# Patient Record
Sex: FEMALE | Race: WHITE | NOT HISPANIC OR LATINO | Employment: OTHER | URBAN - METROPOLITAN AREA
[De-identification: names, ages, dates, MRNs, and addresses within clinical notes are randomized per-mention and may not be internally consistent; named-entity substitution may affect disease eponyms.]

---

## 2017-02-11 ENCOUNTER — HOSPITAL ENCOUNTER (EMERGENCY)
Facility: HOSPITAL | Age: 57
Discharge: HOME | End: 2017-02-12
Attending: EMERGENCY MEDICINE
Payer: COMMERCIAL

## 2017-02-11 VITALS
WEIGHT: 160 LBS | DIASTOLIC BLOOD PRESSURE: 64 MMHG | TEMPERATURE: 98.1 F | OXYGEN SATURATION: 99 % | SYSTOLIC BLOOD PRESSURE: 110 MMHG | RESPIRATION RATE: 18 BRPM | HEART RATE: 61 BPM

## 2017-02-11 DIAGNOSIS — T50.901A OVERDOSE: Primary | ICD-10-CM

## 2017-02-11 DIAGNOSIS — F10.929 ALCOHOL INTOXICATION (HCC): ICD-10-CM

## 2017-02-11 DIAGNOSIS — F32.A DEPRESSION: ICD-10-CM

## 2017-02-11 LAB
ALBUMIN SERPL BCP-MCNC: 3.6 G/DL (ref 3.5–5)
ALP SERPL-CCNC: 84 U/L (ref 46–116)
ALT SERPL W P-5'-P-CCNC: 31 U/L (ref 12–78)
AMPHETAMINES SERPL QL SCN: NEGATIVE
ANION GAP SERPL CALCULATED.3IONS-SCNC: 9 MMOL/L (ref 4–13)
APAP SERPL-MCNC: <2 UG/ML (ref 10–30)
APTT PPP: 27 SECONDS (ref 24–33)
AST SERPL W P-5'-P-CCNC: 10 U/L (ref 5–45)
BARBITURATES UR QL: NEGATIVE
BASOPHILS # BLD AUTO: 0 THOUSANDS/ΜL (ref 0–0.1)
BASOPHILS NFR BLD AUTO: 1 % (ref 0–1)
BENZODIAZ UR QL: POSITIVE
BILIRUB DIRECT SERPL-MCNC: 0.1 MG/DL (ref 0–0.2)
BILIRUB SERPL-MCNC: 0.3 MG/DL (ref 0.2–1)
BILIRUB UR QL STRIP: NEGATIVE
BUN SERPL-MCNC: 10 MG/DL (ref 5–25)
CALCIUM SERPL-MCNC: 8.9 MG/DL (ref 8.3–10.1)
CHLORIDE SERPL-SCNC: 107 MMOL/L (ref 100–108)
CLARITY UR: CLEAR
CO2 SERPL-SCNC: 28 MMOL/L (ref 21–32)
COCAINE UR QL: NEGATIVE
COLOR UR: NORMAL
CREAT SERPL-MCNC: 0.87 MG/DL (ref 0.6–1.3)
EOSINOPHIL # BLD AUTO: 0.3 THOUSAND/ΜL (ref 0–0.61)
EOSINOPHIL NFR BLD AUTO: 4 % (ref 0–6)
ERYTHROCYTE [DISTWIDTH] IN BLOOD BY AUTOMATED COUNT: 13.1 % (ref 11.6–15.1)
ETHANOL EXG-MCNC: 0.06 MG/DL
ETHANOL SERPL-MCNC: 174 MG/DL (ref 0–3)
GFR SERPL CREATININE-BSD FRML MDRD: >60 ML/MIN/1.73SQ M
GLUCOSE SERPL-MCNC: 90 MG/DL (ref 65–140)
GLUCOSE UR STRIP-MCNC: NEGATIVE MG/DL
HCT VFR BLD AUTO: 46.9 % (ref 37–47)
HGB BLD-MCNC: 15.1 G/DL (ref 12–16)
HGB UR QL STRIP.AUTO: NEGATIVE
INR PPP: 1.06 (ref 0.86–1.16)
KETONES UR STRIP-MCNC: NEGATIVE MG/DL
LEUKOCYTE ESTERASE UR QL STRIP: NEGATIVE
LYMPHOCYTES # BLD AUTO: 3.4 THOUSANDS/ΜL (ref 0.6–4.47)
LYMPHOCYTES NFR BLD AUTO: 41 % (ref 14–44)
MCH RBC QN AUTO: 29.2 PG (ref 27–31)
MCHC RBC AUTO-ENTMCNC: 32.3 G/DL (ref 31.4–37.4)
MCV RBC AUTO: 90 FL (ref 82–98)
METHADONE UR QL: NEGATIVE
MONOCYTES # BLD AUTO: 0.4 THOUSAND/ΜL (ref 0.17–1.22)
MONOCYTES NFR BLD AUTO: 5 % (ref 4–12)
NEUTROPHILS # BLD AUTO: 4.2 THOUSANDS/ΜL (ref 1.85–7.62)
NEUTS SEG NFR BLD AUTO: 51 % (ref 43–75)
NITRITE UR QL STRIP: NEGATIVE
NRBC BLD AUTO-RTO: 0 /100 WBCS
OPIATES UR QL SCN: NEGATIVE
PCP UR QL: NEGATIVE
PH UR STRIP.AUTO: 6 [PH] (ref 5–9)
PLATELET # BLD AUTO: 399 THOUSANDS/UL (ref 130–400)
PMV BLD AUTO: 7 FL (ref 8.9–12.7)
POTASSIUM SERPL-SCNC: 3.8 MMOL/L (ref 3.5–5.3)
PROT SERPL-MCNC: 7.5 G/DL (ref 6.4–8.2)
PROT UR STRIP-MCNC: NEGATIVE MG/DL
PROTHROMBIN TIME: 11.1 SECONDS (ref 9.4–11.7)
RBC # BLD AUTO: 5.19 MILLION/UL (ref 4.2–5.4)
SALICYLATES SERPL-MCNC: 5.7 MG/DL (ref 3–20)
SODIUM SERPL-SCNC: 144 MMOL/L (ref 136–145)
SP GR UR STRIP.AUTO: <=1.005 (ref 1–1.03)
THC UR QL: NEGATIVE
UROBILINOGEN UR QL STRIP.AUTO: 0.2 E.U./DL
WBC # BLD AUTO: 8.4 THOUSAND/UL (ref 4.8–10.8)

## 2017-02-11 PROCEDURE — 85730 THROMBOPLASTIN TIME PARTIAL: CPT | Performed by: EMERGENCY MEDICINE

## 2017-02-11 PROCEDURE — 85610 PROTHROMBIN TIME: CPT | Performed by: EMERGENCY MEDICINE

## 2017-02-11 PROCEDURE — 80307 DRUG TEST PRSMV CHEM ANLYZR: CPT | Performed by: EMERGENCY MEDICINE

## 2017-02-11 PROCEDURE — 96372 THER/PROPH/DIAG INJ SC/IM: CPT

## 2017-02-11 PROCEDURE — 80076 HEPATIC FUNCTION PANEL: CPT | Performed by: EMERGENCY MEDICINE

## 2017-02-11 PROCEDURE — 82075 ASSAY OF BREATH ETHANOL: CPT | Performed by: EMERGENCY MEDICINE

## 2017-02-11 PROCEDURE — 80048 BASIC METABOLIC PNL TOTAL CA: CPT | Performed by: EMERGENCY MEDICINE

## 2017-02-11 PROCEDURE — 96361 HYDRATE IV INFUSION ADD-ON: CPT

## 2017-02-11 PROCEDURE — 36415 COLL VENOUS BLD VENIPUNCTURE: CPT | Performed by: EMERGENCY MEDICINE

## 2017-02-11 PROCEDURE — 81003 URINALYSIS AUTO W/O SCOPE: CPT | Performed by: EMERGENCY MEDICINE

## 2017-02-11 PROCEDURE — 85025 COMPLETE CBC W/AUTO DIFF WBC: CPT | Performed by: EMERGENCY MEDICINE

## 2017-02-11 PROCEDURE — 80320 DRUG SCREEN QUANTALCOHOLS: CPT | Performed by: EMERGENCY MEDICINE

## 2017-02-11 PROCEDURE — 80329 ANALGESICS NON-OPIOID 1 OR 2: CPT | Performed by: EMERGENCY MEDICINE

## 2017-02-11 PROCEDURE — 93005 ELECTROCARDIOGRAM TRACING: CPT | Performed by: EMERGENCY MEDICINE

## 2017-02-11 PROCEDURE — 96360 HYDRATION IV INFUSION INIT: CPT

## 2017-02-11 RX ORDER — PANTOPRAZOLE SODIUM 40 MG/1
40 TABLET, DELAYED RELEASE ORAL DAILY
COMMUNITY
End: 2017-12-12

## 2017-02-11 RX ORDER — METOPROLOL TARTRATE 100 MG/1
200 TABLET ORAL DAILY
COMMUNITY
End: 2018-06-06 | Stop reason: HOSPADM

## 2017-02-11 RX ORDER — LOSARTAN POTASSIUM 25 MG/1
25 TABLET ORAL DAILY
COMMUNITY
End: 2019-10-01 | Stop reason: HOSPADM

## 2017-02-11 RX ORDER — DOXEPIN HYDROCHLORIDE 25 MG/1
25 CAPSULE ORAL
COMMUNITY
End: 2017-07-28 | Stop reason: ALTCHOICE

## 2017-02-11 RX ORDER — GABAPENTIN 100 MG/1
100 CAPSULE ORAL 3 TIMES DAILY
COMMUNITY
End: 2017-07-28 | Stop reason: ALTCHOICE

## 2017-02-11 RX ORDER — OLANZAPINE 10 MG/1
10 INJECTION, POWDER, LYOPHILIZED, FOR SOLUTION INTRAMUSCULAR ONCE
Status: COMPLETED | OUTPATIENT
Start: 2017-02-11 | End: 2017-02-11

## 2017-02-11 RX ORDER — LORAZEPAM 2 MG/ML
2 INJECTION INTRAMUSCULAR ONCE
Status: COMPLETED | OUTPATIENT
Start: 2017-02-11 | End: 2017-02-11

## 2017-02-11 RX ORDER — OXYCODONE HCL 10 MG/1
10 TABLET, FILM COATED, EXTENDED RELEASE ORAL EVERY 12 HOURS PRN
COMMUNITY
End: 2017-07-28 | Stop reason: ALTCHOICE

## 2017-02-11 RX ORDER — DULOXETIN HYDROCHLORIDE 60 MG/1
60 CAPSULE, DELAYED RELEASE ORAL DAILY
COMMUNITY
End: 2017-12-12

## 2017-02-11 RX ORDER — ATORVASTATIN CALCIUM 80 MG/1
80 TABLET, FILM COATED ORAL DAILY
COMMUNITY
End: 2019-10-17

## 2017-02-11 RX ORDER — ALPRAZOLAM 1 MG/1
1 TABLET ORAL 4 TIMES DAILY
COMMUNITY
End: 2017-12-12

## 2017-02-11 RX ORDER — GEMFIBROZIL 600 MG/1
600 TABLET, FILM COATED ORAL
COMMUNITY
End: 2018-08-05 | Stop reason: ALTCHOICE

## 2017-02-11 RX ORDER — ZOLPIDEM TARTRATE 10 MG/1
10 TABLET ORAL
COMMUNITY
End: 2017-12-12

## 2017-02-11 RX ADMIN — OLANZAPINE 10 MG: 10 INJECTION, POWDER, FOR SOLUTION INTRAMUSCULAR at 21:53

## 2017-02-11 RX ADMIN — LORAZEPAM 2 MG: 2 INJECTION INTRAMUSCULAR; INTRAVENOUS at 21:53

## 2017-02-11 RX ADMIN — SODIUM CHLORIDE 1000 ML: 0.9 INJECTION, SOLUTION INTRAVENOUS at 16:29

## 2017-02-12 PROCEDURE — 99285 EMERGENCY DEPT VISIT HI MDM: CPT

## 2017-02-14 LAB
ATRIAL RATE: 62 BPM
P AXIS: 26 DEGREES
PR INTERVAL: 176 MS
QRS AXIS: -8 DEGREES
QRSD INTERVAL: 92 MS
QT INTERVAL: 456 MS
QTC INTERVAL: 462 MS
T WAVE AXIS: 60 DEGREES
VENTRICULAR RATE: 62 BPM

## 2017-07-28 ENCOUNTER — APPOINTMENT (EMERGENCY)
Dept: RADIOLOGY | Facility: HOSPITAL | Age: 57
End: 2017-07-28
Payer: COMMERCIAL

## 2017-07-28 ENCOUNTER — HOSPITAL ENCOUNTER (EMERGENCY)
Facility: HOSPITAL | Age: 57
Discharge: HOME | End: 2017-07-28
Attending: EMERGENCY MEDICINE
Payer: COMMERCIAL

## 2017-07-28 VITALS
OXYGEN SATURATION: 98 % | SYSTOLIC BLOOD PRESSURE: 123 MMHG | DIASTOLIC BLOOD PRESSURE: 66 MMHG | HEIGHT: 64 IN | WEIGHT: 135 LBS | RESPIRATION RATE: 18 BRPM | BODY MASS INDEX: 23.05 KG/M2 | TEMPERATURE: 97.7 F | HEART RATE: 96 BPM

## 2017-07-28 DIAGNOSIS — S52.123A RADIAL HEAD FRACTURE: Primary | ICD-10-CM

## 2017-07-28 PROCEDURE — 99283 EMERGENCY DEPT VISIT LOW MDM: CPT

## 2017-07-28 PROCEDURE — 73080 X-RAY EXAM OF ELBOW: CPT

## 2017-07-28 PROCEDURE — 73030 X-RAY EXAM OF SHOULDER: CPT

## 2017-07-28 RX ORDER — OXYCODONE HYDROCHLORIDE AND ACETAMINOPHEN 5; 325 MG/1; MG/1
1 TABLET ORAL EVERY 6 HOURS PRN
Qty: 15 TABLET | Refills: 0 | Status: SHIPPED | OUTPATIENT
Start: 2017-07-28 | End: 2017-12-12

## 2017-07-28 RX ORDER — IBUPROFEN 400 MG/1
400 TABLET ORAL ONCE
Status: COMPLETED | OUTPATIENT
Start: 2017-07-28 | End: 2017-07-28

## 2017-07-28 RX ORDER — OXYCODONE HYDROCHLORIDE AND ACETAMINOPHEN 5; 325 MG/1; MG/1
1 TABLET ORAL ONCE
Status: COMPLETED | OUTPATIENT
Start: 2017-07-28 | End: 2017-07-28

## 2017-07-28 RX ADMIN — IBUPROFEN 400 MG: 400 TABLET, FILM COATED ORAL at 18:31

## 2017-07-28 RX ADMIN — OXYCODONE HYDROCHLORIDE AND ACETAMINOPHEN 1 TABLET: 5; 325 TABLET ORAL at 18:45

## 2017-08-02 ENCOUNTER — APPOINTMENT (OUTPATIENT)
Dept: RADIOLOGY | Facility: CLINIC | Age: 57
End: 2017-08-02
Payer: COMMERCIAL

## 2017-08-02 ENCOUNTER — GENERIC CONVERSION - ENCOUNTER (OUTPATIENT)
Dept: OTHER | Facility: OTHER | Age: 57
End: 2017-08-02

## 2017-08-02 DIAGNOSIS — S52.132A: ICD-10-CM

## 2017-08-02 DIAGNOSIS — S42.402A CLOSED FRACTURE OF LOWER END OF LEFT HUMERUS: ICD-10-CM

## 2017-08-02 PROCEDURE — 73070 X-RAY EXAM OF ELBOW: CPT

## 2017-08-09 ENCOUNTER — ALLSCRIPTS OFFICE VISIT (OUTPATIENT)
Dept: OTHER | Facility: OTHER | Age: 57
End: 2017-08-09

## 2017-08-09 ENCOUNTER — APPOINTMENT (OUTPATIENT)
Dept: RADIOLOGY | Facility: CLINIC | Age: 57
End: 2017-08-09
Payer: COMMERCIAL

## 2017-08-09 DIAGNOSIS — S52.132A: ICD-10-CM

## 2017-08-09 PROCEDURE — 73070 X-RAY EXAM OF ELBOW: CPT

## 2017-08-10 ENCOUNTER — GENERIC CONVERSION - ENCOUNTER (OUTPATIENT)
Dept: OTHER | Facility: OTHER | Age: 57
End: 2017-08-10

## 2017-08-29 ENCOUNTER — APPOINTMENT (OUTPATIENT)
Dept: RADIOLOGY | Facility: CLINIC | Age: 57
End: 2017-08-29
Payer: COMMERCIAL

## 2017-08-29 ENCOUNTER — ALLSCRIPTS OFFICE VISIT (OUTPATIENT)
Dept: OTHER | Facility: OTHER | Age: 57
End: 2017-08-29

## 2017-08-29 DIAGNOSIS — S52.132A: ICD-10-CM

## 2017-08-29 PROCEDURE — 73080 X-RAY EXAM OF ELBOW: CPT

## 2017-09-27 ENCOUNTER — APPOINTMENT (OUTPATIENT)
Dept: RADIOLOGY | Facility: CLINIC | Age: 57
End: 2017-09-27
Payer: COMMERCIAL

## 2017-09-27 ENCOUNTER — GENERIC CONVERSION - ENCOUNTER (OUTPATIENT)
Dept: OTHER | Facility: OTHER | Age: 57
End: 2017-09-27

## 2017-09-27 DIAGNOSIS — S42.402A CLOSED FRACTURE OF LOWER END OF LEFT HUMERUS: ICD-10-CM

## 2017-09-27 PROCEDURE — 73080 X-RAY EXAM OF ELBOW: CPT

## 2017-11-15 DIAGNOSIS — S52.132A: ICD-10-CM

## 2017-12-12 ENCOUNTER — APPOINTMENT (INPATIENT)
Dept: RADIOLOGY | Facility: HOSPITAL | Age: 57
DRG: 014 | End: 2017-12-12
Payer: COMMERCIAL

## 2017-12-12 ENCOUNTER — APPOINTMENT (EMERGENCY)
Dept: RADIOLOGY | Facility: HOSPITAL | Age: 57
DRG: 014 | End: 2017-12-12
Payer: COMMERCIAL

## 2017-12-12 ENCOUNTER — HOSPITAL ENCOUNTER (INPATIENT)
Facility: HOSPITAL | Age: 57
LOS: 1 days | Discharge: HOME | DRG: 014 | End: 2017-12-13
Attending: EMERGENCY MEDICINE | Admitting: INTERNAL MEDICINE
Payer: COMMERCIAL

## 2017-12-12 DIAGNOSIS — R29.898 LOWER EXTREMITY WEAKNESS: ICD-10-CM

## 2017-12-12 DIAGNOSIS — R20.0 LEFT UPPER EXTREMITY NUMBNESS: ICD-10-CM

## 2017-12-12 DIAGNOSIS — R51.9 HEADACHE: ICD-10-CM

## 2017-12-12 DIAGNOSIS — R46.89 BEHAVIORAL CHANGE: Primary | ICD-10-CM

## 2017-12-12 DIAGNOSIS — R47.81 SLURRED SPEECH: ICD-10-CM

## 2017-12-12 PROBLEM — R53.1 WEAKNESS: Status: ACTIVE | Noted: 2017-12-12

## 2017-12-12 PROBLEM — I10 HYPERTENSION: Status: ACTIVE | Noted: 2017-12-12

## 2017-12-12 PROBLEM — M79.7 FIBROMYALGIA: Status: ACTIVE | Noted: 2017-12-12

## 2017-12-12 PROBLEM — E78.5 HYPERLIPIDEMIA: Status: ACTIVE | Noted: 2017-12-12

## 2017-12-12 LAB
ALBUMIN SERPL BCP-MCNC: 3.6 G/DL (ref 3.5–5)
ALP SERPL-CCNC: 74 U/L (ref 46–116)
ALT SERPL W P-5'-P-CCNC: 36 U/L (ref 12–78)
AMPHETAMINES SERPL QL SCN: NEGATIVE
ANION GAP SERPL CALCULATED.3IONS-SCNC: 8 MMOL/L (ref 4–13)
APTT PPP: 25 SECONDS (ref 23–35)
AST SERPL W P-5'-P-CCNC: 14 U/L (ref 5–45)
BACTERIA UR QL AUTO: ABNORMAL /HPF
BARBITURATES UR QL: NEGATIVE
BASOPHILS # BLD AUTO: 0 THOUSANDS/ΜL (ref 0–0.1)
BASOPHILS NFR BLD AUTO: 0 % (ref 0–1)
BENZODIAZ UR QL: POSITIVE
BILIRUB SERPL-MCNC: 0.2 MG/DL (ref 0.2–1)
BILIRUB UR QL STRIP: NEGATIVE
BUN SERPL-MCNC: 12 MG/DL (ref 5–25)
CALCIUM SERPL-MCNC: 8.5 MG/DL (ref 8.3–10.1)
CHLORIDE SERPL-SCNC: 107 MMOL/L (ref 100–108)
CK SERPL-CCNC: 76 U/L (ref 26–192)
CLARITY UR: ABNORMAL
CO2 SERPL-SCNC: 28 MMOL/L (ref 21–32)
COCAINE UR QL: NEGATIVE
COLOR UR: YELLOW
CREAT SERPL-MCNC: 0.96 MG/DL (ref 0.6–1.3)
EOSINOPHIL # BLD AUTO: 0.4 THOUSAND/ΜL (ref 0–0.61)
EOSINOPHIL NFR BLD AUTO: 4 % (ref 0–6)
ERYTHROCYTE [DISTWIDTH] IN BLOOD BY AUTOMATED COUNT: 13.9 % (ref 11.6–15.1)
ETHANOL SERPL-MCNC: <3 MG/DL (ref 0–3)
GFR SERPL CREATININE-BSD FRML MDRD: 66 ML/MIN/1.73SQ M
GLUCOSE SERPL-MCNC: 87 MG/DL (ref 65–140)
GLUCOSE SERPL-MCNC: 95 MG/DL (ref 65–140)
GLUCOSE UR STRIP-MCNC: NEGATIVE MG/DL
HCT VFR BLD AUTO: 42.3 % (ref 37–47)
HGB BLD-MCNC: 14.1 G/DL (ref 12–16)
HGB UR QL STRIP.AUTO: NEGATIVE
INR PPP: 1.07 (ref 0.86–1.16)
KETONES UR STRIP-MCNC: NEGATIVE MG/DL
LEUKOCYTE ESTERASE UR QL STRIP: ABNORMAL
LYMPHOCYTES # BLD AUTO: 3.6 THOUSANDS/ΜL (ref 0.6–4.47)
LYMPHOCYTES NFR BLD AUTO: 39 % (ref 14–44)
MCH RBC QN AUTO: 30.5 PG (ref 27–31)
MCHC RBC AUTO-ENTMCNC: 33.4 G/DL (ref 31.4–37.4)
MCV RBC AUTO: 91 FL (ref 82–98)
METHADONE UR QL: NEGATIVE
MONOCYTES # BLD AUTO: 0.5 THOUSAND/ΜL (ref 0.17–1.22)
MONOCYTES NFR BLD AUTO: 5 % (ref 4–12)
NEUTROPHILS # BLD AUTO: 4.8 THOUSANDS/ΜL (ref 1.85–7.62)
NEUTS SEG NFR BLD AUTO: 52 % (ref 43–75)
NITRITE UR QL STRIP: NEGATIVE
NON-SQ EPI CELLS URNS QL MICRO: ABNORMAL /HPF
NRBC BLD AUTO-RTO: 0 /100 WBCS
OPIATES UR QL SCN: NEGATIVE
PCP UR QL: NEGATIVE
PH UR STRIP.AUTO: 6 [PH] (ref 5–9)
PLATELET # BLD AUTO: 417 THOUSANDS/UL (ref 130–400)
PMV BLD AUTO: 7.3 FL (ref 8.9–12.7)
POTASSIUM SERPL-SCNC: 3.8 MMOL/L (ref 3.5–5.3)
PROT SERPL-MCNC: 6.8 G/DL (ref 6.4–8.2)
PROT UR STRIP-MCNC: NEGATIVE MG/DL
PROTHROMBIN TIME: 11.2 SECONDS (ref 9.4–11.7)
RBC # BLD AUTO: 4.63 MILLION/UL (ref 4.2–5.4)
RBC #/AREA URNS AUTO: ABNORMAL /HPF
SODIUM SERPL-SCNC: 143 MMOL/L (ref 136–145)
SP GR UR STRIP.AUTO: 1.01 (ref 1–1.03)
THC UR QL: NEGATIVE
TROPONIN I SERPL-MCNC: <0.02 NG/ML
UROBILINOGEN UR QL STRIP.AUTO: 0.2 E.U./DL
VIT B12 SERPL-MCNC: 465 PG/ML (ref 100–900)
WBC # BLD AUTO: 9.2 THOUSAND/UL (ref 4.8–10.8)
WBC #/AREA URNS AUTO: ABNORMAL /HPF

## 2017-12-12 PROCEDURE — 36415 COLL VENOUS BLD VENIPUNCTURE: CPT | Performed by: EMERGENCY MEDICINE

## 2017-12-12 PROCEDURE — 85025 COMPLETE CBC W/AUTO DIFF WBC: CPT | Performed by: EMERGENCY MEDICINE

## 2017-12-12 PROCEDURE — 72125 CT NECK SPINE W/O DYE: CPT

## 2017-12-12 PROCEDURE — 85610 PROTHROMBIN TIME: CPT | Performed by: EMERGENCY MEDICINE

## 2017-12-12 PROCEDURE — 70496 CT ANGIOGRAPHY HEAD: CPT

## 2017-12-12 PROCEDURE — 93005 ELECTROCARDIOGRAM TRACING: CPT | Performed by: EMERGENCY MEDICINE

## 2017-12-12 PROCEDURE — 70498 CT ANGIOGRAPHY NECK: CPT

## 2017-12-12 PROCEDURE — 85730 THROMBOPLASTIN TIME PARTIAL: CPT | Performed by: EMERGENCY MEDICINE

## 2017-12-12 PROCEDURE — 80320 DRUG SCREEN QUANTALCOHOLS: CPT | Performed by: EMERGENCY MEDICINE

## 2017-12-12 PROCEDURE — 82607 VITAMIN B-12: CPT | Performed by: EMERGENCY MEDICINE

## 2017-12-12 PROCEDURE — 71010 HB CHEST X-RAY 1 VIEW FRONTAL: CPT

## 2017-12-12 PROCEDURE — 80307 DRUG TEST PRSMV CHEM ANLYZR: CPT | Performed by: EMERGENCY MEDICINE

## 2017-12-12 PROCEDURE — 81001 URINALYSIS AUTO W/SCOPE: CPT | Performed by: EMERGENCY MEDICINE

## 2017-12-12 PROCEDURE — 82550 ASSAY OF CK (CPK): CPT | Performed by: EMERGENCY MEDICINE

## 2017-12-12 PROCEDURE — 99285 EMERGENCY DEPT VISIT HI MDM: CPT

## 2017-12-12 PROCEDURE — 70551 MRI BRAIN STEM W/O DYE: CPT

## 2017-12-12 PROCEDURE — 80053 COMPREHEN METABOLIC PANEL: CPT | Performed by: EMERGENCY MEDICINE

## 2017-12-12 PROCEDURE — 93005 ELECTROCARDIOGRAM TRACING: CPT

## 2017-12-12 PROCEDURE — 82948 REAGENT STRIP/BLOOD GLUCOSE: CPT

## 2017-12-12 PROCEDURE — 84484 ASSAY OF TROPONIN QUANT: CPT | Performed by: EMERGENCY MEDICINE

## 2017-12-12 PROCEDURE — 70450 CT HEAD/BRAIN W/O DYE: CPT

## 2017-12-12 PROCEDURE — 87081 CULTURE SCREEN ONLY: CPT | Performed by: INTERNAL MEDICINE

## 2017-12-12 PROCEDURE — 86618 LYME DISEASE ANTIBODY: CPT | Performed by: EMERGENCY MEDICINE

## 2017-12-12 RX ORDER — CLONAZEPAM 0.5 MG/1
0.5 TABLET ORAL 2 TIMES DAILY
COMMUNITY
End: 2017-12-26

## 2017-12-12 RX ORDER — GEMFIBROZIL 600 MG/1
600 TABLET, FILM COATED ORAL
Status: DISCONTINUED | OUTPATIENT
Start: 2017-12-12 | End: 2017-12-13 | Stop reason: HOSPADM

## 2017-12-12 RX ORDER — ONDANSETRON 2 MG/ML
4 INJECTION INTRAMUSCULAR; INTRAVENOUS EVERY 6 HOURS PRN
Status: DISCONTINUED | OUTPATIENT
Start: 2017-12-12 | End: 2017-12-13 | Stop reason: HOSPADM

## 2017-12-12 RX ORDER — METOPROLOL TARTRATE 100 MG/1
200 TABLET ORAL DAILY
Status: DISCONTINUED | OUTPATIENT
Start: 2017-12-13 | End: 2017-12-13 | Stop reason: HOSPADM

## 2017-12-12 RX ORDER — CLONAZEPAM 0.5 MG/1
0.5 TABLET ORAL 2 TIMES DAILY
Status: DISCONTINUED | OUTPATIENT
Start: 2017-12-12 | End: 2017-12-13 | Stop reason: HOSPADM

## 2017-12-12 RX ORDER — ACETAMINOPHEN 325 MG/1
650 TABLET ORAL EVERY 6 HOURS PRN
Status: DISCONTINUED | OUTPATIENT
Start: 2017-12-12 | End: 2017-12-13 | Stop reason: HOSPADM

## 2017-12-12 RX ORDER — GABAPENTIN 300 MG/1
300 CAPSULE ORAL 3 TIMES DAILY
Status: DISCONTINUED | OUTPATIENT
Start: 2017-12-12 | End: 2017-12-13 | Stop reason: HOSPADM

## 2017-12-12 RX ORDER — NICOTINE 21 MG/24HR
1 PATCH, TRANSDERMAL 24 HOURS TRANSDERMAL DAILY
Status: DISCONTINUED | OUTPATIENT
Start: 2017-12-13 | End: 2017-12-12

## 2017-12-12 RX ORDER — ATORVASTATIN CALCIUM 80 MG/1
80 TABLET, FILM COATED ORAL
Status: DISCONTINUED | OUTPATIENT
Start: 2017-12-12 | End: 2017-12-13 | Stop reason: HOSPADM

## 2017-12-12 RX ORDER — NICOTINE 21 MG/24HR
1 PATCH, TRANSDERMAL 24 HOURS TRANSDERMAL DAILY
Status: DISCONTINUED | OUTPATIENT
Start: 2017-12-12 | End: 2017-12-13 | Stop reason: HOSPADM

## 2017-12-12 RX ORDER — GABAPENTIN 600 MG/1
900 TABLET ORAL 3 TIMES DAILY
COMMUNITY

## 2017-12-12 RX ORDER — LOSARTAN POTASSIUM 50 MG/1
50 TABLET ORAL DAILY
Status: DISCONTINUED | OUTPATIENT
Start: 2017-12-13 | End: 2017-12-13 | Stop reason: HOSPADM

## 2017-12-12 RX ADMIN — GABAPENTIN 300 MG: 300 CAPSULE ORAL at 21:04

## 2017-12-12 RX ADMIN — ATORVASTATIN CALCIUM 80 MG: 80 TABLET, FILM COATED ORAL at 17:54

## 2017-12-12 RX ADMIN — NICOTINE 1 PATCH: 21 PATCH, EXTENDED RELEASE TRANSDERMAL at 21:05

## 2017-12-12 RX ADMIN — GEMFIBROZIL 600 MG: 600 TABLET, FILM COATED ORAL at 18:44

## 2017-12-12 RX ADMIN — CLONAZEPAM 0.5 MG: 0.5 TABLET ORAL at 17:54

## 2017-12-12 RX ADMIN — ACETAMINOPHEN 650 MG: 325 TABLET, FILM COATED ORAL at 17:54

## 2017-12-12 RX ADMIN — IOHEXOL 85 ML: 350 INJECTION, SOLUTION INTRAVENOUS at 20:29

## 2017-12-12 NOTE — ED PROVIDER NOTES
History  Chief Complaint   Patient presents with    Numbness      states patient has been" acting strange like she is drunk and speech slurred " since 12/9, episode of same last week  Patient c/o numbness left arm starting this morning  States has fallen x 4 in the past few days  History provided by:  Patient and spouse   used: No    Altered Mental Status   Presenting symptoms: behavior changes    Presenting symptoms: no confusion    Severity:  Moderate  Most recent episode:  Yesterday  Episode history:  Multiple  Timing:  Intermittent  Progression:  Waxing and waning  Chronicity:  New (recurrent in last 2-3 weeks)  Context: drug use and recent change in medication    Context: not alcohol use, not dementia, not head injury, not homeless, taking medications as prescribed, not nursing home resident, not recent illness and not recent infection    Context comment:  Multiple Rx meds, denies illicit drug use, recent change in benzos 2 wks ago  Associated symptoms: headaches, slurred speech and weakness    Associated symptoms: no abdominal pain, normal movement, no agitation, no bladder incontinence, no decreased appetite, no depression, no difficulty breathing, no eye deviation, no fever, no hallucinations, no light-headedness, no nausea, no palpitations, no rash, no seizures, no suicidal behavior, no visual change and no vomiting    Associated symptoms comment:  B/l LE weakness (feels like my legs are giving out), has fallen multiple times in the last couple of weeks    LUE numbness intermittent, last episode today    Stopped yesterday by police for "eradict driving" considered to be acutely intoxicated, but neg EtOH and never tested for drugs, pt denies behavior      Prior to Admission Medications   Prescriptions Last Dose Informant Patient Reported? Taking?    DULoxetine HCl (CYMBALTA PO) 12/11/2017 at Unknown time Self Yes Yes   Sig: Take 90 mg by mouth daily   atorvastatin (LIPITOR) 80 mg tablet 2017 at Unknown time  Yes Yes   Sig: Take 80 mg by mouth daily   clonazePAM (KlonoPIN) 0 5 mg tablet 2017 at Unknown time Self Yes Yes   Sig: Take 0 5 mg by mouth 2 (two) times a day   gabapentin (NEURONTIN) 600 MG tablet 2017 at Unknown time Self Yes Yes   Sig: Take 300 mg by mouth 3 (three) times a day   gemfibrozil (LOPID) 600 mg tablet 2017 at Unknown time  Yes Yes   Sig: Take 600 mg by mouth 2 (two) times a day before meals   losartan (COZAAR) 25 mg tablet 2017 at Unknown time Self Yes Yes   Sig: Take 50 mg by mouth daily     metoprolol tartrate (LOPRESSOR) 100 mg tablet 2017 at Unknown time Self Yes Yes   Sig: Take 200 mg by mouth daily        Facility-Administered Medications: None       Past Medical History:   Diagnosis Date    Back injury     Bell's palsy     Chronic pain     Closed left arm fracture     Fibromyalgia     GERD (gastroesophageal reflux disease)     Hyperlipidemia     Hypertension     Lyme disease     Myocardial infarction     Cardiac catheterization 2 years ago showed 30% blockage in 1 of the blood vessels       Past Surgical History:   Procedure Laterality Date     SECTION      CHOLECYSTECTOMY         Family History   Problem Relation Age of Onset    Heart attack Mother     Heart attack Father      I have reviewed and agree with the history as documented  Social History   Substance Use Topics    Smoking status: Current Every Day Smoker     Packs/day: 1 50     Years: 40 00    Smokeless tobacco: Never Used    Alcohol use Yes      Comment: occasional        Review of Systems   Constitutional: Positive for diaphoresis and fatigue  Negative for chills, decreased appetite and fever  HENT: Negative for congestion, dental problem, ear pain, facial swelling, sinus pain, sinus pressure, sore throat, trouble swallowing and voice change  Eyes: Negative for photophobia, pain, redness and visual disturbance  Respiratory: Negative for cough, chest tightness and shortness of breath  Cardiovascular: Negative for chest pain, palpitations and leg swelling  Gastrointestinal: Negative for abdominal distention, abdominal pain, nausea and vomiting  Endocrine: Negative for polydipsia, polyphagia and polyuria  Genitourinary: Negative for bladder incontinence, difficulty urinating, dysuria and flank pain  Musculoskeletal: Positive for back pain  Negative for neck pain and neck stiffness  Chronic lower back pain   Skin: Negative for pallor, rash and wound  Allergic/Immunologic: Negative for immunocompromised state  Neurological: Positive for facial asymmetry, speech difficulty, weakness and headaches  Negative for dizziness, tremors, seizures, syncope, light-headedness and numbness  Slurred speech at times (sounds like she's drunk" per  now normal  LE weakness b/l  LUE numbness  Rt facial lid droop, chronic s/t Bell's Palsy, Lyme  Intermittent headaches     Psychiatric/Behavioral: Positive for behavioral problems and decreased concentration  Negative for agitation, confusion and hallucinations  The patient is nervous/anxious  Physical Exam  ED Triage Vitals [12/12/17 1345]   Temperature Pulse Respirations Blood Pressure SpO2   98 1 °F (36 7 °C) 66 18 (!) 174/95 99 %      Temp Source Heart Rate Source Patient Position - Orthostatic VS BP Location FiO2 (%)   Tympanic Monitor Lying Right arm --      Pain Score       --           Orthostatic Vital Signs  Vitals:    12/12/17 1500 12/12/17 1515 12/12/17 1545 12/12/17 1608   BP:    168/76   Pulse: 64 56 58 62   Patient Position - Orthostatic VS:    Sitting       Physical Exam   Constitutional: She is oriented to person, place, and time  She appears well-developed and well-nourished  No distress  HENT:   Head: Normocephalic and atraumatic     Mouth/Throat: Oropharynx is clear and moist    Eyes: EOM are normal  Pupils are equal, round, and reactive to light  3mm dilated pupils b/l   Neck: Normal range of motion  Neck supple  No JVD present  No tracheal deviation present  No thyromegaly present  Cervical spine ttp, midline w/o stepoffs, no e/e/e/c/s/l/a/deformity, rash    Cervical collar applied given recent falls   Cardiovascular: Normal rate, regular rhythm and intact distal pulses  Pulmonary/Chest: Effort normal and breath sounds normal  No stridor  Abdominal: Soft  There is no tenderness  Musculoskeletal: Normal range of motion  She exhibits no edema, tenderness or deformity  4+ diffuse muscle strength   Lymphadenopathy:     She has no cervical adenopathy  Neurological: She is alert and oriented to person, place, and time  No cranial nerve deficit  Coordination normal    Skin: Skin is warm and dry  She is not diaphoretic  Psychiatric:   Mod anxious   Nursing note and vitals reviewed  ED Medications  Medications - No data to display    Diagnostic Studies  Results Reviewed     Procedure Component Value Units Date/Time    Vitamin B12 [[id number]]     Lab Status:  No result Specimen:  Blood     RPR [[id number]]     Lab Status:  No result Specimen:  Blood     Urine Microscopic [[id number]]  (Abnormal) Collected:  12/12/17 1453    Lab Status:  Final result Specimen:  Urine from Urine, Clean Catch Updated:  12/12/17 1527     RBC, UA None Seen /hpf      WBC, UA 4-10 (A) /hpf      Epithelial Cells Innumerable (A) /hpf      Bacteria, UA Occasional /hpf     Rapid drug screen, urine [[id number]]  (Abnormal) Collected:  12/12/17 1455    Lab Status:  Final result Specimen:  Urine from Urine, Clean Catch Updated:  12/12/17 1522     Amph/Meth UR Negative     Barbiturate Ur Negative     Benzodiazepine Urine Positive (A)     Cocaine Urine Negative     Methadone Urine Negative     Opiate Urine Negative     PCP Ur Negative     THC Urine Negative    Narrative:         Presumptive report   If requested, specimen will be sent to reference lab for confirmation  FOR MEDICAL PURPOSES ONLY  IF CONFIRMATION NEEDED PLEASE CONTACT THE LAB WITHIN 5 DAYS  Drug Screen Cutoff Levels:  AMPHETAMINE/METHAMPHETAMINES  1000 ng/mL  BARBITURATES     200 ng/mL  BENZODIAZEPINES     200 ng/mL  COCAINE      300 ng/mL  METHADONE      300 ng/mL  OPIATES      300 ng/mL  PHENCYCLIDINE     25 ng/mL  THC       50 ng/mL    UA w Reflex to Microscopic w Reflex to Culture [[id number]]  (Abnormal) Collected:  12/12/17 1453    Lab Status:  Final result Specimen:  Urine from Urine, Clean Catch Updated:  12/12/17 1504     Color, UA Yellow     Clarity, UA Slightly Cloudy     Specific [place], UA 1 010     pH, UA 6 0     Leukocytes, UA Trace (A)     Nitrite, UA Negative     Protein, UA Negative mg/dl      Glucose, UA Negative mg/dl      Ketones, UA Negative mg/dl      Urobilinogen, UA 0 2 E U /dl      Bilirubin, UA Negative     Blood, UA Negative    Comprehensive metabolic panel [[id number]] Collected:  12/12/17 1358    Lab Status:  Final result Specimen:  Blood from Arm, Right Updated:  12/12/17 1434     Sodium 143 mmol/L      Potassium 3 8 mmol/L      Chloride 107 mmol/L      CO2 28 mmol/L      Anion Gap 8 mmol/L      BUN 12 mg/dL      Creatinine 0 96 mg/dL      Glucose 87 mg/dL      Calcium 8 5 mg/dL      AST 14 U/L      ALT 36 U/L      Alkaline Phosphatase 74 U/L      Total Protein 6 8 g/dL      Albumin 3 6 g/dL      Total Bilirubin 0 20 mg/dL      eGFR 66 ml/min/1 73sq m     Narrative:         National Kidney Disease Education Program recommendations are as follows:  GFR calculation is accurate only with a steady state creatinine  Chronic Kidney disease less than 60 ml/min/1 73 sq  meters  Kidney failure less than 15 ml/min/1 73 sq  meters      Troponin I [[id number]]  (Normal) Collected:  12/12/17 1358    Lab Status:  Final result Specimen:  Blood from Arm, Right Updated:  12/12/17 1434     Troponin I <0 02 ng/mL     Narrative:         [employer] Chemistry analyzer 99% cutoff is > 0 04 ng/mL in network labs    o cTnI 99% cutoff is useful only when applied to patients in the clinical setting of myocardial ischemia  o cTnI 99% cutoff should be interpreted in the context of clinical history, ECG findings and possibly cardiac imaging to establish correct diagnosis  o cTnI 99% cutoff may be suggestive but clearly not indicative of a coronary event without the clinical setting of myocardial ischemia  CK Total with Reflex CKMB [[id number]]  (Normal) Collected:  12/12/17 1358    Lab Status:  Final result Specimen:  Blood from Arm, Right Updated:  12/12/17 1434     Total CK 76 U/L     Ethanol [[id number]]  (Normal) Collected:  12/12/17 1358    Lab Status:  Final result Specimen:  Blood from Arm, Right Updated:  12/12/17 1433     Ethanol Lvl <3 mg/dL     Protime-INR [[id number]]  (Normal) Collected:  12/12/17 1358    Lab Status:  Final result Specimen:  Blood from Arm, Right Updated:  12/12/17 1432     Protime 11 2 seconds      INR 1 07    APTT [[id number]]  (Normal) Collected:  12/12/17 1358    Lab Status:  Final result Specimen:  Blood from Arm, Right Updated:  12/12/17 1432     PTT 25 seconds     Narrative:          Therapeutic Heparin Range = 60-90 seconds    CBC and differential [[id number]]  (Abnormal) Collected:  12/12/17 1358    Lab Status:  Final result Specimen:  Blood from Arm, Right Updated:  12/12/17 1412     WBC 9 20 Thousand/uL      RBC 4 63 Million/uL      Hemoglobin 14 1 g/dL      Hematocrit 42 3 %      MCV 91 fL      MCH 30 5 pg      MCHC 33 4 g/dL      RDW 13 9 %      MPV 7 3 (L) fL      Platelets 672 (H) Thousands/uL      nRBC 0 /100 WBCs      Neutrophils Relative 52 %      Lymphocytes Relative 39 %      Monocytes Relative 5 %      Eosinophils Relative 4 %      Basophils Relative 0 %      Neutrophils Absolute 4 80 Thousands/µL      Lymphocytes Absolute 3 60 Thousands/µL      Monocytes Absolute 0 50 Thousand/µL      Eosinophils Absolute 0 40 Thousand/µL      Basophils Absolute 0 00 Thousands/µL Lyme Antibody Profile with reflex to [facility] [[id number]] Collected:  12/12/17 1358    Lab Status: In process Specimen:  Blood from Arm, Right Updated:  12/12/17 1409    Fingerstick Glucose (POCT) [[id number]]  (Normal) Collected:  12/12/17 1344    Lab Status:  Final result Updated:  12/12/17 1354     POC Glucose 95 mg/dl                  XR chest 1 view   Final Result by JAEL Buck MD (12/12 1450)      No active pulmonary disease  Workstation performed: [id number]         CT spine cervical without contrast   Final Result by Sandip Tillman MD (12/12 1442)      No cervical spine fracture or traumatic malalignment  Workstation performed: [id number]         CT head without contrast   Final Result by Sandip Tillman MD (12/12 1435)      No acute intracranial abnormality           Workstation performed: [id number]         MRI brain wo contrast    (Results Pending)              Procedures  Procedures       Phone Contacts  ED Phone Contact    ED Course  ED Course              NIH Stroke Scale    Flowsheet Row Most Recent Value   Level of Consciousness (1a )  0 Filed at: 12/12/2017 1412   LOC Questions (1b )  0 Filed at: 12/12/2017 1412   LOC Commands (1c )  0 Filed at: 12/12/2017 1412   Best Gaze (2 )  0 Filed at: 12/12/2017 1412   Visual (3 )  0 Filed at: 12/12/2017 1412   Facial Palsy (4 )  0 Filed at: 12/12/2017 1412   Motor Arm, Left (5a )  0 Filed at: 12/12/2017 1412   Motor Arm, Right (5b )  0 Filed at: 12/12/2017 1412   Motor Leg, Left (6a )  0 Filed at: 12/12/2017 1412   Motor Leg, Right (6b )  0 Filed at: 12/12/2017 1412   Limb Ataxia (7 )  0 Filed at: 12/12/2017 1412   Sensory (8 )  0 Filed at: 12/12/2017 1412   Best Language (9 )  0 Filed at: 12/12/2017 1412   Dysarthria (10 )  0 Filed at: 12/12/2017 1412   Extinction and Inattention (11 ) (Formerly Neglect)  0 Filed at: 12/12/2017 1412   Total  0 Filed at: 12/12/2017 1412                        MDM  Number of Diagnoses or Management Options  Behavioral change: new and requires workup  Headache: new and requires workup  Left upper extremity numbness: new and requires workup  Lower extremity weakness: new and requires workup  Diagnosis management comments: R/o CVA, TIA, Lyme, metabolic/electrolyte imbalance, vit b12 deficiency, syphillis, med effec, intox  - POC glucose  - Orthostatics  - EKG  - Labs, EtOH level  - UA, UTox  - CXR  - CTH  - CT cervical spine  - IVF  - Symptomatic management  - Re-eval, dispo        Amount and/or Complexity of Data Reviewed  Clinical lab tests: ordered and reviewed  Tests in the radiology section of CPT®: ordered and reviewed  Tests in the medicine section of CPT®: reviewed and ordered  Decide to obtain previous medical records or to obtain history from someone other than the patient: yes  Obtain history from someone other than the patient: yes (Spouse)  Review and summarize past medical records: yes  Discuss the patient with other providers: yes (Dr Valeriy Faulkner - case discussed, ok with adm, recommend adding B12 and RPR levels    Dr Marylene Francisco - agrees with adm r/o stroke, will consult as inpt; lyme infection discussed, recommend waiting on titer results, no abx and no LP at this time)  Independent visualization of images, tracings, or specimens: yes    Risk of Complications, Morbidity, and/or Mortality  Presenting problems: moderate  Diagnostic procedures: moderate  Management options: moderate    Patient Progress  Patient progress: stable    CritCare Time    Disposition  Final diagnoses:   Behavioral change   Lower extremity weakness   Left upper extremity numbness   Headache     Time reflects when diagnosis was documented in both MDM as applicable and the Disposition within this note     Time User Action Codes Description Comment    12/12/2017  3:34 PM Sasha Sarah [R46 89] Behavioral change     12/12/2017  3:34 PM Sasha Sarah [R29 898] Lower extremity weakness     12/12/2017  3:34 PM Alonso Cunha [R20 0] Left upper extremity numbness     12/12/2017  3:35 PM Nlesy Cunning Add [R51] Headache     12/12/2017  4:09 PM Josh Yao Add [R47 81] Slurred speech     12/12/2017  4:09 PM Josh Yao Modify [R47 81] Slurred speech       ED Disposition     ED Disposition Condition Comment    Admit  Case was discussed with Dr Alexus Estevez and Dr Roseline Medeiros and the patient's admission status was agreed to be Med to the service of Dr Arleen Horvath    None       Patient's Medications   Discharge Prescriptions    No medications on file     No discharge procedures on file      ED Provider  Electronically Signed by           Andrzej Mcginnis MD  12/12/17 2059
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
## 2017-12-13 ENCOUNTER — APPOINTMENT (INPATIENT)
Dept: RADIOLOGY | Facility: HOSPITAL | Age: 57
DRG: 014 | End: 2017-12-13
Payer: COMMERCIAL

## 2017-12-13 ENCOUNTER — APPOINTMENT (INPATIENT)
Dept: NON INVASIVE DIAGNOSTICS | Facility: HOSPITAL | Age: 57
DRG: 014 | End: 2017-12-13
Payer: COMMERCIAL

## 2017-12-13 VITALS
WEIGHT: 145 LBS | HEART RATE: 66 BPM | HEIGHT: 65 IN | SYSTOLIC BLOOD PRESSURE: 135 MMHG | RESPIRATION RATE: 18 BRPM | OXYGEN SATURATION: 95 % | DIASTOLIC BLOOD PRESSURE: 62 MMHG | TEMPERATURE: 98.5 F | BODY MASS INDEX: 24.16 KG/M2

## 2017-12-13 LAB
ANION GAP SERPL CALCULATED.3IONS-SCNC: 8 MMOL/L (ref 4–13)
B BURGDOR IGG SER IA-ACNC: 0.18
B BURGDOR IGM SER IA-ACNC: 0.44
BUN SERPL-MCNC: 15 MG/DL (ref 5–25)
CALCIUM SERPL-MCNC: 8.3 MG/DL (ref 8.3–10.1)
CHLORIDE SERPL-SCNC: 107 MMOL/L (ref 100–108)
CHOLEST SERPL-MCNC: 176 MG/DL (ref 50–200)
CO2 SERPL-SCNC: 27 MMOL/L (ref 21–32)
CREAT SERPL-MCNC: 0.89 MG/DL (ref 0.6–1.3)
GFR SERPL CREATININE-BSD FRML MDRD: 73 ML/MIN/1.73SQ M
GLUCOSE SERPL-MCNC: 126 MG/DL (ref 65–140)
HDLC SERPL-MCNC: 31 MG/DL (ref 40–60)
LDLC SERPL CALC-MCNC: 105 MG/DL (ref 0–100)
POTASSIUM SERPL-SCNC: 3.6 MMOL/L (ref 3.5–5.3)
SODIUM SERPL-SCNC: 142 MMOL/L (ref 136–145)
T4 FREE SERPL-MCNC: 0.8 NG/DL (ref 0.76–1.46)
TRIGL SERPL-MCNC: 201 MG/DL
TSH SERPL DL<=0.05 MIU/L-ACNC: 0.68 UIU/ML (ref 0.36–3.74)

## 2017-12-13 PROCEDURE — A9585 GADOBUTROL INJECTION: HCPCS | Performed by: INTERNAL MEDICINE

## 2017-12-13 PROCEDURE — 97535 SELF CARE MNGMENT TRAINING: CPT

## 2017-12-13 PROCEDURE — G8979 MOBILITY GOAL STATUS: HCPCS

## 2017-12-13 PROCEDURE — G8988 SELF CARE GOAL STATUS: HCPCS

## 2017-12-13 PROCEDURE — 70552 MRI BRAIN STEM W/DYE: CPT

## 2017-12-13 PROCEDURE — 93306 TTE W/DOPPLER COMPLETE: CPT

## 2017-12-13 PROCEDURE — 97110 THERAPEUTIC EXERCISES: CPT

## 2017-12-13 PROCEDURE — 84439 ASSAY OF FREE THYROXINE: CPT | Performed by: NURSE PRACTITIONER

## 2017-12-13 PROCEDURE — 97162 PT EVAL MOD COMPLEX 30 MIN: CPT

## 2017-12-13 PROCEDURE — 97167 OT EVAL HIGH COMPLEX 60 MIN: CPT

## 2017-12-13 PROCEDURE — 84443 ASSAY THYROID STIM HORMONE: CPT | Performed by: NURSE PRACTITIONER

## 2017-12-13 PROCEDURE — G8987 SELF CARE CURRENT STATUS: HCPCS

## 2017-12-13 PROCEDURE — 80061 LIPID PANEL: CPT | Performed by: INTERNAL MEDICINE

## 2017-12-13 PROCEDURE — 80048 BASIC METABOLIC PNL TOTAL CA: CPT | Performed by: INTERNAL MEDICINE

## 2017-12-13 PROCEDURE — G8978 MOBILITY CURRENT STATUS: HCPCS

## 2017-12-13 RX ORDER — HYDROCODONE BITARTRATE AND ACETAMINOPHEN 5; 325 MG/1; MG/1
1 TABLET ORAL EVERY 6 HOURS PRN
Status: DISCONTINUED | OUTPATIENT
Start: 2017-12-13 | End: 2017-12-13 | Stop reason: HOSPADM

## 2017-12-13 RX ORDER — CLOPIDOGREL BISULFATE 75 MG/1
75 TABLET ORAL DAILY
Qty: 30 TABLET | Refills: 0 | Status: SHIPPED | OUTPATIENT
Start: 2017-12-14 | End: 2018-09-11

## 2017-12-13 RX ORDER — NICOTINE 21 MG/24HR
1 PATCH, TRANSDERMAL 24 HOURS TRANSDERMAL DAILY
Qty: 28 PATCH | Refills: 0 | Status: SHIPPED | OUTPATIENT
Start: 2017-12-14 | End: 2017-12-23 | Stop reason: ALTCHOICE

## 2017-12-13 RX ORDER — CLOPIDOGREL BISULFATE 75 MG/1
75 TABLET ORAL DAILY
Status: DISCONTINUED | OUTPATIENT
Start: 2017-12-13 | End: 2017-12-13 | Stop reason: HOSPADM

## 2017-12-13 RX ADMIN — NICOTINE 1 PATCH: 21 PATCH, EXTENDED RELEASE TRANSDERMAL at 09:18

## 2017-12-13 RX ADMIN — GEMFIBROZIL 600 MG: 600 TABLET, FILM COATED ORAL at 06:09

## 2017-12-13 RX ADMIN — GABAPENTIN 300 MG: 300 CAPSULE ORAL at 09:21

## 2017-12-13 RX ADMIN — ENOXAPARIN SODIUM 40 MG: 40 INJECTION SUBCUTANEOUS at 09:17

## 2017-12-13 RX ADMIN — ACETAMINOPHEN 650 MG: 325 TABLET, FILM COATED ORAL at 13:16

## 2017-12-13 RX ADMIN — HYDROCODONE BITARTRATE AND ACETAMINOPHEN 1 TABLET: 5; 325 TABLET ORAL at 15:05

## 2017-12-13 RX ADMIN — CLONAZEPAM 0.5 MG: 0.5 TABLET ORAL at 09:17

## 2017-12-13 RX ADMIN — CLONAZEPAM 0.5 MG: 0.5 TABLET ORAL at 17:09

## 2017-12-13 RX ADMIN — METOPROLOL TARTRATE 200 MG: 100 TABLET ORAL at 09:17

## 2017-12-13 RX ADMIN — GABAPENTIN 300 MG: 300 CAPSULE ORAL at 15:05

## 2017-12-13 RX ADMIN — CLOPIDOGREL BISULFATE 75 MG: 75 TABLET ORAL at 17:09

## 2017-12-13 RX ADMIN — LOSARTAN POTASSIUM 50 MG: 50 TABLET, FILM COATED ORAL at 09:18

## 2017-12-13 RX ADMIN — GEMFIBROZIL 600 MG: 600 TABLET, FILM COATED ORAL at 15:05

## 2017-12-13 RX ADMIN — DULOXETINE HYDROCHLORIDE 90 MG: 60 CAPSULE, DELAYED RELEASE ORAL at 09:17

## 2017-12-13 RX ADMIN — ATORVASTATIN CALCIUM 80 MG: 80 TABLET, FILM COATED ORAL at 17:09

## 2017-12-13 RX ADMIN — GADOBUTROL 7 ML: 604.72 INJECTION INTRAVENOUS at 16:14

## 2017-12-13 NOTE — PLAN OF CARE
Activity Intolerance/Impaired Mobility     Mobility/activity is maintained at optimum level for patient Progressing        Communication Impairment     Ability to express needs and understand communication Jv Engel Andreialmita Discharge to home or other facility with appropriate resources Progressing        INFECTION - ADULT     Absence or prevention of progression during hospitalization Progressing        Knowledge Deficit     Patient/family/caregiver demonstrates understanding of disease process, treatment plan, medications, and discharge instructions Progressing        Neurological Deficit     Neurological status is stable or improving Progressing        PAIN - ADULT     Verbalizes/displays adequate comfort level or baseline comfort level Progressing        Potential for Falls     Patient will remain free of falls Progressing        SAFETY ADULT     Maintain or return to baseline ADL function Progressing     Maintain or return mobility status to optimal level Progressing

## 2017-12-13 NOTE — SOCIAL WORK
Met with pt  Resides with her boyfriend Massiel Villanueva  Home is multi-level with 4 steps to enter and flight to the second floor  Has bathroom on bilat floors  Pt uses spc for ambulation  Has been independent and drives  Explained role of cm, no anticipated d/c needs  CM reviewed d/c planning process including the following: identifying help at home, patient preference for d/c planning needs, and availability of the treatment team to discuss questions or concerns patient and/or family may have regarding understanding of medications and recognizing signs and symptoms once discharged  CM also encouraged patient to follow up with all recommended appointments after discharge  Patient advised of importance for patient and family to participate in managing patient's medical well being

## 2017-12-13 NOTE — DISCHARGE SUMMARY
Discharge Summary - [facility]    Patient Information: Mala Powell 65 y o  female MRN: [id number]  Unit/Bed#: [address] [address] Encounter: [id number]    Discharging Physician / Practitioner: Ramiro Luong MD  PCP: Isabell Joel DO  Admission Date: 12/12/2017  Discharge Date: 12/13/17    Reason for Admission: Numbness ( states patient has been" acting strange like she is drunk and speech slurred " since 12/9, episode of same last week  Patient c/o numbness left arm starting this morning  States has fallen x 4 in the past few days  )      Discharge Diagnoses:     Principal Problem:    Weakness  Active Problems:    Slurred speech    Fibromyalgia    Hypertension    Hyperlipidemia  Resolved Problems:    * No resolved hospital problems  *  1  Possible right basal ganglia acute stroke  2  Hypertension  3  Hyperlipidemia  4  Fibromyalgia  5  Bell's palsy  6  History of Lyme disease    Consultations During Hospital Stay:  Adrian Elmore  Procedures Performed:     Echo showed EF of 60-65 percent, no regional wall motion abnormalities, grade 2 diastolic dysfunction    Imaging while in hospital:    Cta Head And Neck W Wo Contrast    Result Date: 12/12/2017    Impression: No signs of intracranial vascular occlusive disease or aneurysm formation  No hemodynamically significant stenosis within the cervical carotids by NASCET criteria  Patent bilateral cervical vertebral arteries  Workstation performed: [id number]     Ct Head Without Contrast    Result Date: 12/12/2017    Impression: No acute intracranial abnormality  Workstation performed: [id number]     Ct Spine Cervical Without Contrast    Result Date: 12/12/2017  Impression: No cervical spine fracture or traumatic malalignment       Workstation performed: [id number]     Mri Brain Wo Contrast    Result Date: 12/12/2017    Impression: There is questionable diffusion signal along the right posterior limb internal capsule with questionable decreased signal on the ADC map  However, this is not seen on the T2 or FLAIR sequences and therefore is likely artifactual  Correlate clinically and  follow-up  If symptoms persist or worsen, follow-up MRI or CTA brain can be obtained  Workstation performed: [id number]     Mri Brain W Contrast    Result Date: 12/13/2017  Impression: No enhancing lesion identified  Workstation performed: [id number]     Xr Chest 1 View    Result Date: 12/12/2017    Impression: No active pulmonary disease  Workstation performed: [id number]       Test Results Pending at Discharge (will require follow up):   · As per After Visit Summary     Outpatient Tests Requested:  · EEG as outpatient and follow up with Dr Nori Baez in 4 weeks    Complications:  None    Hospital Course:     Humble Christie is a 65 y o  female patient with past medical history of hypertension, hyperlipidemia, MI, fibromyalgia, Bell's palsy who originally presented to the hospital on 12/12/2017 due to intermittent episodes of dizziness with bilateral lower extremity weakness and left arm tingling and numbness  Patient also had episodes of slurred speech  Patient was evaluated by Neurology  Patient initially had MRI of the brain without contrast which showed diffusion signal along the right posterior limb internal capsule with questionable decreased signal on the ADC map  Patient also had CTA of the brain and neck which was negative  Patient also had echo which was normal   Later patient underwent MRI with contrast which did not show any acute abnormality  Patient was seen by Neurology  It was thought patient could have had an acute right basal ganglia stroke  Patient will be changed from aspirin to Plavix 75 milligram daily  Patient advised to follow up outpatient for EEG  Patient also advised not to drive till she gets EEG and cleared by Neurology      Condition at Discharge: stable     Discharge Day Visit / Exam:     Subjective:  Patient is feeling better today  Denies any dizziness or weakness  Vitals: Blood Pressure: 135/62 (12/13/17 1100)  Pulse: 66 (12/13/17 1100)  Temperature: 98 5 °F (36 9 °C) (12/13/17 1100)  Temp Source: Oral (12/13/17 1100)  Respirations: 18 (12/13/17 1100)  Height: 5' 5" (165 1 cm) (12/12/17 1345)  Weight - Scale: 65 8 kg (145 lb) (12/12/17 1345)  SpO2: 95 % (12/13/17 1100)  Exam:   Physical Exam   Constitutional: No distress  HENT:   Head: Normocephalic and atraumatic  Nose: Nose normal    Eyes: Conjunctivae and EOM are normal  Pupils are equal, round, and reactive to light  Neck: Normal range of motion  Neck supple  No JVD present  Cardiovascular: Normal rate, regular rhythm and normal heart sounds  Exam reveals no gallop and no friction rub  No murmur heard  Pulmonary/Chest: Effort normal and breath sounds normal  No respiratory distress  She has no wheezes  She has no rales  She exhibits no tenderness  Abdominal: Soft  Bowel sounds are normal  She exhibits no distension  There is no tenderness  There is no rebound and no guarding  Musculoskeletal: She exhibits no edema  Neurological: She is alert  No cranial nerve deficit  Right-sided facial palsy   Skin: Skin is warm and dry  No rash noted  Psychiatric: She has a normal mood and affect  Discharge instructions/Information to patient and family:(Discharge Medications and Follow up):   See after visit summary for information provided to patient and family  Provisions for Follow-Up Care:  See after visit summary for information related to follow-up care and any pertinent home health orders  Disposition: Home    Planned Readmission:  No     Discharge Statement:  I spent 35 minutes discharging the patient  This time was spent on the day of discharge  I had direct contact with the patient on the day of discharge   Greater than 50% of the total time was spent examining patient, answering all patient questions, arranging and discussing plan of care with patient as well as directly providing post-discharge instructions  Additional time then spent on discharge activities  Discharge Medications:  See after visit summary for reconciled discharge medications provided to patient and family  ** Please Note: Dragon 360 Dictation voice to text software may have been used in the creation of this document   **
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
## 2017-12-13 NOTE — PLAN OF CARE
Activity Intolerance/Impaired Mobility     Mobility/activity is maintained at optimum level for patient Progressing        Communication Impairment     Ability to express needs and understand communication Jv Karjosafat Alicia Discharge to home or other facility with appropriate resources Progressing        INFECTION - ADULT     Absence or prevention of progression during hospitalization Progressing     Absence of fever/infection during neutropenic period Progressing        Knowledge Deficit     Patient/family/caregiver demonstrates understanding of disease process, treatment plan, medications, and discharge instructions Progressing        Neurological Deficit     Neurological status is stable or improving Progressing        PAIN - ADULT     Verbalizes/displays adequate comfort level or baseline comfort level Progressing        Potential for Falls     Patient will remain free of falls Progressing        SAFETY ADULT     Maintain or return to baseline ADL function Progressing     Maintain or return mobility status to optimal level Progressing

## 2017-12-13 NOTE — PLAN OF CARE
INFECTION - ADULT     Absence of fever/infection during neutropenic period Completed          Activity Intolerance/Impaired Mobility     Mobility/activity is maintained at optimum level for patient Progressing        Communication Impairment     Ability to express needs and understand communication [place]     Discharge to home or other facility with appropriate resources Progressing        INFECTION - ADULT     Absence or prevention of progression during hospitalization Progressing        Knowledge Deficit     Patient/family/caregiver demonstrates understanding of disease process, treatment plan, medications, and discharge instructions Progressing        Neurological Deficit     Neurological status is stable or improving Progressing        PAIN - ADULT     Verbalizes/displays adequate comfort level or baseline comfort level Progressing        Potential for Falls     Patient will remain free of falls Progressing        SAFETY ADULT     Maintain or return to baseline ADL function Progressing     Maintain or return mobility status to optimal level Progressing

## 2017-12-13 NOTE — OCCUPATIONAL THERAPY NOTE
Occupational Therapy Evaluation/Treatment     12/13/17 0842   Note Type   Note type Eval only;Eval/Treat   Restrictions/Precautions   Other Precautions Chair Alarm; Bed Alarm; Fall Risk   Pain Assessment   Pain Assessment 0-10   Pain Score 7   Pain Type Chronic pain   Pain Location Back   Pain Orientation Mid   Pain Frequency Constant/continuous   Hospital Pain Intervention(s) Medication (See MAR)   Response to Interventions minimal relief   Home Living   Type of [address] [place] [address] Av Two level;Bed/bath upstairs;1/2 bath on main level;Stairs to enter with rails  (4 JANN from front door, 5 JANN from garage)   Bathroom Shower/Tub Walk-in shower   Bathroom Toilet Standard   Bathroom Equipment (none)   [address] Cane;Reacher   Prior Function   Level of [place] Independent with ADLs and functional mobility   Lives With Significant other  (fiance)   Receives Help From Friend(s)   ADL Assistance Independent   IADLs Needs assistance  (light cleaning only)   Falls in the last 6 months 1 to 4   Vocational On disability  (no lifting >5-10#)   Comments Pt uses SPC at all times   Psychosocial   Psychosocial (WDL) WDL   Subjective   Subjective "I wish I could go back to work "   ADL   Where Assessed Supine, bed   Eating Assistance 7  Independent   Grooming Assistance 5  Supervision/Setup   UB Bathing Assistance 5  Supervision/Setup   LB Bathing Assistance 5  Supervision/Setup   LB Bathing Deficit Steadying; Increased time to complete  (due to pain)   UB Dressing Assistance 7  Independent    [place]  4  Minimal Assistance  (armhold; Supervision with SPC)   Bed Mobility   Rolling R 7  Independent   Rolling L 7  Independent   Supine to Sit 7  Independent   Sit to Supine 7  Independent   Transfers   Sit to Stand 7  Independent   Stand to Sit 7  Independent   Stand pivot 5  Supervision   Additional items [facility])   Additional Comments SPC Functional Mobility   Functional Mobility 5  Supervision   Additional Comments 150 feet   Additional items SPC   Balance   Static Sitting Good   Dynamic Sitting Good   Static Standing Good   Dynamic Standing Fair +   Ambulatory Fair +  (, limps on one side)   Activity Tolerance   Activity Tolerance Patient limited by pain   Nurse Made Aware yes   RUE Assessment   RUE Assessment WFL   LUE Assessment   LUE Assessment WFL   Hand Function   Gross Motor Coordination Functional   Fine Motor Coordination Functional   Sensation   Light Touch No apparent deficits   Sharp/Dull No apparent deficits   Stereognosis No apparent deficits   Proprioception   Proprioception No apparent deficits   Vision-Basic Assessment   Current Vision Wears glasses only for reading   Perception   Inattention/Neglect Appears intact   Spatial Orientation intact   Cognition   Arousal/Participation Alert; Cooperative   Attention Within functional limits   Orientation Level Oriented X4   Memory Within functional limits   Following Commands Follows all commands and directions without difficulty   Assessment   Limitation Decreased ADL status; Decreased endurance;Decreased high-level ADLs; Decreased self-care trans   Prognosis Good   Assessment Patient evaluated by Occupational Therapy  Patient admitted with Weakness  The patients occupational profile, medical and therapy history includes a extensive additional review of physical, cognitive, or psychosocial history related to current functional performance  Comorbidities affecting functional mobility and ADLS include: hypertension, hyperlipidemia, myocardial infarction, fibromyalgia, Bell's palsy, chronic back pain, and multiple falls  Prior to admission, patient was independent with functional mobility with SPC, independent with ADLS, requiring assist for IADLS, living with fiance x 10 years in a 2 level home with 5 steps to enter and ambulating community distances    The evaluation identifies the following performance deficits: weakness, impaired balance, decreased endurance, new onset of impairment of functional mobility, decreased ADLS, decreased IADLS, pain and decreased activity tolerance, that result in activity limitations and/or participation restrictions  This evaluation requires clinical decision making of high complexity, because the patient presents with comorbidites that affect occupational performance and required significant modification of tasks or assistance with consideration of multiple treatment options  The Barthel Index was used as a functional outcome tool presenting with a score of 75, indicating moderate limitations of functional mobility and ADLS  Patient will benefit from skilled Occupational Therapy services to address above deficits and facilitate a safe return to prior level of function  Goals   Patient Goals go home   STG Time Frame 3-5   Short Term Goal #1 Educate pt regarding energy conservation techniques and correct body mechanics during ADL/IADL to minimize chronic back pain and improve independence  Short Term Goal #2 Patient will increase functional mobility to and from bathroom with single point cane with supervision to increase performance with ADLS; Patient will tolerate 10 minutes of UE ROM/strengthening to increase general activity tolerance and performance in ADLS/IADLS; Patient will improve functional activity tolerance to 10 minutes of sustained functional tasks to increase participation in basic self-care and decrease assistance level  LTG Time Frame 10-14   Long Term Goal #1 Pt to demonstrate and verbalize understanding of energy conservation and proper body mechanics principles to safely adapt in various ADL/IADL scenarios with no cueing     Long Term Goal #2 Patient will increase functional mobility to and from bathroom with single point cane independently to increase performance with ADLS; Patient will tolerate 15 minutes of UE ROM/strengthening to increase general activity tolerance and performance in ADLS/IADLS; Patient will improve functional activity tolerance to 15 minutes of sustained functional tasks to increase participation in basic self-care and decrease assistance level  Functional Transfer Goals   Pt Will Perform All Functional Transfers With good judgment/safety   ADL Goals   Pt Will Perform Toileting With stand by assist  (LTG - Indepndent with AD)   Plan   Treatment Interventions ADL retraining;Functional transfer training;Patient/family training; Compensatory technique education; Energy conservation; Activityengagement; Neuromuscular reeducation   OT Frequency 3-5x/wk   Additional Treatment Session   Start Time 3073   End Time 0920   Treatment Assessment Pt seen for education regarding energy conservation techniques and correct body mechanics to compensate for chronic low back pain and some unsteadiness due to chronic limp  Pt showed acceptance, explanation/teachback Used, Demonstrated Understanding/Verbalizes Understanding, Written handout provided for future use  Pt expressed learning about several ideas "that I never even thought of " Cont OT per POC     Recommendation   OT Discharge Recommendation Home with family support   Barthel Index   Feeding 10   Bathing 0   Grooming Score 5   Dressing Score 10   Bladder Score 10   Bowels Score 10   Toilet Use Score 5   Transfers (Bed/Chair) Score 10   Mobility (Level Surface) Score 10   Stairs Score 5   Barthel Index Score 75

## 2017-12-14 LAB
ATRIAL RATE: 61 BPM
MRSA NOSE QL CULT: NORMAL
P AXIS: 44 DEGREES
PR INTERVAL: 168 MS
QRS AXIS: 0 DEGREES
QRSD INTERVAL: 84 MS
QT INTERVAL: 486 MS
QTC INTERVAL: 489 MS
T WAVE AXIS: 68 DEGREES
VENTRICULAR RATE: 61 BPM
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
## 2017-12-23 ENCOUNTER — HOSPITAL ENCOUNTER (EMERGENCY)
Facility: HOSPITAL | Age: 57
Discharge: HOME | End: 2017-12-23
Attending: EMERGENCY MEDICINE | Admitting: EMERGENCY MEDICINE
Payer: COMMERCIAL

## 2017-12-23 VITALS
HEART RATE: 77 BPM | BODY MASS INDEX: 24.96 KG/M2 | SYSTOLIC BLOOD PRESSURE: 104 MMHG | RESPIRATION RATE: 20 BRPM | DIASTOLIC BLOOD PRESSURE: 62 MMHG | OXYGEN SATURATION: 95 % | WEIGHT: 150 LBS | TEMPERATURE: 98.7 F

## 2017-12-23 DIAGNOSIS — F41.9 ANXIETY: Primary | ICD-10-CM

## 2017-12-23 DIAGNOSIS — M54.9 EXACERBATION OF CHRONIC BACK PAIN: ICD-10-CM

## 2017-12-23 DIAGNOSIS — G89.29 EXACERBATION OF CHRONIC BACK PAIN: ICD-10-CM

## 2017-12-23 PROCEDURE — 99283 EMERGENCY DEPT VISIT LOW MDM: CPT

## 2017-12-23 RX ORDER — ALPRAZOLAM 0.5 MG/1
0.5 TABLET ORAL 3 TIMES DAILY PRN
Qty: 15 TABLET | Refills: 0 | Status: ON HOLD | OUTPATIENT
Start: 2017-12-23 | End: 2017-12-28

## 2017-12-23 RX ORDER — NAPROXEN 500 MG/1
500 TABLET ORAL ONCE
Status: COMPLETED | OUTPATIENT
Start: 2017-12-23 | End: 2017-12-23

## 2017-12-23 RX ORDER — ALPRAZOLAM 0.5 MG/1
0.5 TABLET ORAL ONCE
Status: COMPLETED | OUTPATIENT
Start: 2017-12-23 | End: 2017-12-23

## 2017-12-23 RX ORDER — NAPROXEN 500 MG/1
500 TABLET ORAL 2 TIMES DAILY WITH MEALS
Qty: 10 TABLET | Refills: 0 | Status: SHIPPED | OUTPATIENT
Start: 2017-12-23 | End: 2018-06-06 | Stop reason: HOSPADM

## 2017-12-23 RX ADMIN — ALPRAZOLAM 0.5 MG: 0.5 TABLET ORAL at 18:49

## 2017-12-23 RX ADMIN — NAPROXEN 500 MG: 500 TABLET ORAL at 18:49

## 2017-12-23 NOTE — DISCHARGE INSTRUCTIONS
Anxiety - rest as needed, tylenol and the naprosyn for pain, you can add the Xanax as needed for severe anxiety but only take if really needed as your doctor is trying to wean you off  WHAT YOU SHOULD KNOW:   Anxiety is a condition that causes you to feel excessive worry, uneasiness, or fear  Family or work stress, smoking, caffeine, and alcohol can increase your risk for anxiety  Certain medicines or health conditions can also increase your risk  Anxiety may begin gradually, and can become a long-term condition if it is not managed or treated  AFTER YOU LEAVE:   Medicines:   · Medicines  can help you feel more calm and relaxed, and decrease your symptoms  · Take your medicine as directed  Contact your healthcare provider if you think your medicine is not helping or if you have side effects  Tell him if you are allergic to any medicine  Keep a list of the medicines, vitamins, and herbs you take  Include the amounts, and when and why you take them  Bring the list or the pill bottles to follow-up visits  Carry your medicine list with you in case of an emergency  Follow up with your healthcare provider within 2 weeks or as directed:  Write down your questions so you remember to ask them during your visits  Manage anxiety:   · Go to counseling as directed  Cognitive behavioral therapy can help you understand and change how you react to events that trigger your symptoms  · Find ways to manage your symptoms  Activities such as exercise, meditation, or listening to music can help you relax  · Practice deep breathing  Breathing can change how your body reacts to stress  Focus on taking slow, deep breaths several times a day, or during an anxiety attack  Breathe in through your nose, and out through your mouth  · Avoid caffeine  Caffeine can make your symptoms worse  Avoid foods or drinks that are meant to increase your energy level  · Limit or avoid alcohol    Ask your healthcare provider if alcohol is safe for you  You may not be able to drink alcohol if you take certain anxiety or depression medicines  Limit alcohol to 1 drink per day if you are a woman  Limit alcohol to 2 drinks per day if you are a man  A drink of alcohol is 12 ounces of beer, 5 ounces of wine, or 1½ ounces of liquor  Contact your healthcare provider if:   · Your symptoms get worse or do not get better with treatment  · You think your medicine may be causing side effects  · Your anxiety keeps you from doing your regular daily activities  · You have new symptoms since your last visit  · You have questions or concerns about your condition or care  Seek care immediately or call 911 if:   · You have chest pain, tightness, or heaviness that may spread to your shoulders, arms, jaw, neck, or back  · You feel like hurting yourself or someone else  · You feel dizzy, lightheaded, or faint  © 2014 [address] is for End User's use only and may not be sold, redistributed or otherwise used for commercial purposes  All illustrations and images included in CareNotes® are the copyrighted property of A D A M , Inc  or [address]  The above information is an  only  It is not intended as medical advice for individual conditions or treatments  Talk to your doctor, nurse or pharmacist before following any medical regimen to see if it is safe and effective for you  Chronic Low Back Pain   AMBULATORY CARE:    Low back pain  is sudden discomfort in your lower back area and that discomfort makes it difficult to tolerate activity          Common symptoms include the following:   · Back stiffness or spasms    · Pain down the back or side of one leg    · Holding yourself in an unusual position or posture to decrease your back pain    · Not being able to find a sitting position that is comfortable    · Slow increase in your pain for 24 to 48 hours after you stress your back    · Tenderness on your lower back or severe pain when you move your back  Seek immediate care for the following symptoms:     · Sudden stiffness and heaviness in both buttocks down to both legs    · Numbness or weakness in one leg, or pain in both legs    · Numbness in your genital area or across your lower back    · Unable to control your urine or bowel movements  Contact your healthcare provider if:   · You have a fever  · You have pain at night or when you rest     · Your pain does not get better with treatment  · You have pain that worsens when you cough or sneeze  · You suddenly feel something pop or snap in your back  · You have questions or concerns about your condition or care  The goal of treatment for acute low back pain  is to relieve your pain and help you tolerate activity  Most people with acute lower back pain get better within 4 to 6 weeks  You may need any of the following:  · Acetaminophen  decreases pain  It is available without a doctor's order  Ask how much to take and how often to take it  Follow directions  Acetaminophen can cause liver damage if not taken correctly  · NSAIDs  help decrease swelling and pain  This medicine is available with or without a doctor's order  NSAIDs can cause stomach bleeding or kidney problems in certain people  If you take blood thinner medicine, always ask your healthcare provider if NSAIDs are safe for you  Always read the medicine label and follow directions  · Prescription pain medicine  may be given  Ask your healthcare provider how to take this medicine safely  · Muscle relaxers  decrease pain by relaxing the muscles in your lower spine  Manage your symptoms:   · Stay active  as much as you can without causing more pain  Bed rest could make your back pain worse  Start with some light exercises such as walking  Avoid heavy lifting until your pain is gone   Ask for more information about the activities or exercises that are right for you      · Ice  helps decrease swelling, pain, and muscle spams  Put crushed ice in a plastic bag  Cover it with a towel  Place it on your lower back for 20 to 30 minutes every 2 hours  Do this for about 2 to 3 days after your pain starts, or as directed  · Heat  helps decrease pain and muscle spasms  Start to use heat after treatment with ice has stopped  Use a small towel dampened with warm water or a heating pad, or sit in a warm bath  Apply heat on the area for 20 to 30 minutes every 2 hours for as many days as directed  Alternate heat and ice  Prevent acute low back pain:   · Use proper body mechanics  ¨ Bend at the hips and knees when you  objects  Do not bend from the waist  Use your leg muscles as you lift the load  Do not use your back  Keep the object close to your chest as you lift it  Try not to twist or lift anything above your waist     ¨ Change your position often when you stand for long periods of time  Rest one foot on a small box or footrest, and then switch to the other foot often  ¨ Try not to sit for long periods of time  When you do, sit in a straight-backed chair with your feet flat on the floor  Never reach, pull, or push while you are sitting  · Do exercises that strengthen your back muscles  Warm up before you exercise  Ask your healthcare provider the best exercises for you  · Maintain a healthy weight  Ask your healthcare provider how much you should weigh  Ask him to help you create a weight loss plan if you are overweight  Follow up with your healthcare provider as directed:  Return for a follow-up visit if you still have pain after 1 to 3 weeks of treatment  You may need to visit an orthopedist if your back pain lasts more than 12 weeks  Write down your questions so you remember to ask them during your visits    © 2017 [address] Information is for End User's use only and may not be sold, redistributed or otherwise used for commercial purposes  All illustrations and images included in CareNotes® are the copyrighted property of A D A M , Inc  or [employer]  The above information is an  only  It is not intended as medical advice for individual conditions or treatments  Talk to your doctor, nurse or pharmacist before following any medical regimen to see if it is safe and effective for you

## 2017-12-23 NOTE — ED PROVIDER NOTES
History  Chief Complaint   Patient presents with    Panic Attack     Pt reprts panic attack on/off all day today and chronic back pain from fx      57 yowf c/o feeling anxious all day today  Her chronic back pain is acting up and she thinks that is causing her more anxiety  She has a long h/o anxiety and had been on chronic BZD 4mg per day as well as chronic vicodin  A month ago her doctor stopped the higher dose BZD and put her on Klonopin 1 mg per day and gabapentin and she doesn't feel that is enough  No fever, vomiting  Panic Attack   Associated symptoms: anxiety    Associated symptoms: no abdominal pain, no chest pain and no headaches        Prior to Admission Medications   Prescriptions Last Dose Informant Patient Reported? Taking?    DULoxetine HCl (CYMBALTA PO) 12/22/2017 at Unknown time Self Yes Yes   Sig: Take 90 mg by mouth daily   atorvastatin (LIPITOR) 80 mg tablet 12/23/2017 at Unknown time  Yes Yes   Sig: Take 80 mg by mouth daily   clonazePAM (KlonoPIN) 0 5 mg tablet 12/23/2017 at Unknown time Self Yes Yes   Sig: Take 0 5 mg by mouth 2 (two) times a day   clopidogrel (PLAVIX) 75 mg tablet 12/23/2017 at Unknown time  No Yes   Sig: Take 1 tablet by mouth daily   gabapentin (NEURONTIN) 600 MG tablet Past Week at Unknown time Self Yes Yes   Sig: Take 300 mg by mouth 3 (three) times a day   gemfibrozil (LOPID) 600 mg tablet 12/23/2017 at Unknown time  Yes Yes   Sig: Take 600 mg by mouth 2 (two) times a day before meals   losartan (COZAAR) 25 mg tablet 12/23/2017 at Unknown time Self Yes Yes   Sig: Take 50 mg by mouth daily     metoprolol tartrate (LOPRESSOR) 100 mg tablet 12/23/2017 at Unknown time Self Yes Yes   Sig: Take 200 mg by mouth daily        Facility-Administered Medications: None       Past Medical History:   Diagnosis Date    Back injury     Bell's palsy     Chronic pain     back    Closed left arm fracture     Fibromyalgia     GERD (gastroesophageal reflux disease)     Hyperlipidemia     Hypertension     Lyme disease     Myocardial infarction     Cardiac catheterization 2 years ago showed 30% blockage in 1 of the blood vessels       Past Surgical History:   Procedure Laterality Date     SECTION      CHOLECYSTECTOMY         Family History   Problem Relation Age of Onset    Heart attack Mother     Heart attack Father      I have reviewed and agree with the history as documented  Social History   Substance Use Topics    Smoking status: Current Every Day Smoker     Packs/day: 1 00     Years: 40 00     Types: Cigarettes    Smokeless tobacco: Never Used      Comment: Counseled, wants to quit    Alcohol use Yes      Comment: occasional        Review of Systems   Constitutional: Negative  Negative for fever  HENT: Negative  Eyes: Negative  Respiratory: Negative  Negative for cough and shortness of breath  Cardiovascular: Negative  Negative for chest pain  Gastrointestinal: Negative  Negative for abdominal pain, diarrhea, nausea and vomiting  Genitourinary: Negative  Negative for dysuria and flank pain  Musculoskeletal: Positive for back pain  Negative for myalgias  Skin: Negative  Negative for rash  Neurological: Negative  Negative for dizziness and headaches  Hematological: Does not bruise/bleed easily  Psychiatric/Behavioral: The patient is nervous/anxious  All other systems reviewed and are negative  Physical Exam  ED Triage Vitals [17]   Temperature Pulse Respirations Blood Pressure SpO2   98 7 °F (37 1 °C) 77 20 104/62 95 %      Temp Source Heart Rate Source Patient Position - Orthostatic VS BP Location FiO2 (%)   Tympanic Monitor Sitting Right arm --      Pain Score       Worst Possible Pain           Orthostatic Vital Signs  Vitals:    17   BP: 104/62   Pulse: 77   Patient Position - Orthostatic VS: Sitting       Physical Exam   Constitutional: She is oriented to person, place, and time   She appears well-developed and well-nourished  No distress  Pt  tearful   HENT:   Head: Normocephalic and atraumatic  Eyes: Conjunctivae are normal  Pupils are equal, round, and reactive to light  Neck: Normal range of motion  Neck supple  Cardiovascular: Normal rate, regular rhythm and normal heart sounds  No murmur heard  Pulmonary/Chest: Effort normal and breath sounds normal  No respiratory distress  Abdominal: Soft  Bowel sounds are normal  She exhibits no distension  There is no tenderness  Musculoskeletal: Normal range of motion  She exhibits no edema or deformity  Neurological: She is alert and oriented to person, place, and time  No cranial nerve deficit  She exhibits normal muscle tone  Skin: Skin is warm and dry  No rash noted  She is not diaphoretic  No pallor  Psychiatric: She has a normal mood and affect  Her behavior is normal    Nursing note and vitals reviewed  ED Medications  Medications   ALPRAZolam (XANAX) tablet 0 5 mg (0 5 mg Oral Given 12/23/17 1849)   naproxen (NAPROSYN) tablet 500 mg (500 mg Oral Given 12/23/17 1849)       Diagnostic Studies  Results Reviewed     None                 No orders to display              Procedures  Procedures       Phone Contacts  ED Phone Contact    ED Course  ED Course                                MDM  Number of Diagnoses or Management Options  Anxiety:   Exacerbation of chronic back pain:   Diagnosis management comments: Advised will supplement with prn xanax for the next few days and she can use tylenol and naprosyn and rest, heat/ice for back pain  Pt  Advised to call her doctor next week      CritCare Time    Disposition  Final diagnoses:   Anxiety   Exacerbation of chronic back pain     Time reflects when diagnosis was documented in both MDM as applicable and the Disposition within this note     Time User Action Codes Description Comment    88/11/8298  9:26 PM Luciana Factor Add [M62 7] Anxiety     83/34/0505  5:50 PM Luciana Factor Add [M54 9,  G89 29] Exacerbation of chronic back pain       ED Disposition     ED Disposition Condition Comment    Discharge  [facility] discharge to home/self care  Condition at discharge: Stable        Follow-up Information     Follow up With Specialties Details Why Contact Info    Rosalia Palacios, DO  In 3 days  [address]          Patient's Medications   Discharge Prescriptions    ALPRAZOLAM (XANAX) 0 5 MG TABLET    Take 1 tablet by mouth 3 (three) times a day as needed for anxiety       Start Date: 12/23/2017End Date: --       Order Dose: 0 5 mg       Quantity: 15 tablet    Refills: 0    NAPROXEN (NAPROSYN) 500 MG TABLET    Take 1 tablet by mouth 2 (two) times a day with meals       Start Date: 12/23/2017End Date: --       Order Dose: 500 mg       Quantity: 10 tablet    Refills: 0     No discharge procedures on file      ED Provider  Electronically Signed by           Oscar Singh MD  18/57/04 7772

## 2017-12-26 ENCOUNTER — APPOINTMENT (EMERGENCY)
Dept: RADIOLOGY | Facility: HOSPITAL | Age: 57
End: 2017-12-26
Payer: COMMERCIAL

## 2017-12-26 ENCOUNTER — HOSPITAL ENCOUNTER (OUTPATIENT)
Facility: HOSPITAL | Age: 57
Setting detail: OBSERVATION
Discharge: HOME | End: 2017-12-28
Attending: EMERGENCY MEDICINE | Admitting: INTERNAL MEDICINE
Payer: COMMERCIAL

## 2017-12-26 DIAGNOSIS — M79.7 FIBROMYALGIA: ICD-10-CM

## 2017-12-26 DIAGNOSIS — T50.901A DRUG OVERDOSE: Primary | ICD-10-CM

## 2017-12-26 PROBLEM — T44.7X2A INTENTIONAL OVERDOSE OF BETA-ADRENERGIC BLOCKING DRUG (HCC): Status: ACTIVE | Noted: 2017-12-26

## 2017-12-26 PROBLEM — K21.9 GERD (GASTROESOPHAGEAL REFLUX DISEASE): Chronic | Status: ACTIVE | Noted: 2017-12-26

## 2017-12-26 PROBLEM — F41.9 ANXIETY: Status: ACTIVE | Noted: 2017-12-26

## 2017-12-26 PROBLEM — I21.9 MYOCARDIAL INFARCTION (HCC): Chronic | Status: ACTIVE | Noted: 2017-12-26

## 2017-12-26 LAB
ALBUMIN SERPL BCP-MCNC: 3.6 G/DL (ref 3.5–5)
ALP SERPL-CCNC: 70 U/L (ref 46–116)
ALT SERPL W P-5'-P-CCNC: 20 U/L (ref 12–78)
ANION GAP SERPL CALCULATED.3IONS-SCNC: 11 MMOL/L (ref 4–13)
APAP SERPL-MCNC: <2 UG/ML (ref 10–30)
APTT PPP: 24 SECONDS (ref 23–35)
AST SERPL W P-5'-P-CCNC: 9 U/L (ref 5–45)
BASOPHILS # BLD AUTO: 0.1 THOUSANDS/ΜL (ref 0–0.1)
BASOPHILS NFR BLD AUTO: 1 % (ref 0–1)
BILIRUB SERPL-MCNC: 0.2 MG/DL (ref 0.2–1)
BUN SERPL-MCNC: 16 MG/DL (ref 5–25)
CALCIUM SERPL-MCNC: 8.1 MG/DL (ref 8.3–10.1)
CHLORIDE SERPL-SCNC: 107 MMOL/L (ref 100–108)
CO2 SERPL-SCNC: 26 MMOL/L (ref 21–32)
CREAT SERPL-MCNC: 0.98 MG/DL (ref 0.6–1.3)
EOSINOPHIL # BLD AUTO: 0.5 THOUSAND/ΜL (ref 0–0.61)
EOSINOPHIL NFR BLD AUTO: 5 % (ref 0–6)
ERYTHROCYTE [DISTWIDTH] IN BLOOD BY AUTOMATED COUNT: 13.8 % (ref 11.6–15.1)
ETHANOL SERPL-MCNC: 137 MG/DL (ref 0–3)
GFR SERPL CREATININE-BSD FRML MDRD: 65 ML/MIN/1.73SQ M
GLUCOSE SERPL-MCNC: 107 MG/DL (ref 65–140)
GLUCOSE SERPL-MCNC: 95 MG/DL (ref 65–140)
HCT VFR BLD AUTO: 43.5 % (ref 37–47)
HGB BLD-MCNC: 14.8 G/DL (ref 12–16)
INR PPP: 1.03 (ref 0.86–1.16)
LYMPHOCYTES # BLD AUTO: 4.3 THOUSANDS/ΜL (ref 0.6–4.47)
LYMPHOCYTES NFR BLD AUTO: 47 % (ref 14–44)
MCH RBC QN AUTO: 30.9 PG (ref 27–31)
MCHC RBC AUTO-ENTMCNC: 34.1 G/DL (ref 31.4–37.4)
MCV RBC AUTO: 91 FL (ref 82–98)
MONOCYTES # BLD AUTO: 0.6 THOUSAND/ΜL (ref 0.17–1.22)
MONOCYTES NFR BLD AUTO: 7 % (ref 4–12)
NEUTROPHILS # BLD AUTO: 3.7 THOUSANDS/ΜL (ref 1.85–7.62)
NEUTS SEG NFR BLD AUTO: 40 % (ref 43–75)
NRBC BLD AUTO-RTO: 0 /100 WBCS
PLATELET # BLD AUTO: 379 THOUSANDS/UL (ref 130–400)
PMV BLD AUTO: 7.5 FL (ref 8.9–12.7)
POTASSIUM SERPL-SCNC: 3.5 MMOL/L (ref 3.5–5.3)
PROT SERPL-MCNC: 6.9 G/DL (ref 6.4–8.2)
PROTHROMBIN TIME: 10.8 SECONDS (ref 9.4–11.7)
RBC # BLD AUTO: 4.81 MILLION/UL (ref 4.2–5.4)
SALICYLATES SERPL-MCNC: 4.6 MG/DL (ref 3–20)
SODIUM SERPL-SCNC: 144 MMOL/L (ref 136–145)
TROPONIN I SERPL-MCNC: <0.02 NG/ML
WBC # BLD AUTO: 9.1 THOUSAND/UL (ref 4.8–10.8)

## 2017-12-26 PROCEDURE — 84443 ASSAY THYROID STIM HORMONE: CPT | Performed by: INTERNAL MEDICINE

## 2017-12-26 PROCEDURE — 85730 THROMBOPLASTIN TIME PARTIAL: CPT | Performed by: EMERGENCY MEDICINE

## 2017-12-26 PROCEDURE — 99285 EMERGENCY DEPT VISIT HI MDM: CPT

## 2017-12-26 PROCEDURE — 80320 DRUG SCREEN QUANTALCOHOLS: CPT | Performed by: EMERGENCY MEDICINE

## 2017-12-26 PROCEDURE — 84484 ASSAY OF TROPONIN QUANT: CPT | Performed by: EMERGENCY MEDICINE

## 2017-12-26 PROCEDURE — 93005 ELECTROCARDIOGRAM TRACING: CPT

## 2017-12-26 PROCEDURE — 71010 HB CHEST X-RAY 1 VIEW FRONTAL (PORTABLE): CPT

## 2017-12-26 PROCEDURE — 36415 COLL VENOUS BLD VENIPUNCTURE: CPT

## 2017-12-26 PROCEDURE — 80053 COMPREHEN METABOLIC PANEL: CPT | Performed by: EMERGENCY MEDICINE

## 2017-12-26 PROCEDURE — 85025 COMPLETE CBC W/AUTO DIFF WBC: CPT | Performed by: EMERGENCY MEDICINE

## 2017-12-26 PROCEDURE — 93005 ELECTROCARDIOGRAM TRACING: CPT | Performed by: EMERGENCY MEDICINE

## 2017-12-26 PROCEDURE — 80329 ANALGESICS NON-OPIOID 1 OR 2: CPT | Performed by: EMERGENCY MEDICINE

## 2017-12-26 PROCEDURE — 82948 REAGENT STRIP/BLOOD GLUCOSE: CPT

## 2017-12-26 PROCEDURE — 85610 PROTHROMBIN TIME: CPT | Performed by: EMERGENCY MEDICINE

## 2017-12-26 PROCEDURE — 96360 HYDRATION IV INFUSION INIT: CPT

## 2017-12-26 RX ADMIN — SODIUM CHLORIDE 1000 ML: 0.9 INJECTION, SOLUTION INTRAVENOUS at 22:37

## 2017-12-27 PROBLEM — F17.210 DEPENDENCE ON NICOTINE FROM CIGARETTES: Chronic | Status: ACTIVE | Noted: 2017-12-27

## 2017-12-27 PROBLEM — R94.31 QT PROLONGATION: Status: ACTIVE | Noted: 2017-12-27

## 2017-12-27 LAB
ANION GAP SERPL CALCULATED.3IONS-SCNC: 10 MMOL/L (ref 4–13)
ATRIAL RATE: 58 BPM
BUN SERPL-MCNC: 15 MG/DL (ref 5–25)
CALCIUM SERPL-MCNC: 7.8 MG/DL (ref 8.3–10.1)
CHLORIDE SERPL-SCNC: 109 MMOL/L (ref 100–108)
CO2 SERPL-SCNC: 24 MMOL/L (ref 21–32)
CREAT SERPL-MCNC: 0.79 MG/DL (ref 0.6–1.3)
ERYTHROCYTE [DISTWIDTH] IN BLOOD BY AUTOMATED COUNT: 13.6 % (ref 11.6–15.1)
GFR SERPL CREATININE-BSD FRML MDRD: 84 ML/MIN/1.73SQ M
GLUCOSE SERPL-MCNC: 157 MG/DL (ref 65–140)
HCT VFR BLD AUTO: 42.2 % (ref 37–47)
HGB BLD-MCNC: 14 G/DL (ref 12–16)
MAGNESIUM SERPL-MCNC: 1.6 MG/DL (ref 1.6–2.6)
MCH RBC QN AUTO: 30.5 PG (ref 27–31)
MCHC RBC AUTO-ENTMCNC: 33.3 G/DL (ref 31.4–37.4)
MCV RBC AUTO: 92 FL (ref 82–98)
P AXIS: 14 DEGREES
PLATELET # BLD AUTO: 350 THOUSANDS/UL (ref 130–400)
PMV BLD AUTO: 7.6 FL (ref 8.9–12.7)
POTASSIUM SERPL-SCNC: 3.9 MMOL/L (ref 3.5–5.3)
PR INTERVAL: 164 MS
QRS AXIS: -3 DEGREES
QRSD INTERVAL: 88 MS
QT INTERVAL: 506 MS
QTC INTERVAL: 496 MS
RBC # BLD AUTO: 4.6 MILLION/UL (ref 4.2–5.4)
SODIUM SERPL-SCNC: 143 MMOL/L (ref 136–145)
T WAVE AXIS: 59 DEGREES
TSH SERPL DL<=0.05 MIU/L-ACNC: 0.36 UIU/ML (ref 0.36–3.74)
VENTRICULAR RATE: 58 BPM
WBC # BLD AUTO: 7.6 THOUSAND/UL (ref 4.8–10.8)

## 2017-12-27 PROCEDURE — 83735 ASSAY OF MAGNESIUM: CPT | Performed by: INTERNAL MEDICINE

## 2017-12-27 PROCEDURE — 87081 CULTURE SCREEN ONLY: CPT | Performed by: INTERNAL MEDICINE

## 2017-12-27 PROCEDURE — 85027 COMPLETE CBC AUTOMATED: CPT | Performed by: INTERNAL MEDICINE

## 2017-12-27 PROCEDURE — 80048 BASIC METABOLIC PNL TOTAL CA: CPT | Performed by: INTERNAL MEDICINE

## 2017-12-27 RX ORDER — PANTOPRAZOLE SODIUM 40 MG/1
40 TABLET, DELAYED RELEASE ORAL DAILY
COMMUNITY

## 2017-12-27 RX ORDER — GABAPENTIN 300 MG/1
300 CAPSULE ORAL 3 TIMES DAILY
Status: DISCONTINUED | OUTPATIENT
Start: 2017-12-27 | End: 2017-12-28 | Stop reason: HOSPADM

## 2017-12-27 RX ORDER — CLOPIDOGREL BISULFATE 75 MG/1
75 TABLET ORAL DAILY
Status: DISCONTINUED | OUTPATIENT
Start: 2017-12-27 | End: 2017-12-28 | Stop reason: HOSPADM

## 2017-12-27 RX ORDER — NICOTINE 21 MG/24HR
1 PATCH, TRANSDERMAL 24 HOURS TRANSDERMAL DAILY
Status: DISCONTINUED | OUTPATIENT
Start: 2017-12-27 | End: 2017-12-28 | Stop reason: HOSPADM

## 2017-12-27 RX ORDER — ALPRAZOLAM 0.5 MG/1
0.5 TABLET ORAL 3 TIMES DAILY PRN
Status: DISCONTINUED | OUTPATIENT
Start: 2017-12-27 | End: 2017-12-28 | Stop reason: HOSPADM

## 2017-12-27 RX ORDER — GEMFIBROZIL 600 MG/1
600 TABLET, FILM COATED ORAL
Status: DISCONTINUED | OUTPATIENT
Start: 2017-12-27 | End: 2017-12-28 | Stop reason: HOSPADM

## 2017-12-27 RX ORDER — ATORVASTATIN CALCIUM 80 MG/1
80 TABLET, FILM COATED ORAL
Status: DISCONTINUED | OUTPATIENT
Start: 2017-12-27 | End: 2017-12-28 | Stop reason: HOSPADM

## 2017-12-27 RX ORDER — NAPROXEN 250 MG/1
500 TABLET ORAL 2 TIMES DAILY WITH MEALS
Status: DISCONTINUED | OUTPATIENT
Start: 2017-12-27 | End: 2017-12-28 | Stop reason: HOSPADM

## 2017-12-27 RX ORDER — DULOXETIN HYDROCHLORIDE 60 MG/1
60 CAPSULE, DELAYED RELEASE ORAL
Status: DISCONTINUED | OUTPATIENT
Start: 2017-12-27 | End: 2017-12-28 | Stop reason: HOSPADM

## 2017-12-27 RX ORDER — PANTOPRAZOLE SODIUM 40 MG/1
40 TABLET, DELAYED RELEASE ORAL
Status: DISCONTINUED | OUTPATIENT
Start: 2017-12-27 | End: 2017-12-28 | Stop reason: HOSPADM

## 2017-12-27 RX ORDER — HEPARIN SODIUM 5000 [USP'U]/ML
5000 INJECTION, SOLUTION INTRAVENOUS; SUBCUTANEOUS EVERY 8 HOURS SCHEDULED
Status: DISCONTINUED | OUTPATIENT
Start: 2017-12-27 | End: 2017-12-28 | Stop reason: HOSPADM

## 2017-12-27 RX ORDER — ACETAMINOPHEN 325 MG/1
650 TABLET ORAL EVERY 6 HOURS PRN
Status: DISCONTINUED | OUTPATIENT
Start: 2017-12-27 | End: 2017-12-28 | Stop reason: HOSPADM

## 2017-12-27 RX ADMIN — GABAPENTIN 300 MG: 300 CAPSULE ORAL at 21:17

## 2017-12-27 RX ADMIN — GEMFIBROZIL 600 MG: 600 TABLET, FILM COATED ORAL at 16:33

## 2017-12-27 RX ADMIN — GABAPENTIN 300 MG: 300 CAPSULE ORAL at 10:15

## 2017-12-27 RX ADMIN — ALPRAZOLAM 0.5 MG: 0.5 TABLET ORAL at 12:59

## 2017-12-27 RX ADMIN — HEPARIN SODIUM 5000 UNITS: 5000 INJECTION, SOLUTION INTRAVENOUS; SUBCUTANEOUS at 06:54

## 2017-12-27 RX ADMIN — ATORVASTATIN CALCIUM 80 MG: 80 TABLET, FILM COATED ORAL at 16:32

## 2017-12-27 RX ADMIN — NAPROXEN 500 MG: 250 TABLET ORAL at 16:32

## 2017-12-27 RX ADMIN — PANTOPRAZOLE SODIUM 40 MG: 40 TABLET, DELAYED RELEASE ORAL at 06:53

## 2017-12-27 RX ADMIN — ALPRAZOLAM 0.5 MG: 0.5 TABLET ORAL at 21:18

## 2017-12-27 RX ADMIN — GEMFIBROZIL 600 MG: 600 TABLET, FILM COATED ORAL at 06:54

## 2017-12-27 RX ADMIN — SODIUM CHLORIDE 1000 ML: 0.9 INJECTION, SOLUTION INTRAVENOUS at 01:39

## 2017-12-27 RX ADMIN — DULOXETINE HYDROCHLORIDE 60 MG: 60 CAPSULE, DELAYED RELEASE ORAL at 21:17

## 2017-12-27 RX ADMIN — NICOTINE 1 PATCH: 14 PATCH TRANSDERMAL at 10:14

## 2017-12-27 RX ADMIN — CLOPIDOGREL BISULFATE 75 MG: 75 TABLET ORAL at 10:15

## 2017-12-27 RX ADMIN — NAPROXEN 500 MG: 250 TABLET ORAL at 10:12

## 2017-12-27 RX ADMIN — HEPARIN SODIUM 5000 UNITS: 5000 INJECTION, SOLUTION INTRAVENOUS; SUBCUTANEOUS at 14:39

## 2017-12-27 RX ADMIN — HEPARIN SODIUM 5000 UNITS: 5000 INJECTION, SOLUTION INTRAVENOUS; SUBCUTANEOUS at 21:17

## 2017-12-27 RX ADMIN — ALPRAZOLAM 0.5 MG: 0.5 TABLET ORAL at 06:53

## 2017-12-27 RX ADMIN — GABAPENTIN 300 MG: 300 CAPSULE ORAL at 16:33

## 2017-12-27 NOTE — CASE MANAGEMENT
Initial Clinical Review    Admission: Date/Time/Statement:  12/26/17 2322    Orders Placed This Encounter   Procedures    Place in Observation (expected length of stay for this patient is less than two midnights)     Standing Status:   Standing     Number of Occurrences:   1     Order Specific Question:   Admitting Physician     Answer:   MAGALI Thibodeaux [id number]     Order Specific Question:   Level of Care     Answer:   Med Surg [16]         ED: Date/Time/Mode of Arrival:   ED Arrival Information     Expected Arrival Acuity Means of Arrival Escorted By Service Admission Type    - 12/26/2017 21:21 [facility] [address] Emergency    Arrival Complaint    Possible overdose          Chief Complaint:   Chief Complaint   Patient presents with    Overdose - Accidental     Pt states that she took 3 200 mg Metoprolol tablets because she felt her heart racing  History of Illness: 65 y o  female w/PMH of HTN, HLP, fibromyaglgia who presents to the ED after taking Metoprolol  mg total for anxiety  Patient states that she occasionally feels as though she cannot catch her breathe  She is on Xanax 0 5 mg PO Q8 hrs PRN anxiety  She is also on Cymbalta  She is not on Klonopin  She did not call her PCP or her psychiatrist today  Denies any F/C/C  No N/V/D  No chest pain, SOB or palpitations  No abdominal pain  No urinary complaints  No leg swelling  States that she has been smoking half a pack to one pack a day of cigarettes since she was 13years old  In the ED, labs were drawn  EKG was obtained       ED Vital Signs:   ED Triage Vitals   Temperature Pulse Respirations Blood Pressure SpO2   12/26/17 2126 12/26/17 2122 12/26/17 2122 12/26/17 2122 12/26/17 2122   97 7 °F (36 5 °C) 63 18 119/60 96 %      Temp Source Heart Rate Source Patient Position - Orthostatic VS BP Location FiO2 (%)   12/26/17 2126 12/26/17 2122 12/26/17 2122 12/26/17 2122 --   Tympanic Monitor Sitting Right arm       Pain Score 12/26/17 2122       No Pain        Wt Readings from Last 1 Encounters:   12/27/17 79 kg (174 lb 2 6 oz)       Vital Signs (abnormal): Abnormal Labs/Diagnostic Test Results: MEDICAL ALCOHOL 137 ACETAMINOPHEN LEVEL <2 0   · EKG: NSR at 55 bpm,  ms, QRS 88 ms, QT//496 ms --> prolonged QT, TWI in V1     ED Treatment:   Medication Administration from 12/26/2017 2121 to 12/26/2017 2356       Date/Time Order Dose Route Action Action by Comments     12/26/2017 2244 sodium chloride 0 9 % bolus 1,000 mL 0 mL Intravenous Hold Butch Bowen RN      12/26/2017 2237 sodium chloride 0 9 % bolus 1,000 mL 1,000 mL Intravenous New Bag Butch Bowen RN           Past Medical/Surgical History: Active Ambulatory Problems     Diagnosis Date Noted    Slurred speech 12/12/2017    Weakness 12/12/2017    Fibromyalgia 12/12/2017    Hypertension 12/12/2017    Hyperlipidemia 12/12/2017     Resolved Ambulatory Problems     Diagnosis Date Noted    No Resolved Ambulatory Problems     Past Medical History:   Diagnosis Date    Back injury     Bell's palsy     Chronic pain     Closed left arm fracture     Fibromyalgia     GERD (gastroesophageal reflux disease)     Hyperlipidemia     Hypertension     Lyme disease     Myocardial infarction        Admitting Diagnosis: Drug overdose [T50 901A]  Fibromyalgia [M79 7]  Accidental overdose [T50 901A]    Age/Sex: 65 y o  female    Active problems:   Intentional overdose of beta-adrenergic blocking drug    Anxiety   Hx of HTN  HLP  Fibromyalgia   GERD  Plan for the Primary Problem(s):  · Intentional overdose of a beta-adrenergic blocking drug  ? Monitor VS closely   ? EKG reviewed   ? BS within normal limits   ? Hold Metoprolol XL   Plan for Additional Problems:   · Anxiety: Resume Cymbalta and Xanax  ? Psych consult   · Hx of HTN: Hold Toprol XL and Losartan 2/2 above    · HLP: Resume Lipitor and Gemfiborzil  · CAD: Resume Plavix, Lipitor and Gembrozil   ?  BB and ARB on hold 2/2 above   · Fibromyalgia: Resume Cymbalta and Gabapentin   · GERD: Resume Protonix   · QT prolongation: unknown etio   ? Repeat EKG in AM   · Nicotine dependence: Smoking cessation advised                          Start Nicotine patch   VTE Prophylaxis: Heparin  / sequential compression device   Code Status: Full code   POLST: There is no POLST form on file for this patient (pre-hospital)  Anticipated Length of Stay:  Patient will be admitted on an Observation basis with an anticipated length of stay of  < 2 midnights     Justification for Hospital Stay: Ingestion of substances - monitoring of VS     Admission Orders:  OBSERVATION  TELE MON  CONSULT PSYCHIATRY  BMP CBC PLT MG   CXR  Scheduled Meds:   atorvastatin 80 mg Oral Daily With Dinner   clopidogrel 75 mg Oral Daily   DULoxetine 60 mg Oral HS   gabapentin 300 mg Oral TID   gemfibrozil 600 mg Oral BID AC   heparin (porcine) 5,000 Units Subcutaneous Q8H [address]   naproxen 500 mg Oral BID With Meals   nicotine 1 patch Transdermal Daily   pantoprazole 40 mg Oral Early Morning   sodium chloride 1,000 mL Intravenous Once     Continuous Infusions:    PRN Meds:   acetaminophen    ALPRAZolam

## 2017-12-27 NOTE — CONSULTS
Consultation - Bessie Zuniga 65 y o  female MRN: [id number]    Unit/Bed#: [address] [place] [address] Encounter: [id number]      Identifying Data:  65years old white female is admitted at [facility] on December 26, 2017  After she ran out of her Xanax and she started having anxiety and palpitation so she took extra medication of her heart metoprolol 600 mg total for anxiety then her fiance brought her to the hospital because her palpitation was not stopped and her anxiety was still ongoing  Psychiatric consultation is asked for the depression  Patient examined spoke with the nurse reportedly patient is not suicidal and patient also clearly denied taking an overdose to harm herself she said she was trying to help her with the anxiety she did not call her PCP or psychiatrist and she thought that that medicine will help her with anxiety but it did not  Patient is not on one-to-one observation for the suicidal precaution  Patient is a poor historian but she is very open and she was able to tell me clearly about her background information  Patient reports that she has been living with reagan and she is  in 2010 she has 1 son and 2 daughters she smokes cigarette since she was 13 she used to drink alcohol and she had 1 DUI 7 years ago but she never went to rehab and currently she denies drinking alcohol she tried pot years ago but no abuse and she has no history of other drug abuse no IV drug abuse  Patient has 12th grade education and she is not working since 2009 because of the depression and panic disorder she is on disability  Patient is suffering from depression panic disorder hypertension fibromyalgia high cholesterol coronary artery disease GERD nicotine dependence  She has been seeing a psychiatrist and taking her medications Cymbalta and Xanax  Currently patient is not in distress but she still reports that she feels palpitation and medical evaluation and treatment is in progress    Nurse reported no behavior problem  Patient agreed to follow up with her psychiatrist after the discharge and continue her psychotropic medications as ordered by her psychiatrist   Patient has tried Celexa for 10 years and she used to take Klonopin in the past she is suffering from depression and panic disorder    Chief Complaints:  Anxiety and palpitations  Family History:  Daughter has anxiety and depression her father was an alcoholic  Family History   Problem Relation Age of Onset    Heart attack Mother     Heart attack Father        Legal History:  Patient denies  Mental Status Exam:  65years old white female is alert awake oriented x3 cooperative not lethargic she looks anxious and restless during the session  Memory intact  Patient denies auditory or visual hallucinations  Patient denies suicidal or homicidal ideations  Patient gets panic attacks and she has been on anxiety medications since many years  No paranoia and no delusion elucidated  Not agitated  Poor concentration poor sleep insight and judgments are adequate  History of Present Illness     HPI: Cristina Tavera is a 65y o  year old female who presents with anxiety and palpitations  Consults      Historical Information   Past Psychiatric History:  Patient gives an extensive history of depression and panic disorder she goes to see a psychiatrist at [facility] since many years and she gets her psychotropic medications from the psychiatrist she gives a history of 2 psychiatric admissions in the past and 2 suicide attempts in the past but this was not a suicide attempt  She also has a remote history of alcohol abuse with 1 dui but no rehabs in the past she denies history of drug abuse except remote history of smoking pot many years ago but no abuse  Currently patient is taking Cymbalta 60 mg daily and Xanax 0 5 mg p o  t i d  p r n  and she is under psychiatric care    Patient denies legal history in the past  Past Medical History:   Diagnosis Date    Back injury     Bell's palsy     Chronic pain     back    Closed left arm fracture     Fibromyalgia     GERD (gastroesophageal reflux disease)     Hyperlipidemia     Hypertension     Lyme disease     Myocardial infarction     Cardiac catheterization 2 years ago showed 30% blockage in 1 of the blood vessels     Past Surgical History:   Procedure Laterality Date     SECTION      CHOLECYSTECTOMY       Social History   History   Alcohol Use    Yes     Comment: occasional     History   Drug Use No     History   Smoking Status    Current Every Day Smoker    Packs/day: 1 00    Years: 40 00    Types: Cigarettes   Smokeless Tobacco    Never Used     Comment: Counseled, wants to quit       Meds/Allergies   current meds:   Current Facility-Administered Medications   Medication Dose Route Frequency    acetaminophen (TYLENOL) tablet 650 mg  650 mg Oral Q6H PRN    ALPRAZolam (XANAX) tablet 0 5 mg  0 5 mg Oral TID PRN    atorvastatin (LIPITOR) tablet 80 mg  80 mg Oral Daily With Dinner    clopidogrel (PLAVIX) tablet 75 mg  75 mg Oral Daily    DULoxetine (CYMBALTA) delayed release capsule 60 mg  60 mg Oral HS    gabapentin (NEURONTIN) capsule 300 mg  300 mg Oral TID    gemfibrozil (LOPID) tablet 600 mg  600 mg Oral BID AC    heparin (porcine) subcutaneous injection 5,000 Units  5,000 Units Subcutaneous Q8H [address]    naproxen (NAPROSYN) tablet 500 mg  500 mg Oral BID With Meals    nicotine (NICODERM CQ) 14 mg/24hr TD 24 hr patch 1 patch  1 patch Transdermal Daily    pantoprazole (PROTONIX) EC tablet 40 mg  40 mg Oral Early Morning    sodium chloride 0 9 % bolus 1,000 mL  1,000 mL Intravenous Once    and PTA meds:    Prescriptions Prior to Admission   Medication    ALPRAZolam (XANAX) 0 5 mg tablet    clopidogrel (PLAVIX) 75 mg tablet    DULoxetine HCl (CYMBALTA PO)    gabapentin (NEURONTIN) 600 MG tablet    gemfibrozil (LOPID) 600 mg tablet    losartan (COZAAR) 25 mg tablet    metoprolol tartrate (LOPRESSOR) 100 mg tablet    naproxen (NAPROSYN) 500 mg tablet    pantoprazole (PROTONIX) 40 mg tablet    atorvastatin (LIPITOR) 80 mg tablet     No Known Allergies    Objective   Vitals: Blood pressure 122/63, pulse 65, temperature 97 6 °F (36 4 °C), temperature source Oral, resp  rate 18, height 5' 5" (1 651 m), weight 79 kg (174 lb 2 6 oz), SpO2 97 %, not currently breastfeeding        Routine Lab Results:   Admission on 12/26/2017   Component Date Value Ref Range Status    Sodium 12/26/2017 144  136 - 145 mmol/L Final    Potassium 12/26/2017 3 5  3 5 - 5 3 mmol/L Final    Chloride 12/26/2017 107  100 - 108 mmol/L Final    CO2 12/26/2017 26  21 - 32 mmol/L Final    Anion Gap 12/26/2017 11  4 - 13 mmol/L Final    BUN 12/26/2017 16  5 - 25 mg/dL Final    Creatinine 12/26/2017 0 98  0 60 - 1 30 mg/dL Final    Glucose 12/26/2017 107  65 - 140 mg/dL Final    Calcium 12/26/2017 8 1* 8 3 - 10 1 mg/dL Final    AST 12/26/2017 9  5 - 45 U/L Final    ALT 12/26/2017 20  12 - 78 U/L Final    Alkaline Phosphatase 12/26/2017 70  46 - 116 U/L Final    Total Protein 12/26/2017 6 9  6 4 - 8 2 g/dL Final    Albumin 12/26/2017 3 6  3 5 - 5 0 g/dL Final    Total Bilirubin 12/26/2017 0 20  0 20 - 1 00 mg/dL Final    eGFR 12/26/2017 65  ml/min/1 73sq m Final    WBC 12/26/2017 9 10  4 80 - 10 80 Thousand/uL Final    RBC 12/26/2017 4 81  4 20 - 5 40 Million/uL Final    Hemoglobin 12/26/2017 14 8  12 0 - 16 0 g/dL Final    Hematocrit 12/26/2017 43 5  37 0 - 47 0 % Final    MCV 12/26/2017 91  82 - 98 fL Final    MCH 12/26/2017 30 9  27 0 - 31 0 pg Final    MCHC 12/26/2017 34 1  31 4 - 37 4 g/dL Final    RDW 12/26/2017 13 8  11 6 - 15 1 % Final    MPV 12/26/2017 7 5* 8 9 - 12 7 fL Final    Platelets 84/67/0337 379  130 - 400 Thousands/uL Final    nRBC 12/26/2017 0  /100 WBCs Final    Neutrophils Relative 12/26/2017 40* 43 - 75 % Final    Lymphocytes Relative 12/26/2017 47* 14 - 44 % Final    Monocytes Relative 12/26/2017 7  4 - 12 % Final    Eosinophils Relative 12/26/2017 5  0 - 6 % Final    Basophils Relative 12/26/2017 1  0 - 1 % Final    Neutrophils Absolute 12/26/2017 3 70  1 85 - 7 62 Thousands/µL Final    Lymphocytes Absolute 12/26/2017 4 30  0 60 - 4 47 Thousands/µL Final    Monocytes Absolute 12/26/2017 0 60  0 17 - 1 22 Thousand/µL Final    Eosinophils Absolute 12/26/2017 0 50  0 00 - 0 61 Thousand/µL Final    Basophils Absolute 12/26/2017 0 10  0 00 - 0 10 Thousands/µL Final    Protime 12/26/2017 10 8  9 4 - 11 7 seconds Final    INR 12/26/2017 1 03  0 86 - 1 16 Final    PTT 12/26/2017 24  23 - 35 seconds Final    Troponin I 12/26/2017 <0 02  <=0 04 ng/mL Final    Ethanol Lvl 12/26/2017 137* 0 - 3 mg/dL Final    Ventricular Rate 12/26/2017 58  BPM Final    Atrial Rate 12/26/2017 58  BPM Final    MT Interval 12/26/2017 164  ms Final    QRSD Interval 12/26/2017 88  ms Final    QT Interval 12/26/2017 506  ms Final    QTC Interval 12/26/2017 496  ms Final    P Axis 12/26/2017 14  degrees Final    QRS Axis 12/26/2017 -3  degrees Final    T Wave Axis 12/26/2017 59  degrees Final    Acetaminophen Level 12/26/2017 <2 0* 10 - 30 ug/mL Final    Salicylate Lvl 19/48/2543 4 6  3 - 20 mg/dL Final    POC Glucose 12/26/2017 95  65 - 140 mg/dl Final    TSH 3RD GENERATON 12/26/2017 0 360  0 358 - 3 740 uIU/mL Final    Sodium 12/27/2017 143  136 - 145 mmol/L Final    Potassium 12/27/2017 3 9  3 5 - 5 3 mmol/L Final    Chloride 12/27/2017 109* 100 - 108 mmol/L Final    CO2 12/27/2017 24  21 - 32 mmol/L Final    Anion Gap 12/27/2017 10  4 - 13 mmol/L Final    BUN 12/27/2017 15  5 - 25 mg/dL Final    Creatinine 12/27/2017 0 79  0 60 - 1 30 mg/dL Final    Glucose 12/27/2017 157* 65 - 140 mg/dL Final    Calcium 12/27/2017 7 8* 8 3 - 10 1 mg/dL Final    eGFR 12/27/2017 84  ml/min/1 73sq m Final    Magnesium 12/27/2017 1 6  1 6 - 2 6 mg/dL Final    WBC 12/27/2017 7 60  4 80 - 10 80 Thousand/uL Final    RBC 12/27/2017 4 60  4 20 - 5 40 Million/uL Final    Hemoglobin 12/27/2017 14 0  12 0 - 16 0 g/dL Final    Hematocrit 12/27/2017 42 2  37 0 - 47 0 % Final    MCV 12/27/2017 92  82 - 98 fL Final    MCH 12/27/2017 30 5  27 0 - 31 0 pg Final    MCHC 12/27/2017 33 3  31 4 - 37 4 g/dL Final    RDW 12/27/2017 13 6  11 6 - 15 1 % Final    Platelets 59/16/0310 350  130 - 400 Thousands/uL Final    MPV 12/27/2017 7 6* 8 9 - 12 7 fL Final         Diagnosis:  Major depression recurrent  Panic disorder  Insomnia  History of alcohol abuse  Plan:  Continue Cymbalta 60 mg HS  Continue Xanax 0 5 mg p o  t i d  p r n  for anxiety panic attacks  Psychotherapy  Psychiatric follow-up with her psychiatrist at scheduled appointment after the discharge  No need for one-to-one suicidal precaution  No need for inpatient psychiatric care at this time  Discussed with the nurse  I will follow up      Roverto Lopes MD

## 2017-12-27 NOTE — H&P
History and Physical - [facility] Internal Medicine    Patient Information: Harding Meckel 65 y o  female MRN: [id number]  Unit/Bed#: ED 06 Encounter: [id number]  Admitting Physician: Yany De Luna MD  PCP: Jodee Díaz DO  Date of Admission:  12/26/17    Assessment/Plan:    Hospital Problem List:     Active problems:   Intentional overdose of beta-adrenergic blocking drug    Anxiety   Hx of HTN  HLP  Fibromyalgia   GERD    Plan for the Primary Problem(s):  · Intentional overdose of a beta-adrenergic blocking drug  · Monitor VS closely   · EKG reviewed   · BS within normal limits   · Hold Metoprolol XL     Plan for Additional Problems:   · Anxiety: Resume Cymbalta and Xanax  · Psych consult   · Hx of HTN: Hold Toprol XL and Losartan 2/2 above    · HLP: Resume Lipitor and Gemfiborzil  · CAD: Resume Plavix, Lipitor and Gembrozil   · BB and ARB on hold 2/2 above   · Fibromyalgia: Resume Cymbalta and Gabapentin   · GERD: Resume Protonix   · QT prolongation: unknown etio   · Repeat EKG in AM   · Nicotine dependence: Smoking cessation advised    Start Nicotine patch     VTE Prophylaxis: Heparin  / sequential compression device   Code Status: Full code   POLST: There is no POLST form on file for this patient (pre-hospital)    Anticipated Length of Stay:  Patient will be admitted on an Observation basis with an anticipated length of stay of  < 2 midnights  Justification for Hospital Stay: Ingestion of substances - monitoring of VS     Total Time for Visit, including Counseling / Coordination of Care: 45 minutes  Greater than 50% of this total time spent on direct patient counseling and coordination of care  Chief Complaint:   Anxiety - ingestion of multiple Metoprolol XL tablets    History of Present Illness:    Harding Meckel is a 65 y o  female w/PMH of HTN, HLP, fibromyaglgia who presents to the ED after taking Metoprolol  mg total for anxiety   Patient states that she occasionally feels as though she cannot catch her breathe  She is on Xanax 0 5 mg PO Q8 hrs PRN anxiety  She is also on Cymbalta  She is not on Klonopin  She did not call her PCP or her psychiatrist today  Denies any F/C/C  No N/V/D  No chest pain, SOB or palpitations  No abdominal pain  No urinary complaints  No leg swelling  States that she has been smoking half a pack to one pack a day of cigarettes since she was 13years old  In the ED, labs were drawn  EKG was obtained  Review of Systems:    Review of Systems   Constitutional: Negative for activity change, appetite change, chills, diaphoresis, fatigue, fever and unexpected weight change  HENT: Negative for congestion, ear discharge, ear pain, facial swelling, hearing loss, mouth sores, nosebleeds, postnasal drip, rhinorrhea, sinus pressure, sneezing, sore throat, tinnitus, trouble swallowing and voice change  Eyes: Negative for photophobia, discharge, redness and visual disturbance  Respiratory: Negative for cough, chest tightness, shortness of breath, wheezing and stridor  Cardiovascular: Negative for chest pain, palpitations and leg swelling  Gastrointestinal: Negative for abdominal distention, abdominal pain, anal bleeding, blood in stool, constipation, diarrhea, nausea and vomiting  Endocrine: Negative for polydipsia, polyphagia and polyuria  Genitourinary: Negative for decreased urine volume, difficulty urinating, dysuria, flank pain, hematuria, menstrual problem, pelvic pain, urgency, vaginal bleeding and vaginal discharge  Musculoskeletal: Negative for arthralgias, back pain and neck stiffness  Skin: Negative for pallor and rash  Neurological: Negative for dizziness, seizures, facial asymmetry, speech difficulty, light-headedness, numbness and headaches  Hematological: Negative for adenopathy  Psychiatric/Behavioral: Negative for agitation and confusion  The patient is nervous/anxious  All other systems reviewed and are negative        Past Medical and Surgical History:     Past Medical History:   Diagnosis Date    Back injury     Bell's palsy     Chronic pain     back    Closed left arm fracture     Fibromyalgia     GERD (gastroesophageal reflux disease)     Hyperlipidemia     Hypertension     Lyme disease     Myocardial infarction     Cardiac catheterization 2 years ago showed 30% blockage in 1 of the blood vessels       Past Surgical History:   Procedure Laterality Date     SECTION      CHOLECYSTECTOMY         Meds/Allergies:    Prior to Admission medications    Medication Sig Start Date End Date Taking? Authorizing Provider   ALPRAZolam Juan Carlos Pittman) 0 5 mg tablet Take 1 tablet by mouth 3 (three) times a day as needed for anxiety    Yesenia Mcintyre MD   atorvastatin (LIPITOR) 80 mg tablet Take 80 mg by mouth daily    Historical Provider, MD           clopidogrel (PLAVIX) 75 mg tablet Take 1 tablet by mouth daily 17   Diana Luna MD   DULoxetine HCl (CYMBALTA PO) Take 90 mg by mouth daily    Historical Provider, MD   gabapentin (NEURONTIN) 600 MG tablet Take 300 mg by mouth 3 (three) times a day    Historical Provider, MD   gemfibrozil (LOPID) 600 mg tablet Take 600 mg by mouth 2 (two) times a day before meals    Historical Provider, MD   losartan (COZAAR) 25 mg tablet Take 50 mg by mouth daily      Historical Provider, MD   metoprolol tartrate (LOPRESSOR) 100 mg tablet Take 200 mg by mouth daily      Historical Provider, MD   naproxen (NAPROSYN) 500 mg tablet Take 1 tablet by mouth 2 (two) times a day with meals    Yesenia Mcintyre MD     I have reviewed home medications with patient personally      Allergies: No Known Allergies    Social History:     Marital Status: /Civil Union   Occupation: Disabled  Patient Pre-hospital Living Situation: Lives w/her fiance  Patient Pre-hospital Level of Mobility: Able to perform her ADLs  Patient Pre-hospital Diet Restrictions: None  Substance Use History:   History   Alcohol Use    Yes     Comment: occasional     History   Smoking Status    Current Every Day Smoker    Packs/day: 1 00    Years: 40 00    Types: Cigarettes   Smokeless Tobacco    Never Used     Comment: Counseled, wants to quit     History   Drug Use No       Family History:    Family History   Problem Relation Age of Onset    Heart attack Mother     Heart attack Father        Physical Exam:     Vitals:   Blood Pressure: 118/63 (12/26/17 2300)  Pulse: 56 (12/26/17 2300)  Temperature: 97 7 °F (36 5 °C) (12/26/17 2126)  Temp Source: Tympanic (12/26/17 2126)  Respirations: 16 (12/26/17 2300)  Weight - Scale: 68 kg (150 lb) (12/26/17 2122)  SpO2: 96 % (12/26/17 2300)    Physical Exam   Constitutional: She appears well-developed and well-nourished  No distress  HENT:   Head: Normocephalic and atraumatic  Nose: Nose normal    Eyes: Conjunctivae and EOM are normal  Pupils are equal, round, and reactive to light  Neck: Normal range of motion  Neck supple  No JVD present  Cardiovascular: Normal rate, regular rhythm and normal heart sounds  Exam reveals no gallop and no friction rub  No murmur heard  Pulmonary/Chest: Effort normal and breath sounds normal  No respiratory distress  She has no wheezes  She has no rales  She exhibits no tenderness  Abdominal: Soft  Bowel sounds are normal  She exhibits no distension  There is no tenderness  There is no rebound and no guarding  Musculoskeletal: She exhibits no edema  Neurological: She is alert  No cranial nerve deficit  Skin: Skin is warm and dry  No rash noted  Psychiatric:   Flat affect   Vitals reviewed  Additional Data:     Lab Results: I have personally reviewed pertinent reports          Results from last 7 days  Lab Units 12/26/17  2132   WBC Thousand/uL 9 10   HEMOGLOBIN g/dL 14 8   HEMATOCRIT % 43 5   PLATELETS Thousands/uL 379   NEUTROS PCT % 40*   LYMPHS PCT % 47*   MONOS PCT % 7   EOS PCT % 5       Results from last 7 days  Lab Units 12/26/17  2132   SODIUM mmol/L 144   POTASSIUM mmol/L 3 5   CHLORIDE mmol/L 107   CO2 mmol/L 26   BUN mg/dL 16   CREATININE mg/dL 0 98   CALCIUM mg/dL 8 1*   TOTAL PROTEIN g/dL 6 9   BILIRUBIN TOTAL mg/dL 0 20   ALK PHOS U/L 70   ALT U/L 20   AST U/L 9   GLUCOSE RANDOM mg/dL 107       Results from last 7 days  Lab Units 12/26/17  2132   INR  1 03       Imaging: No images to review    EKG, Pathology, and Other Studies Reviewed on Admission:   · EKG: NSR at 55 bpm,  ms, QRS 88 ms, QT//496 ms --> prolonged QT, TWI in V1    Allscripts / Epic Records Reviewed: Yes     ** Please Note: This note has been constructed using a voice recognition system   **

## 2017-12-27 NOTE — SOCIAL WORK
Met with pt  Resides with her boyfriend Justen Sifuentes  Home is multi-level with 4 steps to enter and flight to the second floor  Has bathroom on bilat floors  Pt uses spc for ambulation  Has been independent and drives  States she ran out of Xanax at home, her psychiatrist decreased her dosing drastically, pt asked to a little decrease  Took her metoprolol thinking her would decrease her heart rate thus decreasing her anxiety  Encouraged pt to call her psychiatrist and family physician for urgent appt to get her xanax refilled  Explained role of cm, no anticipated d/c needs  CM reviewed d/c planning process including the following: identifying help at home, patient preference for d/c planning needs, and availability of the treatment team to discuss questions or concerns patient and/or family may have regarding understanding of medications and recognizing signs and symptoms once discharged  CM also encouraged patient to follow up with all recommended appointments after discharge  Patient advised of importance for patient and family to participate in managing patient's medical well being

## 2017-12-28 VITALS
WEIGHT: 174.16 LBS | OXYGEN SATURATION: 98 % | HEIGHT: 65 IN | HEART RATE: 73 BPM | DIASTOLIC BLOOD PRESSURE: 84 MMHG | BODY MASS INDEX: 29.02 KG/M2 | RESPIRATION RATE: 20 BRPM | SYSTOLIC BLOOD PRESSURE: 183 MMHG | TEMPERATURE: 97.7 F

## 2017-12-28 LAB
ATRIAL RATE: 73 BPM
MRSA NOSE QL CULT: NORMAL
P AXIS: 45 DEGREES
PR INTERVAL: 172 MS
QRS AXIS: -2 DEGREES
QRSD INTERVAL: 92 MS
QT INTERVAL: 438 MS
QTC INTERVAL: 482 MS
T WAVE AXIS: 76 DEGREES
VENTRICULAR RATE: 73 BPM

## 2017-12-28 PROCEDURE — 93005 ELECTROCARDIOGRAM TRACING: CPT

## 2017-12-28 RX ORDER — NICOTINE 21 MG/24HR
1 PATCH, TRANSDERMAL 24 HOURS TRANSDERMAL DAILY
Qty: 28 PATCH | Refills: 0 | Status: SHIPPED | OUTPATIENT
Start: 2017-12-29 | End: 2018-06-04

## 2017-12-28 RX ORDER — ALPRAZOLAM 0.5 MG/1
0.5 TABLET ORAL 3 TIMES DAILY PRN
Qty: 10 TABLET | Refills: 0 | Status: SHIPPED | OUTPATIENT
Start: 2017-12-28 | End: 2019-08-21

## 2017-12-28 RX ADMIN — NICOTINE 1 PATCH: 14 PATCH TRANSDERMAL at 09:15

## 2017-12-28 RX ADMIN — GABAPENTIN 300 MG: 300 CAPSULE ORAL at 09:11

## 2017-12-28 RX ADMIN — ATORVASTATIN CALCIUM 80 MG: 80 TABLET, FILM COATED ORAL at 16:57

## 2017-12-28 RX ADMIN — GABAPENTIN 300 MG: 300 CAPSULE ORAL at 16:57

## 2017-12-28 RX ADMIN — HEPARIN SODIUM 5000 UNITS: 5000 INJECTION, SOLUTION INTRAVENOUS; SUBCUTANEOUS at 05:23

## 2017-12-28 RX ADMIN — NAPROXEN 500 MG: 250 TABLET ORAL at 16:57

## 2017-12-28 RX ADMIN — GEMFIBROZIL 600 MG: 600 TABLET, FILM COATED ORAL at 16:57

## 2017-12-28 RX ADMIN — ALPRAZOLAM 0.5 MG: 0.5 TABLET ORAL at 07:12

## 2017-12-28 RX ADMIN — NAPROXEN 500 MG: 250 TABLET ORAL at 09:10

## 2017-12-28 RX ADMIN — CLOPIDOGREL BISULFATE 75 MG: 75 TABLET ORAL at 09:10

## 2017-12-28 RX ADMIN — PANTOPRAZOLE SODIUM 40 MG: 40 TABLET, DELAYED RELEASE ORAL at 05:23

## 2017-12-28 RX ADMIN — ALPRAZOLAM 0.5 MG: 0.5 TABLET ORAL at 15:07

## 2017-12-28 RX ADMIN — GEMFIBROZIL 600 MG: 600 TABLET, FILM COATED ORAL at 07:17

## 2017-12-28 NOTE — PROGRESS NOTES
Patient examined spoke with the nurse patient is alert awake cooperative she feels much better and she reports that she made a mistake by taking extra pill to help her with anxiety she will never do that again  I spoke to Dr Remi Sanabria  yesterday  Patient is not suicidal and it was not a suicide attempt she will be discharged home once she is medically stable  No behavior problem reported or noted  Patient is smiling affect is brighter cooperative and she is able to communicate her feelings well she offers no new complaints  She is stable with depression and panic attacks she will follow up with her psychiatrist after the discharge and continue her psychotropic medications as prescribed but her psychiatrist   Currently patient denies feeling suicidal no psychosis no hallucinations no agitations and she offers no new complaints  Patient will be discharged home after medical stabilization  Therapy done with good response  Continue her medications the same as ordered at this time

## 2017-12-28 NOTE — DISCHARGE SUMMARY
Discharge Summary - [place] [address] Internal Medicine    Patient Information: Jovanna Sawyer 65 y o  female MRN: [id number]  Unit/Bed#: [address] [place] [address] Encounter: [id number]    Discharging Physician / Practitioner: José Miguel Piedra MD  PCP: Cathy Suárez DO  Admission Date: 12/26/2017  Discharge Date: 12/28/17    Reason for Admission: Overdose - Accidental (Pt states that she took 3 200 mg Metoprolol tablets because she felt her heart racing  )      Discharge Diagnoses:     Principal Problem:    Intentional overdose of beta-adrenergic blocking drug ([address] )  Active Problems:    Fibromyalgia    Hypertension    Hyperlipidemia    Accidental overdose    Anxiety    Myocardial infarction    GERD (gastroesophageal reflux disease)    Dependence on nicotine from cigarettes    QT prolongation  Resolved Problems:    * No resolved hospital problems  *  1  Beta-blocker overdose  2  Prolonged QTC  3  Hypertension  4  CAD  5  Fibromyalgia  6  Nicotine dependence  7  Hyperlipidemia    Consultations During Hospital Stay:  Cody Urbina    Test Results Pending at Discharge (will require follow up):   · As per After Visit Summary     Outpatient Tests Requested:  · Follow-up with outpatient EEG as scheduled before    Complications:  None    Hospital Course:     Jovanna Sawyer is a 65 y o  female patient with past medical history of hypertension, hyperlipidemia, fibromyalgia who originally presented to the hospital on 12/26/2017 due to overdosing on metoprolol  milligrams for anxiety and palpitations  Patient was admitted hospital for beta-blocker overdose  Patient was initially bradycardic  Patient was monitored over telemetry which did not show any evidence of pauses or significant bradycardia  Patient continued to remain stable with holding beta-blocker  Patient was also evaluated by Psychiatry who recommended continuing Xanax    Patient initially noted to have prolonged QTC but the EKG before discharge showed a QTC of 482 millisecond which has improved since admission  Patient will be discharged home to follow up with PCP and psychiatry  Condition at Discharge: stable     Discharge Day Visit / Exam:     Subjective:  Patient denies any headache, dizziness, chest pain, palpitations    Vitals: Blood Pressure: (!) 183/84 (12/28/17 1607)  Pulse: 73 (12/28/17 1607)  Temperature: 97 7 °F (36 5 °C) (12/28/17 1607)  Temp Source: Oral (12/28/17 1607)  Respirations: 20 (12/28/17 1607)  Height: 5' 5" (165 1 cm) (12/27/17 0030)  Weight - Scale: 79 kg (174 lb 2 6 oz) (12/27/17 0030)  SpO2: 98 % (12/28/17 1607)  Exam:   Physical Exam   Constitutional: No distress  HENT:   Head: Normocephalic and atraumatic  Nose: Nose normal    Eyes: Conjunctivae and EOM are normal  Pupils are equal, round, and reactive to light  Neck: Normal range of motion  Neck supple  No JVD present  Cardiovascular: Normal rate, regular rhythm and normal heart sounds  Exam reveals no gallop and no friction rub  No murmur heard  Pulmonary/Chest: Effort normal and breath sounds normal  No respiratory distress  She has no wheezes  She has no rales  She exhibits no tenderness  Abdominal: Soft  Bowel sounds are normal  She exhibits no distension  There is no tenderness  There is no rebound and no guarding  Musculoskeletal: She exhibits no edema  Neurological: She is alert  No cranial nerve deficit  Right-sided facial palsy   Skin: Skin is warm and dry  No rash noted  Psychiatric: She has a normal mood and affect  Discharge instructions/Information to patient and family:(Discharge Medications and Follow up):   See after visit summary for information provided to patient and family  Provisions for Follow-Up Care:  See after visit summary for information related to follow-up care and any pertinent home health orders  Disposition: Home    Planned Readmission:  No     Discharge Statement:  I spent 25 minutes discharging the patient  This time was spent on the day of discharge  I had direct contact with the patient on the day of discharge  Greater than 50% of the total time was spent examining patient, answering all patient questions, arranging and discussing plan of care with patient as well as directly providing post-discharge instructions  Additional time then spent on discharge activities  Discharge Medications:  See after visit summary for reconciled discharge medications provided to patient and family  ** Please Note: Dragon 360 Dictation voice to text software may have been used in the creation of this document   **

## 2017-12-28 NOTE — PROGRESS NOTES
[address] Internal Medicine Progress Note  Patient: Lina Martinez 65 y o  female   MRN: [id number]  PCP: Jam Rodriguez DO  Unit/Bed#: [address] [place] [address] Encounter: [id number]  Date Of Visit: 12/27/17    Problem List:    Principal Problem:    Intentional overdose of beta-adrenergic blocking drug ([address] )  Active Problems:    Fibromyalgia    Hypertension    Hyperlipidemia    Accidental overdose    Anxiety    Myocardial infarction    GERD (gastroesophageal reflux disease)    Dependence on nicotine from cigarettes    QT prolongation      Assessment & Plan:    1  Toprol XL over dose-patient was slightly bradycardic without any evidence of pauses or significant events on telemetry  Patient will be monitored overnight and if remains stable possible DC in a m  Mimi Nissen Continue to hold metoprolol XL  2  Prolonged QTC-Electrolyte levels are normal   Follow up repeat EKG in a m  3  Hypertension-continue losartan 25 milligram p o  daily  Continue to hold metoprolol 20 milligram p o  daily  4  History of CAD-continue Plavix, Lipitor and gemfibrozil  Buttock blocker and ARB are hold  5  History of fibromyalgia-on Cymbalta and gabapentin  6  Nicotine dependence-continue nicotine patch  7  Hyperlipidemia-continue gemfibrozil and Lipitor      VTE Pharmacologic Prophylaxis:   Pharmacologic: Heparin  Mechanical VTE Prophylaxis in Place: Yes    Patient Centered Rounds: I have performed bedside rounds with nursing staff today  Discussions with Specialists or Other Care Team Provider: No    Education and Discussions with Family / Patient:Yes    Time Spent for Care: 30 minutes  More than 50% of total time spent on counseling and coordination of care as described above      Current Length of Stay: 0 day(s)    Current Patient Status: Observation     Discharge Plan: Home    Code Status: Level 1 - Full Code      Subjective:   Patient denies any headache, dizziness, palpitations    Objective:         Negative for Chest Pain, Palpitations, Shortness of Breath, Abdominal Pain, Nausea, Vomiting, Constipation, Diarrhea, Dizziness  All other 10 review of systems negative and without drastic interval changes from yesterday  Vitals:   Temp (24hrs), Av 9 °F (36 6 °C), Min:97 6 °F (36 4 °C), Max:98 3 °F (36 8 °C)    HR:  [54-68] 63  Resp:  [16-23] 18  BP: (104-147)/(55-75) 143/75  SpO2:  [92 %-98 %] 96 %  Body mass index is 28 98 kg/m²  Input and Output Summary (last 24 hours): Intake/Output Summary (Last 24 hours) at 17  Last data filed at 17 0139   Gross per 24 hour   Intake             1000 ml   Output                0 ml   Net             1000 ml       Physical Exam:     Physical Exam   Constitutional: No distress  HENT:   Head: Normocephalic and atraumatic  Nose: Nose normal    Eyes: Conjunctivae and EOM are normal  Pupils are equal, round, and reactive to light  Neck: Normal range of motion  Neck supple  No JVD present  Cardiovascular: Normal rate, regular rhythm and normal heart sounds  Exam reveals no gallop and no friction rub  No murmur heard  Pulmonary/Chest: Effort normal and breath sounds normal  No respiratory distress  She has no wheezes  She has no rales  She exhibits no tenderness  Abdominal: Soft  Bowel sounds are normal  She exhibits no distension  There is no tenderness  There is no rebound and no guarding  Musculoskeletal: She exhibits no edema  Neurological: She is alert  No cranial nerve deficit  Skin: Skin is warm and dry  No rash noted  Psychiatric: She has a normal mood and affect         Additional Data:     Labs:      Results from last 7 days  Lab Units 17  1103 17  2132   WBC Thousand/uL 7 60 9 10   HEMOGLOBIN g/dL 14 0 14 8   HEMATOCRIT % 42 2 43 5   PLATELETS Thousands/uL 350 379   NEUTROS PCT %  --  40*   LYMPHS PCT %  --  47*   MONOS PCT %  --  7   EOS PCT %  --  5       Results from last 7 days  Lab Units 17  1103 17  2132   SODIUM mmol/L 143 144   POTASSIUM mmol/L 3 9 3 5   CHLORIDE mmol/L 109* 107   CO2 mmol/L 24 26   BUN mg/dL 15 16   CREATININE mg/dL 0 79 0 98   CALCIUM mg/dL 7 8* 8 1*   TOTAL PROTEIN g/dL  --  6 9   BILIRUBIN TOTAL mg/dL  --  0 20   ALK PHOS U/L  --  70   ALT U/L  --  20   AST U/L  --  9   GLUCOSE RANDOM mg/dL 157* 107       Results from last 7 days  Lab Units 12/26/17  2132   INR  1 03       * I Have Reviewed All Lab Data Listed Above  * Additional Pertinent Lab Tests Reviewed: [address] Admission Reviewed    Imaging:  Cta Head And Neck W Wo Contrast    Result Date: 12/12/2017  Narrative: CTA NECK AND BRAIN WITH AND WITHOUT CONTRAST INDICATION: Neurologic deficit  COMPARISON:   Concurrent MRI of the brain TECHNIQUE:  Routine CT imaging of the Brain without contrast   Post contrast imaging was performed after administration of iodinated contrast through the neck and brain  Post contrast axial 0 625 mm images timed to opacify the arterial system  3D rendering was performed on an independent workstation  MIP reconstructions performed  Coronal reconstructions were performed of the noncontrast portion of the brain  Radiation dose length product (DLP) for this visit:  356 17 mGy-cm   This examination, like all CT scans performed in the [facility], was performed utilizing techniques to minimize radiation dose exposure, including the use of iterative  reconstruction and automated exposure control  IV Contrast:  85 mL of iohexol (OMNIPAQUE)     IMAGE QUALITY:   Diagnostic FINDINGS: CERVICAL VASCULATURE AORTIC ARCH AND GREAT VESSELS:  Normal aortic arch and great vessel origins  Patent visualized subclavian vessels  Independent origin to the left vertebral artery RIGHT VERTEBRAL ARTERY CERVICAL SEGMENT:  Normal origin  The vessel is normal in caliber throughout the neck  Right-sided dominance  LEFT VERTEBRAL ARTERY CERVICAL SEGMENT:  Normal origin   The vessel is normal in caliber throughout the neck  RIGHT EXTRACRANIAL CAROTID SEGMENT:  Mild atherosclerotic disease of the distal common carotid artery and proximal cervical internal carotid artery without significant stenosis, less than 50%  0% LEFT EXTRACRANIAL CAROTID SEGMENT:  Mild atherosclerotic disease of the distal common carotid artery and proximal cervical internal carotid artery without significant stenosis, less than 50%  10% NASCET criteria was used to determine the degree of internal carotid artery diameter stenosis  INTRACRANIAL VASCULATURE INTERNAL CAROTID ARTERIES:  Normal enhancement of the intracranial portions of the internal carotid arteries  Normal ophthalmic artery origins  Normal ICA terminus  ANTERIOR CIRCULATION:  Symmetric A1 segments and anterior cerebral arteries with normal enhancement  Normal anterior communicating artery  MIDDLE CEREBRAL ARTERY CIRCULATION:  M1 segment and middle cerebral artery branches demonstrate normal enhancement bilaterally  DISTAL VERTEBRAL ARTERIES:  Right vertebral dominant relative left  Otherwise there are unremarkable  BASILAR ARTERY:  Vertebrobasilar junction, basilar trunk, basilar summit are within normal limits of caliber  POSTERIOR CEREBRAL ARTERIES: Both posterior cerebral arteries arises from the basilar tip  Both arteries demonstrate normal flow-related enhancement  DURAL VENOUS SINUSES:  Normal  NON VASCULAR ANATOMY BONY STRUCTURES:  No acute osseous abnormality  SOFT TISSUES OF THE NECK:  8 mm peripherally calcified nodule in the right thyroid lobe  Incidental discovery of one or more thyroid nodule(s) measuring less than 1 5 cm and without suspicious features is noted in this patient who is above 28years old; according to guidelines published in the February 2015 white paper on incidental thyroid nodules in the Journal of the [employer] of Radiology [facility]), no further evaluation is recommended  THORACIC INLET:  Mild apical predominant centrilobular emphysema  Impression: No signs of intracranial vascular occlusive disease or aneurysm formation  No hemodynamically significant stenosis within the cervical carotids by NASCET criteria  Patent bilateral cervical vertebral arteries  Workstation performed: [id number]     X-ray Chest 1 View Portable    Result Date: 12/27/2017  Narrative: CHEST INDICATION:  Intentional overdose of metoprolol COMPARISON:  12/12/2017 VIEWS:   AP frontal IMAGES:  1 FINDINGS:     Cardiomediastinal silhouette appears upper limits of normal in size  The lungs are clear  No pneumothorax or pleural effusion  No evidence of heart failure  Visualized osseous structures appear within normal limits for the patient's age  Impression: No active pulmonary disease  Workstation performed: [id number]     Ct Head Without Contrast    Result Date: 12/12/2017  Narrative: CT BRAIN - WITHOUT CONTRAST INDICATION:  Numbness  COMPARISON:  None  TECHNIQUE:  CT examination of the brain was performed  In addition to axial images, coronal reformatted images were created and submitted for interpretation  Radiation dose length product (DLP) for this visit:  1289 51 mGy-cm   This examination, like all CT scans performed in the [facility], was performed utilizing techniques to minimize radiation dose exposure, including the use of iterative reconstruction and automated exposure control  IMAGE QUALITY:  Diagnostic  FINDINGS:  PARENCHYMA:  No intracranial mass, mass effect or midline shift  No CT signs of acute infarction  There is no parenchymal hemorrhage  VENTRICLES AND EXTRA-AXIAL SPACES:  Normal for patient's age  VISUALIZED ORBITS AND PARANASAL SINUSES:  Unremarkable  CALVARIUM AND EXTRACRANIAL SOFT TISSUES:   Normal      Impression: No acute intracranial abnormality  Workstation performed: [id number]     Ct Spine Cervical Without Contrast    Result Date: 12/12/2017  Narrative: CT CERVICAL SPINE - WITHOUT CONTRAST INDICATION: Numbness  COMPARISON: None  TECHNIQUE:  CT examination of the cervical spine was performed without intravenous contrast   Contiguous axial images were obtained  Sagittal and coronal reconstructions were performed  Radiation dose length product (DLP) for this visit:  447 55 mGy-cm   This examination, like all CT scans performed in the [facility], was performed utilizing techniques to minimize radiation dose exposure, including the use of iterative  reconstruction and automated exposure control  IMAGE QUALITY:  Diagnostic  FINDINGS: ALIGNMENT:  Normal alignment of the cervical spine  No subluxation  VERTEBRAL BODIES:  No fracture  DEGENERATIVE CHANGES:  Mild multilevel cervical degenerative changes are noted without critical central canal stenosis  PREVERTEBRAL AND PARASPINAL SOFT TISSUES: Normal         THORACIC INLET:  Emphysema is noted at the lung apices  Impression: No cervical spine fracture or traumatic malalignment  Workstation performed: [id number]     Mri Brain Wo Contrast    Result Date: 12/12/2017  Narrative: MRI BRAIN WITHOUT CONTRAST INDICATION:  Behavioral changes COMPARISON:   None  TECHNIQUE:  Sagittal T1, axial T2, axial FLAIR, axial T1, axial [place] and axial diffusion imaging  IMAGE QUALITY:  Diagnostic  FINDINGS: BRAIN PARENCHYMA:  There is no discrete mass, mass effect or midline shift  No abnormal white matter signal identified  Brainstem and cerebellum demonstrate normal signal  There is no intracranial hemorrhage  There is questionable diffusion signal along the right posterior limb internal capsule with questionable decreased signal on the ADC map  However, this is not seen on the T2 or FLAIR sequences and therefore is likely artifactual  (Series 300 image 16 and series 3 image 16)  Correlate clinically and  follow-up  VENTRICLES:  The ventricles are normal in size and contour   SELLA AND PITUITARY GLAND:  Normal  ORBITS:  Normal  PARANASAL SINUSES:  Normal  VASCULATURE: Evaluation of the major intracranial vasculature demonstrates appropriate flow voids  CALVARIUM AND SKULL BASE:  Normal  EXTRACRANIAL SOFT TISSUES:  Normal      Impression: There is questionable diffusion signal along the right posterior limb internal capsule with questionable decreased signal on the ADC map  However, this is not seen on the T2 or FLAIR sequences and therefore is likely artifactual  Correlate clinically and  follow-up  If symptoms persist or worsen, follow-up MRI or CTA brain can be obtained  Workstation performed: [id number]     Mri Brain W Contrast    Result Date: 12/13/2017  Narrative: MRI BRAIN with CONTRAST INDICATION:  Dizziness COMPARISON:   None  TECHNIQUE: Pre and postcontrast axial, sagittal and coronal multiplanar imaging was performed  7 mL of gadolinium this was given  IMAGE QUALITY:  Diagnostic  FINDINGS: BRAIN PARENCHYMA:  No enhancing lesion identified  VENTRICLES:  The ventricles are normal in size and contour  SELLA AND PITUITARY GLAND:  Normal  ORBITS:  Normal  PARANASAL SINUSES:  Normal  VASCULATURE:  Evaluation of the major intracranial vasculature demonstrates appropriate flow voids  CALVARIUM AND SKULL BASE:  Normal  EXTRACRANIAL SOFT TISSUES:  Normal      Impression: No enhancing lesion identified  Workstation performed: [id number]     Xr Chest 1 View    Result Date: 12/12/2017  Narrative: CHEST-AP INDICATION: Weakness COMPARISON:  None VIEWS: AP semierect IMAGES:  1 FINDINGS:     Cardiomediastinal silhouette appears unremarkable  The lungs are clear  No pneumothorax or pleural effusion  Visualized osseous structures appear within normal limits for the patient's age  Impression: No active pulmonary disease   Workstation performed: [id number]     Imaging Reports Reviewed by myself    Cultures:   Blood Culture: No results found for: BLOODCX  Urine Culture: No results found for: URINECX  Sputum Culture: No components found for: SPUTUMCX  Wound Culture: No results found for: WOUNDCULT    Last 24 Hours Medication List:     atorvastatin 80 mg Oral Daily With Dinner   clopidogrel 75 mg Oral Daily   DULoxetine 60 mg Oral HS   gabapentin 300 mg Oral TID   gemfibrozil 600 mg Oral BID AC   heparin (porcine) 5,000 Units Subcutaneous Q8H [facility]   naproxen 500 mg Oral BID With Meals   nicotine 1 patch Transdermal Daily   pantoprazole 40 mg Oral Early Morning   sodium chloride 1,000 mL Intravenous Once        Today, Patient Was Seen By: Hoang Houston MD    ** Please Note: Dragon 360 Dictation voice to text software may have been used in the creation of this document   **

## 2017-12-28 NOTE — PLAN OF CARE
DISCHARGE PLANNING     Discharge to home or other facility with appropriate resources Progressing        DISCHARGE PLANNING - CARE MANAGEMENT     Discharge to post-acute care or home with appropriate resources Progressing        INFECTION - ADULT     Absence or prevention of progression during hospitalization Progressing        Knowledge Deficit     Patient/family/caregiver demonstrates understanding of disease process, treatment plan, medications, and discharge instructions Progressing        PAIN - ADULT     Verbalizes/displays adequate comfort level or baseline comfort level Progressing        Potential for Falls     Patient will remain free of falls Progressing        SAFETY ADULT     Patient will remain free of falls Progressing     Maintain or return to baseline ADL function Progressing     Maintain or return mobility status to optimal level Progressing

## 2018-01-13 VITALS
HEIGHT: 65 IN | SYSTOLIC BLOOD PRESSURE: 117 MMHG | DIASTOLIC BLOOD PRESSURE: 84 MMHG | HEART RATE: 147 BPM | WEIGHT: 155 LBS | BODY MASS INDEX: 25.83 KG/M2

## 2018-01-13 VITALS — HEIGHT: 65 IN | BODY MASS INDEX: 26.68 KG/M2 | WEIGHT: 160.13 LBS

## 2018-01-17 NOTE — RESULT NOTES
Verified Results  XR ELBOW 2 VIEW LEFT [id number] 11:15AM Mariel Colon Order Number: [id number]     Test Name Result Flag Reference   XR ELBOW 2 VW LEFT (Report)     LEFT ELBOW     INDICATION: Fracture follow-up     COMPARISON: 8/2/2017     VIEWS: 2     IMAGES: 2     FINDINGS:     Radial neck fracture, slightly displaced   There is a joint effusion  No degenerative changes  No lytic or blastic lesions are seen  Soft tissues are unremarkable  IMPRESSION:     Radial neck fracture         Workstation performed: [id number]     Signed by:   Kamari Sanders MD   8/10/17

## 2018-01-22 VITALS
SYSTOLIC BLOOD PRESSURE: 117 MMHG | HEIGHT: 65 IN | BODY MASS INDEX: 26.66 KG/M2 | DIASTOLIC BLOOD PRESSURE: 76 MMHG | WEIGHT: 160 LBS | HEART RATE: 77 BPM

## 2018-01-22 VITALS
WEIGHT: 157.38 LBS | HEIGHT: 65 IN | HEART RATE: 74 BPM | BODY MASS INDEX: 26.22 KG/M2 | DIASTOLIC BLOOD PRESSURE: 72 MMHG | SYSTOLIC BLOOD PRESSURE: 115 MMHG

## 2018-01-23 ENCOUNTER — ALLSCRIPTS OFFICE VISIT (OUTPATIENT)
Dept: OTHER | Facility: OTHER | Age: 58
End: 2018-01-23

## 2018-01-24 NOTE — PROGRESS NOTES
Assessment   1  Closed displaced comminuted fracture of shaft of left tibia, initial encounter (209 32)     (F07 747A)    Plan      Tia Nielson has a closed displaced comminuted fracture of the left tibia that will require open reduction internal fixation  We discussed that this will be fixated with a large plate and screws  I described the risks of the surgery which are inclusive of but not limited to bleeding, infection, blood clot, nerve injury, persistent stiffness and weakness, wound healing problems, failure to achieve the anticipated results and worsening of symptoms  This will be a large incision on the left tibia  We discussed due to the size of the incision there will be some numbness in the lower leg  Tia Nielson has a history of stroke and is currently taking Plavix  She was informed that she must be off this medication for a minimum of 5 days before having the surgery  She will contact her provider who is managing this medication  I did recommend she begin Lovenox of which she can take up to 24 hours prior to the surgery  Discussion/Summary   The patient, patient's family was counseled regarding diagnostic results,-- instructions for management,-- risk factor reductions,-- prognosis,-- patient and family education,-- impressions,-- risks and benefits of treatment options,-- importance of compliance with treatment  Chief Complaint   1  Leg Pain    History of Present Illness   Tia Nielson is a 61-year-old female here today for 2nd opinion for the treatment of a left tibia fracture sustained on January 9, 2018  She states she fell out of bed the evening of the night causing her to fracture leg  She was transported to [facility] where she was treated with closed reduction under anesthesia  Unfortunately, she states, the fracture remained a malaligned and the managing physician wanted to send her to a another provider in [place] for his opinion     Tia Nielson as chief complaint is of the persistent mild to moderate ache located in the medial aspect of the left lower extremity that will radiate superiorly and distally  This pain increases with movement of the left lower extremity and is better at rest in her cast          Review of Systems        Constitutional: No fever, no chills, feels well, no tiredness, no recent weight gain or loss  Eyes: No complaints of eyesight problems, no red eyes  ENT: no loss of hearing, no nosebleeds, no sore throat  Cardiovascular: No complaints of chest pain, no palpitations, no leg claudication or lower extremity edema  Respiratory: no compliants of shortness of breath, no wheezing, no cough  Gastrointestinal: no complaints of abdominal pain, no constipation, no nausea or diarrhea, no vomiting, no bloody stools  Genitourinary: no complaints of dysuria, no incontinence  Musculoskeletal: as noted in HPI  Integumentary: no complaints of skin rash or lesion, no itching or dry skin, no skin wounds  Neurological: no complaints of headache, no confusion, no numbness or tingling, no dizziness  Endocrine: No complaints of muscle weakness, no feelings of weakness, no frequent urination, no excessive thirst       Psychiatric: No suicidal thoughts, no anxiety, no feelings of depression  ROS reviewed  Active Problems   1  Benign essential hypertension (401 1) (I10)   2  Closed displaced fracture of neck of left radius, initial encounter (813 06) (S52 132A)   3  Depression (311) (F32 9)   4  Hypercholesteremia (272 0) (E78 00)   5  Left elbow fracture (812 40) (S42 402A)   6  Radial neck fracture (813 06) (B65 977R)    Past Medical History      The active problems and past medical history were reviewed and updated today  Surgical History    · History of  Section   · History of Cholecystectomy     The surgical history was reviewed and updated today         Family History   Mother    · No pertinent family history  Father    · No pertinent family history     The family history was reviewed and updated today  Social History    · Current every day smoker (305 1) (F17 200)  The social history was reviewed and updated today  The social history was reviewed and is unchanged  Current Meds    1  Gemfibrozil TABS; Therapy: (Recorded:[id number]) to Recorded   2  Losartan Potassium TABS; Therapy: (Recorded:[id number]) to Recorded   3  Metoprolol Tartrate TABS; Therapy: (Recorded:[id number]) to Recorded     The medication list was reviewed and updated today  Allergies   1  No Known Drug Allergies    Physical Exam      Left Lower Extremity: Thigh Appearance: Normal  Lower Leg Appearance: Normal except swelling-- and-- Mild swelling noted but intact skin, but-- no pitting edema  Thigh Palpation: Normal  Lower Leg Palpation: Normal except tenderness (medial, anterior and proximal ), but-- no palpable cord-- and-- no warmth  Thigh ROM: Deferred  Lower Leg ROM: Deferred  Thigh Motor: Deferred  Lower Motor: Deferred  Special Tests: Negative except Sensation to light touch is intact throughout the left lower extremity from L2 through S1 with 2+ dorsalis pedis pulse , but-- negative Homans' sign  Constitutional - General appearance: Normal       Musculoskeletal - Gait and station: Abnormal  Gait evaluation demonstrated non-weight bearing on the left  -- Digits and nails: Normal -- Muscle strength/tone: Normal       Cardiovascular - Pulses: Normal -- Examination of extremities for edema and/or varicosities: Normal -- Heart - RRR, no murmurs, no rubs, no gallops  Respiratory - Lungs - Clear to auscultation bilaterally, no rales, no rhonci, no wheezes  Lymphatic - Palpation of lymph nodes in other areas: Normal  no left femoral node enlargement        Skin - Skin and subcutaneous tissue: Normal       Neurologic - Sensation: Normal -- Lower extremity peripheral neuro exam: Normal       Psychiatric - Orientation to person, place, and time: Normal -- Mood and affect: Normal       Eyes      Conjunctiva and lids: Normal        Pupils and irises: Normal        Results/Data   I personally reviewed the films/images/results in the office today  My interpretation follows  X-ray Review X-rays from an outside source were reviewed today  Jakob Gill has a Displaced and comminuted tibial shaft fracture that extends into the Metaphysis but does not appear to be intra-articular  CT Scan Review CT scan of the tibia fracture also demonstrates that this is extra-articular and fairly comminuted in the distal half of the tibia  Attending Note   Scribe Attestation:      Steven Dow ATC am acting as a scribe in the presence of the attending physician while the attending physician examines the patient  Physician Attestation:      Yovani Francois MD personally performed the services described in this documentation as scribed in my presence, and it is both accurate and complete        Signatures    Electronically signed by : CLAYTON Ring; Jan 23 2018  1:06PM EST                       (Author)     Electronically signed by : MARKIE Choudhury ; Jan 23 2018  3:11PM EST                       (Author)

## 2018-01-25 ENCOUNTER — TELEPHONE (OUTPATIENT)
Dept: OBGYN CLINIC | Facility: HOSPITAL | Age: 58
End: 2018-01-25

## 2018-01-25 NOTE — TELEPHONE ENCOUNTER
Patient sees Dr Dago Esteves,  calling to let the doctor know that patient is interested in going to inpatient rehab after surgery  She has very limited help at home and she has little support  Is this possible to set up?

## 2018-01-25 NOTE — TELEPHONE ENCOUNTER
Yes   She can certainly go to inpatient rehab after surgery  She will likely stay at least 1 or 2 nights in the hospital however

## 2018-01-27 ENCOUNTER — HOSPITAL ENCOUNTER (EMERGENCY)
Facility: HOSPITAL | Age: 58
Discharge: HOME | End: 2018-01-27
Attending: EMERGENCY MEDICINE
Payer: COMMERCIAL

## 2018-01-27 ENCOUNTER — ANESTHESIA EVENT (OUTPATIENT)
Dept: PERIOP | Facility: HOSPITAL | Age: 58
End: 2018-01-27
Payer: COMMERCIAL

## 2018-01-27 VITALS
RESPIRATION RATE: 16 BRPM | HEART RATE: 73 BPM | TEMPERATURE: 98.3 F | OXYGEN SATURATION: 95 % | DIASTOLIC BLOOD PRESSURE: 72 MMHG | SYSTOLIC BLOOD PRESSURE: 157 MMHG | WEIGHT: 174 LBS | BODY MASS INDEX: 28.96 KG/M2

## 2018-01-27 DIAGNOSIS — R58 ECCHYMOSIS: Primary | ICD-10-CM

## 2018-01-27 PROCEDURE — 99283 EMERGENCY DEPT VISIT LOW MDM: CPT

## 2018-01-27 NOTE — ED PROVIDER NOTES
History  Chief Complaint   Patient presents with    Leg Pain     pt presents with a bruise to her right calf that she noticed around 2330 last night and is now afraid this is a blood clot     Patient presents for evaluation of bruising on her right leg  States she stopped her Plavix for surgery on her left leg fracture and is concerned the bruising is a blood clot  Patient also having some throbbing in the broken leg  Recently had half the cast cut off by Dr Tam Talley  Scheduled for surgery on Monday  History provided by:  Patient   used: No    Leg Pain       Prior to Admission Medications   Prescriptions Last Dose Informant Patient Reported? Taking?    ALPRAZolam (XANAX) 0 5 mg tablet   No No   Sig: Take 1 tablet by mouth 3 (three) times a day as needed for anxiety for up to 3 days   DULoxetine HCl (CYMBALTA PO)  Self Yes No   Sig: Take 90 mg by mouth daily at bedtime     atorvastatin (LIPITOR) 80 mg tablet   Yes No   Sig: Take 80 mg by mouth daily   clopidogrel (PLAVIX) 75 mg tablet   No No   Sig: Take 1 tablet by mouth daily   Patient taking differently: Take 75 mg by mouth daily Last dose  1/23/18    gabapentin (NEURONTIN) 600 MG tablet  Self Yes No   Sig: Take 300 mg by mouth 3 (three) times a day   gemfibrozil (LOPID) 600 mg tablet   Yes No   Sig: Take 600 mg by mouth 2 (two) times a day before meals   losartan (COZAAR) 25 mg tablet  Self Yes No   Sig: Take 50 mg by mouth daily     metoprolol tartrate (LOPRESSOR) 100 mg tablet  Self Yes No   Sig: Take 200 mg by mouth daily     naproxen (NAPROSYN) 500 mg tablet   No No   Sig: Take 1 tablet by mouth 2 (two) times a day with meals   nicotine (NICODERM CQ) 14 mg/24hr TD 24 hr patch   No No   Sig: Place 1 patch on the skin daily   pantoprazole (PROTONIX) 40 mg tablet   Yes No   Sig: Take 40 mg by mouth daily      Facility-Administered Medications: None       Past Medical History:   Diagnosis Date    Back injury     Bell's palsy     Chronic pain     back    Closed left arm fracture     Fibromyalgia     Fibromyalgia, primary     GERD (gastroesophageal reflux disease)     Hyperlipidemia     Hypertension     Lyme disease     Myocardial infarct     2015    Myocardial infarction     Cardiac catheterization 2 years ago showed 30% blockage in 1 of the blood vessels    Stroke ([address] )     TIA (transient ischemic attack)     2017       Past Surgical History:   Procedure Laterality Date     SECTION      CHOLECYSTECTOMY      CORONARY ANGIOPLASTY             Family History   Problem Relation Age of Onset    Heart attack Mother     Heart attack Father      I have reviewed and agree with the history as documented  Social History   Substance Use Topics    Smoking status: Current Every Day Smoker     Packs/day: 1 00     Years: 40 00     Types: Cigarettes    Smokeless tobacco: Never Used      Comment: Counseled, wants to quit    Alcohol use Yes      Comment: occasional        Review of Systems   All other systems reviewed and are negative  Physical Exam  ED Triage Vitals [18 014]   Temperature Pulse Respirations Blood Pressure SpO2   98 3 °F (36 8 °C) 73 16 157/72 95 %      Temp Source Heart Rate Source Patient Position - Orthostatic VS BP Location FiO2 (%)   Tympanic Monitor Lying Right arm --      Pain Score       6           Orthostatic Vital Signs  Vitals:    18 0145   BP: 157/72   Pulse: 73   Patient Position - Orthostatic VS: Lying       Physical Exam   Constitutional: She is oriented to person, place, and time  No distress  Cardiovascular: Normal rate, regular rhythm and intact distal pulses  Pulmonary/Chest: Effort normal and breath sounds normal  No respiratory distress  Abdominal: Soft  There is no tenderness  Neurological: She is alert and oriented to person, place, and time  Skin: Capillary refill takes less than 2 seconds  She is not diaphoretic          Nursing note and vitals reviewed  ED Medications  Medications - No data to display    Diagnostic Studies  Results Reviewed     None                 No orders to display              Procedures  Procedures       Phone Contacts  ED Phone Contact    ED Course  ED Course                                MDM  Number of Diagnoses or Management Options  Ecchymosis:   Diagnosis management comments: Pulse ox 95% on RA indicating adequate oxygenation    Ecchymosis is not sign of DVT  Left leg ace wrap was undone and rewrapped which improved her throbbing symptoms  Advised to follow up with Dr Betito Keenan as scheduled  Amount and/or Complexity of Data Reviewed  Decide to obtain previous medical records or to obtain history from someone other than the patient: yes  Review and summarize past medical records: yes    Patient Progress  Patient progress: stable    CritCare Time    Disposition  Final diagnoses:   Ecchymosis     Time reflects when diagnosis was documented in both MDM as applicable and the Disposition within this note     Time User Action Codes Description Comment    1/27/2018  2:00 AM Joann, Luanne [facility] Ecchymosis       ED Disposition     ED Disposition Condition Comment    Discharge  [facility] discharge to home/self care      Condition at discharge: stable        Follow-up Information     Follow up With Specialties Details Why Contact Rommel Robert MD Orthopedic Surgery  as scheduled [facility] [address] [facility]  [phone]          Discharge Medication List as of 1/27/2018  2:01 AM      CONTINUE these medications which have NOT CHANGED    Details   ALPRAZolam (XANAX) 0 5 mg tablet Take 1 tablet by mouth 3 (three) times a day as needed for anxiety for up to 3 days, Starting Thu 12/28/2017, Until Thu 1/25/2018, Print      atorvastatin (LIPITOR) 80 mg tablet Take 80 mg by mouth daily, Until Discontinued, Historical Med      clopidogrel (PLAVIX) 75 mg tablet Take 1 tablet by mouth daily, Starting Thu 12/14/2017, Print      DULoxetine HCl (CYMBALTA PO) Take 90 mg by mouth daily at bedtime  , Historical Med      gabapentin (NEURONTIN) 600 MG tablet Take 300 mg by mouth 3 (three) times a day, Historical Med      gemfibrozil (LOPID) 600 mg tablet Take 600 mg by mouth 2 (two) times a day before meals, Until Discontinued, Historical Med      losartan (COZAAR) 25 mg tablet Take 50 mg by mouth daily  , Historical Med      metoprolol tartrate (LOPRESSOR) 100 mg tablet Take 200 mg by mouth daily  , Historical Med      naproxen (NAPROSYN) 500 mg tablet Take 1 tablet by mouth 2 (two) times a day with meals, Starting Sat 12/23/2017, Print      nicotine (NICODERM CQ) 14 mg/24hr TD 24 hr patch Place 1 patch on the skin daily, Starting Fri 12/29/2017, Normal      pantoprazole (PROTONIX) 40 mg tablet Take 40 mg by mouth daily, Historical Med           No discharge procedures on file      ED Provider  Electronically Signed by           Annamaria Primrose, DO  01/27/18 1342

## 2018-01-27 NOTE — DISCHARGE INSTRUCTIONS
Ecchymosis   WHAT YOU NEED TO KNOW:   Ecchymosis is a collection of blood under the skin  Blood leaks from blood vessels and collects in nearby tissues  This can happen anywhere just below the skin, or in a mucus membrane, such as your mouth  Ecchymosis may appear as a large red, blue, or purple area of skin  You may also have pain or swelling in the area  Signs and symptoms may move to nearby body areas  It is important for you to follow up with your healthcare provider  Some causes of ecchymosis are serious and need medical treatment  DISCHARGE INSTRUCTIONS:   Contact your healthcare provider if:   · You have new symptoms  · Your bruise suddenly gets bigger and feels hard  · The affected area does not improve within 2 weeks  · You have ecchymosis around your eye and you are having trouble seeing  · You have questions or concerns about your condition or care  Self-care:   · Rest  the area to help the tissues heal      · Apply ice  to the area to relieve pain and swelling  Ice can also help prevent tissue damage  Use an ice pack, or put crushed ice in a bag  Cover the ice pack or bag with a small towel before you apply it to your skin  Apply ice for 20 minutes every hour, or as directed  · Elevate  the affected area to reduce swelling, and to improve circulation  Prop the area on pillows to keep it elevated above the level of your heart  Do this as often as possible  · NSAID medicines  such as ibuprofen can help reduce pain and swelling  NSAIDs are available without a prescription  Ask your healthcare provider which medicine is right for you  Ask how much to take and how often to take it  Follow directions  NSAIDs can cause stomach bleeding and kidney damage if not taken correctly  If you take blood thinner medicine, always ask if NSAIDs are safe for you  Follow up with your healthcare provider as directed:  Write down your questions so you remember to ask them during your visits    © 2017 [facility] Information is for End User's use only and may not be sold, redistributed or otherwise used for commercial purposes  All illustrations and images included in CareNotes® are the copyrighted property of A D A M , Inc  or Boris Robles  The above information is an  only  It is not intended as medical advice for individual conditions or treatments  Talk to your doctor, nurse or pharmacist before following any medical regimen to see if it is safe and effective for you

## 2018-01-29 ENCOUNTER — HOSPITAL ENCOUNTER (OUTPATIENT)
Dept: RADIOLOGY | Facility: HOSPITAL | Age: 58
Setting detail: OUTPATIENT SURGERY
Discharge: HOME | End: 2018-01-29
Payer: COMMERCIAL

## 2018-01-29 ENCOUNTER — ANESTHESIA (OUTPATIENT)
Dept: PERIOP | Facility: HOSPITAL | Age: 58
End: 2018-01-29
Payer: COMMERCIAL

## 2018-01-29 ENCOUNTER — HOSPITAL ENCOUNTER (OUTPATIENT)
Facility: HOSPITAL | Age: 58
Setting detail: OUTPATIENT SURGERY
Discharge: NURSING, REHAB OR LONG-TERM CARE | End: 2018-01-30
Attending: ORTHOPAEDIC SURGERY | Admitting: ORTHOPAEDIC SURGERY
Payer: COMMERCIAL

## 2018-01-29 DIAGNOSIS — E78.5 HYPERLIPIDEMIA, UNSPECIFIED HYPERLIPIDEMIA TYPE: ICD-10-CM

## 2018-01-29 DIAGNOSIS — S82.252A: ICD-10-CM

## 2018-01-29 DIAGNOSIS — I21.3 ST ELEVATION MYOCARDIAL INFARCTION (STEMI), UNSPECIFIED ARTERY (HCC): Chronic | ICD-10-CM

## 2018-01-29 DIAGNOSIS — S82.252G CLOSED DISPLACED COMMINUTED FRACTURE OF SHAFT OF LEFT TIBIA WITH DELAYED HEALING, SUBSEQUENT ENCOUNTER: ICD-10-CM

## 2018-01-29 DIAGNOSIS — S82.401J: ICD-10-CM

## 2018-01-29 DIAGNOSIS — S82.201J: ICD-10-CM

## 2018-01-29 DIAGNOSIS — I10 HYPERTENSION, UNSPECIFIED TYPE: ICD-10-CM

## 2018-01-29 DIAGNOSIS — M79.7 FIBROMYALGIA: Primary | ICD-10-CM

## 2018-01-29 PROCEDURE — 73590 X-RAY EXAM OF LOWER LEG: CPT

## 2018-01-29 PROCEDURE — C1713 ANCHOR/SCREW BN/BN,TIS/BN: HCPCS | Performed by: ORTHOPAEDIC SURGERY

## 2018-01-29 PROCEDURE — 27758 TREATMENT OF TIBIA FRACTURE: CPT | Performed by: PHYSICIAN ASSISTANT

## 2018-01-29 PROCEDURE — 27758 TREATMENT OF TIBIA FRACTURE: CPT | Performed by: ORTHOPAEDIC SURGERY

## 2018-01-29 DEVICE — 3.5MM CRTX SCREW/LOW PROF HD SELF-TAPPING/STAR DRIVE/38MM: Type: IMPLANTABLE DEVICE | Status: FUNCTIONAL

## 2018-01-29 DEVICE — 2.7/3.5MM VA-LCP MEDIAL DISTAL TIBIA PLATE/10 HOLES/LEFT
Type: IMPLANTABLE DEVICE | Status: FUNCTIONAL
Brand: VA-LCP

## 2018-01-29 DEVICE — 2.7MM VA LCKNG SCREW SLF-TPNG WITH T8 STARDRIVE RECESS 26MM: Type: IMPLANTABLE DEVICE | Status: FUNCTIONAL

## 2018-01-29 DEVICE — 2.7MM VA LCKNG SCREW SLF-TPNG WITH T8 STARDRIVE RECESS 18MM: Type: IMPLANTABLE DEVICE | Status: FUNCTIONAL

## 2018-01-29 DEVICE — 2.7MM VA LCKNG SCREW SLF-TPNG WITH T8 STARDRIVE RECESS 16MM: Type: IMPLANTABLE DEVICE | Status: FUNCTIONAL

## 2018-01-29 DEVICE — 3.5MM CRTX SCREW/LOW PROF HD SELF-TAPPING/STAR DRIVE/24MM: Type: IMPLANTABLE DEVICE | Status: FUNCTIONAL

## 2018-01-29 DEVICE — 2.7MM VA LCKNG SCREW SLF-TPNG WITH T8 STARDRIVE RECESS 28MM: Type: IMPLANTABLE DEVICE | Status: FUNCTIONAL

## 2018-01-29 DEVICE — 3.5MM CRTX SCREW/LOW PROF HD SELF-TAPPING/STAR DRIVE/26MM: Type: IMPLANTABLE DEVICE | Status: FUNCTIONAL

## 2018-01-29 DEVICE — 3.5MM VARIABLE ANGLE LOCKING SCREW/SLF-TPNG/STRDRV/28MM: Type: IMPLANTABLE DEVICE | Status: FUNCTIONAL

## 2018-01-29 DEVICE — 3.5MM VARIABLE ANGLE LOCKING SCREW/SLF-TPNG/STRDRV/16MM: Type: IMPLANTABLE DEVICE | Status: FUNCTIONAL

## 2018-01-29 DEVICE — 3.5MM CRTX SCREW/LOW PROF HD SELF-TAPPING/STAR DRIVE/22MM: Type: IMPLANTABLE DEVICE | Status: FUNCTIONAL

## 2018-01-29 DEVICE — 2.7MM VA LCKNG SCREW SLF-TPNG WITH T8 STARDRIVE RECESS 22MM: Type: IMPLANTABLE DEVICE | Status: FUNCTIONAL

## 2018-01-29 DEVICE — 2.7MM VA LCKNG SCREW SLF-TPNG WITH T8 STARDRIVE RECESS 20MM: Type: IMPLANTABLE DEVICE | Status: FUNCTIONAL

## 2018-01-29 DEVICE — 3.5MM CRTX SCREW/LOW PROF HD SELF-TAPPING/STAR DRIVE/20MM: Type: IMPLANTABLE DEVICE | Status: FUNCTIONAL

## 2018-01-29 RX ORDER — NAPROXEN 500 MG/1
500 TABLET ORAL 2 TIMES DAILY WITH MEALS
Status: DISCONTINUED | OUTPATIENT
Start: 2018-01-30 | End: 2018-01-31 | Stop reason: HOSPADM

## 2018-01-29 RX ORDER — PROPOFOL 10 MG/ML
INJECTION, EMULSION INTRAVENOUS CONTINUOUS PRN
Status: DISCONTINUED | OUTPATIENT
Start: 2018-01-29 | End: 2018-01-29 | Stop reason: SURG

## 2018-01-29 RX ORDER — ALPRAZOLAM 0.5 MG/1
0.5 TABLET ORAL 3 TIMES DAILY PRN
Status: DISCONTINUED | OUTPATIENT
Start: 2018-01-29 | End: 2018-01-31 | Stop reason: HOSPADM

## 2018-01-29 RX ORDER — SODIUM CHLORIDE, SODIUM LACTATE, POTASSIUM CHLORIDE, CALCIUM CHLORIDE 600; 310; 30; 20 MG/100ML; MG/100ML; MG/100ML; MG/100ML
75 INJECTION, SOLUTION INTRAVENOUS CONTINUOUS
Status: DISCONTINUED | OUTPATIENT
Start: 2018-01-29 | End: 2018-01-31 | Stop reason: HOSPADM

## 2018-01-29 RX ORDER — PANTOPRAZOLE SODIUM 40 MG/1
40 TABLET, DELAYED RELEASE ORAL DAILY
Status: DISCONTINUED | OUTPATIENT
Start: 2018-01-30 | End: 2018-01-31 | Stop reason: HOSPADM

## 2018-01-29 RX ORDER — HYDROMORPHONE HCL 110MG/55ML
0.2 PATIENT CONTROLLED ANALGESIA SYRINGE INTRAVENOUS
Status: DISCONTINUED | OUTPATIENT
Start: 2018-01-29 | End: 2018-01-31 | Stop reason: HOSPADM

## 2018-01-29 RX ORDER — DULOXETIN HYDROCHLORIDE 60 MG/1
60 CAPSULE, DELAYED RELEASE ORAL
Status: DISCONTINUED | OUTPATIENT
Start: 2018-01-29 | End: 2018-01-31 | Stop reason: HOSPADM

## 2018-01-29 RX ORDER — KETAMINE HYDROCHLORIDE 50 MG/ML
INJECTION, SOLUTION, CONCENTRATE INTRAMUSCULAR; INTRAVENOUS AS NEEDED
Status: DISCONTINUED | OUTPATIENT
Start: 2018-01-29 | End: 2018-01-29 | Stop reason: SURG

## 2018-01-29 RX ORDER — MIDAZOLAM HYDROCHLORIDE 1 MG/ML
INJECTION INTRAMUSCULAR; INTRAVENOUS AS NEEDED
Status: DISCONTINUED | OUTPATIENT
Start: 2018-01-29 | End: 2018-01-29 | Stop reason: SURG

## 2018-01-29 RX ORDER — ONDANSETRON 2 MG/ML
4 INJECTION INTRAMUSCULAR; INTRAVENOUS EVERY 6 HOURS PRN
Status: DISCONTINUED | OUTPATIENT
Start: 2018-01-29 | End: 2018-01-31 | Stop reason: HOSPADM

## 2018-01-29 RX ORDER — CLOPIDOGREL BISULFATE 75 MG/1
75 TABLET ORAL DAILY
Status: DISCONTINUED | OUTPATIENT
Start: 2018-01-30 | End: 2018-01-31 | Stop reason: HOSPADM

## 2018-01-29 RX ORDER — OXYCODONE HYDROCHLORIDE AND ACETAMINOPHEN 5; 325 MG/1; MG/1
1 TABLET ORAL EVERY 4 HOURS PRN
Status: DISCONTINUED | OUTPATIENT
Start: 2018-01-29 | End: 2018-01-31 | Stop reason: HOSPADM

## 2018-01-29 RX ORDER — NICOTINE 21 MG/24HR
1 PATCH, TRANSDERMAL 24 HOURS TRANSDERMAL DAILY
Status: DISCONTINUED | OUTPATIENT
Start: 2018-01-30 | End: 2018-01-31 | Stop reason: HOSPADM

## 2018-01-29 RX ORDER — FENTANYL CITRATE 50 UG/ML
INJECTION, SOLUTION INTRAMUSCULAR; INTRAVENOUS AS NEEDED
Status: DISCONTINUED | OUTPATIENT
Start: 2018-01-29 | End: 2018-01-29 | Stop reason: SURG

## 2018-01-29 RX ORDER — DOCUSATE SODIUM 100 MG/1
100 CAPSULE, LIQUID FILLED ORAL 2 TIMES DAILY
Status: DISCONTINUED | OUTPATIENT
Start: 2018-01-29 | End: 2018-01-31 | Stop reason: HOSPADM

## 2018-01-29 RX ORDER — HYDROMORPHONE HCL 110MG/55ML
0.2 PATIENT CONTROLLED ANALGESIA SYRINGE INTRAVENOUS
Status: DISCONTINUED | OUTPATIENT
Start: 2018-01-29 | End: 2018-01-29

## 2018-01-29 RX ORDER — PROPOFOL 10 MG/ML
INJECTION, EMULSION INTRAVENOUS AS NEEDED
Status: DISCONTINUED | OUTPATIENT
Start: 2018-01-29 | End: 2018-01-29 | Stop reason: SURG

## 2018-01-29 RX ORDER — HYDROMORPHONE HCL 110MG/55ML
0.5 PATIENT CONTROLLED ANALGESIA SYRINGE INTRAVENOUS
Status: DISCONTINUED | OUTPATIENT
Start: 2018-01-29 | End: 2018-01-29 | Stop reason: HOSPADM

## 2018-01-29 RX ORDER — HYDROMORPHONE HYDROCHLORIDE 2 MG/ML
INJECTION, SOLUTION INTRAMUSCULAR; INTRAVENOUS; SUBCUTANEOUS AS NEEDED
Status: DISCONTINUED | OUTPATIENT
Start: 2018-01-29 | End: 2018-01-29 | Stop reason: SURG

## 2018-01-29 RX ORDER — GABAPENTIN 300 MG/1
300 CAPSULE ORAL 3 TIMES DAILY
Status: DISCONTINUED | OUTPATIENT
Start: 2018-01-29 | End: 2018-01-31 | Stop reason: HOSPADM

## 2018-01-29 RX ORDER — MAGNESIUM HYDROXIDE 1200 MG/15ML
LIQUID ORAL AS NEEDED
Status: DISCONTINUED | OUTPATIENT
Start: 2018-01-29 | End: 2018-01-29 | Stop reason: HOSPADM

## 2018-01-29 RX ORDER — FENTANYL CITRATE/PF 50 MCG/ML
25 SYRINGE (ML) INJECTION AS NEEDED
Status: COMPLETED | OUTPATIENT
Start: 2018-01-29 | End: 2018-01-29

## 2018-01-29 RX ORDER — LIDOCAINE HYDROCHLORIDE 10 MG/ML
INJECTION, SOLUTION INFILTRATION; PERINEURAL AS NEEDED
Status: DISCONTINUED | OUTPATIENT
Start: 2018-01-29 | End: 2018-01-29 | Stop reason: SURG

## 2018-01-29 RX ORDER — ONDANSETRON 2 MG/ML
INJECTION INTRAMUSCULAR; INTRAVENOUS AS NEEDED
Status: DISCONTINUED | OUTPATIENT
Start: 2018-01-29 | End: 2018-01-29 | Stop reason: SURG

## 2018-01-29 RX ORDER — DIPHENHYDRAMINE HYDROCHLORIDE 50 MG/ML
12.5 INJECTION INTRAMUSCULAR; INTRAVENOUS ONCE AS NEEDED
Status: DISCONTINUED | OUTPATIENT
Start: 2018-01-29 | End: 2018-01-29 | Stop reason: HOSPADM

## 2018-01-29 RX ORDER — ROCURONIUM BROMIDE 10 MG/ML
INJECTION, SOLUTION INTRAVENOUS AS NEEDED
Status: DISCONTINUED | OUTPATIENT
Start: 2018-01-29 | End: 2018-01-29 | Stop reason: SURG

## 2018-01-29 RX ORDER — ONDANSETRON 2 MG/ML
4 INJECTION INTRAMUSCULAR; INTRAVENOUS ONCE AS NEEDED
Status: DISCONTINUED | OUTPATIENT
Start: 2018-01-29 | End: 2018-01-29 | Stop reason: HOSPADM

## 2018-01-29 RX ORDER — METOPROLOL TARTRATE 100 MG/1
200 TABLET ORAL DAILY
Status: DISCONTINUED | OUTPATIENT
Start: 2018-01-30 | End: 2018-01-31 | Stop reason: HOSPADM

## 2018-01-29 RX ORDER — BUPIVACAINE HYDROCHLORIDE AND EPINEPHRINE 5; 5 MG/ML; UG/ML
INJECTION, SOLUTION EPIDURAL; INTRACAUDAL; PERINEURAL AS NEEDED
Status: DISCONTINUED | OUTPATIENT
Start: 2018-01-29 | End: 2018-01-29 | Stop reason: HOSPADM

## 2018-01-29 RX ORDER — GEMFIBROZIL 600 MG/1
600 TABLET, FILM COATED ORAL
Status: DISCONTINUED | OUTPATIENT
Start: 2018-01-30 | End: 2018-01-31 | Stop reason: HOSPADM

## 2018-01-29 RX ORDER — LOSARTAN POTASSIUM 50 MG/1
50 TABLET ORAL DAILY
Status: DISCONTINUED | OUTPATIENT
Start: 2018-01-30 | End: 2018-01-31 | Stop reason: HOSPADM

## 2018-01-29 RX ADMIN — FENTANYL CITRATE 25 MCG: 50 INJECTION INTRAMUSCULAR; INTRAVENOUS at 20:57

## 2018-01-29 RX ADMIN — GABAPENTIN 300 MG: 300 CAPSULE ORAL at 22:37

## 2018-01-29 RX ADMIN — PROPOFOL 100 MCG/KG/MIN: 10 INJECTION, EMULSION INTRAVENOUS at 17:41

## 2018-01-29 RX ADMIN — HYDROMORPHONE HYDROCHLORIDE 0.2 MG: 2 INJECTION, SOLUTION INTRAMUSCULAR; INTRAVENOUS; SUBCUTANEOUS at 19:33

## 2018-01-29 RX ADMIN — FENTANYL CITRATE 25 MCG: 50 INJECTION INTRAMUSCULAR; INTRAVENOUS at 20:48

## 2018-01-29 RX ADMIN — HYDROMORPHONE HYDROCHLORIDE 0.5 MG: 2 INJECTION, SOLUTION INTRAMUSCULAR; INTRAVENOUS; SUBCUTANEOUS at 21:37

## 2018-01-29 RX ADMIN — DULOXETINE HYDROCHLORIDE 60 MG: 60 CAPSULE, DELAYED RELEASE ORAL at 22:38

## 2018-01-29 RX ADMIN — DOCUSATE SODIUM 100 MG: 100 CAPSULE, LIQUID FILLED ORAL at 22:38

## 2018-01-29 RX ADMIN — FENTANYL CITRATE 25 MCG: 50 INJECTION INTRAMUSCULAR; INTRAVENOUS at 21:17

## 2018-01-29 RX ADMIN — ALPRAZOLAM 0.5 MG: 0.5 TABLET ORAL at 22:50

## 2018-01-29 RX ADMIN — CEFAZOLIN SODIUM 2000 MG: 2 SOLUTION INTRAVENOUS at 17:35

## 2018-01-29 RX ADMIN — HYDROMORPHONE HYDROCHLORIDE 0.5 MG: 2 INJECTION, SOLUTION INTRAMUSCULAR; INTRAVENOUS; SUBCUTANEOUS at 21:27

## 2018-01-29 RX ADMIN — FENTANYL CITRATE 25 MCG: 50 INJECTION INTRAMUSCULAR; INTRAVENOUS at 21:08

## 2018-01-29 RX ADMIN — DEXAMETHASONE SODIUM PHOSPHATE 4 MG: 10 INJECTION INTRAMUSCULAR; INTRAVENOUS at 18:00

## 2018-01-29 RX ADMIN — FENTANYL CITRATE 100 MCG: 50 INJECTION, SOLUTION INTRAMUSCULAR; INTRAVENOUS at 20:23

## 2018-01-29 RX ADMIN — KETAMINE HYDROCHLORIDE 5 MG: 50 INJECTION INTRAMUSCULAR; INTRAVENOUS at 19:35

## 2018-01-29 RX ADMIN — KETAMINE HYDROCHLORIDE 5 MG: 50 INJECTION INTRAMUSCULAR; INTRAVENOUS at 18:27

## 2018-01-29 RX ADMIN — PROPOFOL 200 MG: 10 INJECTION, EMULSION INTRAVENOUS at 17:36

## 2018-01-29 RX ADMIN — HYDROMORPHONE HYDROCHLORIDE 0.5 MG: 2 INJECTION, SOLUTION INTRAMUSCULAR; INTRAVENOUS; SUBCUTANEOUS at 21:58

## 2018-01-29 RX ADMIN — KETAMINE HYDROCHLORIDE 5 MG: 50 INJECTION INTRAMUSCULAR; INTRAVENOUS at 17:39

## 2018-01-29 RX ADMIN — HYDROMORPHONE HYDROCHLORIDE 0.5 MG: 2 INJECTION, SOLUTION INTRAMUSCULAR; INTRAVENOUS; SUBCUTANEOUS at 21:48

## 2018-01-29 RX ADMIN — OXYCODONE HYDROCHLORIDE AND ACETAMINOPHEN 1 TABLET: 5; 325 TABLET ORAL at 22:50

## 2018-01-29 RX ADMIN — ROCURONIUM BROMIDE 50 MG: 10 INJECTION INTRAVENOUS at 17:36

## 2018-01-29 RX ADMIN — LIDOCAINE HYDROCHLORIDE 50 MG: 10 INJECTION, SOLUTION INFILTRATION; PERINEURAL at 17:36

## 2018-01-29 RX ADMIN — FENTANYL CITRATE 100 MCG: 50 INJECTION, SOLUTION INTRAMUSCULAR; INTRAVENOUS at 17:36

## 2018-01-29 RX ADMIN — HYDROMORPHONE HYDROCHLORIDE 0.4 MG: 2 INJECTION, SOLUTION INTRAMUSCULAR; INTRAVENOUS; SUBCUTANEOUS at 19:03

## 2018-01-29 RX ADMIN — SODIUM CHLORIDE, SODIUM LACTATE, POTASSIUM CHLORIDE, AND CALCIUM CHLORIDE: .6; .31; .03; .02 INJECTION, SOLUTION INTRAVENOUS at 17:20

## 2018-01-29 RX ADMIN — KETAMINE HYDROCHLORIDE 5 MG: 50 INJECTION INTRAMUSCULAR; INTRAVENOUS at 19:49

## 2018-01-29 RX ADMIN — MIDAZOLAM HYDROCHLORIDE 2 MG: 1 INJECTION, SOLUTION INTRAMUSCULAR; INTRAVENOUS at 17:35

## 2018-01-29 RX ADMIN — SODIUM CHLORIDE, SODIUM LACTATE, POTASSIUM CHLORIDE, AND CALCIUM CHLORIDE 75 ML/HR: .6; .31; .03; .02 INJECTION, SOLUTION INTRAVENOUS at 15:40

## 2018-01-29 RX ADMIN — ONDANSETRON 4 MG: 2 INJECTION INTRAMUSCULAR; INTRAVENOUS at 18:00

## 2018-01-29 NOTE — ANESTHESIA PREPROCEDURE EVALUATION
Review of Systems/Medical History  Patient summary reviewed  Chart reviewed  No history of anesthetic complications     Cardiovascular  No pacemaker/AICD, Hyperlipidemia, Hypertension , Past MI (2016) , CAD, , History of percutaneous transluminal coronary angioplasty,    Pulmonary  Smoker cigarette smoker  ,        GI/Hepatic    GERD well controlled,             Endo/Other     GYN       Hematology    Coagulation disorder (plavix off 1 week) currently taking oral anticoagulants,    Musculoskeletal  Back pain ,   Comment: Uses cane for back pain, prior fracture at T10-T11      Neurology    Neuromuscular disease (fibromyalgia) , CVA , no residual symptoms,    Psychology   Anxiety, Depression ,              Physical Exam    Airway    Mallampati score: III  TM Distance: >3 FB  Neck ROM: full     Dental       Cardiovascular  Rhythm: regular, Rate: normal,     Pulmonary  Breath sounds clear to auscultation,     Other Findings        Anesthesia Plan  ASA Score- 2     Anesthesia Type- general with ASA Monitors  Additional Monitors:   Airway Plan: LMA  Plan Factors-    Induction- intravenous  Postoperative Plan- Plan for postoperative opioid use  Planned trial extubation    Informed Consent- Anesthetic plan and risks discussed with patient  I personally reviewed this patient with the CRNA  Discussed and agreed on the Anesthesia Plan with the ASHLY Sage

## 2018-01-30 VITALS
WEIGHT: 160 LBS | HEART RATE: 73 BPM | SYSTOLIC BLOOD PRESSURE: 145 MMHG | RESPIRATION RATE: 18 BRPM | BODY MASS INDEX: 26.66 KG/M2 | TEMPERATURE: 98.3 F | HEIGHT: 65 IN | OXYGEN SATURATION: 96 % | DIASTOLIC BLOOD PRESSURE: 68 MMHG

## 2018-01-30 PROBLEM — S82.252A CLOSED DISPLACED COMMINUTED FRACTURE OF SHAFT OF LEFT TIBIA: Status: ACTIVE | Noted: 2018-01-30

## 2018-01-30 LAB
ANION GAP SERPL CALCULATED.3IONS-SCNC: 9 MMOL/L (ref 4–13)
BASOPHILS # BLD AUTO: 0 THOUSANDS/ΜL (ref 0–0.1)
BASOPHILS NFR BLD AUTO: 0 % (ref 0–1)
BUN SERPL-MCNC: 14 MG/DL (ref 5–25)
CALCIUM SERPL-MCNC: 8.5 MG/DL (ref 8.3–10.1)
CHLORIDE SERPL-SCNC: 105 MMOL/L (ref 100–108)
CO2 SERPL-SCNC: 29 MMOL/L (ref 21–32)
CREAT SERPL-MCNC: 0.89 MG/DL (ref 0.6–1.3)
EOSINOPHIL # BLD AUTO: 0 THOUSAND/ΜL (ref 0–0.61)
EOSINOPHIL NFR BLD AUTO: 0 % (ref 0–6)
ERYTHROCYTE [DISTWIDTH] IN BLOOD BY AUTOMATED COUNT: 14.4 % (ref 11.6–15.1)
GFR SERPL CREATININE-BSD FRML MDRD: 73 ML/MIN/1.73SQ M
GLUCOSE P FAST SERPL-MCNC: 133 MG/DL (ref 65–99)
GLUCOSE SERPL-MCNC: 133 MG/DL (ref 65–140)
HCT VFR BLD AUTO: 41.2 % (ref 37–47)
HGB BLD-MCNC: 13.7 G/DL (ref 12–16)
LYMPHOCYTES # BLD AUTO: 1.9 THOUSANDS/ΜL (ref 0.6–4.47)
LYMPHOCYTES NFR BLD AUTO: 10 % (ref 14–44)
MCH RBC QN AUTO: 31.1 PG (ref 27–31)
MCHC RBC AUTO-ENTMCNC: 33.3 G/DL (ref 31.4–37.4)
MCV RBC AUTO: 93 FL (ref 82–98)
MONOCYTES # BLD AUTO: 1.2 THOUSAND/ΜL (ref 0.17–1.22)
MONOCYTES NFR BLD AUTO: 6 % (ref 4–12)
NEUTROPHILS # BLD AUTO: 16.1 THOUSANDS/ΜL (ref 1.85–7.62)
NEUTS SEG NFR BLD AUTO: 84 % (ref 43–75)
NRBC BLD AUTO-RTO: 0 /100 WBCS
PLATELET # BLD AUTO: 524 THOUSANDS/UL (ref 130–400)
PLATELET BLD QL SMEAR: ABNORMAL
PMV BLD AUTO: 7.5 FL (ref 8.9–12.7)
POTASSIUM SERPL-SCNC: 4.1 MMOL/L (ref 3.5–5.3)
RBC # BLD AUTO: 4.41 MILLION/UL (ref 4.2–5.4)
SODIUM SERPL-SCNC: 143 MMOL/L (ref 136–145)
WBC # BLD AUTO: 19.2 THOUSAND/UL (ref 4.8–10.8)

## 2018-01-30 PROCEDURE — 85025 COMPLETE CBC W/AUTO DIFF WBC: CPT | Performed by: PHYSICIAN ASSISTANT

## 2018-01-30 PROCEDURE — 99217 PR OBSERVATION CARE DISCHARGE MANAGEMENT: CPT | Performed by: PHYSICIAN ASSISTANT

## 2018-01-30 PROCEDURE — 97162 PT EVAL MOD COMPLEX 30 MIN: CPT

## 2018-01-30 PROCEDURE — 99024 POSTOP FOLLOW-UP VISIT: CPT | Performed by: PHYSICIAN ASSISTANT

## 2018-01-30 PROCEDURE — 99024 POSTOP FOLLOW-UP VISIT: CPT | Performed by: ORTHOPAEDIC SURGERY

## 2018-01-30 PROCEDURE — 97110 THERAPEUTIC EXERCISES: CPT

## 2018-01-30 PROCEDURE — 97167 OT EVAL HIGH COMPLEX 60 MIN: CPT

## 2018-01-30 PROCEDURE — 80048 BASIC METABOLIC PNL TOTAL CA: CPT | Performed by: PHYSICIAN ASSISTANT

## 2018-01-30 PROCEDURE — 99253 IP/OBS CNSLTJ NEW/EST LOW 45: CPT | Performed by: INTERNAL MEDICINE

## 2018-01-30 PROCEDURE — G8988 SELF CARE GOAL STATUS: HCPCS

## 2018-01-30 PROCEDURE — G8987 SELF CARE CURRENT STATUS: HCPCS

## 2018-01-30 RX ORDER — OXYCODONE AND ACETAMINOPHEN 7.5; 325 MG/1; MG/1
1 TABLET ORAL EVERY 4 HOURS PRN
Qty: 24 TABLET | Refills: 0 | Status: SHIPPED | OUTPATIENT
Start: 2018-01-30 | End: 2018-02-09

## 2018-01-30 RX ORDER — ALBUTEROL SULFATE 90 UG/1
2 AEROSOL, METERED RESPIRATORY (INHALATION) EVERY 4 HOURS PRN
Status: DISCONTINUED | OUTPATIENT
Start: 2018-01-30 | End: 2018-01-31 | Stop reason: HOSPADM

## 2018-01-30 RX ADMIN — HYDROMORPHONE HYDROCHLORIDE 0.2 MG: 2 INJECTION, SOLUTION INTRAMUSCULAR; INTRAVENOUS; SUBCUTANEOUS at 04:58

## 2018-01-30 RX ADMIN — HYDROMORPHONE HYDROCHLORIDE 0.2 MG: 2 INJECTION, SOLUTION INTRAMUSCULAR; INTRAVENOUS; SUBCUTANEOUS at 21:12

## 2018-01-30 RX ADMIN — LOSARTAN POTASSIUM 50 MG: 50 TABLET, FILM COATED ORAL at 08:22

## 2018-01-30 RX ADMIN — OXYCODONE HYDROCHLORIDE AND ACETAMINOPHEN 1 TABLET: 5; 325 TABLET ORAL at 20:51

## 2018-01-30 RX ADMIN — GABAPENTIN 300 MG: 300 CAPSULE ORAL at 16:46

## 2018-01-30 RX ADMIN — OXYCODONE HYDROCHLORIDE AND ACETAMINOPHEN 1 TABLET: 5; 325 TABLET ORAL at 03:14

## 2018-01-30 RX ADMIN — OXYCODONE HYDROCHLORIDE AND ACETAMINOPHEN 1 TABLET: 5; 325 TABLET ORAL at 12:27

## 2018-01-30 RX ADMIN — ENOXAPARIN SODIUM 40 MG: 40 INJECTION SUBCUTANEOUS at 16:53

## 2018-01-30 RX ADMIN — ALPRAZOLAM 0.5 MG: 0.5 TABLET ORAL at 23:02

## 2018-01-30 RX ADMIN — GEMFIBROZIL 600 MG: 600 TABLET ORAL at 16:53

## 2018-01-30 RX ADMIN — GEMFIBROZIL 600 MG: 600 TABLET ORAL at 06:15

## 2018-01-30 RX ADMIN — GABAPENTIN 300 MG: 300 CAPSULE ORAL at 21:15

## 2018-01-30 RX ADMIN — ALPRAZOLAM 0.5 MG: 0.5 TABLET ORAL at 06:17

## 2018-01-30 RX ADMIN — HYDROMORPHONE HYDROCHLORIDE 0.2 MG: 2 INJECTION, SOLUTION INTRAMUSCULAR; INTRAVENOUS; SUBCUTANEOUS at 11:44

## 2018-01-30 RX ADMIN — OXYCODONE HYDROCHLORIDE AND ACETAMINOPHEN 1 TABLET: 5; 325 TABLET ORAL at 07:33

## 2018-01-30 RX ADMIN — CLOPIDOGREL BISULFATE 75 MG: 75 TABLET ORAL at 16:53

## 2018-01-30 RX ADMIN — NICOTINE 1 PATCH: 14 PATCH TRANSDERMAL at 08:24

## 2018-01-30 RX ADMIN — GABAPENTIN 300 MG: 300 CAPSULE ORAL at 08:22

## 2018-01-30 RX ADMIN — OXYCODONE HYDROCHLORIDE AND ACETAMINOPHEN 1 TABLET: 5; 325 TABLET ORAL at 16:45

## 2018-01-30 RX ADMIN — METOPROLOL TARTRATE 200 MG: 100 TABLET ORAL at 08:22

## 2018-01-30 RX ADMIN — HYDROMORPHONE HYDROCHLORIDE 0.2 MG: 2 INJECTION, SOLUTION INTRAMUSCULAR; INTRAVENOUS; SUBCUTANEOUS at 08:30

## 2018-01-30 RX ADMIN — PANTOPRAZOLE SODIUM 40 MG: 40 TABLET, DELAYED RELEASE ORAL at 08:29

## 2018-01-30 RX ADMIN — HYDROMORPHONE HYDROCHLORIDE 0.2 MG: 2 INJECTION, SOLUTION INTRAMUSCULAR; INTRAVENOUS; SUBCUTANEOUS at 01:00

## 2018-01-30 RX ADMIN — DULOXETINE HYDROCHLORIDE 60 MG: 60 CAPSULE, DELAYED RELEASE ORAL at 21:15

## 2018-01-30 RX ADMIN — HYDROMORPHONE HYDROCHLORIDE 0.2 MG: 2 INJECTION, SOLUTION INTRAMUSCULAR; INTRAVENOUS; SUBCUTANEOUS at 15:07

## 2018-01-30 RX ADMIN — DOCUSATE SODIUM 100 MG: 100 CAPSULE, LIQUID FILLED ORAL at 08:23

## 2018-01-30 RX ADMIN — ALPRAZOLAM 0.5 MG: 0.5 TABLET ORAL at 15:07

## 2018-01-30 RX ADMIN — HYDROMORPHONE HYDROCHLORIDE 0.2 MG: 2 INJECTION, SOLUTION INTRAMUSCULAR; INTRAVENOUS; SUBCUTANEOUS at 18:07

## 2018-01-30 RX ADMIN — DOCUSATE SODIUM 100 MG: 100 CAPSULE, LIQUID FILLED ORAL at 17:38

## 2018-01-30 NOTE — PLAN OF CARE
Problem: DISCHARGE PLANNING - CARE MANAGEMENT  Goal: Discharge to post-acute care or home with appropriate resources  INTERVENTIONS:  - Conduct assessment to determine patient/family and health care team treatment goals, and need for post-acute services based on payer coverage, community resources, and patient preferences, and barriers to discharge  - Address psychosocial, clinical, and financial barriers to discharge as identified in assessment in conjunction with the patient/family and health care team  - Arrange appropriate level of post-acute services according to patient's   needs and preference and payer coverage in collaboration with the physician and health care team  - Communicate with and update the patient/family, physician, and health care team regarding progress on the discharge plan  - Arrange appropriate transportation to post-acute venues    ARRANGE SHORT TERM REHAB PLACEMENT  Outcome: Progressing  STR referrals have been made to [facility] and [facility]  Rehab authorization will be requested once accepting facility is determined

## 2018-01-30 NOTE — PLAN OF CARE
CARDIOVASCULAR - ADULT     Maintains optimal cardiac output and hemodynamic stability Not Progressing     Absence of cardiac dysrhythmias or at baseline rhythm Not Progressing        GASTROINTESTINAL - ADULT     Minimal or absence of nausea and/or vomiting Not Progressing     Maintains or returns to baseline bowel function Not Progressing     Maintains adequate nutritional intake Not Progressing        GENITOURINARY - ADULT     Maintains or returns to baseline urinary function Not Progressing     Absence of urinary retention Not Progressing        HEMATOLOGIC - ADULT     Maintains hematologic stability Not Progressing        METABOLIC, FLUID AND ELECTROLYTES - ADULT     Electrolytes maintained within normal limits Not Progressing     Fluid balance maintained Not Progressing        MUSCULOSKELETAL - ADULT     Maintain or return mobility to safest level of function Not Progressing     Maintain proper alignment of affected body part Not Progressing        NEUROSENSORY - ADULT     Achieves stable or improved neurological status Not Progressing     Achieves maximal functionality and self care Not Progressing        Potential for Falls     Patient will remain free of falls Not Progressing        Prexisting or High Potential for Compromised Skin Integrity     Skin integrity is maintained or improved Not Progressing        RESPIRATORY - ADULT     Achieves optimal ventilation and oxygenation Not Progressing        SKIN/TISSUE INTEGRITY - ADULT     Skin integrity remains intact Not Progressing     Incision(s), wounds(s) or drain site(s) healing without S/S of infection Not Progressing     Oral mucous membranes remain intact Not Progressing

## 2018-01-30 NOTE — SOCIAL WORK
Discharge ordered  Pt will be transferred to [facility] at a skilled level of care and rehab  Authorization for 7 days skilled rehab trial ([id number]) received from [facility]   Wheelchair van transport scheduled through [facility] (#[id number])  Unit, [facility] and pt aware of plan

## 2018-01-30 NOTE — DISCHARGE SUMMARY
Discharge Summary - Bety Marcial 65 y o  female MRN: [id number]    Unit/Bed#: [address] Encounter: [id number]    Admission Date: 1/29/2018     Admitting Diagnosis: Closed displaced comminuted fracture of shaft of left tibia [K21 [id number]]     Per Dr Galdino Littlejohn  HPI: Larry Oliveros has closed displaced comminuted fracture of the left tibia that will require open reduction internal fixation  We discussed that this will be fixated with a large plate and screws  I described the risks of surgery which are inclusive but not limited to bleeding, infection, blood clot, nerve injury, persistent stiffness, and weakness  Wound healing problems, failure to achieve the anticipated results and worsening of the symptoms  This will be a large incision of the left tibia  We discussed due to the size of incision there will be some numbness in the lower left leg  He now has a history of stroke and is currently taking Plavix  She is informed that she must be off this medication for minimal 5 days before having the surgery  She will contact her provider who is managing this medication  I did recommend that she begin Lovenox of which she can take up to 24 hours prior to the surgery  Procedures Performed: Left tibia ORIF and prophylactic fasciotomy    Hospital Course:  Patient was admitted for elective but urgent surgery for traumatic left tibia fracture  Patient had planned left tibia open reduction internal fixation with plate and screws  Patient also had left anterior tibial compartment fasciotomy in order to prevent high risk of compartment syndrome  Patient had procedure performed yesterday 1/29/18 and tolerated procedure well  Postoperatively into postoperative day 1 patient had pain relatively well controlled with p r n  analgesic medications  Patient was neurovascularly intact  Patient was monitored with physical exam for any signs of compartment syndrome    Patient did not have any numbness, tingling, lack of motor movement, lack of sensation in the distal lower left extremity  Patient did not have any pain with passive range of motion/extension of the toes  Patient had good cap refill  Patient was approved by with case management for skilled nursing facility for extensive PT/OT  Patient was discharged as nonweightbearing on lower left extremity with p r n  analgesic medications and Lovenox for 4 weeks  Patient was transported to skilled nursing [facility]  Significant Findings, Care, Treatment and Services Provided: Left tibia comminuted fracture    Complications: none    Discharge Diagnosis: left comminuted fracture of tibia    Condition at Discharge: good     Discharge instructions/Information to patient and family:   See after visit summary for information provided to patient and family  Provisions for Follow-Up Care:  See after visit summary for information related to follow-up care and any pertinent home health orders  Disposition: Skilled nursing facility at [address] [place] [address] Readmission: No    Discharge Statement   I spent 30 minutes discharging the patient  This time was spent on the day of discharge  I had direct contact with the patient on the day of discharge  Additional documentation is required if more than 30 minutes were spent on discharge  Discharge Medications:  See after visit summary for reconciled discharge medications provided to patient and family

## 2018-01-30 NOTE — ANESTHESIA POSTPROCEDURE EVALUATION
Post-Op Assessment Note      CV Status:  Stable    Mental Status:  Alert and awake    Hydration Status:  Euvolemic    PONV Controlled:  Controlled    Airway Patency:  Patent    Post Op Vitals Reviewed: Yes          Staff: Anesthesiologist           /87 (01/29/18 2130)    Temp      Pulse 68 (01/29/18 2130)   Resp 20 (01/29/18 2130)    SpO2

## 2018-01-30 NOTE — SOCIAL WORK
SW met with pt to assess needs and discuss plans  DASH discussion completed  Discussed goals of making sure pt's needs are met upon discharge, pt's preferences are taken into account, pt understands her health condition, medications and symptoms to watch for after returning home and pt is aware of any follow up appointments recommended by hospital physician  Pt lives at home with her significant other  Prior to admission pt was ambulating with a cane  Pt's plan at this time is to go to [facility]  SW discussed facility options with pt  Pt's preference is [facility] due to the proximity to her home  Her second preference is [facility]  Referrals have been made  Rehab authorization will be requested once accepting facility is determined  SW will follow to assist with planning as needed

## 2018-01-30 NOTE — OCCUPATIONAL THERAPY NOTE
OT EVALUATION     01/30/18 0915   Note Type   Note type Eval only   Restrictions/Precautions   Weight Bearing Precautions Per Order Yes   LLE Weight Bearing Per Order NWB   Braces or Orthoses Splint  (POD #1 s/p tibia ORIF)   Other Precautions Pain; Fall Risk   Pain Assessment   Pain Assessment 0-10   Pain Type Acute pain   Pain Location Leg   Pain Orientation Left; Lower   Hospital Pain Intervention(s) Medication (See MAR); Cold applied;Repositioned; Ambulation/increased activity   Response to Interventions some relief   Home Living   Type of [address] [place] [address] Two level; Able to live on main level with bedroom/bathroom; Bed/bath upstairs  (3  JANN with rail)   Bathroom Shower/Tub Walk-in shower   Bathroom Toilet Standard   Bathroom Equipment Hand-held shower;Commode   Bathroom Accessibility Accessible via walker   [address] [place] [address],[address]; Wheelchair-manual;Crutches;Cane   Prior Function   Level of Love Independent with ADLs and functional mobility   Lives With Friend(s)  ("roommate")   Receives Help From Friend(s)   ADL Assistance Independent   IADLs Needs assistance   Falls in the last 6 months 1 to 4   Comments Pt states she was transferring to w/c independently, propelling w/c in home, and bumping up/down stairs as needed on her buttocks without assist since Left tibia fracture first occurred on 1/9/18  Prior to fracture, pt was independent with all ADL/ light IADL without use of AD      Psychosocial   Psychosocial (WDL) WDL   Subjective   Subjective "I was doing really well, but now I'm going to rehab "   ADL   Where Assessed Supine, bed   Eating Assistance 7  Independent   Grooming Assistance 5  Supervision/Setup   UB Bathing Assistance 5  Supervision/Setup    [facility] 5  Supervision/Setup    [address]  4  Minimal Assistance   Bed Mobility   Supine to Sit 5  Supervision   Sit to Supine 5 Supervision   Transfers   Sit to Stand 4  Minimal assistance   Additional items Assist x 1   Stand to Sit 4  Minimal assistance   Additional items Assist x 1   Stand pivot 4  Minimal assistance  (with standard walker, NWB LLE)   Additional items Assist x 1   Balance   Static Sitting Fair +   Dynamic Sitting Fair   Static Standing Fair -   Dynamic Standing Poor -   Ambulatory Fair -  (with standard walker)   Activity Tolerance   Activity Tolerance Patient limited by pain; Patient limited by fatigue   Nurse Made Aware yes   RUE Assessment   RUE Assessment WFL  (gross strength 4/5)   LUE Assessment   LUE Assessment WFL  (gross strength 4/5)   Hand Function   Gross Motor Coordination Functional   Fine Motor Coordination Functional   Vision-Basic Assessment   Current Vision Wears glasses all the time   Perception   Spatial Orientation Appears intact   Motor Planning Appears intact   Cognition   Overall Cognitive Status WFL   Arousal/Participation Alert; Cooperative   Attention Within functional limits   Orientation Level Oriented X4   Memory Within functional limits   Following Commands Follows all commands and directions without difficulty   Assessment   Limitation Decreased ADL status; Decreased UE strength;Decreased endurance;Decreased self-care trans;Decreased high-level ADLs  (decreased balance)   Prognosis Good   Assessment Patient evaluated by Occupational Therapy  Patient admitted with closed displaced comminuted fracture of shaft of left tibia,  s/p tibia ORIF 1/29/18  The patients occupational profile, medical and therapy history includes a extensive additional review of physical, cognitive, or psychosocial history related to current functional performance  Comorbidities affecting functional mobility and ADLS include: anxiety, chronic back pain, CVA, falls, GERD, hypertension and hyperlipidemia, fibromyalgia, Bell's palsy, left arm fracture, and MI    Prior to admission, patient was independent with ADLS, independent with IADLS and living with "roommate" in a 2 level home with 3 steps to enter  The evaluation identifies the following performance deficits: weakness, impaired balance, decreased endurance, increased fall risk, decreased ADLS, decreased IADLS, pain, decreased activity tolerance, ortheopedic restrictions and decreased strength, that result in activity limitations and/or participation restrictions  This evaluation requires clinical decision making of high complexity, because the patient presents with comorbidites that affect occupational performance and required significant modification of tasks or assistance with consideration of multiple treatment options  The Barthel Index was used as a functional outcome tool presenting with a score of 50, indicating marked limitations of functional mobility and ADLS  Patient will benefit from skilled Occupational Therapy services to address above deficits and facilitate a safe return to prior level of function  Goals   Patient Goals go to rehab   STG Time Frame 3-5   Short Term Goal #1 Patient will increase standing tolerance to 3 minutes during functional activity; Patient will increase bed mobility to independent; Patient will increase functional mobility to and from bathroom with standard walker with min assist to increase performance with ADLS; Patient will tolerate 15 minutes of UE ROM/strengthening to increase general activity tolerance and performance in ADLS/IADLS; Patient will improve functional activity tolerance to 15 minutes of sustained functional tasks to increase participation in basic self-care and decrease assistance level  LTG Time Frame 10-14   Long Term Goal #1 Patient will increase standing tolerance to 5 minutes during functional activity;  Patient will increase bed mobility to independent; Patient will increase functional mobility to and from bathroom with standard walker independently to increase performance with ADLS; Patient will tolerate 20 minutes of UE ROM/strengthening to increase general activity tolerance and performance in ADLS/IADLS; Patient will improve functional activity tolerance to 20 minutes of sustained functional tasks to increase participation in basic self-care and decrease assistance level  Functional Transfer Goals   Pt Will Perform All Functional Transfers With mod indep; With assistive devices; With good judgment/safety  (LTG- Independent; With assistive devices; With good judgment/s)   ADL Goals   Pt Will Perform Bathing At edge of bed   Pt Will Perform UE Dressing With stand by assist;With setup  (LTG - Independent in shower)   Pt Will Perform LE Dressing At edge of bed; With stand by assist  (LTG - Independent)   Pt Will Perform Toileting With stand by assist;With bedside commode  (LTG - Independent on toilet in bathroom,)   Plan   Treatment Interventions ADL retraining;Functional transfer training;UE strengthening/ROM; Endurance training;Patient/family training;Equipment evaluation/education; Compensatory technique education; Energy conservation   OT Frequency 3-5x/wk   Recommendation   OT Discharge Recommendation Short Term Rehab   Barthel Index   Feeding 10   Bathing 0   Grooming Score 0   Dressing Score 5   Bladder Score 10   Bowels Score 10   Toilet Use Score 5   Transfers (Bed/Chair) Score 10   Mobility (Level Surface) Score 0   Stairs Score 0   Barthel Index Score 50

## 2018-01-30 NOTE — PHYSICAL THERAPY NOTE
PT EVALUATION       01/30/18 0901   Pain Assessment   Pain Assessment 0-10   Pain Score 5  (pt given pain meds prior to treatment)   Pain Location Leg   Pain Orientation Left; Lower; Anterior   Home Living   Type of [facility] Two level; Able to live on main level with bedroom/bathroom  (3 JANN)   [address]; Wheelchair-manual;Crutches   Prior Function   Lives With (roommate)   Receives Help From Friend(s); Home health   Falls in the last 6 months (multiple)   Comments Pt fx L tibia 2 weeks ago  L LE was casted but not healing so ORIF 1/29/2018  Pt has had 2 falls at home in the past 2 weeks so pt has just been transferring to commode and , not ambulating at all  Pt was "bumping" up and down steps, does not want to try crutches on steps  Restrictions/Precautions   Weight Bearing Precautions Per Order Yes   LLE Weight Bearing Per Order NWB   Braces or Orthoses Splint   Other Precautions Pain; Fall Risk   Cognition   Overall Cognitive Status WFL   Following Commands Follows all commands and directions without difficulty   RLE Assessment   RLE Assessment WNL   LLE Assessment   LLE Assessment (short leg splint;  ROM knee/hip WFL, MMT hip/knee at least 3)   Bed Mobility   Supine to Sit 5  Supervision   Sit to Supine 5  Supervision   Transfers   Sit to Stand 4  Minimal assistance   Stand to Sit 4  Minimal assistance   Stand pivot 4  Minimal assistance   Additional items (with walker, NWB L LE)   Ambulation/Elevation   Gait pattern (NWB L LE)   Gait Assistance 4  Minimal assist   Assistive Device Standard walker   Distance few steps   Balance   Static Sitting Good   Dynamic Sitting Fair   Static Standing Fair   Dynamic Standing Poor   Activity Tolerance   Activity Tolerance Patient tolerated treatment well   Assessment   Prognosis Good   Problem List Decreased strength;Decreased endurance; Impaired balance;Decreased mobility;Pain;Orthopedic restrictions   Assessment Patient seen for Physical Therapy evaluation  Patient admitted with Fx L tibia now s/o ORIF  Comorbidities affecting patient's physical performance include: MI, htn, fibromyalgia, chronic pain, lymes disease  Personal factors affecting patient at time of initial evaluation include: lives in 2 story house, ambulating with assistive device, stairs to enter home, inability to ambulate household distances, positive fall history, anxiety and tobacco use   Prior to admission, patient was transferring independently to chair, not ambulating due to falls  Please find objective findings from Physical Therapy assessment regarding body systems outlined above with impairments and limitations including weakness, impaired balance, gait deviations, pain, decreased activity tolerance, decreased functional mobility tolerance, fall risk and orthopedic restrictions  The Barthel Index was used as a functional outcome tool presenting with a score of 50 today indicating marked limitations of functional mobility and ADLS  Patient's clinical presentation is currently evolving as seen in patient's presentation of changing level of pain, increased fall risk, new onset of impairment of functional mobility and new onset of weakness  Pt would benefit from continued Physical Therapy treatment to address deficits as defined above and maximize level of functional mobility  As demonstrated by objective findings, the assigned level of complexity for this evaluation is moderate     Goals   Patient Goals fracture healing   STG Expiration Date (1-7 days)   Short Term Goal #1 supervision transfers, supervision ambulation NWB L LE 15 feet with standard walker, OOB to chair x 2 hours with L LE elevated   LTG Expiration Date (1-2 weeks)   Long Term Goal #1 independent transfers to WC/commode, independent ambulation with standard walker NWB L LE 25 feet, no falls, independent HEP   Plan   Treatment/Interventions ADL retraining;Functional transfer training;LE strengthening/ROM; Therapeutic exercise;Patient/family training;Equipment eval/education;Gait training; Endurance training;Elevations   PT Frequency 5x/wk   Recommendation   Recommendation (STR)   Barthel Index   Feeding 10   Bathing 0   Grooming Score 5   Dressing Score 5   Bladder Score 10   Bowels Score 10   Toilet Use Score 5   Transfers (Bed/Chair) Score 5   Mobility (Level Surface) Score 0   Stairs Score 0   Barthel Index Score 50       Time In:0850  Time Out:0900  Total Time: 10      S:  "My leg hurts"  O:  Min assist sit to stand x 3 times to get closer to head of bed with UE support on walker NWB L LE  Cues for safety, technique  Pt instructed to wiggle toes, perform quad sets (pt performed 5x)  Pt educated that it is safe to perform AROM of L LE but not to weight bear on the leg  Ice applied after treatment  Encouraged pt to use commode with assist, not bed pan  A:  Pt is impulsive at times but did well post op day 1 s/p ORIF tibia     P: Continue PT     Yane Cheng, PT

## 2018-01-30 NOTE — DISCHARGE INSTRUCTIONS
Non weight bearing on lower left extremity  Lovenox 40mg  SQ once daily for 4 weeks  D/c to [facility]    Closed Reduction Internal Fixation of Leg Fracture in Adults   WHAT YOU NEED TO KNOW:   You may have a splint or half cast placed during surgery to protect the area  The area will remain numb for up to 6 hours  It is normal to have pain  Take your pain medicines before the numbness wears off completely  You may also notice swelling and bruising  DISCHARGE INSTRUCTIONS:   Call 911 for any of the following:   · You suddenly feel lightheaded, short of breath, or have chest pain  · You cough up blood  Seek care immediately if:   · Your leg feels warm, tender, and painful  It may look swollen and red  · You have severe pain, even after you take medicine  · Your cast or splint breaks  · You cannot move your leg or toes  · You have tingling or numbness in your leg or toes  Contact your healthcare provider if:   · You have a fever or chills  · Your cast or splint gets wet or begins to smell  · Your bandage or cast feels too tight or too loose  · You have a lot of itching under your cast or splint  · You have questions or concerns about your condition or care  Medicines:   · Prescription pain medicine  may be given  Ask your how to take this medicine safely  · Take your medicine as directed  Contact your healthcare provider if you think your medicine is not helping or if you have side effects  Tell him or her if you are allergic to any medicine  Keep a list of the medicines, vitamins, and herbs you take  Include the amounts, and when and why you take them  Bring the list or the pill bottles to follow-up visits  Carry your medicine list with you in case of an emergency  Self-care after surgery:   · Apply ice to the area  Ice helps decrease swelling and pain  Ice may also help prevent tissue damage  Use an ice pack or put crushed ice in a plastic bag   Cover it with a towel and place it on your fractured area for 15 to 20 minutes every hour or as directed  · Elevate your leg as directed  Raise your leg above the level of your heart as often as you can  This will help decrease swelling and pain  Prop your leg on pillows or blankets to keep it elevated comfortably  · Keep your splint or cast dry  Place a plastic bag over the splint or cast before you shower  Tape the bag to your skin to keep water out  Do not soak in a tub or pool  · Limit activity  You may not be able to put your full weight on your leg  Use your crutches, walker, or cane as directed  Ask your healthcare provider when you can return to your daily activities and go back to work  · Eat foods that contain calcium and vitamin D  Calcium and vitamin D help fractures heal  Examples of high-calcium foods include kale, spinach, salmon, and fortified orange juice or breakfast cereal  Tuna, fortified milk or cheese, egg yolks, and soy milk are examples of vitamin D foods  · Do not smoke  If you smoke, it is never too late to quit  Smoking can increase your risk for infection and can slow down healing  Ask your healthcare provider for information if you need help quitting  E-cigarettes or smokeless tobacco still contain nicotine  Talk to your healthcare provider before you use these products  · Go to physical therapy as directed  A physical therapist teaches you exercises to help improve movement and strength, and to decrease pain  Follow up with your healthcare provider as directed: You may need to have more x-rays to show if the bone is healing  You may need to have your splint or cast replaced after the swelling goes down  Write down your questions so remember to ask them during your visits  © 2017 [address] [employer] Information is for End User's use only and may not be sold, redistributed or otherwise used for commercial purposes   All illustrations and images included in CareNotes® are the copyrighted property of [employer]  or Boris Robles  The above information is an  only  It is not intended as medical advice for individual conditions or treatments  Talk to your doctor, nurse or pharmacist before following any medical regimen to see if it is safe and effective for you

## 2018-01-30 NOTE — SOCIAL WORK
SW following to assist with DCP  STR placement is planned  Pt has been accepted by [facility] and there is currently a bed available  SW will submit rehab authorization to [facility]  SW will follow to assist with transfer when ready

## 2018-01-30 NOTE — CONSULTS
Inpatient Medical Consultation - [facility] Internal Medicine    Patient Information: Christiano Lamar 65 y o  female MRN: [id number]  Unit/Bed#: [facility] Encounter: [id number]  PCP: Prabha Pérez DO  Date of Admission:  1/29/2018  Date of Consultation: 01/30/18  Requesting Physician: Katherine Ramirez MD    Reason For Consultation:   Medical management of HTN, HLD    Assessment/Plan:    Hospital Problem List:     Principal Problem:    Closed displaced comminuted fracture of shaft of left tibia  Active Problems:    Fibromyalgia    Hypertension    Hyperlipidemia    Anxiety    Myocardial infarction    GERD (gastroesophageal reflux disease)    Dependence on nicotine from cigarettes      Plan:  Closed displaced comminuted fracture of the left tibia shaft,  Management for pain control and DVT prophylaxis by primary team   POD#1 s/p tibia ORIF  Non weight-bearing on left lower extremity    Leukocytosis,  Likely reactive secondary to surgery  Denies urinary symptoms, URI symptoms, or cough    HTN,   Stable  Continue Cozaar and Lopressor    HLD,  Continue gemfibrozil  12/13/2018 lipid profile cholesterol 176, triglycerides 201, HDL 31 and     Anxiety/depression,  Continue Cymbalta    Compression fracture T10,  Continue naproxen and and gabapentin    GERD,  Continue Protonix    Nicotine dependence,  Encourage smoking cessation  Continue nicotine patch    History of myocardial infarction,  She states she did have a cardiac catheterization approximately 2 years ago which showed a 30% blockage in 1 of the vessels and it was cleaned out at that time with no stent placed    Continue Plavix and gemfibrozil    History of Bell's palsy  12/12/2017 Lyme antibody profile, normal      VTE Prophylaxis: Enoxaparin (Lovenox)  / sequential compression device     Recommendations for Discharge:  · Continue current medications  · Recheck CBC in 5 days  · Add albuterol HFA 2 puffs q4 hours prn whezing    Counseling / Coordination of Care Time: 30 minutes  Greater than 50% of total time spent on patient counseling and coordination of care  Collaboration of Care: Were Recommendations Directly Discussed with Primary Treatment Team? - Yes     History of Present Illness:    Rick Hamlin is a 65 y o  female with a PMH of  hypertension, hyperlipidemia, fibromyalgia, anxiety/depression, history of Bell's palsy, myocardial infarction, GERD and nicotine dependence who was admitted on 1/29/2018 for repair of closed displaced comminuted fracture of the left tibia shaft  She states on 1/9/2018 she fell out of  bed and was complaining of LLE  She called EMS and was transported to [facility] where she was treated for a close reduction under anesthesia  She continued to complain of a moderate ache located in the medial aspect of LLE when she sought out a 2nd opinion with Dr Chante Calderon  On 01/29/2018 she went to the OR for a open reduction with internal fixation of the tibial fracture  Denies chest pain, abdominal pain, or shortness of breath  Denies urinary symptoms  Denies cough or URI symptoms  We are consulted for medical management of hypertension, hyperlipidemia and anxiety/depression    Review of Systems:    Review of Systems   Constitutional: Positive for activity change  Negative for appetite change, chills, diaphoresis, fatigue and fever  HENT: Negative for congestion, dental problem, facial swelling, mouth sores, nosebleeds, postnasal drip, rhinorrhea, trouble swallowing and voice change  Eyes: Negative for pain and redness  Respiratory: Negative for cough, choking, chest tightness, shortness of breath and wheezing  Cardiovascular: Positive for leg swelling  Negative for chest pain and palpitations  Gastrointestinal: Negative for abdominal distention, abdominal pain, blood in stool, constipation, diarrhea and vomiting     Genitourinary: Negative for difficulty urinating, dysuria, flank pain, frequency, hematuria, pelvic pain, urgency and vaginal pain  Musculoskeletal: Positive for back pain and gait problem  Negative for neck pain and neck stiffness  Skin: Negative for color change, pallor and rash  Neurological: Negative for dizziness, tremors, syncope, facial asymmetry, weakness and headaches  Psychiatric/Behavioral: Negative for agitation, behavioral problems, confusion, sleep disturbance and suicidal ideas  The patient is nervous/anxious  Past Medical and Surgical History:     Past Medical History:   Diagnosis Date    Alopecia     Back injury     Bell's palsy     Chronic pain     back    Closed left arm fracture     Fibromyalgia     Fibromyalgia, primary     GERD (gastroesophageal reflux disease)     Hyperlipidemia     Hypertension     Lyme disease     Myocardial infarct     2015    Myocardial infarction     Cardiac catheterization 2 years ago showed 30% blockage in 1 of the blood vessels    Stroke ([facility] [address] )     TIA (transient ischemic attack)     2017       Past Surgical History:   Procedure Laterality Date     SECTION      CHOLECYSTECTOMY      CORONARY ANGIOPLASTY          ORIF TIBIA FRACTURE Left 2018    Procedure: OPEN REDUCTION W/ INTERNAL FIXATION (ORIF) TIBIA FRACTURE;  Surgeon: Robert Barone MD;  Location: [facility];  Service: Orthopedics       Meds/Allergies:    all medications and allergies reviewed    Allergies: No Known Allergies    Social History:     Marital Status: /Civil Union    Substance Use History:   History   Alcohol Use    Yes     Comment: occasional     History   Smoking Status    Current Every Day Smoker    Packs/day: 0 50    Years: 40 00    Types: Cigarettes   Smokeless Tobacco    Never Used     Comment: Counseled, wants to quit     History   Drug Use No       Family History: Mother, alive with no medical problems  Father  secondary to hip surgery      Physical Exam:     Vitals:   Blood Pressure: 129/63 (18 1409)  Pulse: 71 (01/30/18 1409)  Temperature: 98 2 °F (36 8 °C) (01/30/18 1409)  Temp Source: Oral (01/30/18 1409)  Respirations: 18 (01/30/18 1409)  Height: 5' 5" (165 1 cm) (01/29/18 1523)  Weight - Scale: 72 6 kg (160 lb) (01/29/18 1523)  SpO2: 95 % (01/30/18 1409)    Physical Exam   Constitutional: She is oriented to person, place, and time  She appears well-developed and well-nourished  No distress  HENT:   Head: Normocephalic and atraumatic  Neck: Normal range of motion  Neck supple  Cardiovascular: Normal rate, regular rhythm and normal heart sounds  Exam reveals no gallop and no friction rub  No murmur heard  Pulmonary/Chest: Effort normal  No respiratory distress  She has wheezes  She has no rales  She exhibits no tenderness  Scant expiratory wheeze heard on left   Abdominal: Soft  Bowel sounds are normal  She exhibits no distension and no mass  There is no tenderness  There is no rebound and no guarding  Musculoskeletal: She exhibits edema and tenderness  LLE with splint in place  Capillary refills <2sec   +2 pedal pulses   Neurological: She is alert and oriented to person, place, and time  Skin: Skin is warm and dry  No rash noted  She is not diaphoretic  No erythema  No pallor  Psychiatric: She has a normal mood and affect  Her behavior is normal  Judgment and thought content normal          Additional Data:     Lab Results: I Have Reviewed All Lab Data Below:      Results from last 7 days  Lab Units 01/30/18  0644   WBC Thousand/uL 19 20*   HEMOGLOBIN g/dL 13 7   HEMATOCRIT % 41 2   PLATELETS Thousands/uL 524*   NEUTROS PCT % 84*   LYMPHS PCT % 10*   MONOS PCT % 6   EOS PCT % 0       Results from last 7 days  Lab Units 01/30/18  0644   SODIUM mmol/L 143   POTASSIUM mmol/L 4 1   CHLORIDE mmol/L 105   CO2 mmol/L 29   BUN mg/dL 14   CREATININE mg/dL 0 89   CALCIUM mg/dL 8 5   GLUCOSE RANDOM mg/dL 133           * Additional Pertinent Lab Tests Reviewed:  Brandie Vang Admission Reviewed    Imaging: I have personally reviewed pertinent reports  Xr Tibia Fibula 2 Vw Left    Result Date: 1/30/2018  Narrative: C-ARM -    INDICATION: Procedure guidance COMPARISON:  01/12/2018 TECHNIQUE: FLUOROSCOPY TIME:   39 4 SECONDS 4 FLUOROSCOPIC IMAGES FINDINGS: Plate with multiple screws inserted in the tibia fixing a comminuted fracture which is held in anatomic alignment  The proximal aspects of the tibia and fibula are not included  Osseous and soft tissue detail limited by technique  Impression:      Please refer to the separate procedure notes for additional details   Workstation performed: [id number]         ** Please Note: Dragon 360 Dictation speech to text software may have been used in the creation of this document **

## 2018-01-30 NOTE — OP NOTE
OPERATIVE REPORT  PATIENT NAME: Alison Valencia    :  1960  MRN: [id number]  Pt Location: [facility] OR ROOM 01    SURGERY DATE: 2018    Surgeon(s) and Role:     * Fiordaliza Sandoval MD - Primary     * Gilmer Munoz PA-C - Assisting necessary for the procedure for assistance with reduction and plating techniques and retraction of vital structures    Preop Diagnosis:  Closed displaced comminuted fracture of shaft of left tibia [S82 252A]    Post-Op Diagnosis Codes:     * Closed displaced comminuted fracture of shaft of left tibia [S82 252A] that was periarticular    Procedure(s) (LRB):  OPEN REDUCTION W/ INTERNAL FIXATION (ORIF) TIBIA FRACTURE (Left) that was periarticular utilizing 10 hole Synthes distal tibial locking plate    Specimen(s):  * No specimens in log *    Estimated Blood Loss:   30 cc    Drains:       Anesthesia Type:   General    Operative Indications:  Closed displaced comminuted fracture of shaft of left tibia Robb Felty is a 60-year-old female who has been suffering with left leg pain since she suffered a fracture of her left tibia over 2 weeks ago  She had an attempted closed reduction by another physician and casting  I saw her and did demonstrate that the reduction was not satisfactory  She has been on Plavix however and thus I did take her off of Plavix and consented her for open reduction with internal fixation of the left tibial shaft fracture with plate and screws technology  She understood the risks and benefits of that procedure wished to go ahead  The risks are inclusive of but not limited to infection, stiffness, blood clots, failure of the fracture to unite, malrotation, numbness particularly on the skin, nerve injury causing numbness pain and weakness, worsening of symptoms, compartment syndrome, failure to regain full strength and ability, and need for further surgery  Operative Findings:  Left tibia fracture that was highly comminuted    This was a shaft fracture that was from the mid section down to the periarticular region in the distal tibia  It did not however appear to go into the joint on CT scan or during the operation itself  Were able to get nice fixation with the distal tibial locking plate utilizing the 10 hole plate  We felt that we had good reduction both in vivo as well as on fluoroscopic images on both AP and lateral views particularly with the amount of comminution that was present  Complications:   None    Procedure and Technique:  Jf Tellez was taken to the operating room and placed supine on the OR table  She was given preoperative IV antibiotics  General anesthesia was induced in the left lower extremity was then prepped and draped in the usual sterile fashion  We did use a Hibiclens wash 1st to make certain that we had cleared the skin of his much bacteria as possible prior to the final prep  This was with ChloraPrep  We then took a surgical time-out and then exsanguinated and inflated the tourniquet  Total tourniquet time was 94 minutes  We did utilize an anteromedial approach to the distal tibia utilizing a 15 blade  We did carefully dissect down past subcutaneous tissue and did identify and protect the saphenous nerve and vein  We did come down upon the tibia and did dissect the periosteum away from the fracture site and also removed clot that would have blocked our reduction  We were able to get reduction clamps around the bone after we released the anterior compartment  We did release the anterior compartment from approximately 6 cm above the fracture site completely down to the distal tibia  This is to hopefully prevent compartment syndrome after this surgery  All compartments were soft during a before and after the completion of the procedure  We felt that with the reduction forceps we had excellent reduction  There was significant comminution of the tibia  We did begin with a 10 hole plate and placed that medially    We were able to place nonlocking screws into the proximal portion of the tibia and then into the tibial fracture portion itself  We did attempt to leg 1 piece that appeared to not be too comminuted  We felt that we had excellent reduction and good apposition of the plate to the bone even distally  We felt that we were nicely centered as well distally  We did place periarticular unicortical locking screws distally  We demonstrated that these were not in the joint  We placed the majority of screws in the shaft as nonlocking screws but with the unicortical locking screw at the most proximal aspect of the plate to help reduce the stress riser there  We felt that we had excellent reduction and fixation of the plate  We irrigated and took fluoroscopic images demonstrating good alignment  This was on both AP and lateral views  We felt that our rotational alignment was also correct  We felt the grossly we were in good alignment as well as on fluoroscopy  After thorough irrigation, we then let the tourniquet down  We found no significant bleeding  I had released the anterior compartment previously as well  We did close with 2-0 Vicryl of the skin and staples  We applied local anesthetic to the wound and dry, sterile dressings with an AO splint  She tolerated the procedure well and transferred to the recovery room stable condition  She is admitted and will likely require rehabilitation stay  She will go back on her anticoagulation tomorrow  She will be nonweightbearing for at least 6 weeks     I was present for the entire procedure and A qualified resident physician was not available    Patient Disposition:  PACU     SIGNATURE: Kristen Gregory MD  DATE: January 29, 2018  TIME: 8:08 PM

## 2018-01-30 NOTE — CASE MANAGEMENT
Initial Clinical Review    Age/Sex: 65 y o  female    Surgery Date: 1/29/18    Procedure: OPEN REDUCTION W/ INTERNAL FIXATION (ORIF) TIBIA FRACTURE (Left) that was periarticular utilizing 10 hole Synthes distal tibial locking plate    Anesthesia: general    Admission Orders: Date/Time/Statement:     Vital Signs: /60 (BP Location: Left arm)   Pulse 71   Temp 98 3 °F (36 8 °C) (Oral)   Resp 18   Ht 5' 5" (1 651 m)   Wt 72 6 kg (160 lb)   SpO2 95%   BMI 26 63 kg/m²     Diet:        Diet Orders            Start     Ordered    01/29/18 2039  Diet Regular; Regular House  Diet effective now     Question Answer Comment   Diet Type Regular    Regular Regular House    RD to adjust diet per protocol?  Yes        01/29/18 2044      Mobility: up with assistance; NWB  DVT Prophylaxis: VENODYNES    Scheduled Meds:  Current Facility-Administered Medications:  ALPRAZolam 0 5 mg Oral TID PRN Maurite STEPHAN Pham-C    clopidogrel 75 mg Oral Daily MauriSTEPHAN Barajas-C    docusate sodium 100 mg Oral BID MauriSTEPHAN Barajas-C    DULoxetine 60 mg Oral HS STEPHAN Alvarez-C    enoxaparin 40 mg Subcutaneous Daily STEPHAN Alvarez-C    gabapentin 300 mg Oral TID MauriSTEPHAN Barajas-C    gemfibrozil 600 mg Oral BID AC MauriSTEPHAN Barajas-C    HYDROmorphone 0 2 mg Intravenous Q3H PRN Maurite Vero PA-C    HYDROmorphone 0 2 mg Intravenous Q3H PRN Maurite STEPHAN Pham-C    lactated ringers 75 mL/hr Intravenous Continuous STEPHAN Alvarez-SKY Last Rate: 75 mL/hr (01/29/18 1540)   losartan 50 mg Oral Daily Maurite STEPHAN Pham-C    metoprolol tartrate 200 mg Oral Daily Maurite Vero, PA-C    naproxen 500 mg Oral BID With Meals STEPHAN Alvarez-SKY    nicotine 1 patch Transdermal Daily MauriSTEPHAN Barajas-C    ondansetron 4 mg Intravenous Q6H PRN STEPHAN Alvarez-C    oxyCODONE-acetaminophen 1 tablet Oral Q4H PRN Cade Pham PA-C    pantoprazole 40 mg Oral Daily Cade Pham PA-C      Continuous Infusions:  lactated ringers 75 mL/hr Last Rate: 75 mL/hr (01/29/18 1930)     PRN Meds:   ALPRAZolam    HYDROmorphone    HYDROmorphone; USED X3    ondansetron    oxyCODONE-acetaminophen; USED X2    Pain Control:   Pain Medications             DULoxetine HCl (CYMBALTA PO) Take 90 mg by mouth daily at bedtime      gabapentin (NEURONTIN) 600 MG tablet Take 300 mg by mouth 3 (three) times a day    naproxen (NAPROSYN) 500 mg tablet Take 1 tablet by mouth 2 (two) times a day with meals

## 2018-01-31 NOTE — [place] UNIVERSAL TRANSFER FORM
[place] UNIVERSAL TRANSFER FORM  (ALL ITEMS MUST BE COMPLETED)    1  TRANSFER FROM: [address]      TRANSFER TO: [facility]    2  DATE OF TRANSFER: 1/30/2018                        TIME OF TRANSFER: 2130    3  PATIENT NAME: Tessa Morataya,        YOB: 1960                             GENDER: female    4  LANGUAGE:   English    5  PHYSICIAN NAME:  Cynthia Sanchez MD                   PHONE: [phone]    6  CODE STATUS: Prior        Out of Hospital DNR Attached: No    7  :                                      :  Extended Emergency Contact Information  Primary Emergency Contact: Geo Velarde   [place]  Mobile Phone: [phone]  Relation: Significant Other           Health Care Representative/Proxy:  No           Legal Guardian:  No             NAME OF:           HEALTH CARE REPRESENTATIVE/PROXY:                                         OR           LEGAL GUARDIAN, IF NOT :                                               PHONE:  (Day)           (Night)                        (Cell)    8  REASON FOR TRANSFER: (Must include brief medical history and recent changes in physical function or cognition ) short term rehab for closed displaced fracture of left tibia            V/S: /63 (BP Location: Left arm)   Pulse 71   Temp 98 2 °F (36 8 °C) (Oral)   Resp 18   Ht 5' 5" (1 651 m)   Wt 72 6 kg (160 lb)   SpO2 95%   BMI 26 63 kg/m²           PAIN: Yes, Rating 9/10    9  PRIMARY DIAGNOSIS: Closed displaced comminuted fracture of shaft of left tibia      Secondary Diagnosis:         Pacemaker: No      Internal Defib: No          Mental Health Diagnosis (if Applicable):    10  RESTRAINTS: No     11  RESPIRATORY NEEDS: None    12  ISOLATION/PRECAUTION: None    13  ALLERGY: Patient has no known allergies  14  SENSORY:           15  SKIN CONDITION: No Wounds    16  DIET: Regular    17   IV ACCESS: None    18  PERSONAL ITEMS SENT WITH PATIENT: None    19  ATTACHED DOCUMENTS: MUST ATTACH CURRENT MEDICATION INFORMATION Face Sheet, MAR, Medication Reconciliation and Discharge Summary    20  AT RISK ALERTS:Falls        HARM TO: N/A    21  WEIGHT BEARING STATUS:         Left Leg: Limited        Right Leg: Full    22  MENTAL STATUS:Alert and Oriented    23  FUNCTION:        Walk: With Help        Transfer: With Help        Toilet: With Help        Feed: Self    24  IMMUNIZATIONS/SCREENING:     There is no immunization history on file for this patient  25  BOWEL: Continent    26  BLADDER: Continent    27   SENDING FACILITY CONTACT: Sylvia BLANCO                  Title: RN        Unit: [address]        Phone: [phone] [address] (if known):        Title:        Unit:         Phone:         FORM PREFILLED BY (if applicable)       Title:       Unit:        Phone:         FORM COMPLETED BY Jericho Reyes RN      Title: TITA      Phone: [phone]

## 2018-01-31 NOTE — NURSING NOTE
Pt d/c from unit to STR  IV removed prior to d/c  Pt left unit via wheelchair accompanied by [employer] transport  D/c paperwork and prescriptions given to transporter  Pt left with all belongings

## 2018-02-05 ENCOUNTER — APPOINTMENT (OUTPATIENT)
Dept: RADIOLOGY | Facility: CLINIC | Age: 58
End: 2018-02-05
Payer: COMMERCIAL

## 2018-02-05 ENCOUNTER — OFFICE VISIT (OUTPATIENT)
Dept: OBGYN CLINIC | Facility: CLINIC | Age: 58
End: 2018-02-05
Payer: COMMERCIAL

## 2018-02-05 DIAGNOSIS — Z87.81 STATUS POST CLOSED TIBIA FRACTURE: ICD-10-CM

## 2018-02-05 DIAGNOSIS — S82.255A CLOSED NONDISPLACED COMMINUTED FRACTURE OF SHAFT OF LEFT TIBIA, INITIAL ENCOUNTER: Primary | ICD-10-CM

## 2018-02-05 PROCEDURE — 29515 APPLICATION SHORT LEG SPLINT: CPT | Performed by: ORTHOPAEDIC SURGERY

## 2018-02-05 PROCEDURE — 73590 X-RAY EXAM OF LOWER LEG: CPT

## 2018-02-05 PROCEDURE — 99024 POSTOP FOLLOW-UP VISIT: CPT | Performed by: ORTHOPAEDIC SURGERY

## 2018-02-05 NOTE — PROGRESS NOTES
H&P Exam - Orthopedics   Lonnie Guevara 65 y o  female MRN: [id number]  Unit/Bed#:  Encounter: [id number]    Assessment/Plan     Assessment:  Left closed tibia fracture  Plan:  Rony Cooney is doing very well  I am very happy and that she has excellent sensation distally today as I was concerned due to the large incision  I feel she can now be discharged from her rehab facility  We will see her back in 1 week for possible staple removal   This of course will be dependent upon the healing of the incision  She was placed in a splint today and will remain nonweightbearing  History of Present Illness   HPI:  Lonnie Guevara is a 65 y o  female who is here today for a postop visit for the left open reduction internal fixation of the comminuted tibial shaft fracture  She states she is doing very well and has had a decrease in pain  She has no complaints of calf pain or distal paresthesias  Her only complaint is of the persistent mild ache in the left lower extremity  She denies recent fevers or chills  She has been non weight-bearing and has not removed her postoperative splint which provides her comfort  She is questioning if she can be discharged from the nursing home where she has been staying postoperatively  Review of Systems   Constitutional: Negative  HENT: Negative  Eyes: Negative  Respiratory: Negative  Cardiovascular: Negative  Gastrointestinal: Negative  Endocrine: Negative  Genitourinary: Negative  Musculoskeletal:        As noted in HPI   Skin: Negative  Allergic/Immunologic: Negative  Neurological: Negative  Hematological: Negative  Psychiatric/Behavioral: Negative          Historical Information   Past Medical History:   Diagnosis Date    Alopecia     Back injury     Bell's palsy     Chronic pain     back    Closed left arm fracture     Fibromyalgia     Fibromyalgia, primary     GERD (gastroesophageal reflux disease)     Hyperlipidemia     Hypertension     Lyme disease     Myocardial infarct     2015    Myocardial infarction     Cardiac catheterization 2 years ago showed 30% blockage in 1 of the blood vessels    Stroke ([address] )     TIA (transient ischemic attack)     2017     Past Surgical History:   Procedure Laterality Date     SECTION      CHOLECYSTECTOMY      CORONARY ANGIOPLASTY          ORIF TIBIA FRACTURE Left 2018    Procedure: OPEN REDUCTION W/ INTERNAL FIXATION (ORIF) TIBIA FRACTURE;  Surgeon: Amaury Chase MD;  Location: [facility];  Service: Orthopedics     Social History   History   Alcohol Use    Yes     Comment: occasional     History   Drug Use No     History   Smoking Status    Current Every Day Smoker    Packs/day: 0 50    Years: 40 00    Types: Cigarettes   Smokeless Tobacco    Never Used     Comment: Counseled, wants to quit     Family History:   Family History   Problem Relation Age of Onset    Heart attack Mother     Heart attack Father        Meds/Allergies   all medications and allergies reviewed  No Known Allergies    Objective   Vitals: not currently breastfeeding  ,There is no height or weight on file to calculate BMI  [unfilled]    [unfilled]    Invasive Devices          No matching active lines, drains, or airways          Physical Exam   Constitutional: She is oriented to person, place, and time  She appears well-developed and well-nourished  HENT:   Head: Normocephalic and atraumatic  Neck: Normal range of motion  Cardiovascular: Normal rate  Pulmonary/Chest: Effort normal    Musculoskeletal:   As noted in HPI   Neurological: She is alert and oriented to person, place, and time  Skin: Skin is warm and dry  Psychiatric: She has a normal mood and affect  Her behavior is normal  Judgment and thought content normal      Left Ankle Exam     Comments:  Range of motion and strength is as expected following her surgery   The surgical incision is clean dry and intact with no evidence of infection  The wound remains closed with staples  Normal sensations distally and with a 2+ radial pulse  Negative Homans sign  Lab Results:  None  Imaging: I have personally reviewed pertinent films in PACS     X-rays of the left tibia and demonstrate an acceptably aligned comminuted tibia fracture with intact surgical hardware   EKG, Pathology, and Other Studies: None    Code Status: [unfilled]  Advance Directive and Living Will:      Power of :    POLST:

## 2018-02-12 ENCOUNTER — OFFICE VISIT (OUTPATIENT)
Dept: OBGYN CLINIC | Facility: CLINIC | Age: 58
End: 2018-02-12

## 2018-02-12 DIAGNOSIS — Z87.81 STATUS POST CLOSED TIBIA FRACTURE: Primary | ICD-10-CM

## 2018-02-12 PROCEDURE — 99024 POSTOP FOLLOW-UP VISIT: CPT | Performed by: ORTHOPAEDIC SURGERY

## 2018-02-12 NOTE — PROGRESS NOTES
H&P Exam - Orthopedics   Anne Truong 65 y o  female MRN: [id number]  Unit/Bed#:  Encounter: [id number]    Assessment/Plan     Assessment:  Left closed tibia fracture  Plan:  Rodolfo Kirby is doing well and all but 3 staples were removed today and she was placed in CAM boot  She will continue to be nonweightbearing  I will see her back in 1 week to hopefully remove the last 3 staples  Scribe Attestation    I,:   Steven Rogers am acting as a scribe while in the presence of the attending physician :        I,:   Robert Barone MD personally performed the services described in this documentation    as scribed in my presence :            History of Present Illness   HPI:  Anne Truong is a 65 y o  female who presents here today for a postop visit for the left open reduction internal fixation of the comminuted tibial shaft fracture  She states that she is still experiencing pain  She does have some swelling in her left foot which is normal  She is able to wiggle her toes and has normal sensation  She states that she has a persistent mild ache in the left lower extremity  She denies recent fevers or chills  She has been nonweightbearing in the postoperative splint  Review of Systems   Constitutional: Negative for chills, fever and unexpected weight change  HENT: Negative for hearing loss, nosebleeds and sore throat  Eyes: Negative for pain, redness and visual disturbance  Respiratory: Negative for cough, shortness of breath and wheezing  Cardiovascular: Negative for chest pain, palpitations and leg swelling  Gastrointestinal: Negative for abdominal distention, nausea and vomiting  Endocrine: Negative for polydipsia and polyuria  Genitourinary: Negative for dysuria and hematuria  Skin: Negative for rash and wound  Neurological: Negative for dizziness, numbness and headaches  Psychiatric/Behavioral: Negative for decreased concentration and suicidal ideas  The patient is not nervous/anxious  All other systems reviewed and are negative  Historical Information   Past Medical History:   Diagnosis Date    Alopecia     Back injury     Bell's palsy     Chronic pain     back    Closed left arm fracture     Fibromyalgia     Fibromyalgia, primary     GERD (gastroesophageal reflux disease)     Hyperlipidemia     Hypertension     Lyme disease     Myocardial infarct     2015    Myocardial infarction     Cardiac catheterization 2 years ago showed 30% blockage in 1 of the blood vessels    Stroke ([address] )     TIA (transient ischemic attack)     2017     Past Surgical History:   Procedure Laterality Date     SECTION      CHOLECYSTECTOMY      CORONARY ANGIOPLASTY          ORIF TIBIA FRACTURE Left 2018    Procedure: OPEN REDUCTION W/ INTERNAL FIXATION (ORIF) TIBIA FRACTURE;  Surgeon: Fiordaliza Sandoval MD;  Location: [facility];  Service: Orthopedics     Social History   History   Alcohol Use    Yes     Comment: occasional     History   Drug Use No     History   Smoking Status    Current Every Day Smoker    Packs/day: 0 50    Years: 40 00    Types: Cigarettes   Smokeless Tobacco    Never Used     Comment: Counseled, wants to quit     Family History:   Family History   Problem Relation Age of Onset    Heart attack Mother     Heart attack Father        Meds/Allergies   PTA meds:   Cannot display prior to admission medications because the patient has not been admitted in this contact  No Known Allergies    Objective   Vitals: not currently breastfeeding  ,There is no height or weight on file to calculate BMI  [unfilled]    [unfilled]    Invasive Devices          No matching active lines, drains, or airways          Physical Exam   Constitutional: She appears well-developed  HENT:   Head: Normocephalic  Eyes: Pupils are equal, round, and reactive to light  Neck: Normal range of motion  Musculoskeletal: Normal range of motion  Neurological: She is alert  Left Ankle Exam     Other   Erythema: absent  Sensation: normal  Pulse: present    Comments:  Swelling is present at the foot  Lab Results: CBC: No results found for: WBC, HGB, HCT, MCV, PLT, ADJUSTEDWBC, MCH, MCHC, RDW, MPV, NRBC  Imaging: I have personally reviewed pertinent reports  EKG, Pathology, and Other Studies: I have personally reviewed pertinent reports        [facility]: [facility]  Advance Directive and Living Will:      Power of :    POLST:

## 2018-02-21 ENCOUNTER — OFFICE VISIT (OUTPATIENT)
Dept: OBGYN CLINIC | Facility: CLINIC | Age: 58
End: 2018-02-21

## 2018-02-21 VITALS
DIASTOLIC BLOOD PRESSURE: 85 MMHG | WEIGHT: 160.05 LBS | SYSTOLIC BLOOD PRESSURE: 143 MMHG | BODY MASS INDEX: 26.67 KG/M2 | HEART RATE: 61 BPM | HEIGHT: 65 IN

## 2018-02-21 DIAGNOSIS — Z87.81 STATUS POST CLOSED TIBIA FRACTURE: Primary | ICD-10-CM

## 2018-02-21 PROCEDURE — 99024 POSTOP FOLLOW-UP VISIT: CPT | Performed by: ORTHOPAEDIC SURGERY

## 2018-02-21 NOTE — PROGRESS NOTES
H&P Exam - Orthopedics   Gilford Blanc 65 y o  female MRN: [id number]  Unit/Bed#:  Encounter: [id number]    Assessment/Plan     Assessment:  Left closed tibia fracture    Scribe Attestation    I,:   Jessy Hunt am acting as a scribe while in the presence of the attending physician :        I,:   Shayy Anderson MD personally performed the services described in this documentation    as scribed in my presence :            Plan:  Antonette Aguero is doing well upon examination today  She has no signs of infection and the incision is healing as expected  I removed the last 3 staples today and provided steri-strips  I would like to have Antonette Aguero follow up with me in 3 weeks for examination and repeat x-ray  History of Present Illness   HPI:  Gilford Blanc is a 65 y o  female who presents 23 days status post left ORIF of the left tibia  She notes that she is doing well  She currently has no pain at this time  She denies any calf pain or paresthesias at this time  She is currently non-weightbearing at this time        Review of Systems    Historical Information   Past Medical History:   Diagnosis Date    Alopecia     Back injury     Bell's palsy     Chronic pain     back    Closed left arm fracture     Fibromyalgia     Fibromyalgia, primary     GERD (gastroesophageal reflux disease)     Hyperlipidemia     Hypertension     Lyme disease     Myocardial infarct     2015    Myocardial infarction     Cardiac catheterization 2 years ago showed 30% blockage in 1 of the blood vessels    [address] [address]     TIA (transient ischemic attack)     2017     Past Surgical History:   Procedure Laterality Date     SECTION      CHOLECYSTECTOMY      CORONARY ANGIOPLASTY          ORIF TIBIA FRACTURE Left 2018    Procedure: OPEN REDUCTION W/ INTERNAL FIXATION (ORIF) TIBIA FRACTURE;  Surgeon: Shayy Anderson MD;  Location: [facility];  Service: Orthopedics     Social History   History   Alcohol Use    Yes     Comment: occasional     History   Drug Use No     History   Smoking Status    Current Every Day Smoker    Packs/day: 0 50    Years: 40 00    Types: Cigarettes   Smokeless Tobacco    Never Used     Comment: Counseled, wants to quit     Family History:   Family History   Problem Relation Age of Onset    Heart attack Mother     Heart attack Father        Meds/Allergies   all medications and allergies reviewed  Allergies   Allergen Reactions    Hydromorphone Shortness Of Breath       Objective   Vitals: Blood pressure 143/85, pulse 61, height 5' 5" (1 651 m), weight 72 6 kg (160 lb 0 9 oz), not currently breastfeeding  ,Body mass index is 26 63 kg/m²      [unfilled]    [unfilled]    Invasive Devices          No matching active lines, drains, or airways          Physical Exam  Ortho Exam    Lab Results:   Imaging:   EKG, Pathology, and Other Studies:     Code Status: [unfilled]  Advance Directive and Living Will:      Power of :    POLST:

## 2018-03-14 ENCOUNTER — OFFICE VISIT (OUTPATIENT)
Dept: OBGYN CLINIC | Facility: CLINIC | Age: 58
End: 2018-03-14

## 2018-03-14 ENCOUNTER — APPOINTMENT (OUTPATIENT)
Dept: RADIOLOGY | Facility: CLINIC | Age: 58
End: 2018-03-14
Payer: COMMERCIAL

## 2018-03-14 VITALS — HEIGHT: 65 IN

## 2018-03-14 DIAGNOSIS — S82.892D CLOSED FRACTURE OF LEFT ANKLE WITH ROUTINE HEALING, SUBSEQUENT ENCOUNTER: ICD-10-CM

## 2018-03-14 DIAGNOSIS — S82.892D CLOSED FRACTURE OF LEFT ANKLE WITH ROUTINE HEALING, SUBSEQUENT ENCOUNTER: Primary | ICD-10-CM

## 2018-03-14 PROBLEM — S82.892A CLOSED FRACTURE OF LEFT ANKLE: Status: ACTIVE | Noted: 2018-03-14

## 2018-03-14 PROCEDURE — 73590 X-RAY EXAM OF LOWER LEG: CPT

## 2018-03-14 PROCEDURE — 99024 POSTOP FOLLOW-UP VISIT: CPT | Performed by: ORTHOPAEDIC SURGERY

## 2018-03-14 NOTE — PROGRESS NOTES
Laxmi Abdi returns to see me today now almost 2 months out from her left tibial shaft fracture surgical fixation  She is doing well with minimal discomfort  Physical examination demonstrates a well-healed surgical incision with good sensation throughout the left foot  She does have some reasonable range of motion for her ankle already actively and good range of motion about her knee  Radiographic examination plain x-rays taken today demonstrate a very nicely healing fracture of the tibial shaft with intact hardware and already some callus forming in the region of significant comminution  Assessment left tibial shaft fracture status post surgical fixation    Plan Daquandeb Clamp is doing very well with the left leg  She will continue to be nonweightbearing over the next 4 weeks  I will see her back at that point with a new x-ray  She will start physical therapy now for range of motion and strengthening about her ankle and her knee  She is not required to wear the boot when she is in bed or when she is sitting down  She must wear when she is up however

## 2018-03-28 ENCOUNTER — TELEPHONE (OUTPATIENT)
Dept: OBGYN CLINIC | Facility: HOSPITAL | Age: 58
End: 2018-03-28

## 2018-03-28 NOTE — TELEPHONE ENCOUNTER
Pt had surgery back in January 2018  She says that she noticed a bump on her leg that looks like a bolt pushing up  She is having some pain   She would like a call from someone to let her know if this is normal  She can be reached at [phone]

## 2018-03-28 NOTE — TELEPHONE ENCOUNTER
1 of the screws may be prominent or it is possible that 1 of them is loosening  We should see her back to get a new x-ray  Please call her to ask her to return for an appointment

## 2018-04-18 ENCOUNTER — OFFICE VISIT (OUTPATIENT)
Dept: OBGYN CLINIC | Facility: CLINIC | Age: 58
End: 2018-04-18

## 2018-04-18 ENCOUNTER — APPOINTMENT (OUTPATIENT)
Dept: RADIOLOGY | Facility: CLINIC | Age: 58
End: 2018-04-18
Payer: COMMERCIAL

## 2018-04-18 DIAGNOSIS — S82.892D CLOSED FRACTURE OF LEFT ANKLE WITH ROUTINE HEALING, SUBSEQUENT ENCOUNTER: ICD-10-CM

## 2018-04-18 DIAGNOSIS — S82.892D CLOSED FRACTURE OF LEFT ANKLE WITH ROUTINE HEALING, SUBSEQUENT ENCOUNTER: Primary | ICD-10-CM

## 2018-04-18 PROCEDURE — 99024 POSTOP FOLLOW-UP VISIT: CPT | Performed by: ORTHOPAEDIC SURGERY

## 2018-04-18 PROCEDURE — 73590 X-RAY EXAM OF LOWER LEG: CPT

## 2018-04-18 NOTE — PROGRESS NOTES
Laquita Fernández returns to see me today now not quite 3 months out from her left tibia fracture surgical fixation  She states she is doing well but does feel that the hardware is a bit more prominent along the distal aspect  Physical examination:  The left tibia has a well-healed surgical incision with no erythema and no sign of obvious prominence of the hardware with no pain to palpation over the tibia  She has good range of motion radial better ankle  Radiograph examination:  Left tibia x-rays demonstrate excellent healing with good callus formation and appropriate alignment with intact hardware    Assessment left tibia fracture status post surgical fixation    Plan Jeremy German is doing very well and will follow up with me in 4 weeks  She will start physical therapy now and start weight-bearing as well the use of the boot and a walker  I will see her back in 4 weeks for repeat x-ray

## 2018-04-19 ENCOUNTER — TELEPHONE (OUTPATIENT)
Dept: OBGYN CLINIC | Facility: CLINIC | Age: 58
End: 2018-04-19

## 2018-04-20 DIAGNOSIS — S82.892D CLOSED LEFT MALLEOLAR FRACTURE, WITH ROUTINE HEALING, SUBSEQUENT ENCOUNTER: ICD-10-CM

## 2018-04-20 DIAGNOSIS — S82.252D CLOSED DISPLACED COMMINUTED FRACTURE OF SHAFT OF LEFT TIBIA WITH ROUTINE HEALING, SUBSEQUENT ENCOUNTER: Primary | ICD-10-CM

## 2018-04-20 NOTE — TELEPHONE ENCOUNTER
I PUT HOME PT ORDER In, THERE IS NO PHONE NUMBER TO CALL BACK FOR [facility], AND NO FAX NUMBER  IF THEY CALL BACK PLEASE GET A PHONE NUMBER AND FAX

## 2018-05-01 ENCOUNTER — TELEPHONE (OUTPATIENT)
Dept: OBGYN CLINIC | Facility: CLINIC | Age: 58
End: 2018-05-01

## 2018-05-01 NOTE — TELEPHONE ENCOUNTER
Bertha Edouard a physical therapist from the [facility] is calling asking if this patient can be transitioned to out patient PT instead of home PT  He stated patient is doing well with getting around at home  And patient started WB 2 weeks ago      Cb Ray  [phone]

## 2018-06-04 ENCOUNTER — APPOINTMENT (EMERGENCY)
Dept: RADIOLOGY | Facility: HOSPITAL | Age: 58
DRG: 127 | End: 2018-06-04
Payer: COMMERCIAL

## 2018-06-04 ENCOUNTER — TELEPHONE (OUTPATIENT)
Dept: OBGYN CLINIC | Facility: HOSPITAL | Age: 58
End: 2018-06-04

## 2018-06-04 ENCOUNTER — HOSPITAL ENCOUNTER (INPATIENT)
Facility: HOSPITAL | Age: 58
LOS: 2 days | Discharge: HOME | DRG: 127 | End: 2018-06-06
Attending: EMERGENCY MEDICINE | Admitting: INTERNAL MEDICINE
Payer: COMMERCIAL

## 2018-06-04 DIAGNOSIS — S82.892D CLOSED FRACTURE OF LEFT ANKLE WITH ROUTINE HEALING, SUBSEQUENT ENCOUNTER: Primary | ICD-10-CM

## 2018-06-04 DIAGNOSIS — S82.892A CLOSED FRACTURE OF LEFT ANKLE: ICD-10-CM

## 2018-06-04 DIAGNOSIS — J81.1 PULMONARY EDEMA: ICD-10-CM

## 2018-06-04 DIAGNOSIS — I50.9 ACUTE CHF (CONGESTIVE HEART FAILURE) (HCC): Primary | ICD-10-CM

## 2018-06-04 DIAGNOSIS — I10 HYPERTENSION: ICD-10-CM

## 2018-06-04 PROBLEM — E11.9 T2DM (TYPE 2 DIABETES MELLITUS) (HCC): Status: ACTIVE | Noted: 2018-06-04

## 2018-06-04 PROBLEM — Z86.73 HISTORY OF TIA (TRANSIENT ISCHEMIC ATTACK): Status: ACTIVE | Noted: 2018-06-04

## 2018-06-04 PROBLEM — Z87.11 HISTORY OF GASTRIC ULCER: Status: ACTIVE | Noted: 2018-06-04

## 2018-06-04 PROBLEM — M79.89 LEG SWELLING: Status: ACTIVE | Noted: 2018-06-04

## 2018-06-04 PROBLEM — I25.2 HISTORY OF MI (MYOCARDIAL INFARCTION): Status: ACTIVE | Noted: 2018-06-04

## 2018-06-04 PROBLEM — I50.33 ACUTE ON CHRONIC DIASTOLIC CHF (CONGESTIVE HEART FAILURE) (HCC): Status: ACTIVE | Noted: 2018-06-04

## 2018-06-04 LAB
ANION GAP SERPL CALCULATED.3IONS-SCNC: 8 MMOL/L (ref 4–13)
APTT PPP: 19 SECONDS (ref 24–33)
BASOPHILS # BLD AUTO: 0.06 THOUSANDS/ΜL (ref 0–0.1)
BASOPHILS NFR BLD AUTO: 1 % (ref 0–1)
BUN SERPL-MCNC: 13 MG/DL (ref 5–25)
CALCIUM SERPL-MCNC: 8.6 MG/DL (ref 8.3–10.1)
CHLORIDE SERPL-SCNC: 106 MMOL/L (ref 100–108)
CO2 SERPL-SCNC: 28 MMOL/L (ref 21–32)
CREAT SERPL-MCNC: 1 MG/DL (ref 0.6–1.3)
EOSINOPHIL # BLD AUTO: 0.27 THOUSAND/ΜL (ref 0–0.61)
EOSINOPHIL NFR BLD AUTO: 4 % (ref 0–6)
ERYTHROCYTE [DISTWIDTH] IN BLOOD BY AUTOMATED COUNT: 14.4 % (ref 11.6–15.1)
GFR SERPL CREATININE-BSD FRML MDRD: 63 ML/MIN/1.73SQ M
GLUCOSE SERPL-MCNC: 89 MG/DL (ref 65–140)
HCT VFR BLD AUTO: 35.1 % (ref 34.8–46.1)
HGB BLD-MCNC: 11.6 G/DL (ref 11.5–15.4)
INR PPP: 1.06 (ref 0.86–1.16)
LYMPHOCYTES # BLD AUTO: 2.4 THOUSANDS/ΜL (ref 0.6–4.47)
LYMPHOCYTES NFR BLD AUTO: 38 % (ref 14–44)
MCH RBC QN AUTO: 31.1 PG (ref 26.8–34.3)
MCHC RBC AUTO-ENTMCNC: 33 G/DL (ref 31.4–37.4)
MCV RBC AUTO: 94 FL (ref 82–98)
MONOCYTES # BLD AUTO: 0.42 THOUSAND/ΜL (ref 0.17–1.22)
MONOCYTES NFR BLD AUTO: 7 % (ref 4–12)
NEUTROPHILS # BLD AUTO: 3.21 THOUSANDS/ΜL (ref 1.85–7.62)
NEUTS SEG NFR BLD AUTO: 50 % (ref 43–75)
NT-PROBNP SERPL-MCNC: 5994 PG/ML
PLATELET # BLD AUTO: 343 THOUSANDS/UL (ref 149–390)
PMV BLD AUTO: 10 FL (ref 8.9–12.7)
POTASSIUM SERPL-SCNC: 5.1 MMOL/L (ref 3.5–5.3)
PROTHROMBIN TIME: 11.1 SECONDS (ref 9.4–11.7)
RBC # BLD AUTO: 3.73 MILLION/UL (ref 3.81–5.12)
SODIUM SERPL-SCNC: 142 MMOL/L (ref 136–145)
TROPONIN I SERPL-MCNC: <0.02 NG/ML
TSH SERPL DL<=0.05 MIU/L-ACNC: 0.71 UIU/ML (ref 0.36–3.74)
WBC # BLD AUTO: 6.36 THOUSAND/UL (ref 4.31–10.16)

## 2018-06-04 PROCEDURE — 85610 PROTHROMBIN TIME: CPT | Performed by: PHYSICIAN ASSISTANT

## 2018-06-04 PROCEDURE — 93971 EXTREMITY STUDY: CPT

## 2018-06-04 PROCEDURE — 80048 BASIC METABOLIC PNL TOTAL CA: CPT | Performed by: PHYSICIAN ASSISTANT

## 2018-06-04 PROCEDURE — 93971 EXTREMITY STUDY: CPT | Performed by: SURGERY

## 2018-06-04 PROCEDURE — 93005 ELECTROCARDIOGRAM TRACING: CPT

## 2018-06-04 PROCEDURE — 36415 COLL VENOUS BLD VENIPUNCTURE: CPT | Performed by: PHYSICIAN ASSISTANT

## 2018-06-04 PROCEDURE — 85025 COMPLETE CBC W/AUTO DIFF WBC: CPT | Performed by: PHYSICIAN ASSISTANT

## 2018-06-04 PROCEDURE — 99222 1ST HOSP IP/OBS MODERATE 55: CPT | Performed by: INTERNAL MEDICINE

## 2018-06-04 PROCEDURE — 85730 THROMBOPLASTIN TIME PARTIAL: CPT | Performed by: PHYSICIAN ASSISTANT

## 2018-06-04 PROCEDURE — 84443 ASSAY THYROID STIM HORMONE: CPT | Performed by: INTERNAL MEDICINE

## 2018-06-04 PROCEDURE — 71275 CT ANGIOGRAPHY CHEST: CPT

## 2018-06-04 PROCEDURE — 84484 ASSAY OF TROPONIN QUANT: CPT | Performed by: PHYSICIAN ASSISTANT

## 2018-06-04 PROCEDURE — 83880 ASSAY OF NATRIURETIC PEPTIDE: CPT | Performed by: PHYSICIAN ASSISTANT

## 2018-06-04 PROCEDURE — 99285 EMERGENCY DEPT VISIT HI MDM: CPT

## 2018-06-04 RX ORDER — ALPRAZOLAM 0.5 MG/1
0.5 TABLET ORAL 3 TIMES DAILY PRN
Status: DISCONTINUED | OUTPATIENT
Start: 2018-06-04 | End: 2018-06-06 | Stop reason: HOSPADM

## 2018-06-04 RX ORDER — PANTOPRAZOLE SODIUM 40 MG/1
40 TABLET, DELAYED RELEASE ORAL DAILY
Status: DISCONTINUED | OUTPATIENT
Start: 2018-06-05 | End: 2018-06-06 | Stop reason: HOSPADM

## 2018-06-04 RX ORDER — GEMFIBROZIL 600 MG/1
600 TABLET, FILM COATED ORAL
Status: DISCONTINUED | OUTPATIENT
Start: 2018-06-05 | End: 2018-06-06 | Stop reason: HOSPADM

## 2018-06-04 RX ORDER — METOPROLOL TARTRATE 100 MG/1
100 TABLET ORAL EVERY 12 HOURS SCHEDULED
Status: DISCONTINUED | OUTPATIENT
Start: 2018-06-04 | End: 2018-06-06 | Stop reason: HOSPADM

## 2018-06-04 RX ORDER — ATORVASTATIN CALCIUM 80 MG/1
80 TABLET, FILM COATED ORAL
Status: DISCONTINUED | OUTPATIENT
Start: 2018-06-05 | End: 2018-06-06 | Stop reason: HOSPADM

## 2018-06-04 RX ORDER — CLOPIDOGREL BISULFATE 75 MG/1
75 TABLET ORAL DAILY
Status: DISCONTINUED | OUTPATIENT
Start: 2018-06-04 | End: 2018-06-06 | Stop reason: HOSPADM

## 2018-06-04 RX ORDER — GABAPENTIN 300 MG/1
300 CAPSULE ORAL 3 TIMES DAILY
Status: DISCONTINUED | OUTPATIENT
Start: 2018-06-04 | End: 2018-06-06 | Stop reason: HOSPADM

## 2018-06-04 RX ORDER — DULOXETIN HYDROCHLORIDE 60 MG/1
60 CAPSULE, DELAYED RELEASE ORAL
Status: DISCONTINUED | OUTPATIENT
Start: 2018-06-04 | End: 2018-06-06 | Stop reason: HOSPADM

## 2018-06-04 RX ORDER — KETOROLAC TROMETHAMINE 30 MG/ML
15 INJECTION, SOLUTION INTRAMUSCULAR; INTRAVENOUS ONCE
Status: COMPLETED | OUTPATIENT
Start: 2018-06-04 | End: 2018-06-05

## 2018-06-04 RX ORDER — ACETAMINOPHEN 325 MG/1
650 TABLET ORAL EVERY 6 HOURS PRN
Status: DISCONTINUED | OUTPATIENT
Start: 2018-06-04 | End: 2018-06-06 | Stop reason: HOSPADM

## 2018-06-04 RX ORDER — FUROSEMIDE 10 MG/ML
20 INJECTION INTRAMUSCULAR; INTRAVENOUS ONCE
Status: COMPLETED | OUTPATIENT
Start: 2018-06-04 | End: 2018-06-04

## 2018-06-04 RX ORDER — HEPARIN SODIUM 5000 [USP'U]/ML
5000 INJECTION, SOLUTION INTRAVENOUS; SUBCUTANEOUS EVERY 8 HOURS SCHEDULED
Status: DISCONTINUED | OUTPATIENT
Start: 2018-06-04 | End: 2018-06-06 | Stop reason: HOSPADM

## 2018-06-04 RX ADMIN — CLOPIDOGREL BISULFATE 75 MG: 75 TABLET ORAL at 22:01

## 2018-06-04 RX ADMIN — SODIUM CHLORIDE 1000 ML: 0.9 INJECTION, SOLUTION INTRAVENOUS at 18:07

## 2018-06-04 RX ADMIN — METOPROLOL TARTRATE 100 MG: 100 TABLET ORAL at 21:59

## 2018-06-04 RX ADMIN — GABAPENTIN 300 MG: 300 CAPSULE ORAL at 21:59

## 2018-06-04 RX ADMIN — HEPARIN SODIUM 5000 UNITS: 5000 INJECTION, SOLUTION INTRAVENOUS; SUBCUTANEOUS at 22:58

## 2018-06-04 RX ADMIN — FUROSEMIDE 20 MG: 10 INJECTION, SOLUTION INTRAVENOUS at 19:02

## 2018-06-04 RX ADMIN — IOHEXOL 85 ML: 350 INJECTION, SOLUTION INTRAVENOUS at 17:00

## 2018-06-04 RX ADMIN — DULOXETINE HYDROCHLORIDE 60 MG: 60 CAPSULE, DELAYED RELEASE ORAL at 22:02

## 2018-06-04 NOTE — ED NOTES
Pt IV positional   Additional IV attempted without success  Pt IV flushes with blood return  Pt educated for need to keep arm straight for patency  D/W STEPHAN Martin IVF  Will stop fluids ordered  And give lasix as ordered         Amy Schwartz RN  06/04/18 Ruy Hickman RN  06/04/18 [id number]

## 2018-06-04 NOTE — TELEPHONE ENCOUNTER
Pt called again   She said that she cannot go to [place] so she would like you to call her for the soonest appt at [place]

## 2018-06-04 NOTE — ASSESSMENT & PLAN NOTE
· In ED, she received NS 1 L  · BNP = 5994  Troponin <0 02  TSH 0 713  A1C 5 6  Lipid profile, cholesterol 205, triglyceride 165, HDL 44, and   · 12/13/2017 ECHO: EF 65 %  Grade 2 diastolic dysfunction  Repeat ECHO  · Continue telemetry  · 6/4/2018 at admission received Lasix 20mg IV x1   During the night became SOB after ambulating to BR given Lasix 20mg IV x1  · Daily wt and strict I/O  24  Hour weight loss of 5 9 lbs and -345cc within a 24 hours  · Monitor lytes, renal fxn am  · Cardiology consulted

## 2018-06-04 NOTE — ED PROVIDER NOTES
History  Chief Complaint   Patient presents with    Leg Swelling     States surgery ( hardware ) in January for fx left lower leg  Started with pain and swelling LLE 2-3 days ago   Was on Plavix which she stopped a month ago because of bruising   Shortness of Breath     States has been short of breath for a week  64 y/o female, h/o stroke/ MI/ HLD/HTN, presenting with left calf and foot swelling and pain 2-3 days ago  Recently had hardware placed and is in a boot currently  Has been taking herself out of the boot and ambulating against instructions per   Stopped taking plavix on her own because she was bruising  On plavix for prior stroke  Over the past week has had increasing shortness of breath that worsens with ambulation and is better with rest   Smoking history  Denies nausea, vomiting, chest pain, numbness, paresthesias, changes in vision, weakness, falls  Prior to Admission Medications   Prescriptions Last Dose Informant Patient Reported? Taking?    ALPRAZolam (XANAX) 0 5 mg tablet 6/3/2018 at Unknown time  No Yes   Sig: Take 1 tablet by mouth 3 (three) times a day as needed for anxiety for up to 3 days   DULoxetine HCl (CYMBALTA PO) 6/3/2018 at Unknown time Self Yes Yes   Sig: Take 90 mg by mouth daily at bedtime     atorvastatin (LIPITOR) 80 mg tablet 6/4/2018 at Unknown time  Yes Yes   Sig: Take 80 mg by mouth daily   clopidogrel (PLAVIX) 75 mg tablet More than a month at Unknown time  No No   Sig: Take 1 tablet by mouth daily   Patient taking differently: Take 75 mg by mouth daily Last dose  1/23/18    gabapentin (NEURONTIN) 600 MG tablet More than a month at Unknown time Self Yes No   Sig: Take 300 mg by mouth 3 (three) times a day   gemfibrozil (LOPID) 600 mg tablet 6/4/2018 at 0600  Yes Yes   Sig: Take 600 mg by mouth 2 (two) times a day before meals   losartan (COZAAR) 25 mg tablet 6/4/2018 at Unknown time Self Yes Yes   Sig: Take 50 mg by mouth daily     metoprolol tartrate (LOPRESSOR) 100 mg tablet 2018 at Unknown time Self Yes Yes   Sig: Take 200 mg by mouth daily     naproxen (NAPROSYN) 500 mg tablet Past Month at Unknown time  No Yes   Sig: Take 1 tablet by mouth 2 (two) times a day with meals   pantoprazole (PROTONIX) 40 mg tablet 2018 at Unknown time  Yes Yes   Sig: Take 40 mg by mouth daily      Facility-Administered Medications: None       Past Medical History:   Diagnosis Date    Alopecia     Back injury     Bell's palsy     Chronic pain     back    Closed left arm fracture     Fibromyalgia     Fibromyalgia, primary     GERD (gastroesophageal reflux disease)     Hyperlipidemia     Hypertension     Lyme disease     [facility]         Myocardial infarction ([facility] )     Cardiac catheterization 2 years ago showed 30% blockage in 1 of the blood vessels    Stroke ([facility] )     TIA (transient ischemic attack)     2017       Past Surgical History:   Procedure Laterality Date     SECTION      CHOLECYSTECTOMY      CORONARY ANGIOPLASTY          ORIF TIBIA FRACTURE Left 2018    Procedure: OPEN REDUCTION W/ INTERNAL FIXATION (ORIF) TIBIA FRACTURE;  Surgeon: aPwel Canseco MD;  Location: [facility];  Service: Orthopedics       Family History   Problem Relation Age of Onset    Heart attack Mother     Heart attack Father      I have reviewed and agree with the history as documented  Social History   Substance Use Topics    Smoking status: Former Smoker     Packs/day: 0 50     Years: 40 00     Types: Cigarettes    Smokeless tobacco: Never Used      Comment: Counseled, wants to quit    Alcohol use Yes      Comment: occasional        Review of Systems   Constitutional: Negative  Negative for activity change and appetite change  HENT: Negative  Eyes: Negative  Respiratory: Positive for shortness of breath  Negative for apnea, cough, choking, chest tightness, wheezing and stridor      Cardiovascular: Positive for leg swelling  Negative for chest pain and palpitations  Gastrointestinal: Negative  Genitourinary: Negative  Musculoskeletal: Negative  Skin: Negative  Neurological: Positive for headaches  Negative for dizziness, tremors, seizures, syncope, facial asymmetry, speech difficulty, weakness, light-headedness and numbness  All other systems reviewed and are negative  Physical Exam  Physical Exam   Constitutional: She is oriented to person, place, and time  She appears well-developed and well-nourished  HENT:   Head: Normocephalic and atraumatic  Eyes: Conjunctivae and EOM are normal  Pupils are equal, round, and reactive to light  Neck: Normal range of motion  Neck supple  Cardiovascular: Normal rate, regular rhythm, normal heart sounds and intact distal pulses  Exam reveals no gallop and no friction rub  No murmur heard  Pulmonary/Chest: Effort normal  No respiratory distress  She has no wheezes  She has no rales  She exhibits no tenderness  S PO2 is 92% indicating adequate oxygenation  Decreased breath sounds at the bases   Abdominal: Soft  Bowel sounds are normal  She exhibits no distension and no mass  There is no tenderness  There is no rebound and no guarding  No hernia  Musculoskeletal:        Feet:    Neurological: She is alert and oriented to person, place, and time  Skin: Skin is warm and dry  Capillary refill takes less than 2 seconds  Nursing note and vitals reviewed        Vital Signs  ED Triage Vitals [06/04/18 1406]   Temperature Pulse Respirations Blood Pressure SpO2   98 3 °F (36 8 °C) 62 20 142/71 92 %      Temp Source Heart Rate Source Patient Position - Orthostatic VS BP Location FiO2 (%)   Tympanic Monitor Sitting Left arm --      Pain Score       9           Vitals:    06/04/18 1406 06/04/18 1900   BP: 142/71 (!) 189/88   Pulse: 62 70   Patient Position - Orthostatic VS: Sitting Sitting       Visual Acuity      ED Medications  Medications   sodium chloride 0 9 % bolus 1,000 mL (0 mL Intravenous Stopped 6/4/18 1840)   iohexol (OMNIPAQUE) 350 MG/ML injection (MULTI-DOSE) 85 mL (85 mL Intravenous Given 6/4/18 1700)   furosemide (LASIX) injection 20 mg (20 mg Intravenous Given 6/4/18 1902)       Diagnostic Studies  Results Reviewed     Procedure Component Value Units Date/Time    Basic metabolic panel [[id number]] Collected:  06/04/18 1550    Lab Status:  Final result Specimen:  Blood from Arm, Right Updated:  06/04/18 1644     Sodium 142 mmol/L      Potassium 5 1 mmol/L      Chloride 106 mmol/L      CO2 28 mmol/L      Anion Gap 8 mmol/L      BUN 13 mg/dL      Creatinine 1 00 mg/dL      Glucose 89 mg/dL      Calcium 8 6 mg/dL      eGFR 63 ml/min/1 73sq m     Narrative:         National Kidney Disease Education Program recommendations are as follows:  GFR calculation is accurate only with a steady state creatinine  Chronic Kidney disease less than 60 ml/min/1 73 sq  meters  Kidney failure less than 15 ml/min/1 73 sq  meters      BNP [[id number]]  (Abnormal) Collected:  06/04/18 1550    Lab Status:  Final result Specimen:  Blood from Arm, Right Updated:  06/04/18 1644     NT-proBNP 5,994 (H) pg/mL     Protime-INR [[id number]]  (Normal) Collected:  06/04/18 1550    Lab Status:  Final result Specimen:  Blood from Arm, Right Updated:  06/04/18 1624     Protime 11 1 seconds      INR 1 06    APTT [[id number]]  (Abnormal) Collected:  06/04/18 1550    Lab Status:  Final result Specimen:  Blood from Arm, Right Updated:  06/04/18 1624     PTT 19 (L) seconds     Troponin I [[id number]]  (Normal) Collected:  06/04/18 1550    Lab Status:  Final result Specimen:  Blood from Arm, Right Updated:  06/04/18 1624     Troponin I <0 02 ng/mL     Narrative:         [employer] Chemistry analyzer 99% cutoff is > 0 04 ng/mL in network labs    o cTnI 99% cutoff is useful only when applied to patients in the clinical setting of myocardial ischemia  o cTnI 99% cutoff should be interpreted in the context of clinical history, ECG findings and possibly cardiac imaging to establish correct diagnosis  o cTnI 99% cutoff may be suggestive but clearly not indicative of a coronary event without the clinical setting of myocardial ischemia  CBC and differential [[id number]]  (Abnormal) Collected:  06/04/18 1550    Lab Status:  Final result Specimen:  Blood from Arm, Right Updated:  06/04/18 1608     WBC 6 36 Thousand/uL      RBC 3 73 (L) Million/uL      Hemoglobin 11 6 g/dL      Hematocrit 35 1 %      MCV 94 fL      MCH 31 1 pg      MCHC 33 0 g/dL      RDW 14 4 %      MPV 10 0 fL      Platelets 721 Thousands/uL      Neutrophils Relative 50 %      Lymphocytes Relative 38 %      Monocytes Relative 7 %      Eosinophils Relative 4 %      Basophils Relative 1 %      Neutrophils Absolute 3 21 Thousands/µL      Lymphocytes Absolute 2 40 Thousands/µL      Monocytes Absolute 0 42 Thousand/µL      Eosinophils Absolute 0 27 Thousand/µL      Basophils Absolute 0 06 Thousands/µL                  CTA ED chest PE study   Final Result by Tyrell Bowman MD (06/04 1724)      No pulmonary embolism  Background emphysema with superimposed pulmonary edema  Bilateral pleural effusions  Workstation performed: [id number]         VAS lower limb venous duplex study, unilateral/limited    (Results Pending)              Procedures  Procedures       Phone Contacts  ED Phone Contact    ED Course  ED Course as of Jun 04 1904   Mon Jun 04, 2018   1740 Paged hospitalist     8081 Stopping fluids                                 MDM  Number of Diagnoses or Management Options  Acute CHF (congestive heart failure) [facility]):   Pulmonary edema:   Diagnosis management comments: Will admit for new onset CHF  Patient verbalizes understanding and agrees with the above assessment and plan         Amount and/or Complexity of Data Reviewed  Clinical lab tests: ordered and reviewed  Tests in the radiology section of CPT®: reviewed and ordered  Review and summarize past medical records: yes  Discuss the patient with other providers: yes (Dr Ayesha Muniz )  Independent visualization of images, tracings, or specimens: yes      CritCare Time    Disposition  Final diagnoses:   Acute CHF (congestive heart failure) ([facility] )   Pulmonary edema     Time reflects when diagnosis was documented in both MDM as applicable and the Disposition within this note     Time User Action Codes Description Comment    6/4/2018  5:44 PM [facility] Gonzalez Add [I50 9] Acute CHF (congestive heart failure) ([facility] )     6/4/2018  6:27 PM [facility] Carlos Add [J81 1] Pulmonary edema       ED Disposition     ED Disposition Condition Comment    Admit  Case was discussed with Dr Shaan Aden and the patient's admission status was agreed to be Admission Status: observation status to the service of Dr Shaan Aden   Follow-up Information    None         Patient's Medications   Discharge Prescriptions    No medications on file     No discharge procedures on file      ED Provider  Electronically Signed by           Doug Rivera PA-C  06/04/18 Shakir Wood PA-C  06/04/18 4528

## 2018-06-04 NOTE — TELEPHONE ENCOUNTER
Patient sees Dr Mario Wells for her left Tibia  She called stating that she spoke with him on Saturday and he told her to come in today she that she can get an xray  Dr Mario Wells does not have any openings this morning  Can this wait until tomorrow or does she have to be seen today?

## 2018-06-04 NOTE — ASSESSMENT & PLAN NOTE
· More likely multifactorial from decompensated diastolic CHF, dependent edema & LLE immobility s/p ORIF left tibia fracture    · US venous LLE no DVT

## 2018-06-04 NOTE — H&P
History and Physical - [facility] Internal Medicine  Patient Information: Eugenia Nolasco 65 y o  female MRN: [id number]  Unit/Bed#: ED 15 Encounter: [id number]  Admitting Physician: Maddison Izaguirre MD  PCP: Cachorro Tesfaye DO  Date of Admission:  6/4/2018    Chief Complaint: Leg Swelling (States surgery ( hardware ) in January for fx left lower leg  Started with pain and swelling LLE 2-3 days ago   Was on Plavix which she stopped a month ago because of bruising  ) and Shortness of Breath (States has been short of breath for a week  )    History of Present Illness: The history is provided by the patient  65 y o  female with a history of HTN, HLD, G5NW, Chronic diastolic CHF, previous MI s/p angioplasty in 2015, TIA on Plavix, Anxiety/Depression, Gastric ulcer, GERD, Closed displaced comminuted fracture of shaft of left tibia s/p ORIF in 1/2018 (Dr Haylee Boyd), who presents with left lower leg swelling and worsening pain and somewhat ACUNA for about 1 week  She was on Plavix but she stopped for about 1 month before of skin bruising while she is taking Naproxen 500mg bid for post-op left lower leg pain  She denied active chest pain or orthopnea , palpitation  No productive cough, pleuritic pain, fever, chills, dysuria, diarrhea, N/V, abd pain  No right leg swelling or pain  She is still on boost of LLE  In ED,  US venous LLE no DVT   She received NS 1 L  First trop -ve  BNP = 5994    TTE on 12/13/2017: EF 65 %  Grade 2 diastolic dysfunction    Review of Systems:  10 point remainder of review of systems negative, see HPI  Constitutional: Negative for fatigue, fever, chills, appetite change and unexpected weight change  HEENT: Negative for hearing loss, ear pain, congestion, rhinorrhea, neck pain, neck stiffness, postnasal drip, sinus pressure and ear discharge  Eyes: Negative for photophobia and visual disturbance  Respiratory: Negative for cough, Positive for shortness of breath     Cardiovascular: Negative for chest pain    Gastrointestinal: Negative for nausea, abdominal pain, diarrhea, constipation and blood in stool  Genitourinary: Negative for dysuria and difficulty urinating  Skin: Positive for LLE below knee swelling, Negative for rash  Neurological: Negative for dizziness, numbness and headaches  Negative for syncope and weakness  Psychiatric/Behavioral: Negative for sleep disturbance  Negative for suicidal ideas, self-injury and dysphoric mood       Past Medical and Surgical History:   Past Medical History:   Diagnosis Date    Alopecia     Back injury     Bell's palsy     Chronic pain     back    Closed left arm fracture     Fibromyalgia     Fibromyalgia, primary     GERD (gastroesophageal reflux disease)     Hyperlipidemia     Hypertension     Lyme disease     [facility] [address] [facility]         Myocardial infarction ([facility] [address] )     Cardiac catheterization 2 years ago showed 30% blockage in 1 of the blood vessels    [facility]     TIA (transient ischemic attack)     2017       Past Surgical History:   Procedure Laterality Date     SECTION      CHOLECYSTECTOMY      CORONARY ANGIOPLASTY          ORIF TIBIA FRACTURE Left 2018    Procedure: OPEN REDUCTION W/ INTERNAL FIXATION (ORIF) TIBIA FRACTURE;  Surgeon: Jada Jorgensen MD;  Location: [facility];  Service: Orthopedics       Family History:  Family History   Problem Relation Age of Onset    Heart attack Mother     Heart attack Father        Meds/Allergies:    (Not in a hospital admission)    Allergies   Allergen Reactions    Hydromorphone Shortness Of Breath       Current Medications:  Current Facility-Administered Medications   Medication Dose Route Frequency Provider Last Rate Last Dose    furosemide (LASIX) injection 20 mg  20 mg Intravenous Once Mary Kay Cox MD         Current Outpatient Prescriptions   Medication Sig Dispense Refill    ALPRAZolam (XANAX) 0 5 mg tablet Take 1 tablet by mouth 3 (three) times a day as needed for anxiety for up to 3 days 10 tablet 0    atorvastatin (LIPITOR) 80 mg tablet Take 80 mg by mouth daily      DULoxetine HCl (CYMBALTA PO) Take 90 mg by mouth daily at bedtime        gemfibrozil (LOPID) 600 mg tablet Take 600 mg by mouth 2 (two) times a day before meals      losartan (COZAAR) 25 mg tablet Take 50 mg by mouth daily        metoprolol tartrate (LOPRESSOR) 100 mg tablet Take 200 mg by mouth daily        naproxen (NAPROSYN) 500 mg tablet Take 1 tablet by mouth 2 (two) times a day with meals 10 tablet 0    pantoprazole (PROTONIX) 40 mg tablet Take 40 mg by mouth daily      clopidogrel (PLAVIX) 75 mg tablet Take 1 tablet by mouth daily (Patient taking differently: Take 75 mg by mouth daily Last dose  1/23/18 ) 30 tablet 0    gabapentin (NEURONTIN) 600 MG tablet Take 300 mg by mouth 3 (three) times a day         Immunizations: There is no immunization history on file for this patient  History:  Social History     Social History    Marital status: /Civil Union     Spouse name: N/A    Number of children: N/A    Years of education: N/A     Occupational History    Not on file       Social History Main Topics    Smoking status: Former Smoker     Packs/day: 0 50     Years: 40 00     Types: Cigarettes    Smokeless tobacco: Never Used      Comment: Counseled, wants to quit    Alcohol use Yes      Comment: occasional    Drug use: No    Sexual activity: Yes     Partners: Male     Birth control/ protection: Post-menopausal     Other Topics Concern    Not on file     Social History Narrative    No narrative on file        Substance Use History:   History   Alcohol Use    Yes     Comment: occasional     History   Smoking Status    Former Smoker    Packs/day: 0 50    Years: 40 00    Types: Cigarettes   Smokeless Tobacco    Never Used     Comment: Counseled, wants to quit     History   Drug Use No       Physical Exam:   Vitals:   Blood Pressure: 142/71 (06/04/18 1406)  Pulse: 62 (06/04/18 1406)  Temperature: 98 3 °F (36 8 °C) (06/04/18 1406)  Temp Source: Tympanic (06/04/18 1406)  Respirations: 20 (06/04/18 1406)  Weight - Scale: 65 8 kg (145 lb) (06/04/18 1406)  SpO2: 92 % (06/04/18 1406)  Body mass index is 24 13 kg/m²        Lab Results:     Results from last 7 days  Lab Units 06/04/18  1550   WBC Thousand/uL 6 36   HEMOGLOBIN g/dL 11 6   HEMATOCRIT % 35 1   PLATELETS Thousands/uL 343   NEUTROS PCT % 50   LYMPHS PCT % 38   MONOS PCT % 7   EOS PCT % 4       Results from last 7 days  Lab Units 06/04/18  1550   SODIUM mmol/L 142   POTASSIUM mmol/L 5 1   CHLORIDE mmol/L 106   CO2 mmol/L 28   BUN mg/dL 13   CREATININE mg/dL 1 00   CALCIUM mg/dL 8 6   GLUCOSE RANDOM mg/dL 89       Results from last 7 days  Lab Units 06/04/18  1550   INR  1 06       Recent Results (from the past 24 hour(s))   CBC and differential    Collection Time: 06/04/18  3:50 PM   Result Value Ref Range    WBC 6 36 4 31 - 10 16 Thousand/uL    RBC 3 73 (L) 3 81 - 5 12 Million/uL    Hemoglobin 11 6 11 5 - 15 4 g/dL    Hematocrit 35 1 34 8 - 46 1 %    MCV 94 82 - 98 fL    MCH 31 1 26 8 - 34 3 pg    MCHC 33 0 31 4 - 37 4 g/dL    RDW 14 4 11 6 - 15 1 %    MPV 10 0 8 9 - 12 7 fL    Platelets 591 684 - 860 Thousands/uL    Neutrophils Relative 50 43 - 75 %    Lymphocytes Relative 38 14 - 44 %    Monocytes Relative 7 4 - 12 %    Eosinophils Relative 4 0 - 6 %    Basophils Relative 1 0 - 1 %    Neutrophils Absolute 3 21 1 85 - 7 62 Thousands/µL    Lymphocytes Absolute 2 40 0 60 - 4 47 Thousands/µL    Monocytes Absolute 0 42 0 17 - 1 22 Thousand/µL    Eosinophils Absolute 0 27 0 00 - 0 61 Thousand/µL    Basophils Absolute 0 06 0 00 - 0 10 Thousands/µL   Protime-INR    Collection Time: 06/04/18  3:50 PM   Result Value Ref Range    Protime 11 1 9 4 - 11 7 seconds    INR 1 06 0 86 - 1 16   APTT    Collection Time: 06/04/18  3:50 PM   Result Value Ref Range    PTT 19 (L) 24 - 33 seconds   Basic metabolic panel    Collection Time: 06/04/18 3:50 PM   Result Value Ref Range    Sodium 142 136 - 145 mmol/L    Potassium 5 1 3 5 - 5 3 mmol/L    Chloride 106 100 - 108 mmol/L    CO2 28 21 - 32 mmol/L    Anion Gap 8 4 - 13 mmol/L    BUN 13 5 - 25 mg/dL    Creatinine 1 00 0 60 - 1 30 mg/dL    Glucose 89 65 - 140 mg/dL    Calcium 8 6 8 3 - 10 1 mg/dL    eGFR 63 ml/min/1 73sq m   BNP    Collection Time: 06/04/18  3:50 PM   Result Value Ref Range    NT-proBNP 5,994 (H) <125 pg/mL   Troponin I    Collection Time: 06/04/18  3:50 PM   Result Value Ref Range    Troponin I <0 02 <=0 04 ng/mL       Imaging:  Cta Ed Chest Pe Study    Result Date: 6/4/2018  Narrative: CTA - CHEST WITH IV CONTRAST - PULMONARY ANGIOGRAM INDICATION:   Shortness of breath  Discontinued taking Plavix now with left calf swelling and pain  COMPARISON: Chest x-ray 12/26/2017 TECHNIQUE: CTA examination of the chest was performed using angiographic technique according to a protocol specifically tailored to evaluate for pulmonary embolism  Axial, sagittal, and coronal 2D reformatted images were created from the source data and  submitted for interpretation  In addition, coronal 3D MIP postprocessing was performed on the acquisition scanner  Radiation dose length product (DLP) for this visit:  710 06 mGy-cm   This examination, like all CT scans performed in the [facility], was performed utilizing techniques to minimize radiation dose exposure, including the use of iterative  reconstruction and automated exposure control  IV Contrast:  85 mL of iohexol (OMNIPAQUE)  FINDINGS: PULMONARY ARTERIAL TREE:  No pulmonary embolus is seen  LUNGS:  Background emphysema with superimposed interstitial and groundglass opacities suggesting pulmonary edema  Bibasilar dependent compressive atelectasis  No endotracheal or endobronchial lesion  PLEURA:  Small bilateral pleural effusions  HEART/AORTA:  Mild cardiomegaly  No aortic aneurysm   MEDIASTINUM AND LORRAINE:  Prominent AP window node measuring 1 0 cm  Prominent right paratracheal node measures 1 2 cm  CHEST WALL AND LOWER NECK:   Unremarkable  VISUALIZED STRUCTURES IN THE UPPER ABDOMEN:  Unremarkable status post cholecystectomy  OSSEOUS STRUCTURES: Age-indeterminate compression fracture T11  Impression: No pulmonary embolism  Background emphysema with superimposed pulmonary edema  Bilateral pleural effusions  Workstation performed: [id number]     Vas Lower Limb Venous Duplex Study, Unilateral/limited    Result Date: 6/4/2018  Narrative:  [facility] REPORT CLINICAL: Indications: Swelling of Limb [R22 4]  Patient presents with left lower extremity edema and pain x two days  The patient has history of CAD, limb trauma and post operative state(shatter tibia bone and broke ankle)  Clinical:  FINDINGS:  Left     Impression       CFV      Normal (Patent)     CONCLUSION: RIGHT LOWER LIMB LIMITED: Evaluation shows no evidence of thrombus in the common femoral vein  Doppler evaluation shows a normal response to augmentation maneuvers  LEFT LOWER LIMB: No evidence of acute or chronic deep vein thrombosis No evidence of superficial thrombophlebitis noted  Doppler evaluation shows a normal response to augmentation maneuvers  Popliteal, posterior tibial and anterior tibial arterial Doppler waveforms are triphasic  Technical findings were given to [address] [place] [address]  16:15      CTA ED chest PE study   Final Result      No pulmonary embolism  Background emphysema with superimposed pulmonary edema  Bilateral pleural effusions  Workstation performed: [id number]         VAS lower limb venous duplex study, unilateral/limited    (Results Pending)       EKG, Pathology, and Other Studies Reviewed on Admission:     Allscripts Records Reviewed:  Yes     Assessment/Plan:  Hospital Problem List:   Principal Problem:    Left leg swelling  Active Problems:    Acute on chronic diastolic CHF (congestive heart failure) (HCC)    Hypertension Hyperlipidemia    Anxiety    GERD (gastroesophageal reflux disease)    Closed displaced comminuted fracture of shaft of left tibia    History of gastric ulcer    History of TIA (transient ischemic attack)    History of MI (myocardial infarction)    65 y o  female with a history of HTN, HLD, I0GI, Chronic diastolic CHF, previous MI s/p angioplasty in 2015, TIA on Plavix, Anxiety/Depression, Gastric ulcer, GERD, Closed displaced comminuted fracture of shaft of left tibia s/p ORIF in 1/2018 (Dr Aguilar Starr), who presents with left lower leg swelling and worsening pain and somewhat ACUNA for about 1 week  * Left leg swelling   Assessment & Plan    More likely multifactorial from decompensated diastolic CHF, dependent edema & LLE immobility s/p ORIF left tibia fracture  In ED,  venous LLE no DVT         Acute on chronic diastolic CHF (congestive heart failure) ([facility] [address] )   Assessment & Plan    In ED, she received NS 1 L  First trop -ve  BNP = 5994  TTE on 12/13/2017: EF 65 %  Grade 2 diastolic dysfunction  On tele  Trial Lasix 20mg iv x 1  Enhance daily wt, strict I/O  Monitor lytes, renal fxn am  Risk stratification  TTE ordered  Cardiology consult am        History of MI (myocardial infarction)   Assessment & Plan    Continue Plavix, statin        History of TIA (transient ischemic attack)   Assessment & Plan    Continue Plavix        History of gastric ulcer   Assessment & Plan    Continue home PPI        GERD (gastroesophageal reflux disease)   Assessment & Plan    Cont home PPI        Anxiety   Assessment & Plan    Cont home Xanax prn, Cymbalta, Neurontin        Hyperlipidemia   Assessment & Plan    Cont home Lipitor, gemfibrozil        Hypertension   Assessment & Plan    Cont home metoprolol  Hold losartan for now          DVT Prophylaxis: on Heparin sc, early ambulation  Code Status: Full Code  Disposition: anticipate d/c home once clinically improved  Plan d/w pt and ?   at bedside    Anticipated Length of Stay:  Patient will be admitted on an Inpatient basis with an anticipated length of stay of  > 2 midnights  Total Time for Visit, including Counseling/Coordination of Care: 45 minutes  Greater than 50% of this total time spent on direct patient counseling and coordination of care      Signed by:  Rachell Claude, MD  Attending Hospitalist  Page#: [phone] (Hours 7am to 7pm)  6/4/2018 6:54 PM

## 2018-06-05 PROBLEM — E87.6 HYPOKALEMIA: Status: ACTIVE | Noted: 2018-06-05

## 2018-06-05 PROBLEM — R06.02 SOB (SHORTNESS OF BREATH): Status: ACTIVE | Noted: 2018-06-05

## 2018-06-05 PROBLEM — E83.42 HYPOMAGNESEMIA: Status: ACTIVE | Noted: 2018-06-05

## 2018-06-05 LAB
ANION GAP SERPL CALCULATED.3IONS-SCNC: 12 MMOL/L (ref 4–13)
ATRIAL RATE: 64 BPM
BUN SERPL-MCNC: 15 MG/DL (ref 5–25)
CALCIUM SERPL-MCNC: 8.1 MG/DL (ref 8.3–10.1)
CHLORIDE SERPL-SCNC: 103 MMOL/L (ref 100–108)
CHOLEST SERPL-MCNC: 205 MG/DL (ref 50–200)
CO2 SERPL-SCNC: 26 MMOL/L (ref 21–32)
CREAT SERPL-MCNC: 1.19 MG/DL (ref 0.6–1.3)
EST. AVERAGE GLUCOSE BLD GHB EST-MCNC: 114 MG/DL
GFR SERPL CREATININE-BSD FRML MDRD: 51 ML/MIN/1.73SQ M
GLUCOSE SERPL-MCNC: 92 MG/DL (ref 65–140)
HBA1C MFR BLD: 5.6 % (ref 4.2–6.3)
HDLC SERPL-MCNC: 44 MG/DL (ref 40–60)
LDLC SERPL CALC-MCNC: 128 MG/DL (ref 0–100)
MAGNESIUM SERPL-MCNC: 1.1 MG/DL (ref 1.6–2.6)
P AXIS: 47 DEGREES
PHOSPHATE SERPL-MCNC: 4.9 MG/DL (ref 2.7–4.5)
POTASSIUM SERPL-SCNC: 3.1 MMOL/L (ref 3.5–5.3)
PR INTERVAL: 172 MS
QRS AXIS: 8 DEGREES
QRSD INTERVAL: 90 MS
QT INTERVAL: 516 MS
QTC INTERVAL: 532 MS
SODIUM SERPL-SCNC: 141 MMOL/L (ref 136–145)
T WAVE AXIS: 56 DEGREES
TRIGL SERPL-MCNC: 165 MG/DL
VENTRICULAR RATE: 64 BPM

## 2018-06-05 PROCEDURE — 83735 ASSAY OF MAGNESIUM: CPT | Performed by: INTERNAL MEDICINE

## 2018-06-05 PROCEDURE — 99223 1ST HOSP IP/OBS HIGH 75: CPT | Performed by: INTERNAL MEDICINE

## 2018-06-05 PROCEDURE — 99232 SBSQ HOSP IP/OBS MODERATE 35: CPT | Performed by: NURSE PRACTITIONER

## 2018-06-05 PROCEDURE — 80048 BASIC METABOLIC PNL TOTAL CA: CPT | Performed by: INTERNAL MEDICINE

## 2018-06-05 PROCEDURE — 93010 ELECTROCARDIOGRAM REPORT: CPT | Performed by: INTERNAL MEDICINE

## 2018-06-05 PROCEDURE — 83036 HEMOGLOBIN GLYCOSYLATED A1C: CPT | Performed by: INTERNAL MEDICINE

## 2018-06-05 PROCEDURE — 80061 LIPID PANEL: CPT | Performed by: INTERNAL MEDICINE

## 2018-06-05 PROCEDURE — 87081 CULTURE SCREEN ONLY: CPT | Performed by: INTERNAL MEDICINE

## 2018-06-05 PROCEDURE — 84100 ASSAY OF PHOSPHORUS: CPT | Performed by: INTERNAL MEDICINE

## 2018-06-05 RX ORDER — POTASSIUM CHLORIDE 20 MEQ/1
40 TABLET, EXTENDED RELEASE ORAL ONCE
Status: DISCONTINUED | OUTPATIENT
Start: 2018-06-05 | End: 2018-06-05

## 2018-06-05 RX ORDER — POTASSIUM CHLORIDE 20 MEQ/1
40 TABLET, EXTENDED RELEASE ORAL ONCE
Status: COMPLETED | OUTPATIENT
Start: 2018-06-05 | End: 2018-06-05

## 2018-06-05 RX ORDER — HYDROCODONE BITARTRATE AND ACETAMINOPHEN 5; 325 MG/1; MG/1
1 TABLET ORAL EVERY 8 HOURS PRN
Status: DISCONTINUED | OUTPATIENT
Start: 2018-06-05 | End: 2018-06-06 | Stop reason: HOSPADM

## 2018-06-05 RX ORDER — HYDROCODONE BITARTRATE AND ACETAMINOPHEN 5; 325 MG/1; MG/1
1 TABLET ORAL 2 TIMES DAILY PRN
Status: DISCONTINUED | OUTPATIENT
Start: 2018-06-05 | End: 2018-06-05

## 2018-06-05 RX ORDER — FUROSEMIDE 10 MG/ML
20 INJECTION INTRAMUSCULAR; INTRAVENOUS ONCE
Status: COMPLETED | OUTPATIENT
Start: 2018-06-05 | End: 2018-06-05

## 2018-06-05 RX ORDER — MAGNESIUM SULFATE HEPTAHYDRATE 40 MG/ML
2 INJECTION, SOLUTION INTRAVENOUS ONCE
Status: COMPLETED | OUTPATIENT
Start: 2018-06-05 | End: 2018-06-05

## 2018-06-05 RX ADMIN — HYDROCODONE BITARTRATE AND ACETAMINOPHEN 1 TABLET: 5; 325 TABLET ORAL at 21:13

## 2018-06-05 RX ADMIN — DULOXETINE HYDROCHLORIDE 60 MG: 60 CAPSULE, DELAYED RELEASE ORAL at 22:39

## 2018-06-05 RX ADMIN — KETOROLAC TROMETHAMINE 15 MG: 30 INJECTION, SOLUTION INTRAMUSCULAR at 00:40

## 2018-06-05 RX ADMIN — ACETAMINOPHEN 650 MG: 325 TABLET ORAL at 07:38

## 2018-06-05 RX ADMIN — GEMFIBROZIL 600 MG: 600 TABLET ORAL at 06:04

## 2018-06-05 RX ADMIN — HEPARIN SODIUM 5000 UNITS: 5000 INJECTION, SOLUTION INTRAVENOUS; SUBCUTANEOUS at 15:52

## 2018-06-05 RX ADMIN — ALPRAZOLAM 0.5 MG: 0.5 TABLET ORAL at 00:11

## 2018-06-05 RX ADMIN — GABAPENTIN 300 MG: 300 CAPSULE ORAL at 10:31

## 2018-06-05 RX ADMIN — ATORVASTATIN CALCIUM 80 MG: 80 TABLET, FILM COATED ORAL at 15:53

## 2018-06-05 RX ADMIN — METOPROLOL TARTRATE 100 MG: 100 TABLET ORAL at 21:13

## 2018-06-05 RX ADMIN — GEMFIBROZIL 600 MG: 600 TABLET ORAL at 15:54

## 2018-06-05 RX ADMIN — FUROSEMIDE 20 MG: 10 INJECTION, SOLUTION INTRAMUSCULAR; INTRAVENOUS at 01:24

## 2018-06-05 RX ADMIN — HEPARIN SODIUM 5000 UNITS: 5000 INJECTION, SOLUTION INTRAVENOUS; SUBCUTANEOUS at 06:04

## 2018-06-05 RX ADMIN — GABAPENTIN 300 MG: 300 CAPSULE ORAL at 15:53

## 2018-06-05 RX ADMIN — METOPROLOL TARTRATE 100 MG: 100 TABLET ORAL at 09:58

## 2018-06-05 RX ADMIN — GABAPENTIN 300 MG: 300 CAPSULE ORAL at 21:13

## 2018-06-05 RX ADMIN — PANTOPRAZOLE SODIUM 40 MG: 40 TABLET, DELAYED RELEASE ORAL at 09:58

## 2018-06-05 RX ADMIN — ALPRAZOLAM 0.5 MG: 0.5 TABLET ORAL at 07:38

## 2018-06-05 RX ADMIN — POTASSIUM CHLORIDE 40 MEQ: 1500 TABLET, EXTENDED RELEASE ORAL at 09:57

## 2018-06-05 RX ADMIN — HYDROCODONE BITARTRATE AND ACETAMINOPHEN 1 TABLET: 5; 325 TABLET ORAL at 12:12

## 2018-06-05 RX ADMIN — ALPRAZOLAM 0.5 MG: 0.5 TABLET ORAL at 16:02

## 2018-06-05 RX ADMIN — MAGNESIUM SULFATE HEPTAHYDRATE 2 G: 40 INJECTION, SOLUTION INTRAVENOUS at 12:15

## 2018-06-05 RX ADMIN — CLOPIDOGREL BISULFATE 75 MG: 75 TABLET ORAL at 09:57

## 2018-06-05 RX ADMIN — HEPARIN SODIUM 5000 UNITS: 5000 INJECTION, SOLUTION INTRAVENOUS; SUBCUTANEOUS at 22:39

## 2018-06-05 NOTE — PROGRESS NOTES
Called to room by RN, patient short of breath  Patient reports laying in bed and feeling short of breath  She walked to the bathroom and felt short of breath  O2 sat 91% on room air, mildly tachypeic  Patient felt better with O2 via nasal cannula  Pedal edema noted  Additional 20mg of lasix given - elevated BNP, PND, ACUNA, mild hypoxia    For cardiology consult in the AM

## 2018-06-05 NOTE — PROGRESS NOTES
Progress Note Pura Franz 1960, 65 y o  female MRN: [id number]    Unit/Bed#: [address] [place] [address] Encounter: [id number]    Primary Care Provider: Ann-Marie Whitley DO   Date and time admitted to hospital: 6/4/2018  2:34 PM        * Left leg swelling   Assessment & Plan    · More likely multifactorial from decompensated diastolic CHF, dependent edema & LLE immobility s/p ORIF left tibia fracture  · US venous LLE no DVT         Acute on chronic diastolic CHF (congestive heart failure) ([address] )   Assessment & Plan    · In ED, she received NS 1 L  · BNP = 5994  Troponin <0 02  TSH 0 713  A1C 5 6  Lipid profile, cholesterol 205, triglyceride 165, HDL 44, and   · 12/13/2017 ECHO: EF 65 %  Grade 2 diastolic dysfunction  Repeat ECHO  · Continue telemetry  · 6/4/2018 at admission received Lasix 20mg IV x1  During the night became SOB after ambulating to BR given Lasix 20mg IV x1  · Daily wt and strict I/O  24  Hour weight loss of 5 9 lbs and -345cc within a 24 hours  · Monitor lytes, renal fxn am  · Cardiology consulted        SOB (shortness of breath)   Assessment & Plan    · More likely multifactorial from decompensated diastolic CHF, dependent edema & LLE immobility s/p ORIF left tibia fracture  · CTA chest, No pulmonary embolism  Background emphysema with superimposed pulmonary edema    Bilateral pleural effusions        Hypokalemia   Assessment & Plan    · Current potassium 3 1  · Ordered KCl 40 mEq p o  x1        Hypomagnesemia   Assessment & Plan    · Current magnesium level 1 1mg/dL  · Ordered magnesium sulfate 2 g IV x1 dose        History of MI (myocardial infarction)   Assessment & Plan    · Continue Plavix, metoprolol and statin        History of TIA (transient ischemic attack)   Assessment & Plan    · Continue Plavix and statin        History of gastric ulcer   Assessment & Plan    · Continue home PPI        GERD (gastroesophageal reflux disease)   Assessment & Plan    · Continue PPI        Anxiety Assessment & Plan    · Continue Xanax prn, Cymbalta, and Neurontin        Hyperlipidemia   Assessment & Plan    · Continue Lipitor and gemfibrozil        Hypertension   Assessment & Plan    · Continue metoprolol  · Hold losartan for now, continue to monitor BPs          VTE Pharmacologic Prophylaxis:   Pharmacologic: Heparin  Mechanical VTE Prophylaxis in Place: Yes    Patient Centered Rounds: I have performed bedside rounds with nursing staff today  Discussions with Specialists or Other Care Team Provider: spoke with CM concerning plan of care  Education and Discussions with Family / Patient: spoke with patient concerning plan of care    Time Spent for Care: 30 minutes  More than 50% of total time spent on counseling and coordination of care as described above  Current Length of Stay: 1 day(s)    Current Patient Status: Inpatient   Certification Statement: The patient will continue to require additional inpatient hospital stay due to Left leg swelling and acute on chronic diastolic CHF requiring IV Lasix    Discharge Plan:  Not anticipated today    Code Status: Level 1 - Full Code      Subjective:   She is observed lying in bed stating she still feels intermittently short of breath but denies at present  She states last night after she ambulated to bathroom she became short of breath in which they gave her Lasix IV and administered oxygen via nasal cannula  Currently she denies chest pain, abdominal pain, or dizziness  Objective:     Vitals:   Temp (24hrs), Av °F (36 7 °C), Min:96 9 °F (36 1 °C), Max:98 5 °F (36 9 °C)    HR:  [62-71] 63  Resp:  [18-20] 20  BP: (126-189)/(68-88) 138/76  SpO2:  [91 %-95 %] 94 %  Body mass index is 29 79 kg/m²  Input and Output Summary (last 24 hours):        Intake/Output Summary (Last 24 hours) at 18 1140  Last data filed at 18 1000   Gross per 24 hour   Intake              655 ml   Output             1400 ml   Net             -745 ml       Physical Exam:     Physical Exam   Constitutional: She is oriented to person, place, and time  She appears well-developed and well-nourished  No distress  HENT:   Head: Normocephalic and atraumatic  Neck: Normal range of motion  Neck supple  Cardiovascular: Normal rate, regular rhythm and normal heart sounds  Exam reveals no gallop and no friction rub  No murmur heard  Pulmonary/Chest: Effort normal and breath sounds normal  No respiratory distress  She has no wheezes  She has no rales  She exhibits no tenderness  Abdominal: Soft  Bowel sounds are normal  She exhibits no distension and no mass  There is no tenderness  There is no rebound and no guarding  Musculoskeletal: She exhibits edema and tenderness  She exhibits no deformity  Scar noted on left tibia  BLE +1 edema   Neurological: She is alert and oriented to person, place, and time  Skin: Skin is warm and dry  No rash noted  She is not diaphoretic  No erythema  No pallor  Psychiatric: She has a normal mood and affect  Her behavior is normal  Judgment and thought content normal        Additional Data:     Labs:      Results from last 7 days  Lab Units 06/04/18  1550   WBC Thousand/uL 6 36   HEMOGLOBIN g/dL 11 6   HEMATOCRIT % 35 1   PLATELETS Thousands/uL 343   NEUTROS PCT % 50   LYMPHS PCT % 38   MONOS PCT % 7   EOS PCT % 4       Results from last 7 days  Lab Units 06/05/18  0558   SODIUM mmol/L 141   POTASSIUM mmol/L 3 1*   CHLORIDE mmol/L 103   CO2 mmol/L 26   BUN mg/dL 15   CREATININE mg/dL 1 19   CALCIUM mg/dL 8 1*   GLUCOSE RANDOM mg/dL 92       Results from last 7 days  Lab Units 06/04/18  1550   INR  1 06       * I Have Reviewed All Lab Data Listed Above  * Additional Pertinent Lab Tests Reviewed:  [address] [facility] Admission Reviewed    Imaging:    Imaging Reports Reviewed Today Include: VAS lower extremity, CTA chest  Imaging Personally Reviewed by Myself Includes:  none    Recent Cultures (last 7 days):           Last 24 Hours Medication List:     Current Facility-Administered Medications:  acetaminophen 650 mg Oral Q6H PRN NICKY San   ALPRAZolam 0 5 mg Oral TID PRN María De MD   atorvastatin 80 mg Oral Daily With Lakhwinder Terrazas MD   clopidogrel 75 mg Oral Daily María De MD   DULoxetine 60 mg Oral HS María De MD   gabapentin 300 mg Oral TID María De MD   gemfibrozil 600 mg Oral BID AC María De MD   heparin (porcine) 5,000 Units Subcutaneous [facility] María De MD   HYDROcodone-acetaminophen 1 tablet Oral BID PRN NICKY Jones   magnesium sulfate 2 g Intravenous Once NICKY Dunbar   metoprolol tartrate 100 mg Oral Q12H [address] María De MD   pantoprazole 40 mg Oral Daily María De MD        Today, Patient Was Seen By: NICKY Jones    ** Please Note: Dictation voice to text software may have been used in the creation of this document   **

## 2018-06-05 NOTE — PLAN OF CARE
CARDIOVASCULAR - ADULT     Maintains optimal cardiac output and hemodynamic stability Progressing     Absence of cardiac dysrhythmias or at baseline rhythm Progressing        DISCHARGE PLANNING     Discharge to home or other facility with appropriate resources Progressing        INFECTION - ADULT     Absence or prevention of progression during hospitalization Progressing        Knowledge Deficit     Patient/family/caregiver demonstrates understanding of disease process, treatment plan, medications, and discharge instructions Progressing        PAIN - ADULT     Verbalizes/displays adequate comfort level or baseline comfort level Progressing        Potential for Falls     Patient will remain free of falls Progressing        RESPIRATORY - ADULT     Achieves optimal ventilation and oxygenation Progressing        SAFETY ADULT     Maintain or return to baseline ADL function Progressing     Maintain or return mobility status to optimal level Progressing

## 2018-06-05 NOTE — SOCIAL WORK
Met with pt  Resides with radha Yi  Uses spc for ambulation, has RW, BSC, and w/c at home  Home is 2 floors with 5 steps to enter and 13 steps with rail to the second floor  Has bathrooms on first and second floor  Was open to c in the past, but not currently  Has been independent, but does not drive  Explained role of cm, no anticipated d/c needs  CM reviewed d/c planning process including the following: identifying help at home, patient preference for d/c planning needs, and availability of the treatment team to discuss questions or concerns patient and/or family may have regarding understanding of medications and recognizing signs and symptoms once discharged  CM also encouraged patient to follow up with all recommended appointments after discharge  Patient advised of importance for patient and family to participate in managing patient's medical well being

## 2018-06-05 NOTE — CASE MANAGEMENT
Initial Clinical Review    Admission: Date/Time/Statement: 6/4/18 @ 1854   Inpatient Admission (Order [id number])   Admission   Date: 6/4/2018 Department: [place] [address] Released By/Authorizing: Lay Morrow MD (auto-released)   Order Information     Order Name   INPATIENT ADMISSION [[id number]]     Order History   Inpatient   Date/Time Action Taken User Additional Information   06/04/18 1854 Release Lay Morrow MD (auto-released) From Order: [id number]   06/04/18 1854 Complete Lay Morrow MD    Order Details     Frequency Duration Priority Order Class   Once 1  occurrence Routine Hospital Performed   Inpatient Admission   Order: [id number]   Status:  Completed   Visible to patient:  No (Not Released) Next appt:  06/08/2018 at 02:15 PM in Orthopedic Surgery Dylan Bradshaw MD)                            Order Questions     Question Answer Comment   Admitting Physician Darlena Kussmaul    Level of Care Med Surg    Estimated length of stay More than 2 Midnights    Certification I certify that inpatient services are medically necessary for this patient for a duration of greater than two midnights  See H&P and MD Progress Notes for additional information about the patient's course of treatment  ED: Date/Time/Mode of Arrival:   ED Arrival Information     Expected Arrival Acuity Means of Arrival Escorted By Service Admission Type    - 6/4/2018 13:47 Urgent Wheelchair Family Member General Medicine Urgent    Arrival Complaint    leg swelling/previous surgery          Chief Complaint:   Chief Complaint   Patient presents with    Leg Swelling     States surgery ( hardware ) in January for fx left lower leg  Started with pain and swelling LLE 2-3 days ago   Was on Plavix which she stopped a month ago because of bruising   Shortness of Breath     States has been short of breath for a week  History of Illness: The history is provided by the patient    65 y o  female with a history of HTN, HLD, T2DM, Chronic diastolic CHF, previous MI s/p angioplasty in 2015, TIA on Plavix, Anxiety/Depression, Gastric ulcer, GERD, Closed displaced comminuted fracture of shaft of left tibia s/p ORIF in 1/2018 (Dr Michael Fox), who presents with left lower leg swelling and worsening pain and somewhat ACUNA for about 1 week  She was on Plavix but she stopped for about 1 month before of skin bruising while she is taking Naproxen 500mg bid for post-op left lower leg pain  She denied active chest pain or orthopnea , palpitation  No productive cough, pleuritic pain, fever, chills, dysuria, diarrhea, N/V, abd pain  No right leg swelling or pain  She is still on boost of LLE  Skin: Positive for LLE below knee swelling, Negative for rash  ED Vital Signs:   ED Triage Vitals [06/04/18 1406]   Temperature Pulse Respirations Blood Pressure SpO2   98 3 °F (36 8 °C) 62 20 142/71 92 %      Temp Source Heart Rate Source Patient Position - Orthostatic VS BP Location FiO2 (%)   Tympanic Monitor Sitting Left arm --      Pain Score       9        Wt Readings from Last 1 Encounters:   06/05/18 81 2 kg (179 lb 0 2 oz)       Abnormal Labs/Diagnostic Test Results: BNP 5994 , PTT 19  CT CHEST  No pulmonary embolism  Background emphysema with superimposed pulmonary edema  Bilateral pleural effusions  VASCULAR NEG DVT B/L LOWER EXT    ED Treatment:   Medication Administration from 06/04/2018 1347 to 06/04/2018 1959       Date/Time Order Dose Route Action Action by Comments     06/04/2018 1840 sodium chloride 0 9 % bolus 1,000 mL 0 mL Intravenous Stopped Lornea Sebastian RN      06/04/2018 1807 sodium chloride 0 9 % bolus 1,000 mL 1,000 mL Intravenous New Bag Lorena Sebastian RN      06/04/2018 1700 iohexol (OMNIPAQUE) 350 MG/ML injection (MULTI-DOSE) 85 mL 85 mL Intravenous Given Andrew Merritt      06/04/2018 1902 furosemide (LASIX) injection 20 mg 20 mg Intravenous Given Emil Baltazar RN           Past Medical/Surgical History:    Active Ambulatory Problems Diagnosis Date Noted    Slurred speech 12/12/2017    Weakness 12/12/2017    Fibromyalgia 12/12/2017    Hypertension 12/12/2017    Hyperlipidemia 12/12/2017    Accidental overdose 12/26/2017    [facility] 12/26/2017    Anxiety 12/26/2017    Myocardial infarction [facility] ) 12/26/2017    GERD (gastroesophageal reflux disease) 12/26/2017    Dependence on nicotine from cigarettes 12/27/2017    QT prolongation 12/27/2017    Closed displaced comminuted fracture of shaft of left tibia 01/30/2018    Status post closed tibia fracture 02/21/2018    Closed fracture of left ankle 03/14/2018     Resolved Ambulatory Problems     Diagnosis Date Noted    No Resolved Ambulatory Problems     Past Medical History:   Diagnosis Date    Alopecia     Back injury     Bell's palsy     Chronic pain     Closed left arm fracture     Fibromyalgia     Fibromyalgia, primary     GERD (gastroesophageal reflux disease)     Hyperlipidemia     Hypertension     Lyme disease     [facility]     Myocardial infarction ([facility] )     Stroke ([facility] )     TIA (transient ischemic attack)        Admitting Diagnosis: Pulmonary edema [J81 1]  Leg swelling [M79 89]  Acute CHF (congestive heart failure) ([facility]) [I50 9]    Age/Sex: 65 y o  female    Assessment/Plan:   65 y o  female with a history of HTN, HLD, P3WG, Chronic diastolic CHF, previous MI s/p angioplasty in 2015, TIA on Plavix, Anxiety/Depression, Gastric ulcer, GERD, Closed displaced comminuted fracture of shaft of left tibia s/p ORIF in 1/2018 (Dr Samuel Soto), who presents with left lower leg swelling and worsening pain and somewhat ACUNA for about 1 week       * Left leg swelling   Assessment & Plan     More likely multifactorial from decompensated diastolic CHF, dependent edema & LLE immobility s/p ORIF left tibia fracture    In ED,  venous LLE no DVT       Acute on chronic diastolic CHF (congestive heart failure) ([facility] ) Assessment & Plan     In ED, she received NS 1 L  First trop -ve  BNP = 5994  TTE on 12/13/2017: EF 65 %  Grade 2 diastolic dysfunction  On tele  Trial Lasix 20mg iv x 1  Enhance daily wt, strict I/O  Monitor lytes, renal fxn am  Risk stratification  TTE ordered  Cardiology consult am      History of MI (myocardial infarction)   Assessment & Plan     Continue Plavix, statin      History of TIA (transient ischemic attack)   Assessment & Plan     Continue Plavix      History of gastric ulcer   Assessment & Plan     Continue home PPI      GERD (gastroesophageal reflux disease)   Assessment & Plan     Cont home PPI      Anxiety   Assessment & Plan     Cont home Xanax prn, Cymbalta, Neurontin      Hyperlipidemia   Assessment & Plan     Cont home Lipitor, gemfibrozil      Hypertension   Assessment & Plan     Cont home metoprolol  Hold losartan for now        DVT Prophylaxis: on Heparin sc, early ambulation  Code Status: Full Code  Disposition: anticipate d/c home once clinically improved  Plan d/w pt and ?  at bedside  Anticipated Length of Stay:  Patient will be admitted on an Inpatient basis with an anticipated length of stay of  > 2 midnights         Admission Orders:  TELE MON  BMP MG  ECHO  DAILY WEIGHT I/O  CONSULT CARDIOLOGY  Scheduled Meds:   Current Facility-Administered Medications:  acetaminophen 650 mg Oral Q6H PRN NICKY Sheth   ALPRAZolam 0 5 mg Oral TID PRN Polly Paz MD   atorvastatin 80 mg Oral Daily With Yfn Smith MD   clopidogrel 75 mg Oral Daily Polly Paz MD   DULoxetine 60 mg Oral HS Polly Paz MD   gabapentin 300 mg Oral TID Polly Paz MD   gemfibrozil 600 mg Oral BID AC Polly Paz MD   heparin (porcine) 5,000 Units Subcutaneous [facility] Polly Paz MD   HYDROcodone-acetaminophen 1 tablet Oral BID PRN NICKY Joseph   magnesium sulfate 2 g Intravenous Once NICKY Dunbar   metoprolol tartrate 100 mg Oral Q12H [address] Polly Paz MD   pantoprazole 40 mg Oral Daily Kimberli Bee MD     Continuous Infusions:    PRN Meds:   acetaminophen    ALPRAZolam    HYDROcodone-acetaminophen

## 2018-06-05 NOTE — CONSULTS
Consultation - Cardiology   Melissa Sweeney 65 y o  female MRN: [id number]  Unit/Bed#: 4 [place] [address] Encounter: [id number]  06/05/18  11:30 AM          Physician Requesting Consult: Rylee Pedro MD  Reason for Consult / Principal Problem: CHF      Assessment:  1  Acute on chronic diastolic congestive heart failure -likely precipitated by NSAID and salt use  2  Hypertension  3  PVD with previous TIA/CVA  4  Recent tibial fracture with repair  5  Dyslipidemia  6  CAD     Plan:  - BP control with goal SBP < 130 mmHg - agree with metoprolol   - Restart losartan  - IV Lasix  - Avoid NSAIDS  - Monitor I+Os  - Daily weights  - Continue Plavix  - Atorvastatin 80 mg daily  - PT/ambulation  - Discussed low salt diet  History of Present Illness   HPI: Melissa Sweeney is a 65y o  year old female who presents with lower extremity edema and dyspnea  Symptoms have been present for approximately 1 week  Dyspnea worsened with exertion  Associated with dyspnea is orthopnea and PND  She denies any chest pain  CT scan of chest showed no PE, but there were findings consistent with acute CHF  NT-pBNP was elevated to 6,000 pg/mL  Doppler Ultrasound did not show DVT  Recently underwent ORIF of left tibia after a fracture in 1/2018  Has been using Naproxen 500 mg bid for the past month and has been eating a large amount of processed food/salt  She has a history of TIA for which she is on Plavix therapy (stopped 1 month ago due to bruising), previous MI with angioplasty in 1882, chronic diastolic CHF with most recent echocardiogram done in December 2017 while admitted for TIA  Echocardiogram showed Ef of 65% with grade 2 diastolic dysfunction and elevated filling pressures  Mild valve disease  Review of Systems:    Review of Systems   Constitutional: Negative for chills, fatigue and fever  HENT: Negative for congestion, nosebleeds and postnasal drip      Respiratory: Negative for cough, chest tightness and shortness of breath  Cardiovascular: Negative for chest pain, palpitations and leg swelling  Gastrointestinal: Negative for abdominal distention, abdominal pain, diarrhea, nausea and vomiting  Endocrine: Negative for polydipsia, polyphagia and polyuria  Musculoskeletal: Positive for arthralgias and gait problem  Negative for myalgias  Skin: Negative for color change, pallor and rash  Allergic/Immunologic: Negative for environmental allergies, food allergies and immunocompromised state  Neurological: Negative for dizziness, seizures, syncope and light-headedness  Hematological: Negative for adenopathy  Bruises/bleeds easily  Psychiatric/Behavioral: Negative for dysphoric mood  The patient is not nervous/anxious          Historical Information   Past Medical History:   Diagnosis Date    Alopecia     Back injury     Bell's palsy     Chronic pain     back    Closed left arm fracture     Fibromyalgia     Fibromyalgia, primary     GERD (gastroesophageal reflux disease)     Hyperlipidemia     Hypertension     Lyme disease     [facility]         [facility]     Cardiac catheterization 2 years ago showed 30% blockage in 1 of the blood vessels    Stroke ([facility] )     TIA (transient ischemic attack)     2017     Past Surgical History:   Procedure Laterality Date     SECTION      CHOLECYSTECTOMY      CORONARY ANGIOPLASTY          ORIF TIBIA FRACTURE Left 2018    Procedure: OPEN REDUCTION W/ INTERNAL FIXATION (ORIF) TIBIA FRACTURE;  Surgeon: Jose C Bruno MD;  Location: [facility];  Service: Orthopedics     History   Alcohol Use No     Comment: as per patient; former social drinker      History   Drug Use No     History   Smoking Status    Former Smoker    Packs/day: 0 50    Years: 40 00    Types: Cigarettes   Smokeless Tobacco    Never Used     Comment: Counseled, wants to quit       Family History:   Family History   Problem Relation Age of Onset    Heart attack Mother     Heart attack Father        Meds/Allergies   current meds:   Current Facility-Administered Medications   Medication Dose Route Frequency    acetaminophen (TYLENOL) tablet 650 mg  650 mg Oral Q6H PRN    ALPRAZolam (XANAX) tablet 0 5 mg  0 5 mg Oral TID PRN    atorvastatin (LIPITOR) tablet 80 mg  80 mg Oral Daily With Dinner    clopidogrel (PLAVIX) tablet 75 mg  75 mg Oral Daily    DULoxetine (CYMBALTA) delayed release capsule 60 mg  60 mg Oral HS    gabapentin (NEURONTIN) capsule 300 mg  300 mg Oral TID    gemfibrozil (LOPID) tablet 600 mg  600 mg Oral BID AC    heparin (porcine) subcutaneous injection 5,000 Units  5,000 Units Subcutaneous Q8H [address]    HYDROcodone-acetaminophen (NORCO) 5-325 mg per tablet 1 tablet  1 tablet Oral BID PRN    magnesium sulfate 2 g/50 mL IVPB (premix) 2 g  2 g Intravenous Once    metoprolol tartrate (LOPRESSOR) tablet 100 mg  100 mg Oral Q12H KIKI    pantoprazole (PROTONIX) EC tablet 40 mg  40 mg Oral Daily     Allergies   Allergen Reactions    Hydromorphone Shortness Of Breath       Objective   Vitals: Blood pressure 138/76, pulse 63, temperature 98 4 °F (36 9 °C), temperature source Oral, resp  rate 20, height 5' 5" (1 651 m), weight 81 2 kg (179 lb 0 2 oz), last menstrual period 09/04/2009, SpO2 94 %, not currently breastfeeding , Body mass index is 29 79 kg/m²  Physical Exam   Constitutional: She appears healthy  No distress  HENT:   Nose: Nose normal    Mouth/Throat: Oropharynx is clear  Eyes: Conjunctivae are normal  Pupils are equal, round, and reactive to light  Neck: Neck supple  No JVD present  Cardiovascular: Normal rate and regular rhythm  Exam reveals no distant heart sounds and no friction rub  No murmur heard  Pulmonary/Chest: Effort normal and breath sounds normal  She has no wheezes  She has no rales  Abdominal: Soft  She exhibits no distension  There is no tenderness     Musculoskeletal: She exhibits edema  Neurological: She is alert and oriented to person, place, and time  Skin: Skin is warm and dry  No rash noted         Lab Results:     Troponins:   Results from last 7 days  Lab Units 18  1550   TROPONIN I ng/mL <0 02       CBC with diff:   Results from last 7 days  Lab Units 18  1550   WBC Thousand/uL 6 36   HEMOGLOBIN g/dL 11 6   HEMATOCRIT % 35 1   MCV fL 94   PLATELETS Thousands/uL 343   MCH pg 31 1   MCHC g/dL 33 0   RDW % 14 4   MPV fL 10 0       CMP:   Results from last 7 days  Lab Units 18  0558 18  1550   SODIUM mmol/L 141 142   POTASSIUM mmol/L 3 1* 5 1   CHLORIDE mmol/L 103 106   CO2 mmol/L 26 28   ANION GAP mmol/L 12 8   BUN mg/dL 15 13   CREATININE mg/dL 1 19 1 00   GLUCOSE RANDOM mg/dL 92 89   CALCIUM mg/dL 8 1* 8 6   EGFR ml/min/1 73sq m 51 63       Magnesium:   Results from last 7 days  Lab Units 18  0558   MAGNESIUM mg/dL 1 1*       Coags:   Results from last 7 days  Lab Units 18  1550   PTT seconds 19*   INR  1 06       Lipid Profile:   Results from last 7 days  Lab Units 18  0558   CHOLESTEROL mg/dL 205*   TRIGLYCERIDES mg/dL 165*   HDL mg/dL 44   LDL CALC mg/dL 128*         Cardiac testing:   Results for orders placed during the hospital encounter of 17   Echo complete with contrast if indicated    [address]  [address] [place] [address]  [phone]    Transthoracic Echocardiogram  2D, M-mode, Doppler, and Color Doppler    Study date:  13-Dec-2017    Patient: Margretta Apgar  MR number: [id number]  Account number: [de-identified]  : 1960  Age: 65 years  Gender: Female  Status: Routine  Location: Bedside  Height: 65 in  Weight: 144 8 lb  BP: 136/ 64 mmHg    Indications: weakness    Diagnoses: I11 9 - Hypertensive heart disease without heart failure    Sonographer:  MASON Alvarez  Primary Physician:  Vero James DO  Group:  [facility]  Interpreting Physician:  Celi Calloway Cameron Truong MD    SUMMARY    LEFT VENTRICLE:  Systolic function was vigorous  Ejection fraction was estimated in the range of 60 % to 65 % to be 65 %  Intracardiac gradient of 12 mm noted without valsulva  There were no regional wall motion abnormalities  Wall thickness was moderately increased  There was moderate concentric hypertrophy  Features were consistent with a pseudonormal left ventricular filling pattern, with concomitant abnormal relaxation and increased filling pressure (grade 2 diastolic dysfunction)  Doppler parameters were consistent with high ventricular filling pressure  LEFT ATRIUM:  The atrium was moderately dilated  RIGHT ATRIUM:  The atrium was mildly dilated  MITRAL VALVE:  There was mild annular calcification  Valve structure was normal   There was mild regurgitation  TRICUSPID VALVE:  There was mild regurgitation  HISTORY: PRIOR HISTORY: Fibromyalgia, HTN, Hyperlipidemia, Slurred speech    PROCEDURE: The procedure was performed at the bedside  This was a routine study  The transthoracic approach was used  The study included complete 2D imaging, M-mode, complete spectral Doppler, and color Doppler  The heart rate was 79 bpm,  at the start of the study  Images were obtained from the parasternal, apical, subcostal, and suprasternal notch acoustic windows  Image quality was adequate  LEFT VENTRICLE: Size was normal  Systolic function was vigorous  Ejection fraction was estimated in the range of 60 % to 65 % to be 65 %  Intracardiac gradient of 12 mm noted without valsulva There were no regional wall motion  abnormalities  Wall thickness was moderately increased  There was moderate concentric hypertrophy  DOPPLER: Features were consistent with a pseudonormal left ventricular filling pattern, with concomitant abnormal relaxation and increased  filling pressure (grade 2 diastolic dysfunction)  Doppler parameters were consistent with high ventricular filling pressure      RIGHT VENTRICLE: The size was normal  Systolic function was normal     LEFT ATRIUM: The atrium was moderately dilated  RIGHT ATRIUM: The atrium was mildly dilated  MITRAL VALVE: There was mild annular calcification  Valve structure was normal  There was normal leaflet separation  DOPPLER: The transmitral velocity was within the normal range  There was no evidence for stenosis  There was mild  regurgitation  AORTIC VALVE: The valve was trileaflet  Leaflets exhibited normal thickness and normal cuspal separation  DOPPLER: There was no evidence for stenosis  There was no regurgitation  TRICUSPID VALVE: DOPPLER: There was mild regurgitation  Estimated peak PA pressure was 35 to 40 mmHg  PULMONIC VALVE: DOPPLER: There was no significant regurgitation  PERICARDIUM: There was no thickening or calcification  There was no pericardial effusion  AORTA: The root exhibited normal size  SYSTEMIC VEINS: IVC: The inferior vena cava was normal in size  Respirophasic changes were normal     SYSTEM MEASUREMENT TABLES    2D mode  AoR Diam 2D: 2 9 cm  LA Diam (2D): 4 7 cm  LA/Ao (2D): 1 62  FS (2D Teich): 33 3 %  IVSd (2D): 1 83 cm  LVDEV: 72 1 cm³  LVESV: 27 cm³  LVIDd(2D): 4 05 cm  LVISd (2D): 2 7 cm  LVOT Area 2D: 3 14 cm squared  LVPWd (2D): 1 61 cm  SV (Teich): 45 1 cm³    Apical four chamber  LVEF A4C: 55 %    Unspecified Scan Mode  STEFFI Cont Eq (Peak Candido): 2 94 cm squared  LVOT Diam : 2 cm  LVOT Vmax: 1650 mm/s  LVOT Vmax; Mean: 1650 mm/s  Peak Grad ; Mean: 11 mm[Hg]  MV Peak A Candido: 933 mm/s  MV Peak E Candido   Mean: 1170 mm/s  MVA (PHT): 4 49 cm squared  PHT: 49 ms  Max P mm[Hg]  V Max: 1890 mm/s  Vmax: 2480 mm/s  RA Area: 20 8 cm squared  RA Volume: 57 9 cm³  TAPSE: 2 cm    [employer] Accredited Echocardiography Laboratory    Prepared and electronically signed by    Cadence Cody MD  Signed 13-Dec-2017 14:45:46           EKG: Personally reviewed: NSR with nonspecific T wave abnormalities    Imaging: I have personally reviewed pertinent films in PACS

## 2018-06-05 NOTE — PLAN OF CARE
CARDIOVASCULAR - ADULT     Maintains optimal cardiac output and hemodynamic stability Progressing     Absence of cardiac dysrhythmias or at baseline rhythm Progressing        DISCHARGE PLANNING     Discharge to home or other facility with appropriate resources Progressing        DISCHARGE PLANNING - CARE MANAGEMENT     Discharge to post-acute care or home with appropriate resources Progressing        INFECTION - ADULT     Absence or prevention of progression during hospitalization Progressing        Knowledge Deficit     Patient/family/caregiver demonstrates understanding of disease process, treatment plan, medications, and discharge instructions Progressing        PAIN - ADULT     Verbalizes/displays adequate comfort level or baseline comfort level Progressing        Potential for Falls     Patient will remain free of falls Progressing        RESPIRATORY - ADULT     Achieves optimal ventilation and oxygenation Progressing        SAFETY ADULT     Maintain or return to baseline ADL function Progressing     Maintain or return mobility status to optimal level Progressing

## 2018-06-05 NOTE — CASE MANAGEMENT
Continued Stay Review    Date: 6/5/18    Vital Signs: /76 (BP Location: Left arm)   Pulse 63   Temp 98 4 °F (36 9 °C) (Oral)   Resp 20   Ht 5' 5" (1 651 m)   Wt 81 2 kg (179 lb 0 2 oz)   LMP 09/04/2009 (Within Days) Comment: per patient   SpO2 94%   Breastfeeding? No   BMI 29 79 kg/m²       TELE MON  IV LASIX 20MG  IV TORADOL  IV MAG SULFATE 2GM  BMP MG  Medications:   Scheduled Meds:   Current Facility-Administered Medications:  acetaminophen 650 mg Oral Q6H PRN NICKY Butler   ALPRAZolam 0 5 mg Oral TID PRN Rolando Hutchison MD   atorvastatin 80 mg Oral Daily With Zora Selby MD   clopidogrel 75 mg Oral Daily Rolando Hutchison MD   DULoxetine 60 mg Oral HS Rolando Hutchison MD   gabapentin 300 mg Oral TID Rolando Hutchison MD   gemfibrozil 600 mg Oral BID AC Rolando Hutchison MD   heparin (porcine) 5,000 Units Subcutaneous [facility] Rolando Hutchison MD   HYDROcodone-acetaminophen 1 tablet Oral BID PRN NICKY Smith   metoprolol tartrate 100 mg Oral Q12H [address] Rolando Hutchison MD   pantoprazole 40 mg Oral Daily Rolando Hutchison MD     Continuous Infusions:    PRN Meds:   acetaminophen    ALPRAZolam    HYDROcodone-acetaminophen    Abnormal Labs/Diagnostic Results: K 3 1 CA 8 1 PHOS 4 9 MG 1 1 CHOLESTEROL 205 TRIG 165     Age/Sex: 65 y o  female     Attending 2:49 am   Called to room by RN, patient short of breath  Patient reports laying in bed and feeling short of breath  She walked to the bathroom and felt short of breath  O2 sat 91% on room air, mildly tachypeic  Patient felt better with O2 via nasal cannula  Pedal edema noted  Additional 20mg of lasix given - elevated BNP, PND, ACUNA, mild hypoxia  For cardiology consult in the AM     * Left leg swelling   Assessment & Plan     · More likely multifactorial from decompensated diastolic CHF, dependent edema & LLE immobility s/p ORIF left tibia fracture    · US venous LLE no DVT       Acute on chronic diastolic CHF (congestive heart failure) ([facility] [address] ) Assessment & Plan     · In ED, she received NS 1 L  · BNP = 5994  Troponin <0 02  TSH 0 713  A1C 5 6  Lipid profile, cholesterol 205, triglyceride 165, HDL 44, and   · 12/13/2017 ECHO: EF 65 %  Grade 2 diastolic dysfunction  Repeat ECHO  · Continue telemetry  · 6/4/2018 at admission received Lasix 20mg IV x1  During the night became SOB after ambulating to BR given Lasix 20mg IV x1  · Daily wt and strict I/O  24  Hour weight loss of 5 9 lbs and -345cc within a 24 hours  · Monitor lytes, renal fxn am  · Cardiology consulted      SOB (shortness of breath)   Assessment & Plan     · More likely multifactorial from decompensated diastolic CHF, dependent edema & LLE immobility s/p ORIF left tibia fracture  · CTA chest, No pulmonary embolism  Background emphysema with superimposed pulmonary edema   Bilateral pleural effusions      Hypokalemia   Assessment & Plan     · Current potassium 3 1  · Ordered KCl 40 mEq p o  x1      Hypomagnesemia   Assessment & Plan     · Current magnesium level 1 1mg/dL  · Ordered magnesium sulfate 2 g IV x1 dose      History of MI (myocardial infarction)   Assessment & Plan     · Continue Plavix, metoprolol and statin      History of TIA (transient ischemic attack)   Assessment & Plan     · Continue Plavix and statin      History of gastric ulcer   Assessment & Plan     · Continue home PPI      GERD (gastroesophageal reflux disease)   Assessment & Plan     · Continue PPI      Anxiety   Assessment & Plan     · Continue Xanax prn, Cymbalta, and Neurontin      Hyperlipidemia   Assessment & Plan     · Continue Lipitor and gemfibrozil      Hypertension   Assessment & Plan     · Continue metoprolol  · Hold losartan for now, continue to monitor BPs         VTE Pharmacologic Prophylaxis:   Pharmacologic: Heparin  Mechanical VTE Prophylaxis in Place: Yes     Cardiology consult  Assessment:  1  Acute on chronic diastolic congestive heart failure -likely precipitated by NSAID and salt use    2  Hypertension  3  PVD with previous TIA/CVA  4  Recent tibial fracture with repair  5  Dyslipidemia  6  CAD   Plan:  - BP control with goal SBP < 130 mmHg - agree with metoprolol   - Restart losartan  - IV Lasix  - Avoid NSAIDS  - Monitor I+Os  - Daily weights  - Continue Plavix  - Atorvastatin 80 mg daily  - PT/ambulation  - Discussed low salt diet

## 2018-06-05 NOTE — ASSESSMENT & PLAN NOTE
· More likely multifactorial from decompensated diastolic CHF, dependent edema & LLE immobility s/p ORIF left tibia fracture  · CTA chest, No pulmonary embolism  Background emphysema with superimposed pulmonary edema    Bilateral pleural effusions

## 2018-06-06 VITALS
BODY MASS INDEX: 29.83 KG/M2 | OXYGEN SATURATION: 94 % | WEIGHT: 179.01 LBS | HEART RATE: 70 BPM | RESPIRATION RATE: 20 BRPM | TEMPERATURE: 97.8 F | SYSTOLIC BLOOD PRESSURE: 158 MMHG | DIASTOLIC BLOOD PRESSURE: 77 MMHG | HEIGHT: 65 IN

## 2018-06-06 LAB
ANION GAP SERPL CALCULATED.3IONS-SCNC: 9 MMOL/L (ref 4–13)
BUN SERPL-MCNC: 17 MG/DL (ref 5–25)
CALCIUM SERPL-MCNC: 8.2 MG/DL (ref 8.3–10.1)
CHLORIDE SERPL-SCNC: 106 MMOL/L (ref 100–108)
CO2 SERPL-SCNC: 25 MMOL/L (ref 21–32)
CREAT SERPL-MCNC: 0.92 MG/DL (ref 0.6–1.3)
GFR SERPL CREATININE-BSD FRML MDRD: 70 ML/MIN/1.73SQ M
GLUCOSE SERPL-MCNC: 111 MG/DL (ref 65–140)
MAGNESIUM SERPL-MCNC: 2 MG/DL (ref 1.6–2.6)
MRSA NOSE QL CULT: NORMAL
POTASSIUM SERPL-SCNC: 3.3 MMOL/L (ref 3.5–5.3)
SODIUM SERPL-SCNC: 140 MMOL/L (ref 136–145)

## 2018-06-06 PROCEDURE — 99232 SBSQ HOSP IP/OBS MODERATE 35: CPT | Performed by: INTERNAL MEDICINE

## 2018-06-06 PROCEDURE — 99239 HOSP IP/OBS DSCHRG MGMT >30: CPT | Performed by: NURSE PRACTITIONER

## 2018-06-06 PROCEDURE — 94760 N-INVAS EAR/PLS OXIMETRY 1: CPT

## 2018-06-06 PROCEDURE — 83735 ASSAY OF MAGNESIUM: CPT | Performed by: NURSE PRACTITIONER

## 2018-06-06 PROCEDURE — 80048 BASIC METABOLIC PNL TOTAL CA: CPT | Performed by: NURSE PRACTITIONER

## 2018-06-06 RX ORDER — HYDROCODONE BITARTRATE AND ACETAMINOPHEN 5; 325 MG/1; MG/1
1 TABLET ORAL EVERY 8 HOURS PRN
Qty: 7 TABLET | Refills: 0 | Status: SHIPPED | OUTPATIENT
Start: 2018-06-06 | End: 2018-08-05 | Stop reason: ALTCHOICE

## 2018-06-06 RX ORDER — POTASSIUM CHLORIDE 20 MEQ/1
40 TABLET, EXTENDED RELEASE ORAL ONCE
Status: COMPLETED | OUTPATIENT
Start: 2018-06-06 | End: 2018-06-06

## 2018-06-06 RX ORDER — LOSARTAN POTASSIUM 25 MG/1
25 TABLET ORAL DAILY
Status: DISCONTINUED | OUTPATIENT
Start: 2018-06-06 | End: 2018-06-06 | Stop reason: HOSPADM

## 2018-06-06 RX ORDER — METOPROLOL TARTRATE 100 MG/1
100 TABLET ORAL EVERY 12 HOURS SCHEDULED
Qty: 60 TABLET | Refills: 0 | Status: SHIPPED | OUTPATIENT
Start: 2018-06-06 | End: 2019-08-21

## 2018-06-06 RX ORDER — FUROSEMIDE 20 MG/1
20 TABLET ORAL DAILY
Qty: 30 TABLET | Refills: 0 | Status: ON HOLD | OUTPATIENT
Start: 2018-06-06 | End: 2018-07-27

## 2018-06-06 RX ADMIN — PANTOPRAZOLE SODIUM 40 MG: 40 TABLET, DELAYED RELEASE ORAL at 09:33

## 2018-06-06 RX ADMIN — HYDROCODONE BITARTRATE AND ACETAMINOPHEN 1 TABLET: 5; 325 TABLET ORAL at 07:52

## 2018-06-06 RX ADMIN — LOSARTAN POTASSIUM 25 MG: 25 TABLET ORAL at 12:20

## 2018-06-06 RX ADMIN — POTASSIUM CHLORIDE 40 MEQ: 1500 TABLET, EXTENDED RELEASE ORAL at 12:57

## 2018-06-06 RX ADMIN — HEPARIN SODIUM 5000 UNITS: 5000 INJECTION, SOLUTION INTRAVENOUS; SUBCUTANEOUS at 05:44

## 2018-06-06 RX ADMIN — GABAPENTIN 300 MG: 300 CAPSULE ORAL at 09:32

## 2018-06-06 RX ADMIN — METOPROLOL TARTRATE 100 MG: 100 TABLET ORAL at 09:32

## 2018-06-06 RX ADMIN — ALPRAZOLAM 0.5 MG: 0.5 TABLET ORAL at 02:02

## 2018-06-06 RX ADMIN — GEMFIBROZIL 600 MG: 600 TABLET ORAL at 06:28

## 2018-06-06 RX ADMIN — ALPRAZOLAM 0.5 MG: 0.5 TABLET ORAL at 10:12

## 2018-06-06 RX ADMIN — HEPARIN SODIUM 5000 UNITS: 5000 INJECTION, SOLUTION INTRAVENOUS; SUBCUTANEOUS at 14:36

## 2018-06-06 RX ADMIN — CLOPIDOGREL BISULFATE 75 MG: 75 TABLET ORAL at 09:33

## 2018-06-06 NOTE — DISCHARGE INSTRUCTIONS
· Please weigh yourself daily  If you gain 3 pounds within 2 days and 5 pounds within 3 days, please call your PCP

## 2018-06-06 NOTE — ASSESSMENT & PLAN NOTE
· Current potassium 3 3  · Ordered KCl 40 mEq p o  x1  · Repeat BMP on Friday results to PCP and cardiology

## 2018-06-06 NOTE — ASSESSMENT & PLAN NOTE
· Resolved  · More likely multifactorial from decompensated diastolic CHF, dependent edema & LLE immobility s/p ORIF left tibia fracture  · CTA chest, No pulmonary embolism  Background emphysema with superimposed pulmonary edema    Bilateral pleural effusions

## 2018-06-06 NOTE — PROGRESS NOTES
Cardiology CHF Progress Note    Assessment/Plan     1  Acute on chronic diastolic congestive Heart Failure -  Improving  2  Hypertension  3  PVD with previous TIA/CVA  4  Recent tibial fracture with repair  5  Dyslipidemia  6  CAD     - may switch to PO lasix - 20 mg daily upon discharge  - continue plavix  - Tylenol for pain - discussed upper limit of 3 grams/day  - continue metoprolol and resume losartan  - continue atorvastatin  - DC telemetry         LOS: 2 days     Subjective     Interval History: has no complaint of shortness of breath  LE edema improved  Had some dizziness this AM after using the toilet  Medication side effects: none    Objective     Vital signs in last 24 hours:   Temp:  [97 5 °F (36 4 °C)-98 3 °F (36 8 °C)] 97 8 °F (36 6 °C)  HR:  [63-75] 63  Resp:  [16-18] 18  BP: (120-154)/(57-79) 154/79    Intake/Output last 3 shifts:  I/O last 3 completed shifts: In: 1400 [P O :1370; I V :30]  Out: 2200 [Urine:2200]  Intake/Output this shift:  I/O this shift:  In: -   Out: 600 [Urine:600]    /79 (BP Location: Right arm)   Pulse 63   Temp 97 8 °F (36 6 °C) (Oral)   Resp 18   Ht 5' 5" (1 651 m)   Wt 81 2 kg (179 lb 0 2 oz)   LMP 09/04/2009 (Within Days) Comment: per patient   SpO2 92%   Breastfeeding? No   BMI 29 79 kg/m²   Physical Exam   Constitutional: She appears healthy  No distress  HENT:   Nose: Nose normal    Mouth/Throat: Oropharynx is clear  Eyes: Conjunctivae are normal  Pupils are equal, round, and reactive to light  Neck: Neck supple  No JVD present  Cardiovascular: Normal rate and regular rhythm  Exam reveals no distant heart sounds and no friction rub  No murmur heard  Pulmonary/Chest: Effort normal and breath sounds normal  She has no wheezes  She has no rales  Abdominal: Soft  She exhibits no distension  There is no tenderness  Musculoskeletal: She exhibits edema (trace) and tenderness     Neurological: She is alert and oriented to person, place, and time    Skin: Skin is warm and dry  No rash noted  Scar left leg consistent with previous surgery     Cardiographics  ECG/TELE: normal sinus rhythm     Echocardiogram: grade 2 diastolic dysfunction with normal LV systolic function in December

## 2018-06-06 NOTE — ASSESSMENT & PLAN NOTE
· Much improved  · More likely multifactorial from decompensated diastolic CHF, dependent edema & LLE immobility s/p ORIF left tibia fracture    · US venous LLE no DVT

## 2018-06-06 NOTE — ASSESSMENT & PLAN NOTE
· Resolved  She will be discharged home on Lasix 20mg PO daily  · In ED, she received NS 1 L  · BNP = 5994  Troponin <0 02  TSH 0 713  A1C 5 6  Lipid profile, cholesterol 205, triglyceride 165, HDL 44, and   · 12/13/2017 ECHO: EF 65 %  Grade 2 diastolic dysfunction  · 6/4/2018 at admission received Lasix 20mg IV x1   During the night became SOB after ambulating to BR given Lasix 20mg IV x1    · Daily wt and strict I/O  24  Hour weight loss of 5 9 lbs and -725cc within a 24 hours  · Monitor lytes, renal fxn am  · Cardiology following, appreciate recommendations

## 2018-06-08 ENCOUNTER — OFFICE VISIT (OUTPATIENT)
Dept: OBGYN CLINIC | Facility: CLINIC | Age: 58
End: 2018-06-08
Payer: COMMERCIAL

## 2018-06-08 ENCOUNTER — APPOINTMENT (OUTPATIENT)
Dept: RADIOLOGY | Facility: CLINIC | Age: 58
End: 2018-06-08
Payer: COMMERCIAL

## 2018-06-08 VITALS
SYSTOLIC BLOOD PRESSURE: 111 MMHG | DIASTOLIC BLOOD PRESSURE: 76 MMHG | HEART RATE: 67 BPM | HEIGHT: 65 IN | WEIGHT: 179.01 LBS | BODY MASS INDEX: 29.83 KG/M2

## 2018-06-08 DIAGNOSIS — S82.892D CLOSED FRACTURE OF LEFT ANKLE WITH ROUTINE HEALING, SUBSEQUENT ENCOUNTER: ICD-10-CM

## 2018-06-08 DIAGNOSIS — S82.892D CLOSED FRACTURE OF LEFT ANKLE WITH ROUTINE HEALING, SUBSEQUENT ENCOUNTER: Primary | ICD-10-CM

## 2018-06-08 PROCEDURE — 73590 X-RAY EXAM OF LOWER LEG: CPT

## 2018-06-08 PROCEDURE — 99213 OFFICE O/P EST LOW 20 MIN: CPT | Performed by: ORTHOPAEDIC SURGERY

## 2018-06-08 NOTE — PROGRESS NOTES
Assessment/Plan:  1  Closed fracture of left ankle with routine healing, subsequent encounter  CANCELED: XR tibia fibula 2 vw right       Scribe Attestation    I,:   Mallorie Jernigan am acting as a scribe while in the presence of the attending physician :        I,:   Franci Sarah MD personally performed the services described in this documentation    as scribed in my presence :              Ashley Buck is doing great with her left ankle fracture  She can discontinue the use of her cam walker boot and transition into a support sneaker  She will follow up in our office as needed  Subjective:   Jessika Kim is a 65 y o  female who presents to the office for follow up left ankle fracture  She is about 5 months out from her surgical fixation and is doing very well  Over the weekend she did call me saying she was experiencing some swelling in her lower leg and went to her local hospital  They ruled out a blood clot and instructed her to follow up in my office today  She states she is feeling good and her swelling has gone down  She has no pain at today's appointment and has been using the cam walker boot since she was last seen  About 2 months ago  Review of Systems   Constitutional: Negative for chills, fever and unexpected weight change  HENT: Negative for hearing loss, nosebleeds and sore throat  Eyes: Negative for pain, redness and visual disturbance  Respiratory: Negative for cough, shortness of breath and wheezing  Cardiovascular: Negative for chest pain, palpitations and leg swelling  Gastrointestinal: Negative for abdominal pain, nausea and vomiting  Endocrine: Negative for polydipsia and polyuria  Genitourinary: Negative for dysuria and hematuria  Musculoskeletal:        See HPI   Skin: Negative for rash and wound  Neurological: Negative for dizziness, numbness and headaches  Psychiatric/Behavioral: Negative for decreased concentration and suicidal ideas   The patient is not nervous/anxious  Past Medical History:   Diagnosis Date    Alopecia     Back injury     Bell's palsy     Chronic pain     back    Closed left arm fracture     Fibromyalgia     Fibromyalgia, primary     GERD (gastroesophageal reflux disease)     Hyperlipidemia     Hypertension     Lyme disease     [facility] [place] [facility]         Myocardial infarction [facility])     Cardiac catheterization 2 years ago showed 30% blockage in 1 of the blood vessels    Stroke ([facility] [place] [facility]     TIA (transient ischemic attack)     2017       Past Surgical History:   Procedure Laterality Date     SECTION      CHOLECYSTECTOMY      CORONARY ANGIOPLASTY          ORIF TIBIA FRACTURE Left 2018    Procedure: OPEN REDUCTION W/ INTERNAL FIXATION (ORIF) TIBIA FRACTURE;  Surgeon: Sincere Joshua MD;  Location: [facility];  Service: Orthopedics       Family History   Problem Relation Age of Onset    Heart attack Mother     Heart attack Father        Social History     Occupational History    Not on file       Social History Main Topics    Smoking status: Former Smoker     Packs/day: 0 50     Years: 40 00     Types: Cigarettes    Smokeless tobacco: Never Used      Comment: Counseled, wants to quit    Alcohol use No      Comment: as per patient; former social drinker     Drug use: No    Sexual activity: Yes     Partners: Male     Birth control/ protection: Post-menopausal         Current Outpatient Prescriptions:     ALPRAZolam (XANAX) 0 5 mg tablet, Take 1 tablet by mouth 3 (three) times a day as needed for anxiety for up to 3 days, Disp: 10 tablet, Rfl: 0    atorvastatin (LIPITOR) 80 mg tablet, Take 80 mg by mouth daily, Disp: , Rfl:     clopidogrel (PLAVIX) 75 mg tablet, Take 1 tablet by mouth daily (Patient taking differently: Take 75 mg by mouth daily Last dose  18 ), Disp: 30 tablet, Rfl: 0    DULoxetine HCl (CYMBALTA PO), Take 90 mg by mouth daily at bedtime  , Disp: , Rfl:    furosemide (LASIX) 20 mg tablet, Take 1 tablet (20 mg total) by mouth daily, Disp: 30 tablet, Rfl: 0    gabapentin (NEURONTIN) 600 MG tablet, Take 300 mg by mouth 3 (three) times a day, Disp: , Rfl:     gemfibrozil (LOPID) 600 mg tablet, Take 600 mg by mouth 2 (two) times a day before meals, Disp: , Rfl:     HYDROcodone-acetaminophen (NORCO) 5-325 mg per tablet, Take 1 tablet by mouth every 8 (eight) hours as needed for pain for up to 7 doses Max Daily Amount: 3 tablets, Disp: 7 tablet, Rfl: 0    losartan (COZAAR) 25 mg tablet, Take 50 mg by mouth daily  , Disp: , Rfl:     metoprolol tartrate (LOPRESSOR) 100 mg tablet, Take 1 tablet (100 mg total) by mouth every 12 (twelve) hours, Disp: 60 tablet, Rfl: 0    pantoprazole (PROTONIX) 40 mg tablet, Take 40 mg by mouth daily, Disp: , Rfl:     Allergies   Allergen Reactions    Hydromorphone Shortness Of Breath       Objective:  Vitals:    06/08/18 1438   BP: 111/76   Pulse: 67       Right Ankle Exam   Right ankle exam is normal       Left Ankle Exam   Swelling: none    Tenderness   The patient is experiencing no tenderness  Range of Motion   The patient has normal left ankle ROM  Muscle Strength   The patient has normal left ankle strength  Strength/Myotome Testing     Left Ankle/Foot   Normal strength      Physical Exam   Constitutional: She is oriented to person, place, and time  She appears well-developed and well-nourished  HENT:   Head: Normocephalic and atraumatic  Eyes: Conjunctivae are normal    Neck: Neck supple  Cardiovascular: Intact distal pulses  Pulmonary/Chest: Effort normal    Neurological: She is alert and oriented to person, place, and time  Skin: Skin is warm and dry  Psychiatric: She has a normal mood and affect  Her behavior is normal    Vitals reviewed  I have personally reviewed pertinent films in PACS and my interpretation is as follows:   Two view left tib/fib x-ray taken on 6/8/2018 shows good callus formation and excellent healing

## 2018-07-25 ENCOUNTER — APPOINTMENT (EMERGENCY)
Dept: RADIOLOGY | Facility: HOSPITAL | Age: 58
DRG: 127 | End: 2018-07-25
Payer: COMMERCIAL

## 2018-07-25 ENCOUNTER — HOSPITAL ENCOUNTER (INPATIENT)
Facility: HOSPITAL | Age: 58
LOS: 2 days | Discharge: HOME | DRG: 127 | End: 2018-07-27
Attending: EMERGENCY MEDICINE | Admitting: STUDENT IN AN ORGANIZED HEALTH CARE EDUCATION/TRAINING PROGRAM
Payer: COMMERCIAL

## 2018-07-25 DIAGNOSIS — R07.9 CHEST PAIN: ICD-10-CM

## 2018-07-25 DIAGNOSIS — I50.9 CONGESTIVE HEART FAILURE (CHF) (HCC): Primary | ICD-10-CM

## 2018-07-25 DIAGNOSIS — I50.33 ACUTE ON CHRONIC DIASTOLIC CHF (CONGESTIVE HEART FAILURE) (HCC): ICD-10-CM

## 2018-07-25 DIAGNOSIS — I50.9 ACUTE CHF (CONGESTIVE HEART FAILURE) (HCC): ICD-10-CM

## 2018-07-25 PROBLEM — I63.9 CVA (CEREBRAL VASCULAR ACCIDENT) (HCC): Status: ACTIVE | Noted: 2018-07-25

## 2018-07-25 LAB
ALBUMIN SERPL BCP-MCNC: 3.2 G/DL (ref 3.5–5)
ALP SERPL-CCNC: 79 U/L (ref 46–116)
ALT SERPL W P-5'-P-CCNC: 18 U/L (ref 12–78)
ANION GAP SERPL CALCULATED.3IONS-SCNC: 7 MMOL/L (ref 4–13)
APTT PPP: 25 SECONDS (ref 24–36)
AST SERPL W P-5'-P-CCNC: 18 U/L (ref 5–45)
BASOPHILS # BLD AUTO: 0.05 THOUSANDS/ΜL (ref 0–0.1)
BASOPHILS NFR BLD AUTO: 1 % (ref 0–1)
BILIRUB SERPL-MCNC: 0.2 MG/DL (ref 0.2–1)
BUN SERPL-MCNC: 20 MG/DL (ref 5–25)
CALCIUM SERPL-MCNC: 8.3 MG/DL (ref 8.3–10.1)
CHLORIDE SERPL-SCNC: 108 MMOL/L (ref 100–108)
CO2 SERPL-SCNC: 28 MMOL/L (ref 21–32)
CREAT SERPL-MCNC: 1.05 MG/DL (ref 0.6–1.3)
EOSINOPHIL # BLD AUTO: 0.21 THOUSAND/ΜL (ref 0–0.61)
EOSINOPHIL NFR BLD AUTO: 3 % (ref 0–6)
ERYTHROCYTE [DISTWIDTH] IN BLOOD BY AUTOMATED COUNT: 12.7 % (ref 11.6–15.1)
GFR SERPL CREATININE-BSD FRML MDRD: 60 ML/MIN/1.73SQ M
GLUCOSE SERPL-MCNC: 130 MG/DL (ref 65–140)
HCT VFR BLD AUTO: 35.8 % (ref 34.8–46.1)
HGB BLD-MCNC: 11.8 G/DL (ref 11.5–15.4)
INR PPP: 1.02 (ref 0.86–1.16)
LYMPHOCYTES # BLD AUTO: 3.77 THOUSANDS/ΜL (ref 0.6–4.47)
LYMPHOCYTES NFR BLD AUTO: 56 % (ref 14–44)
MCH RBC QN AUTO: 29.9 PG (ref 26.8–34.3)
MCHC RBC AUTO-ENTMCNC: 33 G/DL (ref 31.4–37.4)
MCV RBC AUTO: 91 FL (ref 82–98)
MONOCYTES # BLD AUTO: 0.47 THOUSAND/ΜL (ref 0.17–1.22)
MONOCYTES NFR BLD AUTO: 7 % (ref 4–12)
NEUTROPHILS # BLD AUTO: 2.19 THOUSANDS/ΜL (ref 1.85–7.62)
NEUTS SEG NFR BLD AUTO: 33 % (ref 43–75)
NT-PROBNP SERPL-MCNC: 1531 PG/ML
PLATELET # BLD AUTO: 337 THOUSANDS/UL (ref 149–390)
PMV BLD AUTO: 9.6 FL (ref 8.9–12.7)
POTASSIUM SERPL-SCNC: 3.8 MMOL/L (ref 3.5–5.3)
PROT SERPL-MCNC: 6.6 G/DL (ref 6.4–8.2)
PROTHROMBIN TIME: 10.7 SECONDS (ref 9.4–11.7)
RBC # BLD AUTO: 3.94 MILLION/UL (ref 3.81–5.12)
SODIUM SERPL-SCNC: 143 MMOL/L (ref 136–145)
TROPONIN I SERPL-MCNC: <0.02 NG/ML
WBC # BLD AUTO: 6.69 THOUSAND/UL (ref 4.31–10.16)

## 2018-07-25 PROCEDURE — 85730 THROMBOPLASTIN TIME PARTIAL: CPT | Performed by: EMERGENCY MEDICINE

## 2018-07-25 PROCEDURE — 84484 ASSAY OF TROPONIN QUANT: CPT | Performed by: EMERGENCY MEDICINE

## 2018-07-25 PROCEDURE — 93005 ELECTROCARDIOGRAM TRACING: CPT

## 2018-07-25 PROCEDURE — 36415 COLL VENOUS BLD VENIPUNCTURE: CPT | Performed by: EMERGENCY MEDICINE

## 2018-07-25 PROCEDURE — 83880 ASSAY OF NATRIURETIC PEPTIDE: CPT | Performed by: EMERGENCY MEDICINE

## 2018-07-25 PROCEDURE — 85610 PROTHROMBIN TIME: CPT | Performed by: EMERGENCY MEDICINE

## 2018-07-25 PROCEDURE — 85025 COMPLETE CBC W/AUTO DIFF WBC: CPT | Performed by: EMERGENCY MEDICINE

## 2018-07-25 PROCEDURE — 99220 PR INITIAL OBSERVATION CARE/DAY 70 MINUTES: CPT | Performed by: STUDENT IN AN ORGANIZED HEALTH CARE EDUCATION/TRAINING PROGRAM

## 2018-07-25 PROCEDURE — 80053 COMPREHEN METABOLIC PANEL: CPT | Performed by: EMERGENCY MEDICINE

## 2018-07-25 PROCEDURE — 71045 X-RAY EXAM CHEST 1 VIEW: CPT

## 2018-07-25 PROCEDURE — 99285 EMERGENCY DEPT VISIT HI MDM: CPT

## 2018-07-25 RX ORDER — FUROSEMIDE 10 MG/ML
20 INJECTION INTRAMUSCULAR; INTRAVENOUS ONCE
Status: COMPLETED | OUTPATIENT
Start: 2018-07-25 | End: 2018-07-25

## 2018-07-25 RX ADMIN — FUROSEMIDE 20 MG: 10 INJECTION, SOLUTION INTRAMUSCULAR; INTRAVENOUS at 23:16

## 2018-07-26 PROBLEM — G45.9 TIA (TRANSIENT ISCHEMIC ATTACK): Status: ACTIVE | Noted: 2018-07-25

## 2018-07-26 LAB
ANION GAP SERPL CALCULATED.3IONS-SCNC: 5 MMOL/L (ref 4–13)
ATRIAL RATE: 69 BPM
BASOPHILS # BLD AUTO: 0.08 THOUSANDS/ΜL (ref 0–0.1)
BASOPHILS NFR BLD AUTO: 1 % (ref 0–1)
BUN SERPL-MCNC: 23 MG/DL (ref 5–25)
CALCIUM SERPL-MCNC: 8.3 MG/DL (ref 8.3–10.1)
CHLORIDE SERPL-SCNC: 106 MMOL/L (ref 100–108)
CO2 SERPL-SCNC: 29 MMOL/L (ref 21–32)
CREAT SERPL-MCNC: 1.06 MG/DL (ref 0.6–1.3)
EOSINOPHIL # BLD AUTO: 0.18 THOUSAND/ΜL (ref 0–0.61)
EOSINOPHIL NFR BLD AUTO: 3 % (ref 0–6)
ERYTHROCYTE [DISTWIDTH] IN BLOOD BY AUTOMATED COUNT: 12.3 % (ref 11.6–15.1)
GFR SERPL CREATININE-BSD FRML MDRD: 59 ML/MIN/1.73SQ M
GLUCOSE SERPL-MCNC: 123 MG/DL (ref 65–140)
HCT VFR BLD AUTO: 36.5 % (ref 34.8–46.1)
HGB BLD-MCNC: 11.9 G/DL (ref 11.5–15.4)
IMM GRANULOCYTES # BLD AUTO: 0.02 THOUSAND/UL (ref 0–0.2)
IMM GRANULOCYTES NFR BLD AUTO: 0 % (ref 0–2)
LYMPHOCYTES # BLD AUTO: 3.51 THOUSANDS/ΜL (ref 0.6–4.47)
LYMPHOCYTES NFR BLD AUTO: 57 % (ref 14–44)
MAGNESIUM SERPL-MCNC: 1.5 MG/DL (ref 1.6–2.6)
MCH RBC QN AUTO: 29.6 PG (ref 26.8–34.3)
MCHC RBC AUTO-ENTMCNC: 32.6 G/DL (ref 31.4–37.4)
MCV RBC AUTO: 91 FL (ref 82–98)
MONOCYTES # BLD AUTO: 0.44 THOUSAND/ΜL (ref 0.17–1.22)
MONOCYTES NFR BLD AUTO: 7 % (ref 4–12)
NEUTROPHILS # BLD AUTO: 1.95 THOUSANDS/ΜL (ref 1.85–7.62)
NEUTS SEG NFR BLD AUTO: 32 % (ref 43–75)
NRBC BLD AUTO-RTO: 0 /100 WBCS
P AXIS: 21 DEGREES
PLATELET # BLD AUTO: 313 THOUSANDS/UL (ref 149–390)
PLATELET # BLD AUTO: 349 THOUSANDS/UL (ref 149–390)
PMV BLD AUTO: 10 FL (ref 8.9–12.7)
PMV BLD AUTO: 9.5 FL (ref 8.9–12.7)
POTASSIUM SERPL-SCNC: 2.9 MMOL/L (ref 3.5–5.3)
PR INTERVAL: 164 MS
QRS AXIS: -4 DEGREES
QRSD INTERVAL: 90 MS
QT INTERVAL: 506 MS
QTC INTERVAL: 542 MS
RBC # BLD AUTO: 4.02 MILLION/UL (ref 3.81–5.12)
SODIUM SERPL-SCNC: 140 MMOL/L (ref 136–145)
T WAVE AXIS: 97 DEGREES
TROPONIN I SERPL-MCNC: <0.02 NG/ML
TROPONIN I SERPL-MCNC: <0.02 NG/ML
VENTRICULAR RATE: 69 BPM
WBC # BLD AUTO: 6.18 THOUSAND/UL (ref 4.31–10.16)

## 2018-07-26 PROCEDURE — 80048 BASIC METABOLIC PNL TOTAL CA: CPT | Performed by: STUDENT IN AN ORGANIZED HEALTH CARE EDUCATION/TRAINING PROGRAM

## 2018-07-26 PROCEDURE — 99255 IP/OBS CONSLTJ NEW/EST HI 80: CPT | Performed by: INTERNAL MEDICINE

## 2018-07-26 PROCEDURE — 84484 ASSAY OF TROPONIN QUANT: CPT | Performed by: STUDENT IN AN ORGANIZED HEALTH CARE EDUCATION/TRAINING PROGRAM

## 2018-07-26 PROCEDURE — 85025 COMPLETE CBC W/AUTO DIFF WBC: CPT | Performed by: STUDENT IN AN ORGANIZED HEALTH CARE EDUCATION/TRAINING PROGRAM

## 2018-07-26 PROCEDURE — 83735 ASSAY OF MAGNESIUM: CPT | Performed by: NURSE PRACTITIONER

## 2018-07-26 PROCEDURE — 99232 SBSQ HOSP IP/OBS MODERATE 35: CPT | Performed by: INTERNAL MEDICINE

## 2018-07-26 PROCEDURE — 87081 CULTURE SCREEN ONLY: CPT | Performed by: STUDENT IN AN ORGANIZED HEALTH CARE EDUCATION/TRAINING PROGRAM

## 2018-07-26 PROCEDURE — 85049 AUTOMATED PLATELET COUNT: CPT | Performed by: STUDENT IN AN ORGANIZED HEALTH CARE EDUCATION/TRAINING PROGRAM

## 2018-07-26 PROCEDURE — 93010 ELECTROCARDIOGRAM REPORT: CPT | Performed by: INTERNAL MEDICINE

## 2018-07-26 RX ORDER — FUROSEMIDE 10 MG/ML
20 INJECTION INTRAMUSCULAR; INTRAVENOUS DAILY
Status: DISCONTINUED | OUTPATIENT
Start: 2018-07-26 | End: 2018-07-27 | Stop reason: HOSPADM

## 2018-07-26 RX ORDER — ALPRAZOLAM 0.5 MG/1
0.5 TABLET ORAL 3 TIMES DAILY PRN
Status: DISCONTINUED | OUTPATIENT
Start: 2018-07-26 | End: 2018-07-27 | Stop reason: HOSPADM

## 2018-07-26 RX ORDER — GABAPENTIN 300 MG/1
300 CAPSULE ORAL 3 TIMES DAILY
Status: DISCONTINUED | OUTPATIENT
Start: 2018-07-26 | End: 2018-07-27 | Stop reason: HOSPADM

## 2018-07-26 RX ORDER — HEPARIN SODIUM 5000 [USP'U]/ML
5000 INJECTION, SOLUTION INTRAVENOUS; SUBCUTANEOUS EVERY 8 HOURS SCHEDULED
Status: DISCONTINUED | OUTPATIENT
Start: 2018-07-26 | End: 2018-07-27 | Stop reason: HOSPADM

## 2018-07-26 RX ORDER — FUROSEMIDE 20 MG/1
20 TABLET ORAL DAILY
Status: DISCONTINUED | OUTPATIENT
Start: 2018-07-26 | End: 2018-07-26

## 2018-07-26 RX ORDER — PANTOPRAZOLE SODIUM 40 MG/1
40 TABLET, DELAYED RELEASE ORAL
Status: DISCONTINUED | OUTPATIENT
Start: 2018-07-26 | End: 2018-07-27 | Stop reason: HOSPADM

## 2018-07-26 RX ORDER — ATORVASTATIN CALCIUM 80 MG/1
80 TABLET, FILM COATED ORAL
Status: DISCONTINUED | OUTPATIENT
Start: 2018-07-26 | End: 2018-07-27 | Stop reason: HOSPADM

## 2018-07-26 RX ORDER — FUROSEMIDE 10 MG/ML
20 INJECTION INTRAMUSCULAR; INTRAVENOUS DAILY
Status: DISCONTINUED | OUTPATIENT
Start: 2018-07-27 | End: 2018-07-26

## 2018-07-26 RX ORDER — FUROSEMIDE 10 MG/ML
40 INJECTION INTRAMUSCULAR; INTRAVENOUS DAILY
Status: DISCONTINUED | OUTPATIENT
Start: 2018-07-26 | End: 2018-07-26

## 2018-07-26 RX ORDER — HYDROCODONE BITARTRATE AND ACETAMINOPHEN 5; 325 MG/1; MG/1
1 TABLET ORAL EVERY 6 HOURS PRN
Status: COMPLETED | OUTPATIENT
Start: 2018-07-26 | End: 2018-07-26

## 2018-07-26 RX ORDER — LOSARTAN POTASSIUM 50 MG/1
50 TABLET ORAL DAILY
Status: DISCONTINUED | OUTPATIENT
Start: 2018-07-26 | End: 2018-07-27 | Stop reason: HOSPADM

## 2018-07-26 RX ORDER — FUROSEMIDE 10 MG/ML
20 INJECTION INTRAMUSCULAR; INTRAVENOUS DAILY
Status: DISCONTINUED | OUTPATIENT
Start: 2018-07-26 | End: 2018-07-26

## 2018-07-26 RX ORDER — MAGNESIUM SULFATE HEPTAHYDRATE 40 MG/ML
2 INJECTION, SOLUTION INTRAVENOUS ONCE
Status: COMPLETED | OUTPATIENT
Start: 2018-07-26 | End: 2018-07-27

## 2018-07-26 RX ORDER — POTASSIUM CHLORIDE 20 MEQ/1
40 TABLET, EXTENDED RELEASE ORAL ONCE
Status: COMPLETED | OUTPATIENT
Start: 2018-07-26 | End: 2018-07-26

## 2018-07-26 RX ORDER — METOPROLOL TARTRATE 100 MG/1
100 TABLET ORAL EVERY 12 HOURS SCHEDULED
Status: DISCONTINUED | OUTPATIENT
Start: 2018-07-26 | End: 2018-07-27 | Stop reason: HOSPADM

## 2018-07-26 RX ORDER — GEMFIBROZIL 600 MG/1
600 TABLET, FILM COATED ORAL
Status: DISCONTINUED | OUTPATIENT
Start: 2018-07-26 | End: 2018-07-27 | Stop reason: HOSPADM

## 2018-07-26 RX ORDER — POTASSIUM CHLORIDE 20 MEQ/1
20 TABLET, EXTENDED RELEASE ORAL ONCE
Status: COMPLETED | OUTPATIENT
Start: 2018-07-27 | End: 2018-07-26

## 2018-07-26 RX ORDER — CLOPIDOGREL BISULFATE 75 MG/1
75 TABLET ORAL DAILY
Status: DISCONTINUED | OUTPATIENT
Start: 2018-07-26 | End: 2018-07-27 | Stop reason: HOSPADM

## 2018-07-26 RX ORDER — HYDROCODONE BITARTRATE AND ACETAMINOPHEN 5; 325 MG/1; MG/1
1 TABLET ORAL EVERY 6 HOURS PRN
Status: DISCONTINUED | OUTPATIENT
Start: 2018-07-26 | End: 2018-07-27 | Stop reason: HOSPADM

## 2018-07-26 RX ADMIN — ALPRAZOLAM 0.5 MG: 0.5 TABLET ORAL at 09:36

## 2018-07-26 RX ADMIN — ALPRAZOLAM 0.5 MG: 0.5 TABLET ORAL at 01:43

## 2018-07-26 RX ADMIN — PANTOPRAZOLE SODIUM 40 MG: 40 TABLET, DELAYED RELEASE ORAL at 06:04

## 2018-07-26 RX ADMIN — GABAPENTIN 300 MG: 300 CAPSULE ORAL at 09:35

## 2018-07-26 RX ADMIN — POTASSIUM CHLORIDE 40 MEQ: 1500 TABLET, EXTENDED RELEASE ORAL at 12:33

## 2018-07-26 RX ADMIN — GEMFIBROZIL 600 MG: 600 TABLET ORAL at 17:09

## 2018-07-26 RX ADMIN — DULOXETINE HYDROCHLORIDE 90 MG: 60 CAPSULE, DELAYED RELEASE ORAL at 00:41

## 2018-07-26 RX ADMIN — POTASSIUM CHLORIDE 40 MEQ: 1500 TABLET, EXTENDED RELEASE ORAL at 17:07

## 2018-07-26 RX ADMIN — GABAPENTIN 300 MG: 300 CAPSULE ORAL at 22:34

## 2018-07-26 RX ADMIN — HEPARIN SODIUM 5000 UNITS: 5000 INJECTION, SOLUTION INTRAVENOUS; SUBCUTANEOUS at 00:41

## 2018-07-26 RX ADMIN — ALPRAZOLAM 0.5 MG: 0.5 TABLET ORAL at 19:41

## 2018-07-26 RX ADMIN — HEPARIN SODIUM 5000 UNITS: 5000 INJECTION, SOLUTION INTRAVENOUS; SUBCUTANEOUS at 22:35

## 2018-07-26 RX ADMIN — HEPARIN SODIUM 5000 UNITS: 5000 INJECTION, SOLUTION INTRAVENOUS; SUBCUTANEOUS at 06:04

## 2018-07-26 RX ADMIN — HEPARIN SODIUM 5000 UNITS: 5000 INJECTION, SOLUTION INTRAVENOUS; SUBCUTANEOUS at 13:04

## 2018-07-26 RX ADMIN — ATORVASTATIN CALCIUM 80 MG: 80 TABLET, FILM COATED ORAL at 17:07

## 2018-07-26 RX ADMIN — GEMFIBROZIL 600 MG: 600 TABLET ORAL at 07:04

## 2018-07-26 RX ADMIN — HYDROCODONE BITARTRATE AND ACETAMINOPHEN 1 TABLET: 5; 325 TABLET ORAL at 07:03

## 2018-07-26 RX ADMIN — HYDROCODONE BITARTRATE AND ACETAMINOPHEN 1 TABLET: 5; 325 TABLET ORAL at 14:43

## 2018-07-26 RX ADMIN — MAGNESIUM SULFATE HEPTAHYDRATE 2 G: 40 INJECTION, SOLUTION INTRAVENOUS at 18:58

## 2018-07-26 RX ADMIN — POTASSIUM CHLORIDE 20 MEQ: 1500 TABLET, EXTENDED RELEASE ORAL at 23:23

## 2018-07-26 RX ADMIN — HYDROCODONE BITARTRATE AND ACETAMINOPHEN 1 TABLET: 5; 325 TABLET ORAL at 00:41

## 2018-07-26 RX ADMIN — HYDROCODONE BITARTRATE AND ACETAMINOPHEN 1 TABLET: 5; 325 TABLET ORAL at 20:19

## 2018-07-26 RX ADMIN — GABAPENTIN 300 MG: 300 CAPSULE ORAL at 00:41

## 2018-07-26 RX ADMIN — FUROSEMIDE 20 MG: 10 INJECTION, SOLUTION INTRAMUSCULAR; INTRAVENOUS at 13:04

## 2018-07-26 RX ADMIN — METOPROLOL TARTRATE 100 MG: 100 TABLET ORAL at 00:40

## 2018-07-26 RX ADMIN — GABAPENTIN 300 MG: 300 CAPSULE ORAL at 17:07

## 2018-07-26 RX ADMIN — CLOPIDOGREL BISULFATE 75 MG: 75 TABLET ORAL at 09:35

## 2018-07-26 RX ADMIN — DULOXETINE HYDROCHLORIDE 90 MG: 60 CAPSULE, DELAYED RELEASE ORAL at 22:33

## 2018-07-26 RX ADMIN — METOPROLOL TARTRATE 100 MG: 100 TABLET ORAL at 20:19

## 2018-07-26 NOTE — CONSULTS
Consultation - Cardiology   Tao Her 65 y o  female MRN: [id number]  Unit/Bed#: [address] [place] [address] Encounter: [id number]    Assessment/Plan     Assessment:  1  Acute on chronic diastolic heart failure  2  Prolonged QT/QTC question secondary to Cymbalta  3  Hypokalemia  4  Hypertension  5  Dyslipidemia  6  History of tobacco abuse  7  History of coronary artery disease  8  Obesity question underlying obstructive sleep apnea  9  Mild pulmonary hypertension with PA pressure of 35-40 mmHg seen on recent echocardiogram    Plan:  Patient has been admitted to the hospitalist service  1  Will check exercise nuclear stress test in the a m  to evaluate both patient's functional capacity and for any ischemia  Would recommend structured exercise program if one is in her area and patient qualifies with her insurance  2   Recommend outpatient follow-up for mobile cardiac telemetry for severe once of her prolonged QT/QTC  Consideration for discontinuation of Cymbalta for another agent  3   Continue her Cozaar and diuretic  Recommend daily weights  Monitor I& O and labs  4   Will her replete her potassium with 40 mEq oral now, and repeat 40 mEq at dinner time  Will recheck magnesium and BMP at 8:00 p m  and continue repletion as needed  Will also put patient on a daily dose of potassium with her diuretics  5   Encourage patient follow up for outpatient evaluation for obstructive sleep apnea  May also need PFT for evaluation of COPD as patient is a former [de-identified] pack year smoker    History of Present Illness   Physician Requesting Consult: Kashif Abdi MD  Reason for Consult / Principal Problem: Chest Pain, History HTN, Diastolic HF  HPI: Tao Her is a 65y o  year old female who presented to the emergency room with complaints of 3 day history of shortness of breath with exertion and associated chest tightness    All symptoms resolved with rest   She denied any radiation of the chest pressure to the neck back or shoulders  She denied any associated diaphoresis, nausea, or vomiting  She states that she weighs herself daily and it noted a 5 lb weight gain during this period  She denies missing any medications or change in her diet  Troponins are negative x3  Twelve lead EKG does show sinus rhythm with nonspecific ST segment changes in inferior lateral leads  Prolonged QT and QTC is noted in this patient who takes Cymbalta 90 mg once a day  Patient notes a history of coronary artery disease  And states she was catheterization in 2015 at [facility]   She does not remember which vessel but she was told she has a 30% blockage  She has not had any stress testing performed since that time  Patient did have a 2D echocardiogram performed in December of 2017 which demonstrated an ejection fraction of 65%  No regional wall motion abnormalities were appreciated  She was noted to have a Doppler findings grade 2 diastolic dysfunction  Patient also had biatrial enlargement  Mild TR with pulmonary artery pressure estimated at 35-40 mm Hg  Patient was also noted of an intracardiac gradient of 12 mm without Valsalva maneuver  Patient is a former smoker, quitting approximately 6 months ago  She states prior she had been smoking up to 2 packs per day since the age of 17  Patient states she has also been told she snores, unknown whether she has periods of apnea but in light of echo findings outpatient evaluation for obstructive sleep apnea is recommended  Inpatient consult to Cardiology  Consult performed by: Julito Diaz ordered by: Brook Belcher          Review of Systems   Constitutional: Positive for activity change and unexpected weight change  Negative for appetite change, chills, fatigue and fever  HENT: Negative for congestion, sinus pressure and tinnitus  Eyes: Negative for photophobia and visual disturbance     Respiratory: Positive for chest tightness and shortness of breath  Negative for apnea, wheezing and stridor  Cardiovascular: Positive for leg swelling  Gastrointestinal: Negative for abdominal distention, constipation and diarrhea  Endocrine: Negative for polydipsia, polyphagia and polyuria  Genitourinary: Negative for difficulty urinating  Musculoskeletal: Positive for arthralgias and back pain  Negative for gait problem  Skin: Negative for color change  Neurological: Negative for dizziness, syncope, light-headedness, numbness and headaches         Historical Information   Past Medical History:   Diagnosis Date    Alopecia     Back injury     Bell's palsy     [facility]     Chronic pain     back    Closed left arm fracture     Fibromyalgia     Fibromyalgia, primary     GERD (gastroesophageal reflux disease)     Hyperlipidemia     Hypertension     Lyme disease     [facility] [address] [facility]         Myocardial infarction ([facility] )     Cardiac catheterization 2 years ago showed 30% blockage in 1 of the blood vessels    Stroke ([facility] )     TIA (transient ischemic attack)     2017     Past Surgical History:   Procedure Laterality Date     SECTION      CHOLECYSTECTOMY      CORONARY ANGIOPLASTY          ORIF TIBIA FRACTURE Left 2018    Procedure: OPEN REDUCTION W/ INTERNAL FIXATION (ORIF) TIBIA FRACTURE;  Surgeon: Silverio Anderson MD;  Location: [facility];  Service: Orthopedics     History   Alcohol Use No     Comment: as per patient; former social drinker      History   Drug Use No     History   Smoking Status    Former Smoker    Packs/day: 0 50    Years: 40 00    Types: Cigarettes   Smokeless Tobacco    Never Used     Comment: Counseled, wants to quit     Family History:   Family History   Problem Relation Age of Onset    Heart attack Mother     Heart attack Father        Meds/Allergies   all current active meds have been reviewed and PTA meds:   Prior to Admission Medications Prescriptions Last Dose Informant Patient Reported? Taking? ALPRAZolam (XANAX) 0 5 mg tablet 7/24/2018 at Unknown time  No Yes   Sig: Take 1 tablet by mouth 3 (three) times a day as needed for anxiety for up to 3 days   DULoxetine HCl (CYMBALTA PO) 7/24/2018 at Unknown time Self Yes Yes   Sig: Take 90 mg by mouth daily at bedtime     HYDROcodone-acetaminophen (NORCO) 5-325 mg per tablet Past Month at Unknown time  No Yes   Sig: Take 1 tablet by mouth every 8 (eight) hours as needed for pain for up to 7 doses Max Daily Amount: 3 tablets   atorvastatin (LIPITOR) 80 mg tablet 7/25/2018 at Unknown time  Yes Yes   Sig: Take 80 mg by mouth daily   clopidogrel (PLAVIX) 75 mg tablet 7/25/2018 at Unknown time  No Yes   Sig: Take 1 tablet by mouth daily   Patient taking differently: Take 75 mg by mouth daily Last dose  1/23/18    furosemide (LASIX) 20 mg tablet 7/25/2018 at Unknown time  No Yes   Sig: Take 1 tablet (20 mg total) by mouth daily   gabapentin (NEURONTIN) 600 MG tablet 7/25/2018 at Unknown time Self Yes Yes   Sig: Take 300 mg by mouth 3 (three) times a day   gemfibrozil (LOPID) 600 mg tablet 7/25/2018 at Unknown time  Yes Yes   Sig: Take 600 mg by mouth 2 (two) times a day before meals   losartan (COZAAR) 25 mg tablet 7/25/2018 at Unknown time Self Yes Yes   Sig: Take 50 mg by mouth daily     metoprolol tartrate (LOPRESSOR) 100 mg tablet 7/25/2018 at Unknown time  No Yes   Sig: Take 1 tablet (100 mg total) by mouth every 12 (twelve) hours   pantoprazole (PROTONIX) 40 mg tablet 7/25/2018 at Unknown time  Yes Yes   Sig: Take 40 mg by mouth daily      Facility-Administered Medications: None     Allergies   Allergen Reactions    Hydromorphone Shortness Of Breath       Objective   Vitals: Blood pressure 101/54, pulse 63, temperature 98 5 °F (36 9 °C), temperature source Oral, resp   rate 19, height 5' 5" (1 651 m), weight 81 4 kg (179 lb 7 3 oz), last menstrual period 09/04/2009, SpO2 93 %, not currently breastfeeding  Orthostatic Blood Pressures      Most Recent Value   Blood Pressure  101/54 filed at 07/26/2018 0920   Patient Position - Orthostatic VS  Lying filed at 07/26/2018 0920            Intake/Output Summary (Last 24 hours) at 07/26/18 1129  Last data filed at 07/26/18 0800   Gross per 24 hour   Intake                0 ml   Output              600 ml   Net             -600 ml       Invasive Devices     Peripheral Intravenous Line            Peripheral IV 07/25/18 Right Arm less than 1 day                Physical Exam   Constitutional: She is oriented to person, place, and time  She appears well-developed and well-nourished  No distress  HENT:   Head: Normocephalic and atraumatic  Eyes: Pupils are equal, round, and reactive to light  Neck: Normal range of motion  Neck supple  No JVD present  Cardiovascular: Normal rate and regular rhythm  Murmur heard at apex and 4th intercostal space left midclavicular line toward sternum   Pulmonary/Chest: Breath sounds normal  No respiratory distress  She has no wheezes  She has no rales  Abdominal: Soft  Bowel sounds are normal  She exhibits no distension  There is no tenderness  Musculoskeletal: Normal range of motion  She exhibits no edema  Neurological: She is alert and oriented to person, place, and time  Skin: Skin is warm and dry  She is not diaphoretic  Psychiatric: She has a normal mood and affect  Nursing note and vitals reviewed  Lab Results:   I have personally reviewed pertinent lab results      CBC with diff:   Results from last 7 days  Lab Units 07/26/18  0443   WBC Thousand/uL 6 18   RBC Million/uL 4 02   HEMOGLOBIN g/dL 11 9   HEMATOCRIT % 36 5   MCV fL 91   MCH pg 29 6   MCHC g/dL 32 6   RDW % 12 3   MPV fL 10 0   PLATELETS Thousands/uL 313     CMP:   Results from last 7 days  Lab Units 07/26/18  0443 07/25/18  2221   SODIUM mmol/L 140 143   POTASSIUM mmol/L 2 9* 3 8   CHLORIDE mmol/L 106 108   CO2 mmol/L 29 28   ANION GAP mmol/L 5 7   BUN mg/dL 23 20   CREATININE mg/dL 1 06 1 05   GLUCOSE RANDOM mg/dL 123 130   CALCIUM mg/dL 8 3 8 3   AST U/L  --  18   ALT U/L  --  18   ALK PHOS U/L  --  79   TOTAL PROTEIN g/dL  --  6 6   BILIRUBIN TOTAL mg/dL  --  0 20   EGFR ml/min/1 73sq m 59 60     Troponin:   0  Lab Value Date/Time   TROPONINI <0 02 07/26/2018 0443   TROPONINI <0 02 07/26/2018 0058   TROPONINI <0 02 07/25/2018 2221   TROPONINI <0 02 06/04/2018 1550   TROPONINI <0 02 12/26/2017 2132   TROPONINI <0 02 12/12/2017 1358     BNP:   Results from last 7 days  Lab Units 07/26/18  0443   SODIUM mmol/L 140   POTASSIUM mmol/L 2 9*   CHLORIDE mmol/L 106   CO2 mmol/L 29   ANION GAP mmol/L 5   BUN mg/dL 23   CREATININE mg/dL 1 06   GLUCOSE RANDOM mg/dL 123   CALCIUM mg/dL 8 3   EGFR ml/min/1 73sq m 59     Coags:   Results from last 7 days  Lab Units 07/25/18  2239   PTT seconds 25   INR  1 02     TSH:     Magnesium:     Lipid Profile:     Imaging: I have personally reviewed pertinent reports  EKG:  Sinus rhythm with nonspecific ST and T-wave abnormalities seen in  Lateral leads  Patient is noted to have prolonged QT//542 milliseconds  Patient currently is on Cymbalta 90 mg once a day  VTE Prophylaxis: Sequential compression device (Venodyne)  and Heparin    Code Status: Level 1 - Full Code  Advance Directive and Living Will:      Power of :    POLST:      Counseling / Coordination of Care  Total floor / unit time spent today 60 minutes  Greater than 50% of total time was spent with the patient and / or family counseling and / or coordination of care  A description of the counseling / coordination of care:  Diastolic heart failure, mild to moderate pulmonary hypertension, chest pain in patient with history of coronary artery disease  Eli Daniels

## 2018-07-26 NOTE — H&P
History and Physical - [address] [place] [address] Internal Medicine    Patient Information: Emiliano Evans 65 y o  female MRN: [id number]  Unit/Bed#: [address] [place] [address] Encounter: [id number]  Admitting Physician: Gisel Lucero MD  PCP: Milton Smith DO  Date of Admission:  07/26/18    Chief Complaint:     Chest pain, shortness of breath   of Present Illness:    Emiliano Evans is a 65 y o  female with a PMH of CHF, HTN, HLD, Anxiety and CVA who presents with chest pain and shortness of breath  Of note she was admitted last month for CHF exacerbation  Since that time she states that she was relatively well  However 2 days ago she noted some shortness of breath and midsternal chest pain while walking up the stairs  She also noted increased swelling in her ankles  As time progressed her shortness of breath worsened with activity  Concerned for her health she came to the ED  Currently she moderate improvement in her chest pain however it has not subsided fully  She denies any shortness of breath or abdominal pain  No other complaints or concerns  Review of Systems:    Review of Systems   Constitutional: Negative for chills and fever  Respiratory: Positive for shortness of breath  Negative for cough  Cardiovascular: Positive for chest pain and leg swelling  Gastrointestinal: Negative for abdominal pain and blood in stool  Genitourinary: Negative for hematuria  Musculoskeletal: Positive for arthralgias  Neurological: Negative for syncope  Psychiatric/Behavioral: Negative for confusion  The patient is nervous/anxious          Past Medical and Surgical History:     Past Medical History:   Diagnosis Date    Alopecia     Back injury     Bell's palsy     CHF (congestive heart failure) (HCC)     Chronic pain     back    Closed left arm fracture     Fibromyalgia     Fibromyalgia, primary     GERD (gastroesophageal reflux disease)     Hyperlipidemia     Hypertension     Lyme disease     Myocardial infarct [facility])         Myocardial infarction [facility])     Cardiac catheterization 2 years ago showed 30% blockage in 1 of the blood vessels    Stroke ([address]     TIA (transient ischemic attack)     2017       Past Surgical History:   Procedure Laterality Date     SECTION      CHOLECYSTECTOMY      CORONARY ANGIOPLASTY          ORIF TIBIA FRACTURE Left 2018    Procedure: OPEN REDUCTION W/ INTERNAL FIXATION (ORIF) TIBIA FRACTURE;  Surgeon: Mabel Montero MD;  Location: [facility];  Service: Orthopedics       Meds/Allergies:    PTA meds:   Prior to Admission Medications   Prescriptions Last Dose Informant Patient Reported? Taking?    ALPRAZolam (XANAX) 0 5 mg tablet 2018 at Unknown time  No Yes   Sig: Take 1 tablet by mouth 3 (three) times a day as needed for anxiety for up to 3 days   DULoxetine HCl (CYMBALTA PO) 2018 at Unknown time Self Yes Yes   Sig: Take 90 mg by mouth daily at bedtime     HYDROcodone-acetaminophen (NORCO) 5-325 mg per tablet Past Month at Unknown time  No Yes   Sig: Take 1 tablet by mouth every 8 (eight) hours as needed for pain for up to 7 doses Max Daily Amount: 3 tablets   atorvastatin (LIPITOR) 80 mg tablet 2018 at Unknown time  Yes Yes   Sig: Take 80 mg by mouth daily   clopidogrel (PLAVIX) 75 mg tablet 2018 at Unknown time  No Yes   Sig: Take 1 tablet by mouth daily   Patient taking differently: Take 75 mg by mouth daily Last dose  18    furosemide (LASIX) 20 mg tablet 2018 at Unknown time  No Yes   Sig: Take 1 tablet (20 mg total) by mouth daily   gabapentin (NEURONTIN) 600 MG tablet 2018 at Unknown time Self Yes Yes   Sig: Take 300 mg by mouth 3 (three) times a day   gemfibrozil (LOPID) 600 mg tablet 2018 at Unknown time  Yes Yes   Sig: Take 600 mg by mouth 2 (two) times a day before meals   losartan (COZAAR) 25 mg tablet 2018 at Unknown time Self Yes Yes   Sig: Take 50 mg by mouth daily     metoprolol tartrate (LOPRESSOR) 100 mg tablet 7/25/2018 at Unknown time  No Yes   Sig: Take 1 tablet (100 mg total) by mouth every 12 (twelve) hours   pantoprazole (PROTONIX) 40 mg tablet 7/25/2018 at Unknown time  Yes Yes   Sig: Take 40 mg by mouth daily      Facility-Administered Medications: None       Allergies: Allergies   Allergen Reactions    Hydromorphone Shortness Of Breath     History:     Marital Status: /Civil Union   History   Alcohol Use No     Comment: as per patient; former social drinker      History   Smoking Status    Former Smoker    Packs/day: 0 50    Years: 40 00    Types: Cigarettes   Smokeless Tobacco    Never Used     Comment: Counseled, wants to quit     History   Drug Use No       Family History: Mother: healthy   Father: CAD    Physical Exam:     Vitals:   Blood Pressure: 101/58 (07/25/18 2330)  Pulse: 62 (07/25/18 2330)  Temperature: 98 7 °F (37 1 °C) (07/25/18 2147)  Temp Source: Tympanic (07/25/18 2147)  Respirations: 22 (07/25/18 2330)  Weight - Scale: 81 6 kg (180 lb) (07/25/18 2147)  SpO2: 96 % (07/25/18 2330)    Physical Exam:   General: in no acute distress  HEENT: atraumatic, normocephalic  Skin: no jaundice  CVS: RRR, no murmurs appreciated  Lungs: crackles in right lung base, no wheezing or rales   Abdomen: soft, nondistended, bowel sounds normal, nontender upon palpation, no guarding or rebound tenderness  Extremities: nonpitting pedal edema bilaterally, no calf swelling or tenderness  Neuro: alert and oriented x3  Psych: calm, cooperative      Lab Results: I have personally reviewed pertinent reports          Results from last 7 days  Lab Units 07/25/18  2221   WBC Thousand/uL 6 69   HEMOGLOBIN g/dL 11 8   HEMATOCRIT % 35 8   PLATELETS Thousands/uL 337   NEUTROS PCT % 33*   LYMPHS PCT % 56*   MONOS PCT % 7   EOS PCT % 3       Results from last 7 days  Lab Units 07/25/18  2221   SODIUM mmol/L 143   POTASSIUM mmol/L 3 8   CHLORIDE mmol/L 108   CO2 mmol/L 28   BUN mg/dL 20   CREATININE mg/dL 1 05   CALCIUM mg/dL 8 3   TOTAL PROTEIN g/dL 6 6   BILIRUBIN TOTAL mg/dL 0 20   ALK PHOS U/L 79   ALT U/L 18   AST U/L 18   GLUCOSE RANDOM mg/dL 130       Results from last 7 days  Lab Units 07/25/18  2239   INR  1 02       Imaging:     Xr Chest 1 View Portable    Result Date: 7/25/2018  Narrative: CHEST INDICATION:   chest pain/SOB  COMPARISON:  12/26/2017 EXAM PERFORMED/VIEWS:  XR CHEST PORTABLE FINDINGS: Cardiomediastinal silhouette appears unremarkable  The lungs are clear  No pneumothorax or pleural effusion  Osseous structures appear within normal limits for patient age  Impression: No acute cardiopulmonary disease  Workstation performed: [id number]         Assessment/Plan    * Acute on chronic diastolic CHF (congestive heart failure) ([facility] [address] )   Assessment & Plan    Pt reports a 2 day hx of worsening ACUNA and lower extremity edema  BNP >1000 though lower than last month  CXR with no overt signs of heart failure however there are some crackles evident on exam  She received a 20 mg dose of IV Lasix in the ED  Will admit to Medicine, monitor on telemetry, trend troponin, schedule IV Lasix along with home regimen of Metoprolol and Cozaar if BP permits  She underwent a TTE in Dec 2017 which revealed a normal EF but grade 2 DD  She also reports her last cardiac cath was approximately 2 years ago with no significant CAD observed  Will consult Cardiology for further evaluation         Chest pain   Assessment & Plan    Likely secondary to above  Pt is not hypoxic or tachycardic therefore PE is of low concern  She also has an element of Anxiety therefore will resume home anxiety regimen and continue plan as above         TIA (transient ischemic attack)   Assessment & Plan    Pt denies any residual deficit    Resume Plavix and Statin         Anxiety   Assessment & Plan    Continue Cymbalta and Xanax prn         Hyperlipidemia   Assessment & Plan    Continue Statin and Lopid        Hypertension   Assessment & Plan    Continue Metoprolol and [facility] Problem List:     Principal Problem:    Acute on chronic diastolic CHF (congestive heart failure) (HCC)  Active Problems:    Chest pain    Hypertension    Hyperlipidemia    Anxiety    TIA (transient ischemic attack)        VTE Prophylaxis: heparin   Code Status: Prior    Anticipated Length of Stay:  Patient will be admitted on an Inpatient basis with an anticipated length of stay of atleast 2 midnights  Total Time for Visit, including Counseling / Coordination of Care: 30 minutes  Greater than 50% of this total time spent on direct patient counseling and coordination of care  Sherre Soulier

## 2018-07-26 NOTE — CASE MANAGEMENT
Continued Stay Review    Date: 7/26/18    Vital Signs: /54 (BP Location: Left arm)   Pulse 63   Temp 98 5 °F (36 9 °C) (Oral)   Resp 19   Ht 5' 5" (1 651 m)   Wt 81 4 kg (179 lb 7 3 oz)   LMP 09/04/2009 (Within Days) Comment: per patient   SpO2 93%   Breastfeeding? No   BMI 29 86 kg/m²       TELE MON  DAILY WEIGHT I/O  CARDIOLOGY FOLLOWING  CBC DIFF  PLT CT  TROPONIN   MG   SCHEDULE STRESS TEST  NPO  IV LASIX 20MG   BMP  Scheduled Meds:  Current Facility-Administered Medications:  ALPRAZolam 0 5 mg Oral TID PRN Agata Gregg MD   atorvastatin 80 mg Oral Daily With Zach Tyson MD   clopidogrel 75 mg Oral Daily Agata Gregg MD   DULoxetine 90 mg Oral HS Agata Gregg MD   furosemide 20 mg Intravenous Daily NICKY Campo   gabapentin 300 mg Oral TID Agata rGegg MD   gemfibrozil 600 mg Oral BID Royer Harper MD   heparin (porcine) 5,000 Units Subcutaneous Q8H [address] Agata Gregg MD   HYDROcodone-acetaminophen 1 tablet Oral Q6H PRN Agata Gregg MD   losartan 50 mg Oral Daily Agata Gregg MD   metoprolol tartrate 100 mg Oral Q12H [address] Agata Gregg MD   pantoprazole 40 mg Oral Early Morning Agata Gregg MD   [START ON 7/27/2018] potassium chloride 20 mEq Oral Once Junior Arellano, CRNP   potassium chloride 40 mEq Oral Once NICKY Campo     Continuous Infusions:   PRN Meds:   ALPRAZolam    HYDROcodone-acetaminophen  Abnormal Labs/Diagnostic Results: K 2 9 MG 1 5     Age/Sex: 65 y o  female       CARDIOLOGY  Assessment:  1  Acute on chronic diastolic heart failure  2  Prolonged QT/QTC question secondary to Cymbalta  3  Hypokalemia  4  Hypertension  5  Dyslipidemia  6  History of tobacco abuse  7  History of coronary artery disease  8  Obesity question underlying obstructive sleep apnea  9  Mild pulmonary hypertension with PA pressure of 35-40 mmHg seen on recent echocardiogram  Plan:  Patient has been admitted to the hospitalist service  1    Will check exercise nuclear stress test in the a m  to evaluate both patient's functional capacity and for any ischemia  Would recommend structured exercise program if one is in her area and patient qualifies with her insurance  2   Recommend outpatient follow-up for mobile cardiac telemetry for severe once of her prolonged QT/QTC  Consideration for discontinuation of Cymbalta for another agent  3   Continue her Cozaar and diuretic  Recommend daily weights  Monitor I& O and labs  4   Will her replete her potassium with 40 mEq oral now, and repeat 40 mEq at dinner time  Will recheck magnesium and BMP at 8:00 p m  and continue repletion as needed  Will also put patient on a daily dose of potassium with her diuretics  5   Encourage patient follow up for outpatient evaluation for obstructive sleep apnea  May also need PFT for evaluation of COPD as patient is a former [de-identified] pack year smoker    ATTENDING  Acute on chronic diastolic CHF   Patient will be on Lasix 40 milligram IV daily  Monitor strict I& O and daily weight  Patient might also have underlying COPD contributing to her shortness of breath given her prolonged smoking history  Patient will need outpatient follow-up with pulmonology  Chest pain   Serial cardiac enzymes were negative  Patient was scheduled for nuclear stress test to rule out any underlying ischemia  Continue aspirin, metoprolol and Lipitor  Prolonged QT interval  Likely secondary to Cymbalta use  Keep potassium level over 4 and magnesium level over 2  Potassium supplementation and magnesium supplementation ordered  Repeat EKG in a m    Certification Statement: The patient will continue to require additional inpatient hospital stay due to Acute exacerbation of CHF and chest pain

## 2018-07-26 NOTE — ASSESSMENT & PLAN NOTE
Likely secondary to Cymbalta use  Keep potassium level over 4 and magnesium level over 2  Potassium supplementation and magnesium supplementation ordered  Repeat EKG in a m

## 2018-07-26 NOTE — ASSESSMENT & PLAN NOTE
Pt patient's echo in December 4097 showed diastolic dysfunction with normal ejection fraction  Patient will be on Lasix 40 milligram IV daily  Monitor strict I& O and daily weight  Patient might also have underlying COPD contributing to her shortness of breath given her prolonged smoking history  Patient will need outpatient follow-up with pulmonology

## 2018-07-26 NOTE — PROGRESS NOTES
Progress Note Elmer Iraheta 1960, 65 y o  female MRN: [id number]    Unit/Bed#: [address] [place] [address] Encounter: [id number]    Primary Care Provider: Lea Merino DO   Date and time admitted to hospital: 7/25/2018  9:48 PM        * Acute on chronic diastolic CHF (congestive heart failure) ([address] )   Assessment & Plan    Pt patient's echo in December 8255 showed diastolic dysfunction with normal ejection fraction  Patient will be on Lasix 40 milligram IV daily  Monitor strict I& O and daily weight  Patient might also have underlying COPD contributing to her shortness of breath given her prolonged smoking history  Patient will need outpatient follow-up with pulmonology        Chest pain   Assessment & Plan    Serial cardiac enzymes were negative  Patient was scheduled for nuclear stress test to rule out any underlying ischemia  Continue aspirin, metoprolol and Lipitor  Prolonged QT interval   Assessment & Plan    Likely secondary to Cymbalta use  Keep potassium level over 4 and magnesium level over 2  Potassium supplementation and magnesium supplementation ordered  Repeat EKG in a m  Hypertension   Assessment & Plan    Continue Metoprolol and Cozaar        Hyperlipidemia   Assessment & Plan    Continue Statin and Lopid        Anxiety   Assessment & Plan    Continue Cymbalta and Xanax prn         TIA (transient ischemic attack)   Assessment & Plan    Pt denies any residual deficit  Continue Plavix and Statin             VTE Pharmacologic Prophylaxis:   Pharmacologic: Heparin  Mechanical VTE Prophylaxis in Place: Yes    Patient Centered Rounds: I have performed bedside rounds with nursing staff today  Discussions with Specialists or Other Care Team Provider: Dr Ramiro Covarrubias    Education and Discussions with Family / Patient: yes    Time Spent for Care: 30 minutes  More than 50% of total time spent on counseling and coordination of care as described above      Current Length of Stay: 1 day(s)    Current Patient Status: Inpatient   Certification Statement: The patient will continue to require additional inpatient hospital stay due to Acute exacerbation of CHF and chest pain    Discharge Plan: Home    Code Status: Level 1 - Full Code      Subjective:   Patient has improved shortness of breath  Denies any further chest pain  Denies any palpitations, abdominal pain, nausea or vomiting  Objective:     Vitals:   Temp (24hrs), Av °F (36 7 °C), Min:97 3 °F (36 3 °C), Max:98 7 °F (37 1 °C)    HR:  [62-72] 69  Resp:  [16-22] 18  BP: ()/(54-89) 115/58  SpO2:  [93 %-97 %] 94 %  Body mass index is 29 86 kg/m²  Input and Output Summary (last 24 hours): Intake/Output Summary (Last 24 hours) at 18 1804  Last data filed at 18 1701   Gross per 24 hour   Intake              660 ml   Output             2650 ml   Net            -1990 ml       Physical Exam:     Physical Exam   Constitutional: No distress  HENT:   Head: Normocephalic and atraumatic  Nose: Nose normal    Eyes: Conjunctivae and EOM are normal  Pupils are equal, round, and reactive to light  Neck: Normal range of motion  Neck supple  No JVD present  Cardiovascular: Normal rate, regular rhythm and normal heart sounds  Exam reveals no gallop and no friction rub  No murmur heard  Pulmonary/Chest: Effort normal and breath sounds normal  No respiratory distress  She has no wheezes  She has no rales  She exhibits no tenderness  Abdominal: Soft  Bowel sounds are normal  She exhibits no distension  There is no tenderness  There is no rebound and no guarding  Musculoskeletal: She exhibits edema  Trace edema   Neurological: She is alert  No cranial nerve deficit  Skin: Skin is warm and dry  No rash noted  Psychiatric: She has a normal mood and affect         Additional Data:     Labs:      Results from last 7 days  Lab Units 18  0443   WBC Thousand/uL 6 18   HEMOGLOBIN g/dL 11 9   HEMATOCRIT % 36 5   PLATELETS Thousands/uL 313   NEUTROS PCT % 32*   LYMPHS PCT % 57*   MONOS PCT % 7   EOS PCT % 3       Results from last 7 days  Lab Units 07/26/18  0443 07/25/18  2221   SODIUM mmol/L 140 143   POTASSIUM mmol/L 2 9* 3 8   CHLORIDE mmol/L 106 108   CO2 mmol/L 29 28   BUN mg/dL 23 20   CREATININE mg/dL 1 06 1 05   CALCIUM mg/dL 8 3 8 3   TOTAL PROTEIN g/dL  --  6 6   BILIRUBIN TOTAL mg/dL  --  0 20   ALK PHOS U/L  --  79   ALT U/L  --  18   AST U/L  --  18   GLUCOSE RANDOM mg/dL 123 130       Results from last 7 days  Lab Units 07/25/18  2239   INR  1 02       * I Have Reviewed All Lab Data Listed Above  * Additional Pertinent Lab Tests Reviewed: [facility]      Recent Cultures (last 7 days):           Last 24 Hours Medication List:     Current Facility-Administered Medications:  ALPRAZolam 0 5 mg Oral TID PRN Nimesh Leon MD   atorvastatin 80 mg Oral Daily With Yasmine Beyer MD   clopidogrel 75 mg Oral Daily Nimesh Leon MD   DULoxetine 90 mg Oral HS Nimesh Leon MD   furosemide 20 mg Intravenous Daily NICKY Alcocer   gabapentin 300 mg Oral TID Nimesh Leon MD   gemfibrozil 600 mg Oral BID Tristen Perry MD   heparin (porcine) 5,000 Units Subcutaneous Q8H [address] Nimesh Leon MD   losartan 50 mg Oral Daily Nimesh Leon MD   magnesium sulfate 2 g Intravenous Once Reta Groves MD   metoprolol tartrate 100 mg Oral Q12H [address] Nimesh Leon MD   pantoprazole 40 mg Oral Early Morning Nimesh Leon MD   [START ON 7/27/2018] potassium chloride 20 mEq Oral Once NICKY Alcocer        Today, Patient Was Seen By: Reta Groves MD    ** Please Note: Dictation voice to text software may have been used in the creation of this document   **

## 2018-07-26 NOTE — CASE MANAGEMENT
Initial Clinical Review    Admission: Date/Time/Statement: 7/25/18 @ 2356     Orders Placed This Encounter   Procedures    Place in Observation (expected length of stay for this patient is less than two midnights)     Standing Status:   Standing     Number of Occurrences:   1     Order Specific Question:   Admitting Physician     Answer:   Angie Stephen [[id number]]     Order Specific Question:   Level of Care     Answer:   Med Surg [16]    Inpatient Admission     Standing Status:   Standing     Number of Occurrences:   1     Order Specific Question:   Admitting Physician     Answer:   Angie Stephen [[id number]]     Order Specific Question:   Level of Care     Answer:   Med Surg [16]     Order Specific Question:   Estimated length of stay     Answer:   More than 2 Midnights     Order Specific Question:   Certification     Answer:   I certify that inpatient services are medically necessary for this patient for a duration of greater than two midnights  See H&P and MD Progress Notes for additional information about the patient's course of treatment  ED: Date/Time/Mode of Arrival:   ED Arrival Information     Expected Arrival Acuity Means of Arrival Escorted By Service Admission Type    - 7/25/2018 21:40 Emergent Walk-In Self General Medicine Emergency    Arrival Complaint    diff breathing          Chief Complaint:   Chief Complaint   Patient presents with    Shortness of Breath     upon any exertion for 3 days, history of CHF    Chest Pain       intermittent since this morning       History of Illness: Monique Blevins is a 65 y o  female with a PMH of CHF, HTN, HLD, Anxiety and CVA who presents with chest pain and shortness of breath  2 days ago she noted some shortness of breath and midsternal chest pain while walking up the stairs  She also noted increased swelling in her ankles  As time progressed her shortness of breath worsened with activity  Concerned for her health she came to the ED    Currently she moderate improvement in her chest pain however it has not subsided  ED Vital Signs:   ED Triage Vitals [07/25/18 2147]   Temperature Pulse Respirations Blood Pressure SpO2   98 7 °F (37 1 °C) 68 20 116/71 97 %      Temp Source Heart Rate Source Patient Position - Orthostatic VS BP Location FiO2 (%)   Tympanic Monitor Sitting Left arm --      Pain Score       6        Wt Readings from Last 1 Encounters:   07/26/18 81 4 kg (179 lb 7 3 oz)       Abnormal Labs/Diagnostic Test Results: TROPONIN <0 02 X3    Albumin 3 2 (L) g/dL     NT-proBNP 1,531 (H) pg/mL   CXR No acute cardiopulmonary disease       ED Treatment:   Medication Administration from 07/25/2018 2140 to 07/25/2018 2349       Date/Time Order Dose Route Action Action by Comments     07/25/2018 6636 furosemide (LASIX) injection 20 mg 20 mg Intravenous Given Lucinda Mayo RN           Past Medical/Surgical History:    Active Ambulatory Problems     Diagnosis Date Noted    Slurred speech 12/12/2017    Weakness 12/12/2017    Fibromyalgia 12/12/2017    Hypertension 12/12/2017    Hyperlipidemia 12/12/2017    Accidental overdose 12/26/2017    Intentional overdose of beta-adrenergic blocking drug ([facility] ) 12/26/2017    Anxiety 12/26/2017    Myocardial infarction ([facility] ) 12/26/2017    GERD (gastroesophageal reflux disease) 12/26/2017    Dependence on nicotine from cigarettes 12/27/2017    QT prolongation 12/27/2017    Closed displaced comminuted fracture of shaft of left tibia 01/30/2018    Status post closed tibia fracture 02/21/2018    Closed fracture of left ankle 03/14/2018    Left leg swelling 06/04/2018    Acute on chronic diastolic CHF (congestive heart failure) ([facility] ) 06/04/2018    History of gastric ulcer 06/04/2018    History of TIA (transient ischemic attack) 06/04/2018    History of MI (myocardial infarction) 06/04/2018    Hypomagnesemia 06/05/2018    Hypokalemia 06/05/2018    SOB (shortness of breath) 06/05/2018     Resolved Ambulatory Problems     Diagnosis Date Noted    No Resolved Ambulatory Problems     Past Medical History:   Diagnosis Date    Alopecia     Back injury     Bell's palsy     [facility])     Chronic pain     Closed left arm fracture     Fibromyalgia     Fibromyalgia, primary     GERD (gastroesophageal reflux disease)     Hyperlipidemia     Hypertension     Lyme disease     [facility]ocardial infarct ([facility] )     Myocardial infarction ([facility] )     Stroke ([facility] )     TIA (transient ischemic attack)        Admitting Diagnosis: Chest pain [R07 9]  Difficulty breathing [R06 89]  Congestive heart failure (CHF) ([facility]) [I50 9]    Age/Sex: 65 y o  female    Assessment/Plan:   Acute on chronic diastolic CHF (congestive heart failure) ([facility])   Assessment & Plan     Pt reports a 2 day hx of worsening ACUNA and lower extremity edema  BNP >1000 though lower than last month  there are some crackles evident on exam  She received a 20 mg dose of IV Lasix in the ED  Will admit to Medicine, monitor on telemetry, trend troponin, schedule IV Lasix along with home regimen of Metoprolol and Cozaar if BP permits  She underwent a TTE in Dec 2017 which revealed a normal EF but grade 2 DD  She also reports her last cardiac cath was approximately 2 years ago with no significant CAD observed  Will consult Cardiology for further evaluation    Chest pain   Assessment & Plan     Likely secondary to above  Pt is not hypoxic or tachycardic therefore PE is of low concern  She also has an element of Anxiety therefore will resume home anxiety regimen and continue plan as above    TIA (transient ischemic attack)   Assessment & Plan     Pt denies any residual deficit    Resume Plavix and Statin    Anxiety   Assessment & Plan     Continue Cymbalta and Xanax prn    Hyperlipidemia   Assessment & Plan     Continue Statin and Lopid   Hypertension   Assessment & Plan     Continue Metoprolol and Cozaar   VTE Prophylaxis: heparin   Code Status: Prior     Anticipated Length of Stay:  Patient will be admitted on an Inpatient basis with an anticipated length of stay of atleast 2 midnights       Admission Orders:  TELE MON  CARDIOLOGY CONSULT  DAILY WEIGHT I/O  Scheduled Meds:   Current Facility-Administered Medications:  ALPRAZolam 0 5 mg Oral TID PRN Jr Macias MD   atorvastatin 80 mg Oral Daily With Carissa Moran MD   clopidogrel 75 mg Oral Daily Jr Macias MD   DULoxetine 90 mg Oral HS Jr Macias MD   furosemide 20 mg Intravenous Daily Jr Macias MD   gabapentin 300 mg Oral TID Jr Macias MD   gemfibrozil 600 mg Oral BID AC Jr Macias MD   heparin (porcine) 5,000 Units Subcutaneous Q8H [address] Jr Macias MD   HYDROcodone-acetaminophen 1 tablet Oral Q6H PRN Jr Macias MD   losartan 50 mg Oral Daily Jr Macias MD   metoprolol tartrate 100 mg Oral Q12H [address] Jr Macias MD   pantoprazole 40 mg Oral Early Morning Jr Macias MD     Continuous Infusions:    PRN Meds:   ALPRAZolam    HYDROcodone-acetaminophen

## 2018-07-26 NOTE — SOCIAL WORK
DASH discussion completed  Discussed goals of making sure pt's needs are met upon discharge, pt's preferences are taken into account, pt understands her health condition, medications and symptoms to watch for after returning home and pt is aware of any follow up appointments recommended by hospital physician  Spoke with the pt at the bedside  Pt lives with her  and has DME with a cane, walker, bsc and wc  Pt has no HHC and is independent with her own care  Pt denies any DCP needs at this time  She does not drive and family provides transport  [facility] [employer] [facility]

## 2018-07-26 NOTE — ASSESSMENT & PLAN NOTE
Serial cardiac enzymes were negative  Patient was scheduled for nuclear stress test to rule out any underlying ischemia  Continue aspirin, metoprolol and Lipitor

## 2018-07-27 ENCOUNTER — APPOINTMENT (INPATIENT)
Dept: RADIOLOGY | Facility: HOSPITAL | Age: 58
DRG: 127 | End: 2018-07-27
Payer: COMMERCIAL

## 2018-07-27 ENCOUNTER — APPOINTMENT (INPATIENT)
Dept: NON INVASIVE DIAGNOSTICS | Facility: HOSPITAL | Age: 58
DRG: 127 | End: 2018-07-27
Payer: COMMERCIAL

## 2018-07-27 VITALS
DIASTOLIC BLOOD PRESSURE: 73 MMHG | BODY MASS INDEX: 30.49 KG/M2 | SYSTOLIC BLOOD PRESSURE: 128 MMHG | WEIGHT: 182.98 LBS | HEART RATE: 76 BPM | RESPIRATION RATE: 18 BRPM | OXYGEN SATURATION: 98 % | HEIGHT: 65 IN | TEMPERATURE: 97.7 F

## 2018-07-27 LAB
ANION GAP SERPL CALCULATED.3IONS-SCNC: 10 MMOL/L (ref 4–13)
ATRIAL RATE: 75 BPM
BUN SERPL-MCNC: 21 MG/DL (ref 5–25)
CALCIUM SERPL-MCNC: 8.5 MG/DL (ref 8.3–10.1)
CHEST PAIN STATEMENT: NORMAL
CHLORIDE SERPL-SCNC: 108 MMOL/L (ref 100–108)
CO2 SERPL-SCNC: 25 MMOL/L (ref 21–32)
CREAT SERPL-MCNC: 0.93 MG/DL (ref 0.6–1.3)
GFR SERPL CREATININE-BSD FRML MDRD: 69 ML/MIN/1.73SQ M
GLUCOSE SERPL-MCNC: 109 MG/DL (ref 65–140)
MAGNESIUM SERPL-MCNC: 2.4 MG/DL (ref 1.6–2.6)
MAX DIASTOLIC BP: 76 MMHG
MAX HEART RATE: 108 BPM
MAX PREDICTED HEART RATE: 164 BPM
MAX. SYSTOLIC BP: 156 MMHG
MRSA NOSE QL CULT: NORMAL
P AXIS: 53 DEGREES
POTASSIUM SERPL-SCNC: 4.2 MMOL/L (ref 3.5–5.3)
PR INTERVAL: 178 MS
PROTOCOL NAME: NORMAL
QRS AXIS: 8 DEGREES
QRSD INTERVAL: 94 MS
QT INTERVAL: 462 MS
QTC INTERVAL: 515 MS
REASON FOR TERMINATION: NORMAL
SODIUM SERPL-SCNC: 143 MMOL/L (ref 136–145)
T WAVE AXIS: 163 DEGREES
TARGET HR FORMULA: NORMAL
TEST INDICATION: NORMAL
TIME IN EXERCISE PHASE: NORMAL
VENTRICULAR RATE: 75 BPM

## 2018-07-27 PROCEDURE — 99232 SBSQ HOSP IP/OBS MODERATE 35: CPT | Performed by: INTERNAL MEDICINE

## 2018-07-27 PROCEDURE — 93010 ELECTROCARDIOGRAM REPORT: CPT | Performed by: INTERNAL MEDICINE

## 2018-07-27 PROCEDURE — 78452 HT MUSCLE IMAGE SPECT MULT: CPT

## 2018-07-27 PROCEDURE — 93005 ELECTROCARDIOGRAM TRACING: CPT

## 2018-07-27 PROCEDURE — 93017 CV STRESS TEST TRACING ONLY: CPT

## 2018-07-27 PROCEDURE — 99239 HOSP IP/OBS DSCHRG MGMT >30: CPT | Performed by: INTERNAL MEDICINE

## 2018-07-27 PROCEDURE — 83735 ASSAY OF MAGNESIUM: CPT | Performed by: NURSE PRACTITIONER

## 2018-07-27 PROCEDURE — 80048 BASIC METABOLIC PNL TOTAL CA: CPT | Performed by: INTERNAL MEDICINE

## 2018-07-27 PROCEDURE — A9502 TC99M TETROFOSMIN: HCPCS

## 2018-07-27 RX ORDER — POTASSIUM CHLORIDE 20 MEQ/1
20 TABLET, EXTENDED RELEASE ORAL EVERY OTHER DAY
Status: DISCONTINUED | OUTPATIENT
Start: 2018-07-27 | End: 2018-07-27 | Stop reason: HOSPADM

## 2018-07-27 RX ORDER — FUROSEMIDE 20 MG/1
40 TABLET ORAL DAILY
Qty: 30 TABLET | Refills: 0 | Status: SHIPPED | OUTPATIENT
Start: 2018-07-27 | End: 2019-09-28 | Stop reason: HOSPADM

## 2018-07-27 RX ORDER — POTASSIUM CHLORIDE 20 MEQ/1
20 TABLET, EXTENDED RELEASE ORAL EVERY OTHER DAY
Qty: 30 TABLET | Refills: 0 | Status: SHIPPED | OUTPATIENT
Start: 2018-07-29 | End: 2019-09-28 | Stop reason: HOSPADM

## 2018-07-27 RX ADMIN — HYDROCODONE BITARTRATE AND ACETAMINOPHEN 1 TABLET: 5; 325 TABLET ORAL at 17:41

## 2018-07-27 RX ADMIN — REGADENOSON 0.4 MG: 0.08 INJECTION, SOLUTION INTRAVENOUS at 09:11

## 2018-07-27 RX ADMIN — ATORVASTATIN CALCIUM 80 MG: 80 TABLET, FILM COATED ORAL at 16:46

## 2018-07-27 RX ADMIN — METOPROLOL TARTRATE 100 MG: 100 TABLET ORAL at 10:49

## 2018-07-27 RX ADMIN — CLOPIDOGREL BISULFATE 75 MG: 75 TABLET ORAL at 10:49

## 2018-07-27 RX ADMIN — LOSARTAN POTASSIUM 50 MG: 50 TABLET, FILM COATED ORAL at 10:49

## 2018-07-27 RX ADMIN — GEMFIBROZIL 600 MG: 600 TABLET ORAL at 16:46

## 2018-07-27 RX ADMIN — HEPARIN SODIUM 5000 UNITS: 5000 INJECTION, SOLUTION INTRAVENOUS; SUBCUTANEOUS at 13:02

## 2018-07-27 RX ADMIN — PANTOPRAZOLE SODIUM 40 MG: 40 TABLET, DELAYED RELEASE ORAL at 05:33

## 2018-07-27 RX ADMIN — POTASSIUM CHLORIDE 20 MEQ: 1500 TABLET, EXTENDED RELEASE ORAL at 16:47

## 2018-07-27 RX ADMIN — HYDROCODONE BITARTRATE AND ACETAMINOPHEN 1 TABLET: 5; 325 TABLET ORAL at 05:30

## 2018-07-27 RX ADMIN — HYDROCODONE BITARTRATE AND ACETAMINOPHEN 1 TABLET: 5; 325 TABLET ORAL at 11:35

## 2018-07-27 RX ADMIN — GABAPENTIN 300 MG: 300 CAPSULE ORAL at 10:49

## 2018-07-27 RX ADMIN — ALPRAZOLAM 0.5 MG: 0.5 TABLET ORAL at 12:59

## 2018-07-27 RX ADMIN — ALPRAZOLAM 0.5 MG: 0.5 TABLET ORAL at 04:33

## 2018-07-27 RX ADMIN — GEMFIBROZIL 600 MG: 600 TABLET ORAL at 10:50

## 2018-07-27 RX ADMIN — FUROSEMIDE 20 MG: 10 INJECTION, SOLUTION INTRAMUSCULAR; INTRAVENOUS at 10:50

## 2018-07-27 RX ADMIN — HEPARIN SODIUM 5000 UNITS: 5000 INJECTION, SOLUTION INTRAVENOUS; SUBCUTANEOUS at 05:33

## 2018-07-27 RX ADMIN — GABAPENTIN 300 MG: 300 CAPSULE ORAL at 16:46

## 2018-07-27 NOTE — NURSING NOTE
Patient discharged to home left via wheelchair with her belongings  Iv access removed prior to leaving  Discharge instructions and scripts given to patient and educated  Refused pneumo vaccine and signed  dietician spoke with patient  Prior to discharge

## 2018-07-27 NOTE — CONSULTS
Consulted for CHF diet teaching  Provided low sodium diet education with handouts for at home use  Pt was reluctant to education provided, giving "eye-rolls" when discussing use of salt for flavoring foods  Reinforced importance of dietary compliance for CHF and fluid retention  Predict pt to have fair compliance to dietary restrictions at time of d/c  Will continue to reinforce education as necessary  Thank you

## 2018-07-27 NOTE — PROGRESS NOTES
Progress Note - Cardiology   Monique Poag 65 y o  female MRN: [id number]  Unit/Bed#: [address] [place] [address] Encounter: [id number]    Assessment/Plan:  Acute on chronic diastolic heart failure- current decompensation secondary to dietary noncompliance  Reports high salt intake  Recommend transition to p  O  Diuretics (furosemide 40 mg daily) and discussed with patient about restricting 2 2 g sodium diet    Chest pain abnormal EKG- T-wave inversions noted  Nuclear stress test without significant inducible ischemia  Noted with mild transient ischemic dilatation  Hypokalemia    Hypertension    Dyslipidemia    History of tobacco use    Obesity    Subjective/Objective   Reports improvement in her shortness of breath  Objective:     Vitals: /73   Pulse 76   Temp 97 7 °F (36 5 °C) (Oral)   Resp 18   Ht 5' 5" (1 651 m)   Wt 83 kg (182 lb 15 7 oz)   LMP 09/04/2009 (Within Days) Comment: per patient   SpO2 98%   Breastfeeding? No   BMI 30 45 kg/m²   Vitals:    07/26/18 0600 07/27/18 0555   Weight: 81 4 kg (179 lb 7 3 oz) 83 kg (182 lb 15 7 oz)     Orthostatic Blood Pressures      Most Recent Value   Blood Pressure  128/73 filed at 07/27/2018 1040   Patient Position - Orthostatic VS  Sitting filed at 07/27/2018 0815            Intake/Output Summary (Last 24 hours) at 07/27/18 1430  Last data filed at 07/27/18 1349   Gross per 24 hour   Intake              300 ml   Output             2600 ml   Net            -2300 ml       Invasive Devices     Peripheral Intravenous Line            Peripheral IV 07/25/18 Right Arm 1 day                Review of Systems: reviewed    Physical Exam   Constitutional: She is oriented to person, place, and time  No distress  HENT:   Head: Normocephalic and atraumatic  Right Ear: External ear normal    Left Ear: External ear normal    Eyes: Conjunctivae are normal  Pupils are equal, round, and reactive to light  Right eye exhibits no discharge  Left eye exhibits no discharge  No scleral icterus  Neck: Normal range of motion  Neck supple  No JVD present  No tracheal deviation present  No thyromegaly present  Cardiovascular: Normal rate and regular rhythm  Exam reveals gallop  Exam reveals no friction rub  Murmur heard  Pulmonary/Chest: Effort normal and breath sounds normal  No stridor  No respiratory distress  She has no wheezes  She has no rales  She exhibits no tenderness  Abdominal: Soft  Bowel sounds are normal  She exhibits no distension and no mass  There is no tenderness  There is no rebound and no guarding  Musculoskeletal: Normal range of motion  She exhibits edema  She exhibits no tenderness or deformity  Neurological: She is alert and oriented to person, place, and time  She has normal reflexes  No cranial nerve deficit  She exhibits normal muscle tone  Coordination normal    Skin: Skin is warm and dry  No rash noted  She is not diaphoretic  No erythema  No pallor  Psychiatric: She has a normal mood and affect  Her behavior is normal  Judgment and thought content normal      Lab Results: I have personally reviewed pertinent lab results  Imaging: I have personally reviewed pertinent reports  EKG: reviewed  VTE Pharmacologic Prophylaxis: Sequential compression device (Venodyne)   VTE Mechanical Prophylaxis: sequential compression device    Counseling / Coordination of Care  Total time spent today 40 minutes  Greater than 50% of total time was spent with the patient and / or family counseling and / or coordination of care   A description of the counseling / coordination of care: hf

## 2018-07-27 NOTE — ASSESSMENT & PLAN NOTE
Likely secondary to Cymbalta use  Keep potassium level over 4 and magnesium level over 2  QTC is much better with electrolyte replacement

## 2018-07-27 NOTE — DISCHARGE SUMMARY
Discharge- Kimo Elizabeth 1960, 65 y o  female MRN: [id number]    Unit/Bed#: [address] [address] Encounter: [id number]    Primary Care Provider: Linnea Renae DO   Date and time admitted to hospital: 7/25/2018  9:48 PM        * Acute on chronic diastolic CHF (congestive heart failure) ([address] )   Assessment & Plan    Due to diet noncompliance and excessive salt intake   Pt patient's echo in December 3946 showed diastolic dysfunction with normal ejection fraction  Patient had good diuresis with Lasix 40 milligram IV daily  Patient might also have underlying COPD contributing to her shortness of breath given her prolonged smoking history  Patient will need outpatient follow-up with pulmonology for PFTs  Patient will be discharged home on Lasix 40 milligram p   O  daily along with KCl 20 milligrams every other day to follow BMP in 1 week   Patient was also given nutritional consultation during the hospital stay        Chest pain   Assessment & Plan    Serial cardiac enzymes were negative  Patient underwent nuclear stress test today which was normal  Continue aspirin, metoprolol and Lipitor  Prolonged QT interval   Assessment & Plan    Likely secondary to Cymbalta use  Keep potassium level over 4 and magnesium level over 2  QTC is much better with electrolyte replacement        Hypertension   Assessment & Plan    Continue Metoprolol and Cozaar        Hyperlipidemia   Assessment & Plan    Continue Statin and Lopid        Anxiety   Assessment & Plan    Continue Cymbalta and Xanax prn         TIA (transient ischemic attack)   Assessment & Plan    Pt denies any residual deficit    Continue Plavix and Statin               Discharging Physician / Practitioner: Josee Childs MD  PCP: Linnea Renae DO  Admission Date:   Admission Orders     Ordered        07/25/18 2356  Inpatient Admission  Once         07/25/18 2315  Place in Observation (expected length of stay for this patient is less than two midnights) Once             Discharge Date: 07/27/18    Resolved Problems  Date Reviewed: 7/27/2018    None          Consultations During Hospital Stay:  · Dr Pham Gold    Procedures Performed:     · Nuclear stress test was normal       Outpatient Tests Requested:  · BMP in 1 week  The patient advised to follow up outpatient with pulmonology for PFTs    Complications:  None    Reason for Admission:  Chest pain or shortness of    Hospital Course:     Matha Peabody is a 65 y o  female patient with past medical of CHF, hypertension, hyperlipidemia, anxiety, CVA who originally presented to the hospital on 7/25/2018 due to chest pain and shortness of breath  Patient with hospital for CHF exacerbation and patient was started on IV Lasix with good diuresis  Patient had serial cardiac enzymes which were negative and patient later underwent nuclear stress test which was normal   Patient advised on dietary compliance with low-salt diet and patient also ordered nutrition consultation before discharge    Please see above list of diagnoses and related plan for additional information  Condition at Discharge: stable     Discharge Day Visit / Exam:     Subjective:  Patient denies any chest pain or shortness of breath  Improved leg swelling  Vitals: Blood Pressure: 128/73 (07/27/18 1040)  Pulse: 76 (07/27/18 1040)  Temperature: 97 7 °F (36 5 °C) (07/27/18 0815)  Temp Source: Oral (07/27/18 0815)  Respirations: 18 (07/27/18 1040)  Height: 5' 5" (165 1 cm) (07/26/18 0008)  Weight - Scale: 83 kg (182 lb 15 7 oz) (07/27/18 0555)  SpO2: 98 % (07/27/18 1040)  Exam:   Physical Exam   Constitutional: No distress  HENT:   Head: Normocephalic and atraumatic  Nose: Nose normal    Eyes: Conjunctivae and EOM are normal  Pupils are equal, round, and reactive to light  Neck: Normal range of motion  Neck supple  No JVD present  Cardiovascular: Normal rate, regular rhythm and normal heart sounds  Exam reveals no gallop and no friction rub  No murmur heard  Pulmonary/Chest: Effort normal and breath sounds normal  No respiratory distress  She has no wheezes  She has no rales  She exhibits no tenderness  Abdominal: Soft  Bowel sounds are normal  She exhibits no distension  There is no tenderness  There is no rebound and no guarding  Musculoskeletal: She exhibits edema  Trace edema   Neurological: She is alert  No cranial nerve deficit  Skin: Skin is warm and dry  No rash noted  Psychiatric: She has a normal mood and affect  Discharge instructions/Information to patient and family:   See after visit summary for information provided to patient and family  Provisions for Follow-Up Care:  See after visit summary for information related to follow-up care and any pertinent home health orders  Disposition:     Home    For Discharges to [facility] SNF:   · Not Applicable to this Patient - Not Applicable to this Patient    Planned Readmission: No     Discharge Statement:  I spent  35 minutes discharging the patient  This time was spent on the day of discharge  I had direct contact with the patient on the day of discharge  Greater than 50% of the total time was spent examining patient, answering all patient questions, arranging and discussing plan of care with patient as well as directly providing post-discharge instructions  Additional time then spent on discharge activities  Discharge Medications:  See after visit summary for reconciled discharge medications provided to patient and family        ** Please Note: This note has been constructed using a voice recognition system **

## 2018-07-27 NOTE — ASSESSMENT & PLAN NOTE
Serial cardiac enzymes were negative  Patient underwent nuclear stress test today which was normal  Continue aspirin, metoprolol and Lipitor

## 2018-07-27 NOTE — ASSESSMENT & PLAN NOTE
Due to diet noncompliance and excessive salt intake   Pt patient's echo in December 3623 showed diastolic dysfunction with normal ejection fraction  Patient had good diuresis with Lasix 40 milligram IV daily  Patient might also have underlying COPD contributing to her shortness of breath given her prolonged smoking history  Patient will need outpatient follow-up with pulmonology for PFTs  Patient will be discharged home on Lasix 40 milligram p   O  daily along with KCl 20 milligrams every other day to follow BMP in 1 week   Patient was also given nutritional consultation during the hospital stay

## 2018-08-05 ENCOUNTER — APPOINTMENT (EMERGENCY)
Dept: RADIOLOGY | Facility: HOSPITAL | Age: 58
End: 2018-08-05
Payer: COMMERCIAL

## 2018-08-05 ENCOUNTER — HOSPITAL ENCOUNTER (EMERGENCY)
Facility: HOSPITAL | Age: 58
Discharge: HOME | End: 2018-08-05
Attending: EMERGENCY MEDICINE | Admitting: EMERGENCY MEDICINE
Payer: COMMERCIAL

## 2018-08-05 DIAGNOSIS — R56.9 SEIZURE (HCC): Primary | ICD-10-CM

## 2018-08-05 LAB
ALBUMIN SERPL BCP-MCNC: 4 G/DL (ref 3.5–5)
ALP SERPL-CCNC: 78 U/L (ref 46–116)
ALT SERPL W P-5'-P-CCNC: 39 U/L (ref 12–78)
ANION GAP SERPL CALCULATED.3IONS-SCNC: 11 MMOL/L (ref 4–13)
AST SERPL W P-5'-P-CCNC: 14 U/L (ref 5–45)
BASOPHILS # BLD AUTO: 0.06 THOUSANDS/ΜL (ref 0–0.1)
BASOPHILS NFR BLD AUTO: 1 % (ref 0–1)
BILIRUB SERPL-MCNC: 0.3 MG/DL (ref 0.2–1)
BUN SERPL-MCNC: 21 MG/DL (ref 5–25)
CALCIUM SERPL-MCNC: 8.7 MG/DL (ref 8.3–10.1)
CHLORIDE SERPL-SCNC: 100 MMOL/L (ref 100–108)
CO2 SERPL-SCNC: 27 MMOL/L (ref 21–32)
CREAT SERPL-MCNC: 1.25 MG/DL (ref 0.6–1.3)
EOSINOPHIL # BLD AUTO: 0.09 THOUSAND/ΜL (ref 0–0.61)
EOSINOPHIL NFR BLD AUTO: 1 % (ref 0–6)
ERYTHROCYTE [DISTWIDTH] IN BLOOD BY AUTOMATED COUNT: 12.9 % (ref 11.6–15.1)
GFR SERPL CREATININE-BSD FRML MDRD: 48 ML/MIN/1.73SQ M
GLUCOSE SERPL-MCNC: 152 MG/DL (ref 65–140)
HCT VFR BLD AUTO: 43.2 % (ref 34.8–46.1)
HGB BLD-MCNC: 14 G/DL (ref 11.5–15.4)
IMM GRANULOCYTES # BLD AUTO: 0.03 THOUSAND/UL (ref 0–0.2)
IMM GRANULOCYTES NFR BLD AUTO: 0 % (ref 0–2)
LYMPHOCYTES # BLD AUTO: 0.95 THOUSANDS/ΜL (ref 0.6–4.47)
LYMPHOCYTES NFR BLD AUTO: 9 % (ref 14–44)
MCH RBC QN AUTO: 29.5 PG (ref 26.8–34.3)
MCHC RBC AUTO-ENTMCNC: 32.4 G/DL (ref 31.4–37.4)
MCV RBC AUTO: 91 FL (ref 82–98)
MONOCYTES # BLD AUTO: 0.56 THOUSAND/ΜL (ref 0.17–1.22)
MONOCYTES NFR BLD AUTO: 5 % (ref 4–12)
NEUTROPHILS # BLD AUTO: 8.71 THOUSANDS/ΜL (ref 1.85–7.62)
NEUTS SEG NFR BLD AUTO: 84 % (ref 43–75)
NRBC BLD AUTO-RTO: 0 /100 WBCS
PLATELET # BLD AUTO: 365 THOUSANDS/UL (ref 149–390)
PMV BLD AUTO: 9 FL (ref 8.9–12.7)
POTASSIUM SERPL-SCNC: 3.2 MMOL/L (ref 3.5–5.3)
PROT SERPL-MCNC: 8 G/DL (ref 6.4–8.2)
RBC # BLD AUTO: 4.75 MILLION/UL (ref 3.81–5.12)
SODIUM SERPL-SCNC: 138 MMOL/L (ref 136–145)
WBC # BLD AUTO: 10.4 THOUSAND/UL (ref 4.31–10.16)

## 2018-08-05 PROCEDURE — 99284 EMERGENCY DEPT VISIT MOD MDM: CPT

## 2018-08-05 PROCEDURE — 85025 COMPLETE CBC W/AUTO DIFF WBC: CPT | Performed by: EMERGENCY MEDICINE

## 2018-08-05 PROCEDURE — 73030 X-RAY EXAM OF SHOULDER: CPT

## 2018-08-05 PROCEDURE — 96372 THER/PROPH/DIAG INJ SC/IM: CPT

## 2018-08-05 PROCEDURE — 80053 COMPREHEN METABOLIC PANEL: CPT | Performed by: EMERGENCY MEDICINE

## 2018-08-05 PROCEDURE — 71045 X-RAY EXAM CHEST 1 VIEW: CPT

## 2018-08-05 PROCEDURE — 36415 COLL VENOUS BLD VENIPUNCTURE: CPT | Performed by: EMERGENCY MEDICINE

## 2018-08-05 RX ORDER — KETOROLAC TROMETHAMINE 30 MG/ML
30 INJECTION, SOLUTION INTRAMUSCULAR; INTRAVENOUS ONCE
Status: COMPLETED | OUTPATIENT
Start: 2018-08-05 | End: 2018-08-05

## 2018-08-05 RX ORDER — ARIPIPRAZOLE 5 MG/1
5 TABLET ORAL DAILY
COMMUNITY

## 2018-08-05 RX ORDER — CLONAZEPAM 0.5 MG/1
0.5 TABLET ORAL 3 TIMES DAILY
COMMUNITY
End: 2019-08-21

## 2018-08-05 RX ORDER — CARBAMAZEPINE 200 MG/1
200 TABLET ORAL 3 TIMES DAILY
COMMUNITY
End: 2019-08-21

## 2018-08-05 RX ORDER — CLONAZEPAM 0.5 MG/1
0.25 TABLET ORAL 2 TIMES DAILY
Qty: 7 TABLET | Refills: 0 | Status: SHIPPED | OUTPATIENT
Start: 2018-08-05 | End: 2018-08-19

## 2018-08-05 RX ORDER — POTASSIUM CHLORIDE 20 MEQ/1
40 TABLET, EXTENDED RELEASE ORAL ONCE
Status: COMPLETED | OUTPATIENT
Start: 2018-08-05 | End: 2018-08-05

## 2018-08-05 RX ORDER — CLONAZEPAM 0.5 MG/1
0.25 TABLET ORAL ONCE
Status: COMPLETED | OUTPATIENT
Start: 2018-08-05 | End: 2018-08-05

## 2018-08-05 RX ADMIN — KETOROLAC TROMETHAMINE 30 MG: 30 INJECTION, SOLUTION INTRAMUSCULAR at 19:52

## 2018-08-05 RX ADMIN — CLONAZEPAM 0.25 MG: 0.5 TABLET ORAL at 19:52

## 2018-08-05 RX ADMIN — POTASSIUM CHLORIDE 40 MEQ: 1500 TABLET, EXTENDED RELEASE ORAL at 21:24

## 2018-08-05 NOTE — ED NOTES
Pt not aware that being off her klonopin could lead to seizures, educated to benzodiazapine withdrawal      Rosa Calderon RN  08/05/18 1958

## 2018-08-06 NOTE — ED PROVIDER NOTES
History  Chief Complaint   Patient presents with    Seizure - Prior Hx Of     pt states had a seizure today and fell, c/o pain left shoulder/clavicle  States was off her Tegretol for 1 week, started taking again yesterday    Fall     57yoF hx seizure disorder off tegretol and clonazepam for a week d/t running out of meds, had a breakthrough seiure today  C/o L shoulder pain  Prior to Admission Medications   Prescriptions Last Dose Informant Patient Reported? Taking?    ALPRAZolam (XANAX) 0 5 mg tablet   No No   Sig: Take 1 tablet by mouth 3 (three) times a day as needed for anxiety for up to 3 days   ARIPiprazole (ABILIFY) 5 mg tablet   Yes Yes   Sig: Take 5 mg by mouth daily   DULoxetine HCl (CYMBALTA PO)  Self Yes Yes   Sig: Take 90 mg by mouth daily at bedtime     atorvastatin (LIPITOR) 80 mg tablet   Yes Yes   Sig: Take 80 mg by mouth daily   carBAMazepine (TEGretol) 200 mg tablet   Yes Yes   Sig: Take 200 mg by mouth 3 (three) times a day   clonazePAM (KlonoPIN) 0 5 mg tablet   Yes Yes   Sig: Take 0 5 mg by mouth 3 (three) times a day Ran out 2 days ago   clopidogrel (PLAVIX) 75 mg tablet   No Yes   Sig: Take 1 tablet by mouth daily   Patient taking differently: Take 75 mg by mouth daily Last dose  1/23/18    furosemide (LASIX) 20 mg tablet   No Yes   Sig: Take 2 tablets (40 mg total) by mouth daily   Patient taking differently: Take 20 mg by mouth daily     gabapentin (NEURONTIN) 600 MG tablet  Self Yes Yes   Sig: Take 300 mg by mouth 3 (three) times a day   losartan (COZAAR) 25 mg tablet  Self Yes Yes   Sig: Take 50 mg by mouth daily     metoprolol tartrate (LOPRESSOR) 100 mg tablet   No Yes   Sig: Take 1 tablet (100 mg total) by mouth every 12 (twelve) hours   pantoprazole (PROTONIX) 40 mg tablet   Yes Yes   Sig: Take 40 mg by mouth daily   potassium chloride (K-DUR,KLOR-CON) 20 mEq tablet   No Yes   Sig: Take 1 tablet (20 mEq total) by mouth every other day      Facility-Administered Medications: None       Past Medical History:   Diagnosis Date    Alopecia     Back injury     Bell's palsy     CHF (congestive heart failure) (HCC)     Chronic pain     back    Closed left arm fracture     Fibromyalgia     Fibromyalgia, primary     GERD (gastroesophageal reflux disease)     Hyperlipidemia     Hypertension     Lyme disease     [facility] [address] [facility]         [facility]     Cardiac catheterization 2 years ago showed 30% blockage in 1 of the blood vessels    Stroke [facility]     TIA (transient ischemic attack)     2017       Past Surgical History:   Procedure Laterality Date     SECTION      CHOLECYSTECTOMY      CORONARY ANGIOPLASTY          ORIF TIBIA FRACTURE Left 2018    Procedure: OPEN REDUCTION W/ INTERNAL FIXATION (ORIF) TIBIA FRACTURE;  Surgeon: Sincere Joshua MD;  Location: [facility];  Service: Orthopedics       Family History   Problem Relation Age of Onset    Heart attack Mother     Heart attack Father      I have reviewed and agree with the history as documented  Social History   Substance Use Topics    Smoking status: Former Smoker     Packs/day: 0 00     Years: 40 00    Smokeless tobacco: Never Used    Alcohol use No      Comment: as per patient; former social drinker         Review of Systems   Constitutional: Negative for fever  Respiratory: Negative for cough  Gastrointestinal: Negative for abdominal pain  Musculoskeletal: Negative for back pain  Neurological: Negative for headaches  All other systems reviewed and are negative  Physical Exam  Physical Exam   Constitutional: She is oriented to person, place, and time  She appears well-developed  HENT:   Mouth/Throat: Oropharynx is clear and moist    Eyes: Conjunctivae are normal    Neck: Neck supple  Cardiovascular: Normal rate and regular rhythm  Pulmonary/Chest: Effort normal and breath sounds normal    Abdominal: Soft   Bowel sounds are normal    Musculoskeletal:   Full ROM L shoulder  Mild AC tenderness  Neurological: She is alert and oriented to person, place, and time  Skin: Skin is warm and dry  Psychiatric: She has a normal mood and affect  Vitals reviewed        Vital Signs  ED Triage Vitals   Temperature Pulse Respirations Blood Pressure SpO2   08/05/18 1920 08/05/18 1920 08/05/18 1920 08/05/18 1920 08/05/18 1920   99 2 °F (37 3 °C) 70 20 119/75 94 %      Temp Source Heart Rate Source Patient Position - Orthostatic VS BP Location FiO2 (%)   08/05/18 1920 08/05/18 2128 08/05/18 1920 08/05/18 1920 --   Tympanic Monitor Sitting Right arm       Pain Score       08/05/18 1920       9           Vitals:    08/05/18 1920 08/05/18 2128   BP: 119/75 122/57   Pulse: 70 64   Patient Position - Orthostatic VS: Sitting Sitting       Visual Acuity      ED Medications  Medications   ketorolac (TORADOL) injection 30 mg (30 mg Intramuscular Given 8/5/18 1952)   clonazePAM (KlonoPIN) tablet 0 25 mg (0 25 mg Oral Given 8/5/18 1952)   potassium chloride (K-DUR,KLOR-CON) CR tablet 40 mEq (40 mEq Oral Given 8/5/18 2124)       Diagnostic Studies  Results Reviewed     Procedure Component Value Units Date/Time    Comprehensive metabolic panel [[id number]]  (Abnormal) Collected:  08/05/18 1951    Lab Status:  Final result Specimen:  Blood from Arm, Right Updated:  08/05/18 2028     Sodium 138 mmol/L      Potassium 3 2 (L) mmol/L      Chloride 100 mmol/L      CO2 27 mmol/L      Anion Gap 11 mmol/L      BUN 21 mg/dL      Creatinine 1 25 mg/dL      Glucose 152 (H) mg/dL      Calcium 8 7 mg/dL      AST 14 U/L      ALT 39 U/L      Alkaline Phosphatase 78 U/L      Total Protein 8 0 g/dL      Albumin 4 0 g/dL      Total Bilirubin 0 30 mg/dL      eGFR 48 ml/min/1 73sq m     Narrative:         National Kidney Disease Education Program recommendations are as follows:  GFR calculation is accurate only with a steady state creatinine  Chronic Kidney disease less than 60 ml/min/1 73 sq  meters  Kidney failure less than 15 ml/min/1 73 sq  meters  CBC and differential [[id number]]  (Abnormal) Collected:  08/05/18 1951    Lab Status:  Final result Specimen:  Blood from Arm, Right Updated:  08/05/18 2001     WBC 10 40 (H) Thousand/uL      RBC 4 75 Million/uL      Hemoglobin 14 0 g/dL      Hematocrit 43 2 %      MCV 91 fL      MCH 29 5 pg      MCHC 32 4 g/dL      RDW 12 9 %      MPV 9 0 fL      Platelets 006 Thousands/uL      nRBC 0 /100 WBCs      Neutrophils Relative 84 (H) %      Immat GRANS % 0 %      Lymphocytes Relative 9 (L) %      Monocytes Relative 5 %      Eosinophils Relative 1 %      Basophils Relative 1 %      Neutrophils Absolute 8 71 (H) Thousands/µL      Immature Grans Absolute 0 03 Thousand/uL      Lymphocytes Absolute 0 95 Thousands/µL      Monocytes Absolute 0 56 Thousand/µL      Eosinophils Absolute 0 09 Thousand/µL      Basophils Absolute 0 06 Thousands/µL                  XR chest 1 view portable   Final Result by Paradise Hodges MD (08/06 0363)      No acute cardiopulmonary disease  Workstation performed: [id number]         XR shoulder 2+ views LEFT   Final Result by Paradise Hodges MD (08/06 7520)      No acute osseous abnormality  Mild degenerative changes at the glenohumeral joint  Workstation performed: [id number]                    Procedures  Procedures       Phone Contacts  ED Phone Contact    ED Course                               MDM  Number of Diagnoses or Management Options  [facility]):   Diagnosis management comments: Breakthrough sz d/t med noncompliance  Pt advised to resume meds  Given RX     F/u PCP 1 wk    CritCare Time    Disposition  Final diagnoses:   [facility])     Time reflects when diagnosis was documented in both MDM as applicable and the Disposition within this note     Time User Action Codes Description Comment    8/5/2018  9:07 PM Wilma Metz Add [R56 9] [facility])       ED Disposition     ED Disposition Condition Comment    Discharge  [facility] discharge to home/self care  Condition at discharge: Stable        Follow-up Information     Follow up With Specialties Details Why [address], DO Family Medicine In 1 week  [address] [facility]            Discharge Medication List as of 8/5/2018  9:11 PM      START taking these medications    Details   !! clonazePAM (KlonoPIN) 0 5 mg tablet Take 0 5 tablets (0 25 mg total) by mouth 2 (two) times a day for 14 days, Starting Sun 8/5/2018, Until Sun 8/19/2018, Print       !! - Potential duplicate medications found  Please discuss with provider        CONTINUE these medications which have NOT CHANGED    Details   ARIPiprazole (ABILIFY) 5 mg tablet Take 5 mg by mouth daily, Historical Med      atorvastatin (LIPITOR) 80 mg tablet Take 80 mg by mouth daily, Until Discontinued, Historical Med      carBAMazepine (TEGretol) 200 mg tablet Take 200 mg by mouth 3 (three) times a day, Historical Med      !! clonazePAM (KlonoPIN) 0 5 mg tablet Take 0 5 mg by mouth 3 (three) times a day Ran out 2 days ago, Historical Med      clopidogrel (PLAVIX) 75 mg tablet Take 1 tablet by mouth daily, Starting Thu 12/14/2017, Print      DULoxetine HCl (CYMBALTA PO) Take 90 mg by mouth daily at bedtime  , Historical Med      furosemide (LASIX) 20 mg tablet Take 2 tablets (40 mg total) by mouth daily, Starting Fri 7/27/2018, Print      gabapentin (NEURONTIN) 600 MG tablet Take 300 mg by mouth 3 (three) times a day, Historical Med      losartan (COZAAR) 25 mg tablet Take 50 mg by mouth daily  , Historical Med      metoprolol tartrate (LOPRESSOR) 100 mg tablet Take 1 tablet (100 mg total) by mouth every 12 (twelve) hours, Starting Wed 6/6/2018, Print      pantoprazole (PROTONIX) 40 mg tablet Take 40 mg by mouth daily, Historical Med      potassium chloride (K-DUR,KLOR-CON) 20 mEq tablet Take 1 tablet (20 mEq total) by mouth every other day, Starting Sun 7/29/2018, Normal      ALPRAZolam (XANAX) 0 5 mg tablet Take 1 tablet by mouth 3 (three) times a day as needed for anxiety for up to 3 days, Starting Thu 12/28/2017, Until Wed 7/25/2018, Print       !! - Potential duplicate medications found  Please discuss with provider  No discharge procedures on file      ED Provider  Electronically Signed by           Muna Mcclellan DO  08/06/18 7460

## 2018-08-06 NOTE — DISCHARGE INSTRUCTIONS
Epilepsy   WHAT YOU NEED TO KNOW:   Epilepsy is a brain disorder that causes seizures  It is also called a seizure disorder  A seizure means an abnormal area in your brain sometimes sends bursts of electrical activity  A seizure may start in one part of your brain, or both sides may be affected  Depending on the type of seizure, you may have movements you cannot control, lose consciousness, or stare straight ahead  You may be confused or tired after the seizure  A seizure may last a few seconds or longer than 5 minutes  A birth defect, tumor, stroke, dementia, injury, or infection may cause epilepsy  The cause of your epilepsy may not be known  If your seizures are not controlled, epilepsy may become life-threatening  DISCHARGE INSTRUCTIONS:   Call 911 for any of the following:   · Your seizure lasts longer than 5 minutes  · You have trouble breathing after a seizure  · You have diabetes or are pregnant and have a seizure  · You have a seizure in water, such as a swimming pool or bathtub  Return to the emergency department if:   · You have a second seizure within 24 hours of the first      · You are injured during a seizure  Contact your healthcare provider if:   · You feel you are not able to cope with your condition  · Your seizures start to happen more often  · You are confused longer than usual after a seizure  · You are planning to get pregnant or are currently pregnant  · You have questions or concerns about your condition or care  Medicines:   · Antiseizure medicine  may control or prevent another seizure  Do not stop taking this medicine  Another person may need to give you rescue medicine to stop a seizure at home  Ask your healthcare provider for more information about rescue medicine  · Take your medicine as directed  Contact your healthcare provider if you think your medicine is not helping or if you have side effects  Tell him of her if you are allergic to any medicine  Keep a list of the medicines, vitamins, and herbs you take  Include the amounts, and when and why you take them  Bring the list or the pill bottles to follow-up visits  Carry your medicine list with you in case of an emergency  Follow up with your neurologist as directed: You may need tests to check the level of antiseizure medicine in your blood  Your neurologist may need to change or adjust your medicine  Write down your questions so you remember to ask them during your visits  What you can do to prevent a seizure: You may not be able to prevent every seizure  The following can help you manage triggers that may make a seizure start:  · Take your medicine every day at the same time  This will also help prevent medicine side effects  Set an alarm to help remind you to take your medicine every day  · Manage stress  Stress can be a trigger for epilepsy  Exercise can help you reduce stress  Talk to your healthcare provider about exercise that is safe for you  Illness can be a form of stress  Eat a variety of healthy foods and drink plenty of liquids during an illness  Talk to your healthcare provider about other ways to manage stress  · Set a regular sleep schedule  A lack of sleep can trigger a seizure  Try to go to sleep and wake up at the same time every day  Keep your bedroom quiet and dark  Talk to your healthcare provider if you are having trouble sleeping  · Limit or do not drink alcohol as directed  Alcohol can trigger a seizure, especially if you drink a large amount at one time  A drink of alcohol is 12 ounces of beer, 1½ ounces of liquor, or 5 ounces of wine  Talk to your healthcare provider about a safe amount of alcohol for you  Your provider may recommend that you do not drink any alcohol  Tell him or her if you need help to quit drinking  What you can do to manage epilepsy:   · Keep a seizure diary  This can help you find your triggers and avoid them   Write down the dates of your seizures, where you were, and what you were doing  Include how you felt before and after  Possible triggers include illness, lack of sleep, hormonal changes, alcohol, drugs, lights, or stress  · Record any auras you have before a seizure  An aura is a sign that you are about to have a seizure  Auras happen before certain types of seizures that are in only 1 part of the brain  The aura may happen seconds before a seizure, or up to an hour before  You may feel, see, hear, or smell something  Examples include part of your body becoming hot  You may see a flash of light or hear something  You may have anxiety or déjà vu  If you have an aura, include it in your seizure diary  · Create a care plan  Tell family, friends, and coworkers about your epilepsy  Give them instructions that tell them how they can keep you safe if you have a seizure  · Find support  You may be referred to a psychologist or   Ask your healthcare provider about support groups for people with epilepsy  · Ask what safety precautions you should take  Talk with your healthcare provider about driving  You may not be able to drive until you are seizure-free for a period of time  You will need to check the law where you live  Also talk to your healthcare provider about swimming and bathing  You may drown or develop life-threatening heart or lung damage if you have a seizure in water  · Carry medical alert identification  Wear medical alert jewelry or carry a card that says you have epilepsy  Ask your healthcare provider where to get these items  How others can keep you safe during a seizure:  Give the following instructions to family, friends, and coworkers:  · Do not panic  · Do not hold me down or put anything in my mouth  · Gently guide me to the floor or a soft surface  · Place me on my side to help prevent me from swallowing saliva or vomit  · Protect me from injury   Remove sharp or hard objects from the area surrounding me, or cushion my head  · Loosen the clothing around my head and neck  · Time how long my seizure lasts  Call 911 if my seizure lasts longer than 5 minutes or if I have a second seizure  · Stay with me until my seizure ends  Let me rest until I am fully awake  · Perform CPR if I stop breathing or you cannot feel my pulse  · Do not give me anything to eat or drink until I am fully awake  © 2017 [facility] Information is for End User's use only and may not be sold, redistributed or otherwise used for commercial purposes  All illustrations and images included in CareNotes® are the copyrighted property of [employer]  or Boris Robles  The above information is an  only  It is not intended as medical advice for individual conditions or treatments  Talk to your doctor, nurse or pharmacist before following any medical regimen to see if it is safe and effective for you

## 2018-08-07 VITALS
RESPIRATION RATE: 18 BRPM | WEIGHT: 179.9 LBS | HEART RATE: 64 BPM | SYSTOLIC BLOOD PRESSURE: 122 MMHG | BODY MASS INDEX: 29.94 KG/M2 | TEMPERATURE: 99.2 F | OXYGEN SATURATION: 94 % | DIASTOLIC BLOOD PRESSURE: 57 MMHG

## 2018-09-11 ENCOUNTER — APPOINTMENT (EMERGENCY)
Dept: RADIOLOGY | Facility: HOSPITAL | Age: 58
End: 2018-09-11
Payer: COMMERCIAL

## 2018-09-11 ENCOUNTER — HOSPITAL ENCOUNTER (EMERGENCY)
Facility: HOSPITAL | Age: 58
Discharge: HOME | End: 2018-09-11
Attending: EMERGENCY MEDICINE | Admitting: EMERGENCY MEDICINE
Payer: COMMERCIAL

## 2018-09-11 VITALS
RESPIRATION RATE: 19 BRPM | TEMPERATURE: 97.6 F | HEART RATE: 79 BPM | SYSTOLIC BLOOD PRESSURE: 124 MMHG | OXYGEN SATURATION: 95 % | WEIGHT: 180 LBS | DIASTOLIC BLOOD PRESSURE: 61 MMHG | BODY MASS INDEX: 29.95 KG/M2

## 2018-09-11 DIAGNOSIS — S22.42XA MULTIPLE FRACTURES OF RIBS, LEFT SIDE, INIT FOR CLOS FX: Primary | ICD-10-CM

## 2018-09-11 DIAGNOSIS — S42.009A CLAVICLE FRACTURE: ICD-10-CM

## 2018-09-11 LAB
ALBUMIN SERPL BCP-MCNC: 3.6 G/DL (ref 3.5–5)
ALP SERPL-CCNC: 99 U/L (ref 46–116)
ALT SERPL W P-5'-P-CCNC: 16 U/L (ref 12–78)
ANION GAP SERPL CALCULATED.3IONS-SCNC: 10 MMOL/L (ref 4–13)
APTT PPP: 28 SECONDS (ref 24–33)
AST SERPL W P-5'-P-CCNC: 6 U/L (ref 5–45)
BACTERIA UR QL AUTO: ABNORMAL /HPF
BASOPHILS # BLD AUTO: 0.08 THOUSANDS/ΜL (ref 0–0.1)
BASOPHILS NFR BLD AUTO: 1 % (ref 0–1)
BILIRUB SERPL-MCNC: 0.2 MG/DL (ref 0.2–1)
BILIRUB UR QL STRIP: NEGATIVE
BUN SERPL-MCNC: 18 MG/DL (ref 5–25)
CALCIUM SERPL-MCNC: 8.5 MG/DL (ref 8.3–10.1)
CHLORIDE SERPL-SCNC: 104 MMOL/L (ref 100–108)
CLARITY UR: ABNORMAL
CO2 SERPL-SCNC: 28 MMOL/L (ref 21–32)
COLOR UR: ABNORMAL
CREAT SERPL-MCNC: 0.78 MG/DL (ref 0.6–1.3)
EOSINOPHIL # BLD AUTO: 0.2 THOUSAND/ΜL (ref 0–0.61)
EOSINOPHIL NFR BLD AUTO: 3 % (ref 0–6)
ERYTHROCYTE [DISTWIDTH] IN BLOOD BY AUTOMATED COUNT: 13.6 % (ref 11.6–15.1)
GFR SERPL CREATININE-BSD FRML MDRD: 85 ML/MIN/1.73SQ M
GLUCOSE SERPL-MCNC: 96 MG/DL (ref 65–140)
GLUCOSE UR STRIP-MCNC: NEGATIVE MG/DL
HCT VFR BLD AUTO: 39.9 % (ref 34.8–46.1)
HGB BLD-MCNC: 13 G/DL (ref 11.5–15.4)
HGB UR QL STRIP.AUTO: NEGATIVE
IMM GRANULOCYTES # BLD AUTO: 0.01 THOUSAND/UL (ref 0–0.2)
IMM GRANULOCYTES NFR BLD AUTO: 0 % (ref 0–2)
INR PPP: 1.01 (ref 0.86–1.16)
KETONES UR STRIP-MCNC: NEGATIVE MG/DL
LEUKOCYTE ESTERASE UR QL STRIP: ABNORMAL
LIPASE SERPL-CCNC: 242 U/L (ref 73–393)
LYMPHOCYTES # BLD AUTO: 3.16 THOUSANDS/ΜL (ref 0.6–4.47)
LYMPHOCYTES NFR BLD AUTO: 54 % (ref 14–44)
MCH RBC QN AUTO: 30.2 PG (ref 26.8–34.3)
MCHC RBC AUTO-ENTMCNC: 32.6 G/DL (ref 31.4–37.4)
MCV RBC AUTO: 93 FL (ref 82–98)
MONOCYTES # BLD AUTO: 0.45 THOUSAND/ΜL (ref 0.17–1.22)
MONOCYTES NFR BLD AUTO: 8 % (ref 4–12)
NEUTROPHILS # BLD AUTO: 2.03 THOUSANDS/ΜL (ref 1.85–7.62)
NEUTS SEG NFR BLD AUTO: 34 % (ref 43–75)
NITRITE UR QL STRIP: NEGATIVE
NON-SQ EPI CELLS URNS QL MICRO: ABNORMAL /HPF
NRBC BLD AUTO-RTO: 0 /100 WBCS
NT-PROBNP SERPL-MCNC: 2321 PG/ML
PH UR STRIP.AUTO: 5.5 [PH] (ref 5–9)
PLATELET # BLD AUTO: 435 THOUSANDS/UL (ref 149–390)
PMV BLD AUTO: 8.3 FL (ref 8.9–12.7)
POTASSIUM SERPL-SCNC: 4 MMOL/L (ref 3.5–5.3)
PROT SERPL-MCNC: 7.3 G/DL (ref 6.4–8.2)
PROT UR STRIP-MCNC: NEGATIVE MG/DL
PROTHROMBIN TIME: 10.6 SECONDS (ref 9.4–11.7)
RBC # BLD AUTO: 4.3 MILLION/UL (ref 3.81–5.12)
RBC #/AREA URNS AUTO: ABNORMAL /HPF
SODIUM SERPL-SCNC: 142 MMOL/L (ref 136–145)
SP GR UR STRIP.AUTO: 1.01 (ref 1–1.03)
TROPONIN I SERPL-MCNC: <0.02 NG/ML
UROBILINOGEN UR QL STRIP.AUTO: 0.2 E.U./DL
WBC # BLD AUTO: 5.93 THOUSAND/UL (ref 4.31–10.16)
WBC #/AREA URNS AUTO: ABNORMAL /HPF

## 2018-09-11 PROCEDURE — 71045 X-RAY EXAM CHEST 1 VIEW: CPT

## 2018-09-11 PROCEDURE — 99285 EMERGENCY DEPT VISIT HI MDM: CPT

## 2018-09-11 PROCEDURE — 85730 THROMBOPLASTIN TIME PARTIAL: CPT | Performed by: EMERGENCY MEDICINE

## 2018-09-11 PROCEDURE — 36415 COLL VENOUS BLD VENIPUNCTURE: CPT | Performed by: EMERGENCY MEDICINE

## 2018-09-11 PROCEDURE — 81001 URINALYSIS AUTO W/SCOPE: CPT | Performed by: EMERGENCY MEDICINE

## 2018-09-11 PROCEDURE — 96374 THER/PROPH/DIAG INJ IV PUSH: CPT

## 2018-09-11 PROCEDURE — 80053 COMPREHEN METABOLIC PANEL: CPT | Performed by: EMERGENCY MEDICINE

## 2018-09-11 PROCEDURE — 85025 COMPLETE CBC W/AUTO DIFF WBC: CPT | Performed by: EMERGENCY MEDICINE

## 2018-09-11 PROCEDURE — 85610 PROTHROMBIN TIME: CPT | Performed by: EMERGENCY MEDICINE

## 2018-09-11 PROCEDURE — 96361 HYDRATE IV INFUSION ADD-ON: CPT

## 2018-09-11 PROCEDURE — 71275 CT ANGIOGRAPHY CHEST: CPT

## 2018-09-11 PROCEDURE — 84484 ASSAY OF TROPONIN QUANT: CPT | Performed by: EMERGENCY MEDICINE

## 2018-09-11 PROCEDURE — 93005 ELECTROCARDIOGRAM TRACING: CPT

## 2018-09-11 PROCEDURE — 83880 ASSAY OF NATRIURETIC PEPTIDE: CPT | Performed by: EMERGENCY MEDICINE

## 2018-09-11 PROCEDURE — 83690 ASSAY OF LIPASE: CPT | Performed by: EMERGENCY MEDICINE

## 2018-09-11 RX ORDER — ONDANSETRON 2 MG/ML
4 INJECTION INTRAMUSCULAR; INTRAVENOUS ONCE
Status: COMPLETED | OUTPATIENT
Start: 2018-09-11 | End: 2018-09-11

## 2018-09-11 RX ORDER — HYDROCODONE BITARTRATE AND ACETAMINOPHEN 5; 325 MG/1; MG/1
1 TABLET ORAL ONCE
Status: COMPLETED | OUTPATIENT
Start: 2018-09-11 | End: 2018-09-11

## 2018-09-11 RX ADMIN — HYDROCODONE BITARTRATE AND ACETAMINOPHEN 1 TABLET: 5; 325 TABLET ORAL at 18:12

## 2018-09-11 RX ADMIN — ONDANSETRON 4 MG: 2 INJECTION INTRAMUSCULAR; INTRAVENOUS at 18:12

## 2018-09-11 RX ADMIN — IOHEXOL 85 ML: 350 INJECTION, SOLUTION INTRAVENOUS at 21:02

## 2018-09-11 RX ADMIN — SODIUM CHLORIDE 500 ML: 0.9 INJECTION, SOLUTION INTRAVENOUS at 18:42

## 2018-09-11 NOTE — ED PROVIDER NOTES
History  Chief Complaint   Patient presents with    Chest Pain     Pt states that she was sitting on the cough, having a chest pain aat around 4 pm  sweating, the pain in Mid sternum didn't radiate  c o dizziness and lighheaded as well  pain 7/10 right now  61-year-old female presents to the ED with complaints of chest pain which started approximately 1 hour and a half ago  Patient states was given 5 nitro in the ambulance without relief  Pain is on the left side of her chest increases with deep breath and palpation  No fevers no chills no known injury patient does have extensive cardiac and stroke history  Patient is awake and alert states no relief with pain so far  History provided by:  Patient and EMS personnel   used: No        Prior to Admission Medications   Prescriptions Last Dose Informant Patient Reported? Taking?    ALPRAZolam (XANAX) 0 5 mg tablet 9/11/2018 at Unknown time  No Yes   Sig: Take 1 tablet by mouth 3 (three) times a day as needed for anxiety for up to 3 days   ARIPiprazole (ABILIFY) 5 mg tablet 9/10/2018 at Unknown time  Yes Yes   Sig: Take 5 mg by mouth daily   DULoxetine HCl (CYMBALTA PO) 9/10/2018 at Unknown time Self Yes Yes   Sig: Take 90 mg by mouth daily at bedtime     atorvastatin (LIPITOR) 80 mg tablet 9/11/2018 at Unknown time  Yes Yes   Sig: Take 80 mg by mouth daily   carBAMazepine (TEGretol) 200 mg tablet 9/11/2018 at Unknown time  Yes Yes   Sig: Take 200 mg by mouth 3 (three) times a day   clonazePAM (KlonoPIN) 0 5 mg tablet Unknown at Unknown time  Yes No   Sig: Take 0 5 mg by mouth 3 (three) times a day Ran out 2 days ago   clonazePAM (KlonoPIN) 0 5 mg tablet   No No   Sig: Take 0 5 tablets (0 25 mg total) by mouth 2 (two) times a day for 14 days   furosemide (LASIX) 20 mg tablet 9/11/2018 at Unknown time  No Yes   Sig: Take 2 tablets (40 mg total) by mouth daily   Patient taking differently: Take 20 mg by mouth daily     gabapentin (NEURONTIN) 600 MG tablet 2018 at Unknown time Self Yes Yes   Sig: Take 600 mg by mouth 3 (three) times a day     losartan (COZAAR) 25 mg tablet 2018 at Unknown time Self Yes Yes   Sig: Take 50 mg by mouth daily     metoprolol tartrate (LOPRESSOR) 100 mg tablet 2018 at Unknown time  No Yes   Sig: Take 1 tablet (100 mg total) by mouth every 12 (twelve) hours   pantoprazole (PROTONIX) 40 mg tablet 2018 at Unknown time  Yes Yes   Sig: Take 40 mg by mouth daily   potassium chloride (K-DUR,KLOR-CON) 20 mEq tablet 9/10/2018 at Unknown time  No Yes   Sig: Take 1 tablet (20 mEq total) by mouth every other day      Facility-Administered Medications: None       Past Medical History:   Diagnosis Date    Alopecia     Back injury     Bell's palsy     [facility]     Chronic pain     back    Closed left arm fracture     Fibromyalgia     Fibromyalgia, primary     GERD (gastroesophageal reflux disease)     Hyperlipidemia     Hypertension     Lyme disease     [facility] [address] [facility]         Myocardial infarction ([facility] [address] [facility]     Cardiac catheterization 2 years ago showed 30% blockage in 1 of the blood vessels    Stroke ([facility] [address] [facility]     TIA (transient ischemic attack)     2017       Past Surgical History:   Procedure Laterality Date     SECTION      CHOLECYSTECTOMY      CORONARY ANGIOPLASTY          ORIF TIBIA FRACTURE Left 2018    Procedure: OPEN REDUCTION W/ INTERNAL FIXATION (ORIF) TIBIA FRACTURE;  Surgeon: Arben Mcnair MD;  Location: [facility];  Service: Orthopedics       Family History   Problem Relation Age of Onset    Heart attack Mother     Heart attack Father      I have reviewed and agree with the history as documented      Social History   Substance Use Topics    Smoking status: Former Smoker     Packs/day: 0 00     Years: 40 00    Smokeless tobacco: Never Used    Alcohol use No      Comment: as per patient; former social drinker Review of Systems   Constitutional: Negative for activity change, chills, diaphoresis and fever  HENT: Negative for congestion, ear pain, nosebleeds, sore throat, trouble swallowing and voice change  Eyes: Negative for pain, discharge and redness  Respiratory: Negative for apnea, cough, choking, shortness of breath, wheezing and stridor  Cardiovascular: Positive for chest pain  Negative for palpitations  Gastrointestinal: Negative for abdominal distention, abdominal pain, constipation, diarrhea, nausea and vomiting  Endocrine: Negative for polydipsia  Genitourinary: Negative for difficulty urinating, dysuria, flank pain, frequency, hematuria and urgency  Musculoskeletal: Negative for back pain, gait problem, joint swelling, myalgias, neck pain and neck stiffness  Skin: Negative for pallor and rash  Neurological: Negative for dizziness, tremors, syncope, speech difficulty, weakness, numbness and headaches  Hematological: Negative for adenopathy  Psychiatric/Behavioral: Negative for confusion, hallucinations, self-injury and suicidal ideas  The patient is not nervous/anxious  Physical Exam  Physical Exam   Constitutional: She is oriented to person, place, and time  Vital signs are normal  She appears well-developed and well-nourished  HENT:   Head: Normocephalic and atraumatic  Right Ear: External ear normal    Left Ear: External ear normal    Nose: Nose normal    Mouth/Throat: Oropharynx is clear and moist    Eyes: Conjunctivae and EOM are normal  Pupils are equal, round, and reactive to light  Neck: Normal range of motion  Neck supple  Cardiovascular: Normal rate, regular rhythm and normal heart sounds  Pulmonary/Chest: Effort normal and breath sounds normal    Abdominal: Soft  Bowel sounds are normal    Musculoskeletal: Normal range of motion  Neurological: She is alert and oriented to person, place, and time  Skin: Skin is warm     Psychiatric: She has a normal mood and affect  Nursing note and vitals reviewed        Vital Signs  ED Triage Vitals [09/11/18 1741]   Temperature Pulse Respirations Blood Pressure SpO2   97 6 °F (36 4 °C) 102 20 98/61 95 %      Temp Source Heart Rate Source Patient Position - Orthostatic VS BP Location FiO2 (%)   Tympanic Monitor Lying Right arm --      Pain Score       7           Vitals:    09/11/18 2045 09/11/18 2100 09/11/18 2130 09/11/18 2136   BP:  97/60  124/61   Pulse: 80  84 79   Patient Position - Orthostatic VS:    Lying       Visual Acuity      ED Medications  Medications   ondansetron (ZOFRAN) injection 4 mg (4 mg Intravenous Given 9/11/18 1812)   HYDROcodone-acetaminophen (NORCO) 5-325 mg per tablet 1 tablet (1 tablet Oral Given 9/11/18 1812)   sodium chloride 0 9 % bolus 500 mL (0 mL Intravenous Stopped 9/11/18 2003)   iohexol (OMNIPAQUE) 350 MG/ML injection (MULTI-DOSE) 85 mL (85 mL Intravenous Given 9/11/18 2102)       Diagnostic Studies  Results Reviewed     Procedure Component Value Units Date/Time    Urine Microscopic [[id number]]  (Abnormal) Collected:  09/11/18 2028    Lab Status:  Final result Specimen:  Urine from Urine, Clean Catch Updated:  09/11/18 2048     RBC, UA None Seen /hpf      WBC, UA 1-2 (A) /hpf      Epithelial Cells Innumerable (A) /hpf      Bacteria, UA Occasional /hpf     UA w Reflex to Microscopic [[id number]]  (Abnormal) Collected:  09/11/18 2028    Lab Status:  Final result Specimen:  Urine from Urine, Clean Catch Updated:  09/11/18 2034     Color, UA Light Yellow     Clarity, UA Slightly Cloudy     Specific [place], UA 1 010     pH, UA 5 5     Leukocytes, UA Small (A)     Nitrite, UA Negative     Protein, UA Negative mg/dl      Glucose, UA Negative mg/dl      Ketones, UA Negative mg/dl      Urobilinogen, UA 0 2 E U /dl      Bilirubin, UA Negative     Blood, UA Negative    Lipase [[id number]]  (Normal) Collected:  09/11/18 1816    Lab Status:  Final result Specimen:  Blood from Arm, Left Updated:  09/11/18 1843 Lipase 242 u/L     B-type natriuretic peptide [[id number]]  (Abnormal) Collected:  09/11/18 1816    Lab Status:  Final result Specimen:  Blood from Arm, Left Updated:  09/11/18 1843     NT-proBNP 2,321 (H) pg/mL     Troponin I [[id number]]  (Normal) Collected:  09/11/18 1816    Lab Status:  Final result Specimen:  Blood from Arm, Left Updated:  09/11/18 1840     Troponin I <0 02 ng/mL     Comprehensive metabolic panel [[id number]] Collected:  09/11/18 1816    Lab Status:  Final result Specimen:  Blood from Arm, Left Updated:  09/11/18 1837     Sodium 142 mmol/L      Potassium 4 0 mmol/L      Chloride 104 mmol/L      CO2 28 mmol/L      ANION GAP 10 mmol/L      BUN 18 mg/dL      Creatinine 0 78 mg/dL      Glucose 96 mg/dL      Calcium 8 5 mg/dL      AST 6 U/L      ALT 16 U/L      Alkaline Phosphatase 99 U/L      Total Protein 7 3 g/dL      Albumin 3 6 g/dL      Total Bilirubin 0 20 mg/dL      eGFR 85 ml/min/1 73sq m     Narrative:         National Kidney Disease Education Program recommendations are as follows:  GFR calculation is accurate only with a steady state creatinine  Chronic Kidney disease less than 60 ml/min/1 73 sq  meters  Kidney failure less than 15 ml/min/1 73 sq  meters      Protime-INR [[id number]]  (Normal) Collected:  09/11/18 1816    Lab Status:  Final result Specimen:  Blood from Arm, Left Updated:  09/11/18 1835     Protime 10 6 seconds      INR 1 01    APTT [[id number]]  (Normal) Collected:  09/11/18 1816    Lab Status:  Final result Specimen:  Blood from Arm, Left Updated:  09/11/18 1835     PTT 28 seconds     CBC and differential [[id number]]  (Abnormal) Collected:  09/11/18 1816    Lab Status:  Final result Specimen:  Blood from Arm, Left Updated:  09/11/18 1821     WBC 5 93 Thousand/uL      RBC 4 30 Million/uL      Hemoglobin 13 0 g/dL      Hematocrit 39 9 %      MCV 93 fL      MCH 30 2 pg      MCHC 32 6 g/dL      RDW 13 6 %      MPV 8 3 (L) fL      Platelets 219 (H) Thousands/uL      nRBC 0 /100 WBCs Neutrophils Relative 34 (L) %      Immat GRANS % 0 %      Lymphocytes Relative 54 (H) %      Monocytes Relative 8 %      Eosinophils Relative 3 %      Basophils Relative 1 %      Neutrophils Absolute 2 03 Thousands/µL      Immature Grans Absolute 0 01 Thousand/uL      Lymphocytes Absolute 3 16 Thousands/µL      Monocytes Absolute 0 45 Thousand/µL      Eosinophils Absolute 0 20 Thousand/µL      Basophils Absolute 0 08 Thousands/µL                  CTA ED chest PE study   Final Result by Alexander Kirk MD (09/11 2114)      No pulmonary embolism or aortic dissection  No effusion, airspace disease, or pneumothorax  Mild to moderate diffuse centrilobular emphysematous changes  Healing left 3rd and 4th anterior rib fractures  Healing left clavicular fracture  These were not present on the prior CT from 6/4/2018  Workstation performed: [id number]         XR chest 1 view portable    (Results Pending)              Procedures  Procedures       Phone Contacts  ED Phone Contact    ED Course                               MDM  CritCare Time    Disposition  Final diagnoses:   Multiple fractures of ribs, left side, init for clos fx   Clavicle fracture     Time reflects when diagnosis was documented in both MDM as applicable and the Disposition within this note     Time User Action Codes Description Comment    9/11/2018  9:41 PM Milka Escobar Add [S22 42XA] Multiple fractures of ribs, left side, init for clos fx     9/11/2018  9:41 PM Alex Potts 13 Clavicle fracture       ED Disposition     ED Disposition Condition Comment    Discharge  [facility] discharge to home/self care  Condition at discharge: Stable        Follow-up Information     Follow up With Specialties Details Why Contact Rommel Merino DO Family Medicine Schedule an appointment as soon as possible for a visit As needed [address]            Discharge Medication List as of 9/11/2018  9:47 PM      CONTINUE these medications which have NOT CHANGED    Details   ALPRAZolam (XANAX) 0 5 mg tablet Take 1 tablet by mouth 3 (three) times a day as needed for anxiety for up to 3 days, Starting Thu 12/28/2017, Until Tue 9/11/2018, Print      ARIPiprazole (ABILIFY) 5 mg tablet Take 5 mg by mouth daily, Historical Med      atorvastatin (LIPITOR) 80 mg tablet Take 80 mg by mouth daily, Until Discontinued, Historical Med      carBAMazepine (TEGretol) 200 mg tablet Take 200 mg by mouth 3 (three) times a day, Historical Med      DULoxetine HCl (CYMBALTA PO) Take 90 mg by mouth daily at bedtime  , Historical Med      furosemide (LASIX) 20 mg tablet Take 2 tablets (40 mg total) by mouth daily, Starting Fri 7/27/2018, Print      gabapentin (NEURONTIN) 600 MG tablet Take 600 mg by mouth 3 (three) times a day  , Historical Med      losartan (COZAAR) 25 mg tablet Take 50 mg by mouth daily  , Historical Med      metoprolol tartrate (LOPRESSOR) 100 mg tablet Take 1 tablet (100 mg total) by mouth every 12 (twelve) hours, Starting Wed 6/6/2018, Print      pantoprazole (PROTONIX) 40 mg tablet Take 40 mg by mouth daily, Historical Med      potassium chloride (K-DUR,KLOR-CON) 20 mEq tablet Take 1 tablet (20 mEq total) by mouth every other day, Starting Sun 7/29/2018, Normal      clonazePAM (KlonoPIN) 0 5 mg tablet Take 0 5 mg by mouth 3 (three) times a day Ran out 2 days ago, Historical Med           No discharge procedures on file      ED Provider  Electronically Signed by           Mulugeta Valera DO  09/12/18 0041

## 2018-09-12 NOTE — DISCHARGE INSTRUCTIONS
Clavicle Fracture   WHAT YOU NEED TO KNOW:   A clavicle fracture is a crack or break in your clavicle (collarbone)  DISCHARGE INSTRUCTIONS:   Return to the emergency department if:   · Your shoulder, arm, hand, or fingers turn bluish or pale, or feel cold or numb  · Your pain gets worse, even after rest and medicine  · Your splint feels tight, or you have increased swelling  · You cannot move your fingers  Contact your healthcare provider if:   · Your sling or wrap comes off or gets damaged  · You have questions about your condition or care  Medicines: You may  need any of the following:  · Acetaminophen  decreases pain and fever  It is available without a doctor's order  Ask how much to take and how often to take it  Follow directions  Read the labels of all other medicines you are using to see if they also contain acetaminophen, or ask your doctor or pharmacist  Acetaminophen can cause liver damage if not taken correctly  Do not use more than 4 grams (4,000 milligrams) total of acetaminophen in one day  · NSAIDs , such as ibuprofen, help decrease swelling, pain, and fever  This medicine is available with or without a doctor's order  NSAIDs can cause stomach bleeding or kidney problems in certain people  If you take blood thinner medicine, always ask your healthcare provider if NSAIDs are safe for you  Always read the medicine label and follow directions  · Take your medicine as directed  Contact your healthcare provider if you think your medicine is not helping or if you have side effects  Tell him or her if you are allergic to any medicine  Keep a list of the medicines, vitamins, and herbs you take  Include the amounts, and when and why you take them  Bring the list or the pill bottles to follow-up visits  Carry your medicine list with you in case of an emergency  Follow up with your healthcare provider within 1 week or as directed:   You may need to return for more x-rays to see how well your clavicle is healing  Write down your questions so you remember to ask them during your visits  Sling or brace care: You will have a sling or a brace to keep your clavicle from moving while it heals  Ask your healthcare provider for more information on how to care for the sling or brace, including how to adjust it  Ice:  Apply ice on your clavicle for 15 to 20 minutes every hour or as directed  Use an ice pack, or put crushed ice in a plastic bag  Cover it with a towel  Ice decreases swelling and pain  Activity:  Limit activity as directed by your healthcare provider  Slowly start to do more each day as the pain decreases  Physical therapy:  Your healthcare provider may recommend physical therapy after your clavicle heals  A physical therapist teaches you exercises to help improve movement and strength, and to decrease pain  © 2017 [facility] [facility] Information is for End User's use only and may not be sold, redistributed or otherwise used for commercial purposes  All illustrations and images included in CareNotes® are the copyrighted property of [employer]  or [employer] Robles  The above information is an  only  It is not intended as medical advice for individual conditions or treatments  Talk to your doctor, nurse or pharmacist before following any medical regimen to see if it is safe and effective for you

## 2018-09-13 LAB
ATRIAL RATE: 99 BPM
P AXIS: 31 DEGREES
PR INTERVAL: 174 MS
QRS AXIS: -11 DEGREES
QRSD INTERVAL: 88 MS
QT INTERVAL: 394 MS
QTC INTERVAL: 505 MS
T WAVE AXIS: 46 DEGREES
VENTRICULAR RATE: 99 BPM

## 2018-09-13 PROCEDURE — 93010 ELECTROCARDIOGRAM REPORT: CPT | Performed by: INTERNAL MEDICINE

## 2018-09-18 ENCOUNTER — HOSPITAL ENCOUNTER (EMERGENCY)
Facility: HOSPITAL | Age: 58
Discharge: HOME | End: 2018-09-18
Attending: EMERGENCY MEDICINE | Admitting: EMERGENCY MEDICINE
Payer: COMMERCIAL

## 2018-09-18 VITALS
OXYGEN SATURATION: 100 % | HEART RATE: 101 BPM | DIASTOLIC BLOOD PRESSURE: 68 MMHG | RESPIRATION RATE: 18 BRPM | TEMPERATURE: 97.6 F | SYSTOLIC BLOOD PRESSURE: 124 MMHG

## 2018-09-18 DIAGNOSIS — R07.81 RIB PAIN ON LEFT SIDE: Primary | ICD-10-CM

## 2018-09-18 PROCEDURE — 99283 EMERGENCY DEPT VISIT LOW MDM: CPT

## 2018-09-18 RX ORDER — HYDROCODONE BITARTRATE AND ACETAMINOPHEN 5; 325 MG/1; MG/1
1 TABLET ORAL ONCE
Status: COMPLETED | OUTPATIENT
Start: 2018-09-18 | End: 2018-09-18

## 2018-09-18 RX ORDER — LIDOCAINE 50 MG/G
OINTMENT TOPICAL AS NEEDED
Qty: 35.44 G | Refills: 0 | Status: SHIPPED | OUTPATIENT
Start: 2018-09-18 | End: 2019-08-21

## 2018-09-18 RX ADMIN — HYDROCODONE BITARTRATE AND ACETAMINOPHEN 1 TABLET: 5; 325 TABLET ORAL at 15:11

## 2018-09-18 NOTE — ED NOTES
Patient is instructed on incentive spirometry and demonstrated use, patient has used in past     Lyla Mae RN  09/18/18 3389

## 2018-09-18 NOTE — DISCHARGE INSTRUCTIONS
Rib Fracture   WHAT YOU NEED TO KNOW:   A rib fracture is a crack or break in a rib bone  Rib fractures usually heal within 6 weeks  You should be able to return to normal activities before that time  Do not wrap anything around your body to try to splint your ribs  This can prevent you from taking deep breaths and increases your risk for pneumonia  DISCHARGE INSTRUCTIONS:   Call 911 for any of the following:   · You have trouble breathing  · You have new or increased pain  Return to the emergency department if:   · Your pain does not get better, even after treatment  · You have a fever or a cough  Contact your healthcare provider if:   · You have questions or concerns about your condition or care  Medicines:   · NSAIDs  help decrease swelling and pain  NSAIDs are available without a doctor's order  Ask your healthcare provider which medicine is right for you  Ask how much to take and when to take it  Take as directed  NSAIDs can cause stomach bleeding and kidney problems if not taken correctly  · Prescription pain medicine  may be given  Ask your healthcare provider how to take this medicine safely  Some prescription pain medicines contain acetaminophen  Do not take other medicines that contain acetaminophen without talking to your healthcare provider  Too much acetaminophen may cause liver damage  Prescription pain medicine may cause constipation  Ask your healthcare provider how to prevent or treat constipation  · Take your medicine as directed  Contact your healthcare provider if you think your medicine is not helping or if you have side effects  Tell him or her if you are allergic to any medicine  Keep a list of the medicines, vitamins, and herbs you take  Include the amounts, and when and why you take them  Bring the list or the pill bottles to follow-up visits  Carry your medicine list with you in case of an emergency    Follow up with your healthcare provider as directed:  Write down your questions so you remember to ask them during your visits  Deep breathing:  Deep breathing will decrease your risk for pneumonia  Hug a pillow on the injured side while doing this exercise, to decrease pain  Take a deep breath and hold it for as long as possible  You should let the air out and then cough strongly  Deep breaths help open your airway  You may be given an incentive spirometer to help take deep breaths  Put the plastic piece in your mouth  Take a slow, deep breath  You should then let the air out and cough  Repeat these steps 10 times every hour  Rest:  Rest and limit activity to decrease swelling and pain, and allow your injury to heal  Avoid activities that may cause more pain or damage to your ribs such as, pulling, pushing, and lifting  As your pain decreases, begin movements slowly  Take short walks between rest periods  Ice:  Apply ice on the fractured area for 15 to 20 minutes every hour or as directed  Use an ice pack or put crushed ice in a plastic bag  Cover it with a towel  Ice helps prevent tissue damage and decreases swelling and pain  © 2017 [address] [facility] Information is for End User's use only and may not be sold, redistributed or otherwise used for commercial purposes  All illustrations and images included in CareNotes® are the copyrighted property of [employer]  or [employer] Robles  The above information is an  only  It is not intended as medical advice for individual conditions or treatments  Talk to your doctor, nurse or pharmacist before following any medical regimen to see if it is safe and effective for you

## 2018-09-18 NOTE — ED PROVIDER NOTES
History  Chief Complaint   Patient presents with    Rib Pain     pt presents to the ed with left rib and clavical pain  pt was seen for the same complaint in the ed     Clavicle Pain     66 y/o female presenting ongoing left clavicle and left rib pain over the past few weeks  Was seen here on 9/11 and diagnosed with multiple he healing left-sided rib fractures as well as a left clavicular fracture that was also healing  Patient had a prior seizure back in June since then has not had any injuries that she knows of without any repeat seizures or falls  Relays that she always has pain that worsens with coughing  Has not been to an orthopedic for evaluation  Pain is 7/10 nonradiating  Chronic lower extremity swelling which is not worsened  Denies chest pain, shortness of breath, wheezing, fevers, nausea, abdominal pain  Prior to Admission Medications   Prescriptions Last Dose Informant Patient Reported? Taking?    ALPRAZolam (XANAX) 0 5 mg tablet   No No   Sig: Take 1 tablet by mouth 3 (three) times a day as needed for anxiety for up to 3 days   ARIPiprazole (ABILIFY) 5 mg tablet   Yes No   Sig: Take 5 mg by mouth daily   DULoxetine HCl (CYMBALTA PO)  Self Yes No   Sig: Take 90 mg by mouth daily at bedtime     atorvastatin (LIPITOR) 80 mg tablet   Yes No   Sig: Take 80 mg by mouth daily   carBAMazepine (TEGretol) 200 mg tablet   Yes No   Sig: Take 200 mg by mouth 3 (three) times a day   clonazePAM (KlonoPIN) 0 5 mg tablet   Yes No   Sig: Take 0 5 mg by mouth 3 (three) times a day Ran out 2 days ago   clonazePAM (KlonoPIN) 0 5 mg tablet   No No   Sig: Take 0 5 tablets (0 25 mg total) by mouth 2 (two) times a day for 14 days   furosemide (LASIX) 20 mg tablet   No No   Sig: Take 2 tablets (40 mg total) by mouth daily   Patient taking differently: Take 20 mg by mouth daily     gabapentin (NEURONTIN) 600 MG tablet  Self Yes No   Sig: Take 600 mg by mouth 3 (three) times a day     losartan (COZAAR) 25 mg tablet  Self Yes No   Sig: Take 50 mg by mouth daily     metoprolol tartrate (LOPRESSOR) 100 mg tablet   No No   Sig: Take 1 tablet (100 mg total) by mouth every 12 (twelve) hours   pantoprazole (PROTONIX) 40 mg tablet   Yes No   Sig: Take 40 mg by mouth daily   potassium chloride (K-DUR,KLOR-CON) 20 mEq tablet   No No   Sig: Take 1 tablet (20 mEq total) by mouth every other day      Facility-Administered Medications: None       Past Medical History:   Diagnosis Date    Alopecia     Back injury     Bell's palsy     [facility]     Chronic pain     back    Closed left arm fracture     Fibromyalgia     Fibromyalgia, primary     GERD (gastroesophageal reflux disease)     Hyperlipidemia     Hypertension     Lyme disease     Myocardial infarct ([facility] )     2015    [facility] infarction ([facility] )     Cardiac catheterization 2 years ago showed 30% blockage in 1 of the blood vessels    Stroke ([facility] )     TIA (transient ischemic attack)     2017       Past Surgical History:   Procedure Laterality Date     SECTION      CHOLECYSTECTOMY      CORONARY ANGIOPLASTY          ORIF TIBIA FRACTURE Left 2018    Procedure: OPEN REDUCTION W/ INTERNAL FIXATION (ORIF) TIBIA FRACTURE;  Surgeon: Aundrea Watters MD;  Location: [facility];  Service: Orthopedics       Family History   Problem Relation Age of Onset    Heart attack Mother     Heart attack Father      I have reviewed and agree with the history as documented  Social History   Substance Use Topics    Smoking status: Former Smoker     Packs/day: 0 00     Years: 40 00    Smokeless tobacco: Never Used    Alcohol use No      Comment: as per patient; former social drinker         Review of Systems   Constitutional: Negative  Negative for activity change and appetite change  HENT: Negative  Eyes: Negative  Respiratory: Negative  Cardiovascular: Negative  Gastrointestinal: Negative      Genitourinary: Negative  Musculoskeletal: Negative  Skin: Negative  Neurological: Negative  All other systems reviewed and are negative  Physical Exam  Physical Exam   Constitutional: She is oriented to person, place, and time  She appears well-developed and well-nourished  HENT:   Head: Normocephalic and atraumatic  Right Ear: External ear normal    Left Ear: External ear normal    Nose: Nose normal    Mouth/Throat: Oropharynx is clear and moist    Eyes: Conjunctivae and EOM are normal  Pupils are equal, round, and reactive to light  Neck: Normal range of motion  Neck supple  Cardiovascular: Normal rate, regular rhythm, normal heart sounds and intact distal pulses  Exam reveals no gallop and no friction rub  No murmur heard  Pulmonary/Chest: Effort normal and breath sounds normal  No respiratory distress  She has no wheezes  She has no rales  She exhibits tenderness  S PO2 is 100% indicating adequate oxygenation   Abdominal: Soft  Bowel sounds are normal  She exhibits no distension and no mass  There is no tenderness  There is no rebound and no guarding  No hernia  Musculoskeletal:        Arms:  Neurological: She is alert and oriented to person, place, and time  Skin: Skin is warm  Capillary refill takes less than 2 seconds  Nursing note and vitals reviewed        Vital Signs  ED Triage Vitals   Temperature Pulse Respirations Blood Pressure SpO2   09/18/18 1407 09/18/18 1407 09/18/18 1407 09/18/18 1407 09/18/18 1407   97 6 °F (36 4 °C) 101 18 124/68 100 %      Temp Source Heart Rate Source Patient Position - Orthostatic VS BP Location FiO2 (%)   09/18/18 1407 -- -- -- --   Tympanic          Pain Score       09/18/18 1511       8           Vitals:    09/18/18 1407   BP: 124/68   Pulse: 101       Visual Acuity      ED Medications  Medications   HYDROcodone-acetaminophen (NORCO) 5-325 mg per tablet 1 tablet (1 tablet Oral Given 9/18/18 1511)       Diagnostic Studies  Results Reviewed     None No orders to display              Procedures  Procedures       Phone Contacts  ED Phone Contact    ED Course                               MDM  Number of Diagnoses or Management Options  Rib pain on left side:   Diagnosis management comments: Continued pain from prior rib and clavicle fractures  Discussed pain options  She is on blood thinners  Discussed will give 1 dose of vicodin here however no script  Patient is informed to return to the emergency department for worsening of symptoms and was given proper education regarding their diagnosis and symptoms  Otherwise the patient is informed to follow up with their primary care doctor for re-evaluation  The patient verbalizes understanding and agrees with above assessment and plan  All questions were answered  Please Note: Fluency Direct voice recognition software may have been used in the creation of this document  Wrong words or sound a like substitutions may have occurred due to the inherent limitations of the voice software  Amount and/or Complexity of Data Reviewed  Clinical lab tests: reviewed  Tests in the radiology section of CPT®: reviewed  Review and summarize past medical records: yes  Independent visualization of images, tracings, or specimens: yes      CritCare Time    Disposition  Final diagnoses:   Rib pain on left side     Time reflects when diagnosis was documented in both MDM as applicable and the Disposition within this note     Time User Action Codes Description Comment    9/18/2018  3:16 PM Al Villar Add [R07 81] Rib pain on left side       ED Disposition     ED Disposition Condition Comment    Discharge  [facility] discharge to home/self care      Condition at discharge: Good        Follow-up Information     Follow up With Specialties Details Why Contact Info Additional [address] Emergency Department Emergency Medicine Go to If symptoms worsen such as fevers, difficulty breathing etc  [address] [facility]  [phone] [facility], [place], [place], [address] [place] [address],  Family Medicine Schedule an appointment as soon as possible for a visit in 1 day  [address] [facility]             Discharge Medication List as of 9/18/2018  3:17 PM      START taking these medications    Details   lidocaine (XYLOCAINE) 5 % ointment Apply topically as needed for mild pain, Starting Tue 9/18/2018, Print         CONTINUE these medications which have NOT CHANGED    Details   ALPRAZolam (XANAX) 0 5 mg tablet Take 1 tablet by mouth 3 (three) times a day as needed for anxiety for up to 3 days, Starting u 12/28/2017, Until Tue 9/11/2018, Print      ARIPiprazole (ABILIFY) 5 mg tablet Take 5 mg by mouth daily, Historical Med      atorvastatin (LIPITOR) 80 mg tablet Take 80 mg by mouth daily, Until Discontinued, Historical Med      carBAMazepine (TEGretol) 200 mg tablet Take 200 mg by mouth 3 (three) times a day, Historical Med      clonazePAM (KlonoPIN) 0 5 mg tablet Take 0 5 mg by mouth 3 (three) times a day Ran out 2 days ago, Historical Med      DULoxetine HCl (CYMBALTA PO) Take 90 mg by mouth daily at bedtime  , Historical Med      furosemide (LASIX) 20 mg tablet Take 2 tablets (40 mg total) by mouth daily, Starting Fri 7/27/2018, Print      gabapentin (NEURONTIN) 600 MG tablet Take 600 mg by mouth 3 (three) times a day  , Historical Med      losartan (COZAAR) 25 mg tablet Take 50 mg by mouth daily  , Historical Med      metoprolol tartrate (LOPRESSOR) 100 mg tablet Take 1 tablet (100 mg total) by mouth every 12 (twelve) hours, Starting Wed 6/6/2018, Print      pantoprazole (PROTONIX) 40 mg tablet Take 40 mg by mouth daily, Historical Med      potassium chloride (K-DUR,KLOR-CON) 20 mEq tablet Take 1 tablet (20 mEq total) by mouth every other day, Starting Sun 7/29/2018, Normal           No discharge procedures on file      ED Provider  Electronically Signed by           Pamela Vivas PA-C  09/18/18 5273

## 2019-06-06 LAB — HBA1C MFR BLD HPLC: 5.4 %

## 2019-08-15 ENCOUNTER — TELEPHONE (OUTPATIENT)
Dept: NEUROLOGY | Facility: CLINIC | Age: 59
End: 2019-08-15

## 2019-08-21 ENCOUNTER — OFFICE VISIT (OUTPATIENT)
Dept: NEUROLOGY | Facility: CLINIC | Age: 59
End: 2019-08-21
Payer: COMMERCIAL

## 2019-08-21 VITALS
HEART RATE: 72 BPM | WEIGHT: 170 LBS | HEIGHT: 65 IN | DIASTOLIC BLOOD PRESSURE: 79 MMHG | BODY MASS INDEX: 28.32 KG/M2 | SYSTOLIC BLOOD PRESSURE: 115 MMHG

## 2019-08-21 DIAGNOSIS — G40.919 BREAKTHROUGH SEIZURE (HCC): Primary | ICD-10-CM

## 2019-08-21 DIAGNOSIS — F41.9 ANXIETY: ICD-10-CM

## 2019-08-21 DIAGNOSIS — G40.909 SEIZURE DISORDER (HCC): ICD-10-CM

## 2019-08-21 DIAGNOSIS — R40.20 LOSS OF CONSCIOUSNESS (HCC): ICD-10-CM

## 2019-08-21 PROBLEM — T50.901A ACCIDENTAL OVERDOSE: Status: RESOLVED | Noted: 2017-12-26 | Resolved: 2019-08-21

## 2019-08-21 PROBLEM — I50.32 CHRONIC HEART FAILURE WITH PRESERVED EJECTION FRACTION (HCC): Status: ACTIVE | Noted: 2019-05-13

## 2019-08-21 PROBLEM — J96.00 ACUTE RESPIRATORY FAILURE (HCC): Status: ACTIVE | Noted: 2019-05-13

## 2019-08-21 PROBLEM — M79.89 LEFT LEG SWELLING: Status: RESOLVED | Noted: 2018-06-04 | Resolved: 2019-08-21

## 2019-08-21 PROBLEM — E78.00 HYPERCHOLESTEREMIA: Status: ACTIVE | Noted: 2017-08-02

## 2019-08-21 PROBLEM — J96.00 ACUTE RESPIRATORY FAILURE (HCC): Status: RESOLVED | Noted: 2019-05-13 | Resolved: 2019-08-21

## 2019-08-21 PROBLEM — T44.7X2A INTENTIONAL OVERDOSE OF BETA-ADRENERGIC BLOCKING DRUG (HCC): Status: RESOLVED | Noted: 2017-12-26 | Resolved: 2019-08-21

## 2019-08-21 PROBLEM — I21.9 MYOCARDIAL INFARCTION (HCC): Chronic | Status: RESOLVED | Noted: 2017-12-26 | Resolved: 2019-08-21

## 2019-08-21 PROBLEM — F32.A DEPRESSION: Status: ACTIVE | Noted: 2017-08-02

## 2019-08-21 PROBLEM — R06.02 SOB (SHORTNESS OF BREATH): Status: RESOLVED | Noted: 2018-06-05 | Resolved: 2019-08-21

## 2019-08-21 PROBLEM — R07.9 CHEST PAIN: Status: RESOLVED | Noted: 2018-07-25 | Resolved: 2019-08-21

## 2019-08-21 PROBLEM — G45.9 TIA (TRANSIENT ISCHEMIC ATTACK): Status: RESOLVED | Noted: 2018-07-25 | Resolved: 2019-08-21

## 2019-08-21 PROCEDURE — 99354 PR PROLONGED SVC OUTPATIENT SETTING 1ST HOUR: CPT | Performed by: PSYCHIATRY & NEUROLOGY

## 2019-08-21 PROCEDURE — 99215 OFFICE O/P EST HI 40 MIN: CPT | Performed by: PSYCHIATRY & NEUROLOGY

## 2019-08-21 RX ORDER — FLUTICASONE PROPIONATE AND SALMETEROL 113; 14 UG/1; UG/1
1 POWDER, METERED RESPIRATORY (INHALATION) 2 TIMES DAILY
COMMUNITY

## 2019-08-21 RX ORDER — LEVETIRACETAM 500 MG/1
500 TABLET ORAL EVERY 12 HOURS SCHEDULED
Qty: 60 TABLET | Refills: 5 | Status: SHIPPED | OUTPATIENT
Start: 2019-08-21 | End: 2019-11-20 | Stop reason: SDUPTHER

## 2019-08-21 RX ORDER — TRAZODONE HYDROCHLORIDE 100 MG/1
100 TABLET ORAL
COMMUNITY

## 2019-08-21 RX ORDER — ASPIRIN 81 MG/1
81 TABLET ORAL DAILY
COMMUNITY
End: 2020-03-11

## 2019-08-21 RX ORDER — GEMFIBROZIL 600 MG/1
600 TABLET, FILM COATED ORAL
COMMUNITY
End: 2019-10-01 | Stop reason: HOSPADM

## 2019-08-21 RX ORDER — LEVETIRACETAM 500 MG/1
500 TABLET ORAL EVERY 12 HOURS SCHEDULED
COMMUNITY
End: 2019-08-21 | Stop reason: SDUPTHER

## 2019-08-21 NOTE — PATIENT INSTRUCTIONS
Plan:   1 - for now continue with Levetiracetam 500mg tab take one twice a day  2 - sleep deprived EEG  3 - reduce carbamazepine 200mg tab take 1 tab twice a day for 1 week, then 1 tab daily for 1 week, then stop  4 - keep a calendar regarding episodes of altered awareness  Discrete periods of unresponsiveness, staring, abnormal excessive movement, fumbling, fidgeting activity lasting 1-5 minutes  5 - try to have more family presence available to recognize unusual behaviors  6 - check a levetiracetam level  7 - call the office if there is a convulsion or another episode of loss of consciousness  8 - follow-up in 3 months    I discussed seizure safety and seizure first aid with the patient  Limits would be no unprotected heights (ladders, standing on chairs/tables), should not swim alone (need partners or ), cooking with a partner to avoid burn injuries, showers instead of baths  I reviewed that convulsive seizures typically last about 2 minutes with about 15-30 minutes of recovery  Should a seizure last more than 5 minutes or patient fails to recover after 30 minutes or there are multiple seizures in a day or if there is a suspected head injury then EMS should be called or they go to the nearest emergency room  If she only experiences altered awareness, confusion, or focal seizures, then they may call the office for guidance  Seizure first aid consists of preventing the patient from wandering or removal of dangerous objects or prevention of injury, for convulsive seizures, position the patient on the ground, may place a pillow under the head, turn the patient to his side, no objects or reaching for the mouth, no restraints but prevent injuries by removing glasses or sharp/heavy objects, and time the duration of the seizure  I recommend that she obtain a medical alert bracelet that states "Seizure disorder" or "Epilepsy" along with any medication allergies      We discussed issues related to driving  I explained to the patient that the law states that all patients who have a seizure must be reported to the [place]  Patients cannot legally drive for 6 months after seizure in the state of [place] (6 months in the state of [place] and 3 months in the state of [place] )  She is not cleared to return to driving until she has been without a seizure for at least 6 months and cleared by the appropriate state DMV/DOT  I also informed the patient that, although exceptions can be granted for patients with provoked seizures, exclusively nocturnal seizures, prolonged auras, or exclusively simple partial seizures, these exceptions are granted by the [place] and not by the physician  SEIZURE SAFETY    Dont let fear of seizures keep you at home  Be smart about your activities to make sure you are safe  The guidelines below can help you be as safe as possible  Make sure the people around you are aware of:   What happens when you have a seizure   Correct seizure first aid   First aid for choking (consider taking a basic life support class)   When they should know to call 911 or for medical help    Avoid common triggers of seizures:   Missing your medications   Not getting enough sleep   Drinking alcohol   Using illegal drugs    Safety measures for at home:  In the bathroom:    Do not take baths in the bathtub  Instead, take only showers  Try to have a family member available while you are in the shower   Make sure the drain in the bathtub/shower is working properly to avoid pooling of water   You can consider a fitted shower seat  Recessed soap trays can minimize injury if you would happen to fall in the shower   Bathroom doors can be hung to open outwards, so that the door can still be opened if you fall against the door   Do not lock the bathroom door  Use an Occupied sign on the door or other signal to let others know you are in the bathroom    o Safety locks can be obtained from the [facility]   On your water heater, set your water temperature to a warm temperature that is not scalding to avoid burns from very hot water   Put non-skid strips/ in the bathtub   Use an electric shaver   Avoid any electrical appliances (including electric shaver) in the bathroom or near water   Use shatterproof glass for mirrors  In the kitchen:    When possible, cook using a microwave   Only cook or use electrical appliances when someone else is in the house and available   As much as possible, grill food and avoid fryers or frying food   Use the back burners of the stove and turn the pot handles toward the back of the stove   Avoid carrying hot pans, pots of boiling water, or very hot food  Serve food or liquids directly from the stove  At the least, minimize the distance hot food is carried   Use precut foods or food processers to limit the need to use knives   Use plastic or durable cups, dishes, and containers instead of breakable glass items  In the bedroom   Low level and wide beds (like a futon) can reduce risk of injury of falling out of bed  If there is a high risk of falling out of bed, you can consider simply putting the mattress on the floor   Avoid sleeping on top bunks of bunk beds   Place a soft carpet on the floor  Around the house   Pad sharp corners of tables, chairs, etc  Round tables and furniture can be considered to avoid sharp corners   Avoid open flames  Place a screen in front of fireplaces and dont build a fire alone   Allow for open spaces with furniture   Avoid loose throw rugs or slippery floors  Non-slip norris or cushioned norris can help reduce injury from a fall   Fireproof fabrics and furniture are best, and especially important if you smoke   Doors and windows with safety glass are safer if someone falls against them     Avoid lights and heaters that could easily be knocked over    Use curling irons or clothing irons that have automatic shut off switches   Make sure motor driven equipment or lawn mowers have dead mans handles or seats so they will turn off if you release pressure  Safety measures when away from home  [address] law mandates that you cannot drive for 6 months after your most recent seizure   [place] law mandates that you cannot drive for 6 months after your most recent seizure  Work/Travel   Wear a medical alert bracelet or necklace at all times   Wear a helmet and use protective clothing/equipment when appropriate   Consider telling co-workers and travel companions that you have epilepsy and what to do if you have a seizure   Avoid irregular shifts or disruptions of a regular sleep pattern   Take your medications on time and keep an updated list of medications in your purse or wallet   Do not climb to heights or operate heavy machinery   Stand back from the edges of roads or bus/train platforms when traveling   If you wander when you have a seizure, take a friend along for the trip  Recreation  [employer] can be dangerous  Do not swim alone, in open water, or in murky water that you cannot see the bottom  o Caregivers should be with you in the pool at all times and must be physically able to get you out of the water   Use a flotation device   Scuba diving is not recommended since during a seizure people are unaware of their surroundings  o Having a seizure underwater can be deadly and can endanger the lives of others   Kayaking and canoeing can be especially dangerous  o People with epilepsy are at a higher risk of becoming trapped underneath a canoe or kayak   Wear a helmet when playing contact sports, biking, or if there is a risk of falling  o Patients with epilepsy are at a higher risk of head injury when playing contact sports     Avoid riding a bike, running, or other activities around busy roads, steep hills, or secluded areas   Exercise on soft surfaces   [place]: many people with epilepsy can go on rides depending on their type of seizures  o For some people, stress or excitement can trigger a seizure  o Rollercoasters (especially if you are upside-down) are more dangerous for people with epilepsy  Medications   Take your medications on time  If you have trouble remembering, set alarms on your phone  You can visit www  vokfrvv3redrebq  com to set up reminders through text message to help you remember to take your medications   Use a pillbox to help you keep track of your medications   When out of the house, take any needed medications with you  Consider keeping one or two extra doses with you in case you are unexpectedly away from home longer than planned   If you realize you missed a dose of your medications and it is less than 2 hours until your next dose, skip the missed dose  Do not double up your medication dose  If it is more than 2 hours until your next dose, you can take the missed dose   Avoid drinking alcohol, since this can enhance effects of your seizure medications   Be aware of common and major side effects of your medications   Keep your medications out of the reach of children  Parenting:  Tiffany Popper your home as much as possible   It is possible that you could drop your baby if you have a seizure while holding or feeding them   If you are nursing a baby, sit on the floor or bed with your back supported so the baby will not fall far if you should lose consciousness   Feed the baby while he or she is seated in an infant seat   Dress, change, and sponge bathe the baby on the floor   Move the baby around in a stroller or small crib   Keep a young baby in a playpen when you are alone, and a toddler in an indoor play yard, or childproof one room and use safety castaneda at the doors     When out of the house, use a bungee-type cord or restraint harness so your child cannot wander away if you have a seizure that affects your awareness  FIRST AID FOR SEIZURES    What to Do If You Witness a Seizure:     Generalized convulsive (called a generalized tonic-clonic or grand mal seizure)  During this seizure, the person may cry out, suddenly stiffen up, make jerking movements, and fall  The person may turn pale or blue from difficulty breathing  Actions:  1  Stay calm  Talk in a soothing voice and if possible keep onlookers away  2  Prevent injury  Move objects away that the person might hit while jerking uncontrollably  3  Time when the seizure starts and ends  Most seizures stop after only a few minutes  4  Turn him or her gently onto one side  This will help keep the airway clear  5  Never place anything in his/her mouth or give him/her anything by mouth during a seizure  -- Do not give the person water, pills, or food until fully alert  6  Loosen tight clothing or jewelry around his/her neck  7  Make the person as comfortable as possible  8  Place something soft under their head  9  Do not hold the person down  If the person having a seizure thrashes around there is no need for you to restrain them  They are more likely to be combative if restrained  Remember to consider your safety as well  10  Keep onlookers away  11  Be sensitive and supportive, and ask others to do the same  12  Stay with the person until he/she is fully alert  Complex partial seizure (confusional spells)  During this kind of seizure, the person may have a glassy stare; give no response or inappropriate responses when questioned; sit, stand, or walk about aimlessly; make lip smacking or chewing motions; fidget with clothes; appear to be drunk, drugged, or confused  Actions:  1  Make sure the person is safe and wont harm themselves  2  Try to remove harmful objects from around the person (tools, utensils, glasses)    3  Do NOT be aggressive or attempt to restrain the person  They are more likely to be combative if restrained  Remember to consider your safety as well  4  Help prevent the person from wandering, and direct the person to chair or safe position  5  Never place anything in his/her mouth or give him/her anything by mouth during a seizure  -- Do not give the person water, pills, or food until fully alert  6  Keep onlookers away  7  Be sensitive and supportive, and ask others to do the same  8  Stay with the person until he/she is fully alert  CALL 911 if:  1  A convulsive seizure lasts more than 5 minutes  2  The person turns blue during the seizure  3  The person does not start breathing after the seizure  Begin mouth to mouth resuscitation if this would occur  4  The person has one seizure right after the other without coming back to normal consciousness between the seizures  5  The person has not regained consciousness or is still confused after 30 minutes  6  You know the person does not have epilepsy  7  You know the person has diabetes or low blood sugar  8  The person is pregnant, ill, or injured  9  The seizure occurred in water, because the person may have inhaled water  10  The person requests an ambulance or medical help  Rescue medication  Your doctor may prescribe a rescue medication such as lorazepam (Ativan), diazepam (Valium / Diastat), or clonazepam (Klonopin) to terminate a seizure or if you have a history of cluster of seizures   Follow the instructions given by your doctor for these medications    Recognizing Common Seizures (examples)   · Simple partial seizures: Isolated twitching, numbness, sweating, dizziness, nausea/vomiting, disturbances to hearing, vision, smell or taste  No loss of consciousness occurs, and the person remains aware of his/her environment  · Complex partial seizures: Staring, motionless, picking at clothes, smacking lips, swallowing repeatedly or wandering around   The person is not aware of their surroundings and is not fully responsive  · Atonic seizures: Drop attacks or sudden, rapid fall to ground with rapid recovery  · Myoclonic seizures: Brief forceful jerks which can affect the whole body or just part of it  · Absence seizures: May appear to be daydreaming or spacing out   The person is momentarily unresponsive and unaware of what is happening around him/her  · Tonic seizures: Stiffening of part or of the entire body  · Generalized Tonic-Clonic Seizures  Grand-mal seizure   Sudden loss of consciousness with body stiffening followed by continuous jerking movements  A blue tinge around the mouth is likely but lack of oxygen is rare  Loss of bladder and/or bowel control may occur

## 2019-08-21 NOTE — PROGRESS NOTES
Review of Systems    {LimROS-complete:03133}      Review of Systems   Constitutional: Negative  Negative for appetite change and fever  HENT: Negative  Negative for hearing loss, tinnitus, trouble swallowing and voice change  Eyes: Negative  Negative for photophobia and pain  Respiratory: Negative  Negative for shortness of breath  Cardiovascular: Negative  Negative for palpitations  Gastrointestinal: Negative  Negative for nausea and vomiting  Endocrine: Negative  Negative for cold intolerance and heat intolerance  Genitourinary: Negative  Negative for dysuria, frequency and urgency  Musculoskeletal: Positive for back pain  Negative for myalgias and neck pain  Skin: Negative  Negative for rash  Neurological: Positive for dizziness  Negative for tremors, seizures, syncope, facial asymmetry, speech difficulty, weakness, light-headedness, numbness and headaches  Falls   Hematological: Negative  Does not bruise/bleed easily  Psychiatric/Behavioral: Positive for confusion (memory problems)  Negative for hallucinations and sleep disturbance  The patient is nervous/anxious

## 2019-08-21 NOTE — PROGRESS NOTES
[facility] [facility]  Patient's Name: Alphonse Ocampo   Patient's : 1960   Visit Type: consultation  Referring MD / PCP:  Holland Sharma DO    Assessment:  Ms Alphonse Ocampo is a 63 y o  woman with recurrent episodes of loss of consciousness of unknown etiology  These episodes of loss of consciousness have resulted in severe physical injuries  However, there is no prodromal warning to when she loses consciousness, frequently these are unwitnessed, and there are no defined abnormal physical/cognitive alteration of awareness  With the lack of clinical details to how she losses consciousness, we cannot exclude any diagnosis  Typically, periods of hypoperfusion and arrhythmias are preceded by dizziness, visual/sensory changes, or change in tone but also the circumstances immediately around the time of loss of consciousness  Seizures may or may not have a warning to when they happen; but generally there is a reliable witness to the seizure  The difficulty with seizures is that for patients with certain types of seizures, they may be amnestic to the seizure itself  Since she is frequently alone we cannot exclude the possibility that she has an underlying seizure disorder  We discussed the possibility of an underlying seizure disorder, but further investigation is needed  We discussed different seizure types and that her  and family have to be more attentive and not let the patient be alone too much  We discuss some behaviors such as fumbling, automatisms, abnormal speech content, as possible indicators of seizures excluding generalized tonic clonic seizure (which should be readily apparent if it happens)  The alternative to seizures, could be psychogenic cause of loss of consciousness, but we must first rule out if she has seizures  I recommend repeating an EEG study, if that is negative then a 24 hours or more ambulatory EEG study will be needed    She will need to monitor the frequency of her seizures, if fairly frequent then an inpatient EMU study may be more diagnostic as we wean off AEDs to bring out interictal epileptiform discharges and/or characterize her loss of consciousness  We reviewed that patients with epilepsy have an increase risk of cognitive and psychiatric disorders  We reviewed that levetiracetam can worsen depression and anxiety symptoms  It is not clear if levetiracetam has worsened any of these conditions  However, since she had a seizure on carbamazepine, and she expresses significant side effects on how it affects her gait, I recommend that she wean off of this medication  I discussed seizure safety and seizure first aid with the patient  Limits would be no unprotected heights (ladders, standing on chairs/tables), should not swim alone (need partners or ), cooking with a partner to avoid burn injuries, showers instead of baths  I reviewed that convulsive seizures typically last about 2 minutes with about 15-30 minutes of recovery  Should a seizure last more than 5 minutes or patient fails to recover after 30 minutes or there are multiple seizures in a day or if there is a suspected head injury then EMS should be called or they go to the nearest emergency room  If she only experiences altered awareness, confusion, or focal seizures, then they may call the office for guidance  Seizure first aid consists of preventing the patient from wandering or removal of dangerous objects or prevention of injury, for convulsive seizures, position the patient on the ground, may place a pillow under the head, turn the patient to his side, no objects or reaching for the mouth, no restraints but prevent injuries by removing glasses or sharp/heavy objects, and time the duration of the seizure  I recommend that she obtain a medical alert bracelet that states "Seizure disorder" or "Epilepsy" along with any medication allergies      We discussed issues related to driving  I explained to the patient that the law states that all patients who have a seizure must be reported to the DMV/[place]  Patients cannot legally drive for 6 months after seizure in the state of [place] (6 months in the state of [place] and 3 months in the state of [place] )  She is not cleared to return to driving until she has been without a seizure for at least 6 months and cleared by the appropriate state DMV/DOT  I also informed the patient that, although exceptions can be granted for patients with provoked seizures, exclusively nocturnal seizures, prolonged auras, or exclusively simple partial seizures, these exceptions are granted by the DMV/[place] and not by the physician  Plan:   1 - for now continue with Levetiracetam 500mg tab take one twice a day  2 - sleep deprived EEG  3 - reduce carbamazepine 200mg tab take 1 tab twice a day for 1 week, then 1 tab daily for 1 week, then stop  4 - keep a calendar regarding episodes of altered awareness  Discrete periods of unresponsiveness, staring, abnormal excessive movement, fumbling, fidgeting activity lasting 1-5 minutes  5 - try to have more family presence available to recognize unusual behaviors  6 - check a levetiracetam level  7 - call the office if there is a convulsion or another episode of loss of consciousness  8 - follow-up in 3 months  9 - will try to obtain MRI brain report from [facility], if we are unable to get those results will request MRI brain study with seizure protocol        Problem List Items Addressed This Visit        Nervous and Auditory    Breakthrough seizure ([address] ) - Primary    Relevant Medications    levETIRAcetam (KEPPRA) 500 mg tablet    Other Relevant Orders    Levetiracetam level    EEG Sleep deprived    Seizure disorder (HCC)    Relevant Medications    levETIRAcetam (KEPPRA) 500 mg tablet       Other    Anxiety    Loss of consciousness [facility])          Chief Complaint:   Chief Complaint   Patient presents with   [facility] Follow Up   Loss of Consciousness      HPI:      Shirley Santamaria is a 63 y o  right handed female here for follow-up evaluation of seizure  She was previously evaluated by [address] [place] [address] & [facility] - Dr Joss Wright on 12/13/2017 (for numbness, possibly from a stroke in the internal capsule)  The following is from interviewing the patient and review of the available office/hospital notes  Intake History 8/21/2019  Review of medical records:  She was evaluated in December 2017 for left arm numbness and confusion; apparently she was pulled over by police for driving sideways all over the road  She had no recollection of what happened  She has a history of Lyme disease, Fibromyalgia, Bell's Palsy, Panic attacks  MRI brain showed a questionable diffusion signal in the right posterior limb of the internal capsule, but not seen on T2/FLAIR images thought to be artifactual   Due to the possibility of a stroke, aspirin was changed to Plavix  She was supposed to have a follow-up EEG but it was not completed  Patient's history (difficult history to obtain as she is not well aware of her symptoms regarding seizures):  Her seizure started about a year ago around Winter 2018  She recalled that she woke up on the floor  There was no warning  She went to see a neurologist in [place], [place] but she felt nothing was done  She incidentally mentioned she was on Xanax 1mg 4 times a day for 35 years  It was switched to clonazepam 0 25mg twice a day, which then she stopped taking after a month  It was a week later she had her episode of loss of consciousness  She denied drinking alcohol, doing drugs, or had an illness at the time  She has a history of chronic panic disorder; she has sweating, palpitation, and shortness of breath that typically lasts all day  She kept episodes of loss of consciousness to herself    One time (this past Winter) she lost consciousness, fell onto a toilet and broke 4 ribs and clavicle  She had a number of episodes of loss of consciousness that resulted in physical injury including a left radial fracture (2017) and left tibia (January 2018)  She has no idea how this happens  She is generally alone at home, her  is out 12 hours a day  Her  thought that she may sleepwalk, because in the middle of the night, he finds her up, she mumbles something and may act confused which can last 1-2 minutes to half an hour  He is always out of the house  He has not witnessed how these episodes of loss of consciousness start  He has not seen her acting in a way for him to think that she was having a seizure, no clear bizarre hyperactivity or zoning out activity  Her psychiatrist put her on Tegretol to help her come off of Xanax; but she tells me that her psychiatrist and PCP thought she had seizures (not sure what she told them for them to suspect that she has seizures)  She has been on Tegretol for more than a year  She feels that the Tegretol makes her feel weird, she feels that she cannot make it to a chair or table  This could be causing her feeling off balance  She would have to call her  to help get to the bathroom  Her , Raleigh Salmeron, also noticed that she is loopy and confused because of the medications  However, she is also on so many medications  About a year ago, a psychiatrist started her on carbamazepine, duloxetine, and Abilify at the same time for depression and anxiety  Her  wonders if she sleep walks, not every night, she gets up but then is she cannot tell him what is she doing up and she has poor answers  She had Bell's palsy when she was 25years old, during pregnancy  She was diagnosed with Lyme's disease, found to have  "big textbook" target lesion about 5 years ago, treated immediately  She denied involvement of cranial nerve or radiculopathy    Her fibromyalgia was diagnosed 12 years ago  No one has reported if she had a convulsion  On 2019, at 3 AM,  woke up to the sound of her gagging, noticed that she threw up, and she reported that she could not breath, not jerking, but she was not limp  Danielle Tran has no recollection of what happened nor being in the ICU for 4 days  Since being on Levetiracetam she has not had an episode of loss of consciousness   is not helpful  There is a discharge summary from [place], [place])  She was admitted form -2019 due to a history of "chronic seizure disorder" with possible breakthrough seizure (she was found unconscious and unresponsive, covered in vomitus), acute respiratory failure, "shock status" on pressors, and intubated  She was noted to be very depressed and anxious  She was discharged on Levetiracetam 500mg twice a day  AED/side effects/compliance:  Levetiracetam 500mg twice a day  Carbamazepine 200mg three times a day (prescribed for anxiety)    Event/Seizure semiology:  1   Unclear causes of loss of consciousness    Prior Epilepsy History:  x    Special Features  Status epilepticus: No  Self Injury Seizures: No  Precipitating Factors: None    Epilepsy Risk Factors:  Abnormal pregnancy: No  Abnormal birth/: No  Abnormal Development: No  Febrile seizures, simple: No  Febrile seizures, complex: No  CNS infection: No  Mental retardation: No  Cerebral palsy: No  Head injury (moderate/severe): No  CNS neoplasm: No  CNS malformation: No  Neurosurgical procedure: No  Stroke: Yes - 3 years ago, she was pulled over by the police who thought she was drunk  Alcohol abuse: No  Drug abuse: No  Family history Sz/epilepsy: No    Prior AEDs:  medication Max dose Time used Reason to stop   carbamazepine      gabapentin      Levetiracetam              Prior workup:  x  [id number]  MRI brain wo contrast  Questionable diffusion signal with ADC dropout in the right posterior limb internal capsule, no enhancement    EEGs:  5/15/2019 [facility])  Questionable slowing over the right hemisphere derivations    Labs:  5/13/2019  CBC 24 3/14 8/46/379  /5/1/106/24/23/0 9/257  5/16/2019  CBC 7/10 2/30/215  /2 8/109/25/2/0 6    General exam   /79 (BP Location: Left arm, Patient Position: Sitting, Cuff Size: Standard)   Pulse 72   Ht 5' 5" (1 651 m)   Wt 77 1 kg (170 lb)   LMP 09/04/2009 (Within Days) Comment: per patient   BMI 28 29 kg/m²    Appearance: normally developed, appears well  Carotids: no bruits present  Cardiovascular: regular rate and rhythm and normal heart sounds  Pulmonary: clear to auscultation    HEENT: anicteric and moist mucus membranes / oral cavity   Fundoscopy: normal    Mental status  Orientation: alert and oriented to name, place, time  Fund of Knowledge: intact   Attention and Concentration: able to spell HOUSE forwards and backwards  Current and Remote Memory:recalled 1/3 words after five minutes  Language: spontaneous speech is normal, comprehension is intact and naming is intact    Cranial Nerves  CN 1: not tested  CN 2: Visual fields intact to confrontation and pupils equal round reactive to direct and consenual light   CN 3, 4, 6: EOMI, no nystagmus  CN 5:sensation intact to all distriubtion V1, V2, V3  CN 7:there is mild right facial weakness of the forehead, eyes, mouth, and platysmus  CN 8:symmetric to finger rubs bilaterally  CN 9, 10:no dysarthria present and symmetric elevation of soft palate and uvula is midline  CN 11:symmetric strength of sternocleidomastoid and trapezius muscles  CN 12:tongue is midline    Motor:  Bulk, Tone: normal bulk, normal tone  Pronation: no pronator drift  Strength: Symmetric strength of the arms and legs, no lateralizing weakness     Sensory:  Lighttouch: intact in all limbs  Vibration: intact in all limbs except left foot  Temperature: intact in all limbs  Romberg:normal    Coordination:  FNF:FNF bilaterally intact  CRISTÓBAL:incoordinated finger movements of the left and incoordinated finger movements of the right  FFM:intact  Gait/Station:normal gait and unsteady with tandem    Reflexes:  reflexes are normal and symmetric and DTR's are 2/4 in all tested locations    Past Medical/Surgical History:  Patient Active Problem List   Diagnosis    Slurred speech    Weakness    Fibromyalgia    Hypertension    Hypercholesteremia    Anxiety    GERD (gastroesophageal reflux disease)    Dependence on nicotine from cigarettes    Prolonged QT interval    Closed displaced comminuted fracture of shaft of left tibia    Status post closed tibia fracture    Closed fracture of left ankle    Acute on chronic diastolic CHF (congestive heart failure) [facility] )    History of gastric ulcer    History of TIA (transient ischemic attack)    History of MI (myocardial infarction)    Hypomagnesemia    Hypokalemia    [facility]    Chronic heart failure with preserved ejection fraction ([facility])    Depression    Loss of consciousness ([facility] )    Seizure disorder ([facility] )     Past Medical History:   Diagnosis Date    Alopecia     Back injury     Bell's palsy     CHF [facility]congestive heart failure) ([facility])     Chronic pain     back    Closed left arm fracture     Fibromyalgia     Fibromyalgia, primary     GERD (gastroesophageal reflux disease)     Hyperlipidemia     Hypertension     Lyme disease     Myocardial infarct ([facility] )     2015    Myocardial infarction ([facility] )     Cardiac catheterization 2 years ago showed 30% blockage in 1 of the blood vessels    Stroke ([facility] )     TIA (transient ischemic attack)     2017     Past Surgical History:   Procedure Laterality Date     SECTION      CHOLECYSTECTOMY      CORONARY ANGIOPLASTY          ORIF TIBIA FRACTURE Left 2018    Procedure: OPEN REDUCTION W/ INTERNAL FIXATION (ORIF) TIBIA FRACTURE;  Surgeon: Lady Reza MD;  Location: [address] [place] [address]; Service: Orthopedics       Past Psychiatric History:  Depression: yes  Anxiety: Yes  Psychosis: No    Medications:    Current Outpatient Medications:     ARIPiprazole (ABILIFY) 5 mg tablet, Take 5 mg by mouth daily, Disp: , Rfl:     aspirin (ECOTRIN LOW STRENGTH) 81 mg EC tablet, Take 81 mg by mouth daily, Disp: , Rfl:     atorvastatin (LIPITOR) 80 mg tablet, Take 80 mg by mouth daily, Disp: , Rfl:     DULoxetine HCl (CYMBALTA PO), Take 90 mg by mouth daily at bedtime  , Disp: , Rfl:     fluticasone-salmeterol (AIRDUO RESPICLICK) 171-23 mcg/act dry powder inhaler, Inhale 1 puff 2 (two) times a day Rinse mouth after use , Disp: , Rfl:     furosemide (LASIX) 20 mg tablet, Take 2 tablets (40 mg total) by mouth daily (Patient taking differently: Take 20 mg by mouth 2 (two) times a day ), Disp: 30 tablet, Rfl: 0    gabapentin (NEURONTIN) 600 MG tablet, Take 600 mg by mouth 3 (three) times a day  , Disp: , Rfl:     gemfibrozil (LOPID) 600 mg tablet, Take 600 mg by mouth 2 (two) times a day before meals, Disp: , Rfl:     levETIRAcetam (KEPPRA) 500 mg tablet, Take 1 tablet (500 mg total) by mouth every 12 (twelve) hours, Disp: 60 tablet, Rfl: 5    losartan (COZAAR) 25 mg tablet, Take 25 mg by mouth daily , Disp: , Rfl:     metoprolol tartrate (LOPRESSOR) 25 mg tablet, Take 25 mg by mouth every 12 (twelve) hours, Disp: , Rfl:     pantoprazole (PROTONIX) 40 mg tablet, Take 40 mg by mouth daily, Disp: , Rfl:     potassium chloride (K-DUR,KLOR-CON) 20 mEq tablet, Take 1 tablet (20 mEq total) by mouth every other day, Disp: 30 tablet, Rfl: 0    Tiotropium Bromide Monohydrate (SPIRIVA HANDIHALER IN), Inhale, Disp: , Rfl:     traZODone (DESYREL) 100 mg tablet, Take 100 mg by mouth daily at bedtime, Disp: , Rfl:     Allergies:   Allergies   Allergen Reactions    Hydromorphone Shortness Of Breath       Family history:  Family History   Problem Relation Age of Onset    Heart attack Mother     Heart attack Father There is no family history of seizure, epilepsy or developmental delay  Social History  Living situation:  Lives with , she is alone for most of the day  Work:  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, was an LPN, 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  Driving:  Suspended    reports that she has quit smoking  She smoked 0 00 packs per day for 40 00 years  She has never used smokeless tobacco  She reports that she does not drink alcohol or use drugs  Review of Systems  A review of at least 12 organ/systems was obtained by the medical assistant and reviewed by me, including additional positives/negatives:  Musculoskeletal: Positive for back pain  Negative for myalgias and neck pain  Skin: Negative  Negative for rash  Neurological: Positive for dizziness  Negative for tremors, seizures, syncope, facial asymmetry, speech difficulty, weakness, light-headedness, numbness and headaches  Falls   Hematological: Negative  Does not bruise/bleed easily  Psychiatric/Behavioral: Positive for confusion (memory problems)  Negative for hallucinations and sleep disturbance  The patient is nervous/anxious  Decision making was of high-complexity due to the patient's high risk condition (seizures), psychiatric and neuropsychological comorbidities, behavioral problems, memory and cognitive problems and medication side effects  Prolonged service  I spent an additional 30 minutes to address issues of management of epilepsy/loss of consciousness, psychiatric comorbidities, medication instructions, and monitoring (adverse effects, side effects, and risk of antiepileptic medications)    Started at [place] [address] at 4:30PM

## 2019-08-26 ENCOUNTER — TELEPHONE (OUTPATIENT)
Dept: NEUROLOGY | Facility: CLINIC | Age: 59
End: 2019-08-26

## 2019-08-26 NOTE — TELEPHONE ENCOUNTER
Tried calling patient to advise her about DMV form to be filled out- No answer and VM is full could not leave message

## 2019-08-26 NOTE — TELEPHONE ENCOUNTER
Second attempt to reach patient  [facility]  If third attempt has failed, I advise that a letter should be sent to the patient regarding form  Thank you!

## 2019-08-27 NOTE — TELEPHONE ENCOUNTER
Third attempt to call patient, No answer and VM is full so I could not leave message  Will send letter via mail

## 2019-09-26 ENCOUNTER — HOSPITAL ENCOUNTER (INPATIENT)
Facility: HOSPITAL | Age: 59
LOS: 1 days | Discharge: HOME | DRG: 127 | End: 2019-09-28
Attending: EMERGENCY MEDICINE | Admitting: FAMILY MEDICINE
Payer: COMMERCIAL

## 2019-09-26 ENCOUNTER — APPOINTMENT (EMERGENCY)
Dept: RADIOLOGY | Facility: HOSPITAL | Age: 59
DRG: 127 | End: 2019-09-26
Payer: COMMERCIAL

## 2019-09-26 DIAGNOSIS — R53.83 LETHARGY: ICD-10-CM

## 2019-09-26 DIAGNOSIS — F41.9 ANXIETY: ICD-10-CM

## 2019-09-26 DIAGNOSIS — K59.00 CONSTIPATION: ICD-10-CM

## 2019-09-26 DIAGNOSIS — I50.9 CHF (CONGESTIVE HEART FAILURE) (HCC): Primary | ICD-10-CM

## 2019-09-26 DIAGNOSIS — F17.210 DEPENDENCE ON NICOTINE FROM CIGARETTES: Chronic | ICD-10-CM

## 2019-09-26 DIAGNOSIS — M79.7 FIBROMYALGIA: ICD-10-CM

## 2019-09-26 DIAGNOSIS — R07.9 CHEST PAIN: ICD-10-CM

## 2019-09-26 DIAGNOSIS — R33.9 URINARY RETENTION: ICD-10-CM

## 2019-09-26 LAB
ALBUMIN SERPL BCP-MCNC: 3.4 G/DL (ref 3.5–5)
ALP SERPL-CCNC: 93 U/L (ref 46–116)
ALT SERPL W P-5'-P-CCNC: 27 U/L (ref 12–78)
ANION GAP SERPL CALCULATED.3IONS-SCNC: 8 MMOL/L (ref 4–13)
APTT PPP: 26 SECONDS (ref 23–37)
AST SERPL W P-5'-P-CCNC: 25 U/L (ref 5–45)
BASOPHILS # BLD AUTO: 0.1 THOUSANDS/ΜL (ref 0–0.1)
BASOPHILS NFR BLD AUTO: 1 % (ref 0–1)
BILIRUB SERPL-MCNC: 0.5 MG/DL (ref 0.2–1)
BUN SERPL-MCNC: 15 MG/DL (ref 5–25)
CALCIUM SERPL-MCNC: 8.4 MG/DL (ref 8.3–10.1)
CHLORIDE SERPL-SCNC: 102 MMOL/L (ref 100–108)
CO2 SERPL-SCNC: 27 MMOL/L (ref 21–32)
CREAT SERPL-MCNC: 0.96 MG/DL (ref 0.6–1.3)
DEPRECATED D DIMER PPP: 480 NG/ML (FEU) (ref 190–520)
EOSINOPHIL # BLD AUTO: 0.15 THOUSAND/ΜL (ref 0–0.61)
EOSINOPHIL NFR BLD AUTO: 1 % (ref 0–6)
ERYTHROCYTE [DISTWIDTH] IN BLOOD BY AUTOMATED COUNT: 13.8 % (ref 11.6–15.1)
GFR SERPL CREATININE-BSD FRML MDRD: 66 ML/MIN/1.73SQ M
GLUCOSE SERPL-MCNC: 109 MG/DL (ref 65–140)
HCT VFR BLD AUTO: 46.4 % (ref 34.8–46.1)
HGB BLD-MCNC: 15.4 G/DL (ref 11.5–15.4)
IMM GRANULOCYTES # BLD AUTO: 0.05 THOUSAND/UL (ref 0–0.2)
IMM GRANULOCYTES NFR BLD AUTO: 0 % (ref 0–2)
INR PPP: 1.03 (ref 0.91–1.09)
LYMPHOCYTES # BLD AUTO: 3.34 THOUSANDS/ΜL (ref 0.6–4.47)
LYMPHOCYTES NFR BLD AUTO: 25 % (ref 14–44)
MCH RBC QN AUTO: 30.6 PG (ref 26.8–34.3)
MCHC RBC AUTO-ENTMCNC: 33.2 G/DL (ref 31.4–37.4)
MCV RBC AUTO: 92 FL (ref 82–98)
MONOCYTES # BLD AUTO: 0.85 THOUSAND/ΜL (ref 0.17–1.22)
MONOCYTES NFR BLD AUTO: 6 % (ref 4–12)
NEUTROPHILS # BLD AUTO: 9.01 THOUSANDS/ΜL (ref 1.85–7.62)
NEUTS SEG NFR BLD AUTO: 67 % (ref 43–75)
NRBC BLD AUTO-RTO: 0 /100 WBCS
NT-PROBNP SERPL-MCNC: 1001 PG/ML
PLATELET # BLD AUTO: 306 THOUSANDS/UL (ref 149–390)
PMV BLD AUTO: 9.8 FL (ref 8.9–12.7)
POTASSIUM SERPL-SCNC: 4.8 MMOL/L (ref 3.5–5.3)
PROT SERPL-MCNC: 7.3 G/DL (ref 6.4–8.2)
PROTHROMBIN TIME: 11.1 SECONDS (ref 9.8–12)
RBC # BLD AUTO: 5.04 MILLION/UL (ref 3.81–5.12)
SODIUM SERPL-SCNC: 137 MMOL/L (ref 136–145)
TROPONIN I SERPL-MCNC: <0.02 NG/ML
WBC # BLD AUTO: 13.5 THOUSAND/UL (ref 4.31–10.16)

## 2019-09-26 PROCEDURE — 80053 COMPREHEN METABOLIC PANEL: CPT | Performed by: EMERGENCY MEDICINE

## 2019-09-26 PROCEDURE — 85379 FIBRIN DEGRADATION QUANT: CPT | Performed by: NURSE PRACTITIONER

## 2019-09-26 PROCEDURE — 71045 X-RAY EXAM CHEST 1 VIEW: CPT

## 2019-09-26 PROCEDURE — 84484 ASSAY OF TROPONIN QUANT: CPT | Performed by: EMERGENCY MEDICINE

## 2019-09-26 PROCEDURE — 85610 PROTHROMBIN TIME: CPT | Performed by: EMERGENCY MEDICINE

## 2019-09-26 PROCEDURE — 93005 ELECTROCARDIOGRAM TRACING: CPT

## 2019-09-26 PROCEDURE — 99220 PR INITIAL OBSERVATION CARE/DAY 70 MINUTES: CPT | Performed by: NURSE PRACTITIONER

## 2019-09-26 PROCEDURE — 96374 THER/PROPH/DIAG INJ IV PUSH: CPT

## 2019-09-26 PROCEDURE — 36415 COLL VENOUS BLD VENIPUNCTURE: CPT

## 2019-09-26 PROCEDURE — 85025 COMPLETE CBC W/AUTO DIFF WBC: CPT | Performed by: EMERGENCY MEDICINE

## 2019-09-26 PROCEDURE — 85730 THROMBOPLASTIN TIME PARTIAL: CPT | Performed by: EMERGENCY MEDICINE

## 2019-09-26 PROCEDURE — 99285 EMERGENCY DEPT VISIT HI MDM: CPT

## 2019-09-26 PROCEDURE — 83880 ASSAY OF NATRIURETIC PEPTIDE: CPT | Performed by: EMERGENCY MEDICINE

## 2019-09-26 PROCEDURE — 87081 CULTURE SCREEN ONLY: CPT | Performed by: FAMILY MEDICINE

## 2019-09-26 RX ORDER — DULOXETIN HYDROCHLORIDE 30 MG/1
90 CAPSULE, DELAYED RELEASE ORAL
Status: CANCELLED | OUTPATIENT
Start: 2019-09-26

## 2019-09-26 RX ORDER — FUROSEMIDE 10 MG/ML
40 INJECTION INTRAMUSCULAR; INTRAVENOUS ONCE
Status: COMPLETED | OUTPATIENT
Start: 2019-09-26 | End: 2019-09-26

## 2019-09-26 RX ORDER — LEVETIRACETAM 500 MG/1
500 TABLET ORAL EVERY 12 HOURS SCHEDULED
Status: DISCONTINUED | OUTPATIENT
Start: 2019-09-26 | End: 2019-09-28 | Stop reason: HOSPADM

## 2019-09-26 RX ORDER — LOSARTAN POTASSIUM 25 MG/1
25 TABLET ORAL DAILY
Status: CANCELLED | OUTPATIENT
Start: 2019-09-27

## 2019-09-26 RX ORDER — GABAPENTIN 300 MG/1
900 CAPSULE ORAL 3 TIMES DAILY
Status: DISCONTINUED | OUTPATIENT
Start: 2019-09-26 | End: 2019-09-27

## 2019-09-26 RX ORDER — NICOTINE 21 MG/24HR
1 PATCH, TRANSDERMAL 24 HOURS TRANSDERMAL DAILY
Status: DISCONTINUED | OUTPATIENT
Start: 2019-09-27 | End: 2019-09-28 | Stop reason: HOSPADM

## 2019-09-26 RX ORDER — GEMFIBROZIL 600 MG/1
600 TABLET, FILM COATED ORAL
Status: CANCELLED | OUTPATIENT
Start: 2019-09-27

## 2019-09-26 RX ORDER — LEVETIRACETAM 250 MG/1
500 TABLET ORAL EVERY 12 HOURS SCHEDULED
Status: CANCELLED | OUTPATIENT
Start: 2019-09-26

## 2019-09-26 RX ORDER — TRAZODONE HYDROCHLORIDE 50 MG/1
100 TABLET ORAL
Status: CANCELLED | OUTPATIENT
Start: 2019-09-26

## 2019-09-26 RX ORDER — ASPIRIN 81 MG/1
81 TABLET ORAL DAILY
Status: CANCELLED | OUTPATIENT
Start: 2019-09-27

## 2019-09-26 RX ORDER — METOPROLOL TARTRATE 50 MG/1
100 TABLET, FILM COATED ORAL EVERY 12 HOURS SCHEDULED
Status: CANCELLED | OUTPATIENT
Start: 2019-09-26

## 2019-09-26 RX ORDER — LOSARTAN POTASSIUM 25 MG/1
25 TABLET ORAL DAILY
Status: DISCONTINUED | OUTPATIENT
Start: 2019-09-27 | End: 2019-09-27

## 2019-09-26 RX ORDER — FUROSEMIDE 10 MG/ML
40 INJECTION INTRAMUSCULAR; INTRAVENOUS
Status: DISCONTINUED | OUTPATIENT
Start: 2019-09-27 | End: 2019-09-27

## 2019-09-26 RX ORDER — TRAZODONE HYDROCHLORIDE 50 MG/1
100 TABLET ORAL
Status: DISCONTINUED | OUTPATIENT
Start: 2019-09-26 | End: 2019-09-27

## 2019-09-26 RX ORDER — ATORVASTATIN CALCIUM 80 MG/1
80 TABLET, FILM COATED ORAL
Status: DISCONTINUED | OUTPATIENT
Start: 2019-09-26 | End: 2019-09-28 | Stop reason: HOSPADM

## 2019-09-26 RX ORDER — IPRATROPIUM BROMIDE AND ALBUTEROL SULFATE 2.5; .5 MG/3ML; MG/3ML
3 SOLUTION RESPIRATORY (INHALATION)
Status: DISCONTINUED | OUTPATIENT
Start: 2019-09-27 | End: 2019-09-27

## 2019-09-26 RX ORDER — ASPIRIN 81 MG/1
324 TABLET, CHEWABLE ORAL ONCE
Status: COMPLETED | OUTPATIENT
Start: 2019-09-26 | End: 2019-09-26

## 2019-09-26 RX ORDER — ARIPIPRAZOLE 5 MG/1
5 TABLET ORAL DAILY
Status: CANCELLED | OUTPATIENT
Start: 2019-09-27

## 2019-09-26 RX ORDER — NITROGLYCERIN 0.4 MG/1
0.4 TABLET SUBLINGUAL ONCE
Status: COMPLETED | OUTPATIENT
Start: 2019-09-26 | End: 2019-09-26

## 2019-09-26 RX ORDER — METOPROLOL TARTRATE 100 MG/1
100 TABLET ORAL EVERY 12 HOURS SCHEDULED
Status: DISCONTINUED | OUTPATIENT
Start: 2019-09-26 | End: 2019-09-27

## 2019-09-26 RX ORDER — FLUTICASONE FUROATE AND VILANTEROL 100; 25 UG/1; UG/1
1 POWDER RESPIRATORY (INHALATION)
Status: CANCELLED | OUTPATIENT
Start: 2019-09-27

## 2019-09-26 RX ORDER — KETOROLAC TROMETHAMINE 30 MG/ML
15 INJECTION, SOLUTION INTRAMUSCULAR; INTRAVENOUS ONCE
Status: COMPLETED | OUTPATIENT
Start: 2019-09-27 | End: 2019-09-27

## 2019-09-26 RX ORDER — FLUTICASONE FUROATE AND VILANTEROL 100; 25 UG/1; UG/1
1 POWDER RESPIRATORY (INHALATION) DAILY
Status: DISCONTINUED | OUTPATIENT
Start: 2019-09-27 | End: 2019-09-28 | Stop reason: HOSPADM

## 2019-09-26 RX ORDER — GABAPENTIN 600 MG/1
900 TABLET ORAL 3 TIMES DAILY
Status: CANCELLED | OUTPATIENT
Start: 2019-09-26

## 2019-09-26 RX ORDER — GEMFIBROZIL 600 MG/1
600 TABLET, FILM COATED ORAL
Status: DISCONTINUED | OUTPATIENT
Start: 2019-09-27 | End: 2019-09-28 | Stop reason: HOSPADM

## 2019-09-26 RX ORDER — ARIPIPRAZOLE 5 MG/1
5 TABLET ORAL DAILY
Status: DISCONTINUED | OUTPATIENT
Start: 2019-09-27 | End: 2019-09-28 | Stop reason: HOSPADM

## 2019-09-26 RX ORDER — PANTOPRAZOLE SODIUM 40 MG/1
40 TABLET, DELAYED RELEASE ORAL
Status: DISCONTINUED | OUTPATIENT
Start: 2019-09-27 | End: 2019-09-28 | Stop reason: HOSPADM

## 2019-09-26 RX ORDER — PANTOPRAZOLE SODIUM 40 MG/1
40 TABLET, DELAYED RELEASE ORAL DAILY
Status: CANCELLED | OUTPATIENT
Start: 2019-09-27

## 2019-09-26 RX ORDER — ASPIRIN 81 MG/1
81 TABLET ORAL DAILY
Status: DISCONTINUED | OUTPATIENT
Start: 2019-09-27 | End: 2019-09-28 | Stop reason: HOSPADM

## 2019-09-26 RX ORDER — ATORVASTATIN CALCIUM 80 MG/1
80 TABLET, FILM COATED ORAL DAILY
Status: CANCELLED | OUTPATIENT
Start: 2019-09-27

## 2019-09-26 RX ADMIN — ATORVASTATIN CALCIUM 80 MG: 80 TABLET ORAL at 23:47

## 2019-09-26 RX ADMIN — DULOXETINE HYDROCHLORIDE 90 MG: 60 CAPSULE, DELAYED RELEASE ORAL at 23:47

## 2019-09-26 RX ADMIN — GABAPENTIN 900 MG: 300 CAPSULE ORAL at 23:46

## 2019-09-26 RX ADMIN — TRAZODONE HYDROCHLORIDE 100 MG: 50 TABLET ORAL at 23:47

## 2019-09-26 RX ADMIN — LEVETIRACETAM 500 MG: 500 TABLET, FILM COATED ORAL at 23:47

## 2019-09-26 RX ADMIN — FUROSEMIDE 40 MG: 10 INJECTION, SOLUTION INTRAMUSCULAR; INTRAVENOUS at 21:37

## 2019-09-26 RX ADMIN — NITROGLYCERIN 0.4 MG: 0.4 TABLET SUBLINGUAL at 21:30

## 2019-09-26 RX ADMIN — ASPIRIN 81 MG 324 MG: 81 TABLET ORAL at 22:28

## 2019-09-27 ENCOUNTER — APPOINTMENT (OUTPATIENT)
Dept: NON INVASIVE DIAGNOSTICS | Facility: HOSPITAL | Age: 59
DRG: 127 | End: 2019-09-27
Payer: COMMERCIAL

## 2019-09-27 PROBLEM — I95.9 HYPOTENSION: Status: ACTIVE | Noted: 2017-12-12

## 2019-09-27 PROBLEM — N17.9 AKI (ACUTE KIDNEY INJURY) (HCC): Status: ACTIVE | Noted: 2019-09-27

## 2019-09-27 PROBLEM — G92.8 TOXIC METABOLIC ENCEPHALOPATHY: Status: ACTIVE | Noted: 2019-09-27

## 2019-09-27 LAB
AMPHETAMINES SERPL QL SCN: NEGATIVE
ANION GAP SERPL CALCULATED.3IONS-SCNC: 8 MMOL/L (ref 4–13)
ANION GAP SERPL CALCULATED.3IONS-SCNC: 9 MMOL/L (ref 4–13)
ARTERIAL PATENCY WRIST A: YES
BACTERIA UR QL AUTO: ABNORMAL /HPF
BARBITURATES UR QL: NEGATIVE
BASE EXCESS BLDA CALC-SCNC: 1.9 MMOL/L
BENZODIAZ UR QL: POSITIVE
BILIRUB UR QL STRIP: NEGATIVE
BODY TEMPERATURE: 98.2 DEGREES FEHRENHEIT
BUN SERPL-MCNC: 19 MG/DL (ref 5–25)
BUN SERPL-MCNC: 21 MG/DL (ref 5–25)
CALCIUM SERPL-MCNC: 7.8 MG/DL (ref 8.3–10.1)
CALCIUM SERPL-MCNC: 8 MG/DL (ref 8.3–10.1)
CHLORIDE SERPL-SCNC: 102 MMOL/L (ref 100–108)
CHLORIDE SERPL-SCNC: 103 MMOL/L (ref 100–108)
CHOLEST SERPL-MCNC: 192 MG/DL (ref 50–200)
CLARITY UR: CLEAR
CO2 SERPL-SCNC: 27 MMOL/L (ref 21–32)
CO2 SERPL-SCNC: 28 MMOL/L (ref 21–32)
COCAINE UR QL: NEGATIVE
COLOR UR: YELLOW
CREAT SERPL-MCNC: 0.99 MG/DL (ref 0.6–1.3)
CREAT SERPL-MCNC: 1.43 MG/DL (ref 0.6–1.3)
ERYTHROCYTE [DISTWIDTH] IN BLOOD BY AUTOMATED COUNT: 13.9 % (ref 11.6–15.1)
GFR SERPL CREATININE-BSD FRML MDRD: 41 ML/MIN/1.73SQ M
GFR SERPL CREATININE-BSD FRML MDRD: 63 ML/MIN/1.73SQ M
GLUCOSE SERPL-MCNC: 120 MG/DL (ref 65–140)
GLUCOSE SERPL-MCNC: 121 MG/DL (ref 65–140)
GLUCOSE UR STRIP-MCNC: NEGATIVE MG/DL
HCO3 BLDA-SCNC: 27.6 MMOL/L (ref 22–28)
HCT VFR BLD AUTO: 41.4 % (ref 34.8–46.1)
HCV AB SER QL: NORMAL
HDLC SERPL-MCNC: 28 MG/DL (ref 40–60)
HGB BLD-MCNC: 13.7 G/DL (ref 11.5–15.4)
HGB UR QL STRIP.AUTO: NEGATIVE
HYALINE CASTS #/AREA URNS LPF: ABNORMAL /LPF
KETONES UR STRIP-MCNC: NEGATIVE MG/DL
LDLC SERPL CALC-MCNC: 120 MG/DL (ref 0–100)
LEUKOCYTE ESTERASE UR QL STRIP: NEGATIVE
MAGNESIUM SERPL-MCNC: 1.4 MG/DL (ref 1.6–2.6)
MAGNESIUM SERPL-MCNC: 2.3 MG/DL (ref 1.6–2.6)
MCH RBC QN AUTO: 31.1 PG (ref 26.8–34.3)
MCHC RBC AUTO-ENTMCNC: 33.1 G/DL (ref 31.4–37.4)
MCV RBC AUTO: 94 FL (ref 82–98)
METHADONE UR QL: NEGATIVE
NITRITE UR QL STRIP: NEGATIVE
NON VENT ROOM AIR: 21 %
NON-SQ EPI CELLS URNS QL MICRO: ABNORMAL /HPF
O2 CT BLDA-SCNC: 17.2 ML/DL (ref 16–23)
OPIATES UR QL SCN: NEGATIVE
OXYHGB MFR BLDA: 91.4 % (ref 94–97)
PCO2 BLDA: 47.5 MM HG (ref 36–44)
PCO2 TEMP ADJ BLDA: 47.1 MM HG (ref 36–44)
PCP UR QL: NEGATIVE
PH BLD: 7.38 [PH] (ref 7.35–7.45)
PH BLDA: 7.38 [PH] (ref 7.35–7.45)
PH UR STRIP.AUTO: 5 [PH]
PLATELET # BLD AUTO: 242 THOUSANDS/UL (ref 149–390)
PMV BLD AUTO: 10 FL (ref 8.9–12.7)
PO2 BLD: 64.3 MM HG (ref 75–129)
PO2 BLDA: 65.2 MM HG (ref 75–129)
POTASSIUM SERPL-SCNC: 2.8 MMOL/L (ref 3.5–5.3)
POTASSIUM SERPL-SCNC: 3.9 MMOL/L (ref 3.5–5.3)
PROT UR STRIP-MCNC: NEGATIVE MG/DL
RBC # BLD AUTO: 4.4 MILLION/UL (ref 3.81–5.12)
RBC #/AREA URNS AUTO: ABNORMAL /HPF
SODIUM SERPL-SCNC: 138 MMOL/L (ref 136–145)
SODIUM SERPL-SCNC: 139 MMOL/L (ref 136–145)
SP GR UR STRIP.AUTO: 1.01 (ref 1–1.03)
SPECIMEN SOURCE: ABNORMAL
THC UR QL: NEGATIVE
TRIGL SERPL-MCNC: 220 MG/DL
TROPONIN I SERPL-MCNC: <0.02 NG/ML
TROPONIN I SERPL-MCNC: <0.02 NG/ML
UROBILINOGEN UR QL STRIP.AUTO: 0.2 E.U./DL
WBC # BLD AUTO: 9.89 THOUSAND/UL (ref 4.31–10.16)
WBC #/AREA URNS AUTO: ABNORMAL /HPF

## 2019-09-27 PROCEDURE — 84484 ASSAY OF TROPONIN QUANT: CPT | Performed by: NURSE PRACTITIONER

## 2019-09-27 PROCEDURE — G8988 SELF CARE GOAL STATUS: HCPCS

## 2019-09-27 PROCEDURE — G8987 SELF CARE CURRENT STATUS: HCPCS

## 2019-09-27 PROCEDURE — 81001 URINALYSIS AUTO W/SCOPE: CPT | Performed by: NURSE PRACTITIONER

## 2019-09-27 PROCEDURE — 80307 DRUG TEST PRSMV CHEM ANLYZR: CPT | Performed by: NURSE PRACTITIONER

## 2019-09-27 PROCEDURE — 97167 OT EVAL HIGH COMPLEX 60 MIN: CPT

## 2019-09-27 PROCEDURE — 83735 ASSAY OF MAGNESIUM: CPT | Performed by: NURSE PRACTITIONER

## 2019-09-27 PROCEDURE — 86803 HEPATITIS C AB TEST: CPT | Performed by: NURSE PRACTITIONER

## 2019-09-27 PROCEDURE — 93306 TTE W/DOPPLER COMPLETE: CPT

## 2019-09-27 PROCEDURE — G8979 MOBILITY GOAL STATUS: HCPCS

## 2019-09-27 PROCEDURE — 94640 AIRWAY INHALATION TREATMENT: CPT

## 2019-09-27 PROCEDURE — 82805 BLOOD GASES W/O2 SATURATION: CPT | Performed by: NURSE PRACTITIONER

## 2019-09-27 PROCEDURE — 97163 PT EVAL HIGH COMPLEX 45 MIN: CPT

## 2019-09-27 PROCEDURE — 94664 DEMO&/EVAL PT USE INHALER: CPT

## 2019-09-27 PROCEDURE — 80061 LIPID PANEL: CPT | Performed by: NURSE PRACTITIONER

## 2019-09-27 PROCEDURE — 94760 N-INVAS EAR/PLS OXIMETRY 1: CPT

## 2019-09-27 PROCEDURE — 80048 BASIC METABOLIC PNL TOTAL CA: CPT | Performed by: NURSE PRACTITIONER

## 2019-09-27 PROCEDURE — G8978 MOBILITY CURRENT STATUS: HCPCS

## 2019-09-27 PROCEDURE — 36600 WITHDRAWAL OF ARTERIAL BLOOD: CPT

## 2019-09-27 PROCEDURE — 99255 IP/OBS CONSLTJ NEW/EST HI 80: CPT | Performed by: INTERNAL MEDICINE

## 2019-09-27 PROCEDURE — 99232 SBSQ HOSP IP/OBS MODERATE 35: CPT | Performed by: FAMILY MEDICINE

## 2019-09-27 PROCEDURE — 90732 PPSV23 VACC 2 YRS+ SUBQ/IM: CPT | Performed by: NURSE PRACTITIONER

## 2019-09-27 PROCEDURE — 93306 TTE W/DOPPLER COMPLETE: CPT | Performed by: INTERNAL MEDICINE

## 2019-09-27 PROCEDURE — 85027 COMPLETE CBC AUTOMATED: CPT | Performed by: NURSE PRACTITIONER

## 2019-09-27 RX ORDER — SENNOSIDES 8.6 MG
1 TABLET ORAL ONCE
Status: COMPLETED | OUTPATIENT
Start: 2019-09-27 | End: 2019-09-27

## 2019-09-27 RX ORDER — MAGNESIUM SULFATE HEPTAHYDRATE 40 MG/ML
2 INJECTION, SOLUTION INTRAVENOUS ONCE
Status: COMPLETED | OUTPATIENT
Start: 2019-09-27 | End: 2019-09-27

## 2019-09-27 RX ORDER — SODIUM CHLORIDE, SODIUM GLUCONATE, SODIUM ACETATE, POTASSIUM CHLORIDE, MAGNESIUM CHLORIDE, SODIUM PHOSPHATE, DIBASIC, AND POTASSIUM PHOSPHATE .53; .5; .37; .037; .03; .012; .00082 G/100ML; G/100ML; G/100ML; G/100ML; G/100ML; G/100ML; G/100ML
50 INJECTION, SOLUTION INTRAVENOUS CONTINUOUS
Status: DISCONTINUED | OUTPATIENT
Start: 2019-09-27 | End: 2019-09-28 | Stop reason: HOSPADM

## 2019-09-27 RX ORDER — IPRATROPIUM BROMIDE AND ALBUTEROL SULFATE 2.5; .5 MG/3ML; MG/3ML
3 SOLUTION RESPIRATORY (INHALATION) 2 TIMES DAILY
Status: DISCONTINUED | OUTPATIENT
Start: 2019-09-28 | End: 2019-09-28 | Stop reason: HOSPADM

## 2019-09-27 RX ORDER — METOPROLOL TARTRATE 50 MG/1
50 TABLET, FILM COATED ORAL EVERY 12 HOURS SCHEDULED
Status: DISCONTINUED | OUTPATIENT
Start: 2019-09-27 | End: 2019-09-28 | Stop reason: HOSPADM

## 2019-09-27 RX ORDER — METHOCARBAMOL 500 MG/1
500 TABLET, FILM COATED ORAL EVERY 6 HOURS PRN
Status: DISCONTINUED | OUTPATIENT
Start: 2019-09-27 | End: 2019-09-27

## 2019-09-27 RX ORDER — GABAPENTIN 300 MG/1
300 CAPSULE ORAL 3 TIMES DAILY
Status: DISCONTINUED | OUTPATIENT
Start: 2019-09-27 | End: 2019-09-28 | Stop reason: HOSPADM

## 2019-09-27 RX ORDER — HEPARIN SODIUM 5000 [USP'U]/ML
5000 INJECTION, SOLUTION INTRAVENOUS; SUBCUTANEOUS EVERY 8 HOURS SCHEDULED
Status: DISCONTINUED | OUTPATIENT
Start: 2019-09-27 | End: 2019-09-28 | Stop reason: HOSPADM

## 2019-09-27 RX ORDER — ACETAMINOPHEN 325 MG/1
975 TABLET ORAL EVERY 8 HOURS PRN
Status: DISCONTINUED | OUTPATIENT
Start: 2019-09-27 | End: 2019-09-28 | Stop reason: HOSPADM

## 2019-09-27 RX ORDER — POTASSIUM CHLORIDE 20 MEQ/1
40 TABLET, EXTENDED RELEASE ORAL EVERY 4 HOURS
Status: COMPLETED | OUTPATIENT
Start: 2019-09-27 | End: 2019-09-27

## 2019-09-27 RX ORDER — POTASSIUM CHLORIDE 20 MEQ/1
40 TABLET, EXTENDED RELEASE ORAL ONCE
Status: COMPLETED | OUTPATIENT
Start: 2019-09-27 | End: 2019-09-27

## 2019-09-27 RX ADMIN — GEMFIBROZIL 600 MG: 600 TABLET ORAL at 16:14

## 2019-09-27 RX ADMIN — LEVETIRACETAM 500 MG: 500 TABLET, FILM COATED ORAL at 09:36

## 2019-09-27 RX ADMIN — ATORVASTATIN CALCIUM 80 MG: 80 TABLET ORAL at 16:15

## 2019-09-27 RX ADMIN — POTASSIUM CHLORIDE 40 MEQ: 1500 TABLET, EXTENDED RELEASE ORAL at 13:22

## 2019-09-27 RX ADMIN — LEVETIRACETAM 500 MG: 500 TABLET, FILM COATED ORAL at 21:55

## 2019-09-27 RX ADMIN — ACETAMINOPHEN 975 MG: 325 TABLET, FILM COATED ORAL at 09:35

## 2019-09-27 RX ADMIN — GABAPENTIN 900 MG: 300 CAPSULE ORAL at 16:15

## 2019-09-27 RX ADMIN — FLUTICASONE FUROATE AND VILANTEROL TRIFENATATE 1 PUFF: 100; 25 POWDER RESPIRATORY (INHALATION) at 10:20

## 2019-09-27 RX ADMIN — ENOXAPARIN SODIUM 40 MG: 40 INJECTION SUBCUTANEOUS at 09:24

## 2019-09-27 RX ADMIN — SENNOSIDES 8.6 MG: 8.6 TABLET, FILM COATED ORAL at 14:09

## 2019-09-27 RX ADMIN — KETOROLAC TROMETHAMINE 15 MG: 30 INJECTION, SOLUTION INTRAMUSCULAR at 00:27

## 2019-09-27 RX ADMIN — MAGNESIUM SULFATE HEPTAHYDRATE 2 G: 40 INJECTION, SOLUTION INTRAVENOUS at 07:14

## 2019-09-27 RX ADMIN — NICOTINE 1 PATCH: 14 PATCH TRANSDERMAL at 09:24

## 2019-09-27 RX ADMIN — GABAPENTIN 300 MG: 300 CAPSULE ORAL at 21:55

## 2019-09-27 RX ADMIN — PNEUMOCOCCAL VACCINE POLYVALENT 0.5 ML
25; 25; 25; 25; 25; 25; 25; 25; 25; 25; 25; 25; 25; 25; 25; 25; 25; 25; 25; 25; 25; 25; 25 INJECTION, SOLUTION INTRAMUSCULAR; SUBCUTANEOUS at 22:15

## 2019-09-27 RX ADMIN — POTASSIUM CHLORIDE 40 MEQ: 1500 TABLET, EXTENDED RELEASE ORAL at 07:12

## 2019-09-27 RX ADMIN — MAGNESIUM HYDROXIDE 30 ML: 400 SUSPENSION ORAL at 14:09

## 2019-09-27 RX ADMIN — DULOXETINE HYDROCHLORIDE 90 MG: 60 CAPSULE, DELAYED RELEASE ORAL at 21:55

## 2019-09-27 RX ADMIN — POTASSIUM CHLORIDE 40 MEQ: 1500 TABLET, EXTENDED RELEASE ORAL at 09:26

## 2019-09-27 RX ADMIN — SODIUM CHLORIDE 250 ML: 0.9 INJECTION, SOLUTION INTRAVENOUS at 04:28

## 2019-09-27 RX ADMIN — METOPROLOL TARTRATE 50 MG: 50 TABLET, FILM COATED ORAL at 09:24

## 2019-09-27 RX ADMIN — ARIPIPRAZOLE 5 MG: 5 TABLET ORAL at 09:24

## 2019-09-27 RX ADMIN — PANTOPRAZOLE SODIUM 40 MG: 40 TABLET, DELAYED RELEASE ORAL at 05:04

## 2019-09-27 RX ADMIN — ASPIRIN 81 MG: 81 TABLET, COATED ORAL at 09:24

## 2019-09-27 RX ADMIN — SODIUM CHLORIDE, SODIUM GLUCONATE, SODIUM ACETATE, POTASSIUM CHLORIDE, MAGNESIUM CHLORIDE, SODIUM PHOSPHATE, DIBASIC, AND POTASSIUM PHOSPHATE 75 ML/HR: .53; .5; .37; .037; .03; .012; .00082 INJECTION, SOLUTION INTRAVENOUS at 10:19

## 2019-09-27 RX ADMIN — SODIUM CHLORIDE, SODIUM GLUCONATE, SODIUM ACETATE, POTASSIUM CHLORIDE, MAGNESIUM CHLORIDE, SODIUM PHOSPHATE, DIBASIC, AND POTASSIUM PHOSPHATE 50 ML/HR: .53; .5; .37; .037; .03; .012; .00082 INJECTION, SOLUTION INTRAVENOUS at 23:31

## 2019-09-27 RX ADMIN — HEPARIN SODIUM 5000 UNITS: 5000 INJECTION INTRAVENOUS; SUBCUTANEOUS at 21:55

## 2019-09-27 RX ADMIN — IPRATROPIUM BROMIDE AND ALBUTEROL SULFATE 3 ML: 2.5; .5 SOLUTION RESPIRATORY (INHALATION) at 08:01

## 2019-09-27 RX ADMIN — GEMFIBROZIL 600 MG: 600 TABLET ORAL at 09:27

## 2019-09-27 RX ADMIN — GABAPENTIN 900 MG: 300 CAPSULE ORAL at 09:36

## 2019-09-27 RX ADMIN — IPRATROPIUM BROMIDE AND ALBUTEROL SULFATE 3 ML: 2.5; .5 SOLUTION RESPIRATORY (INHALATION) at 19:53

## 2019-09-27 NOTE — ASSESSMENT & PLAN NOTE
Two to medication side effects  Patient is lethargic, easy to arouse but falls asleep within seconds  States she does not know why she is so sleepy  Cannot keep her eyes open    · Consult Psychiatry  · Discontinue trazodone  · Consider reducing doses of Neurontin and/or Cymbalta  · Supportive care with IV hydration  · Up with assistance  · Aspiration precautions, fall precautions  · Urinary retention protocol, straight cath as needed

## 2019-09-27 NOTE — ASSESSMENT & PLAN NOTE
Likely due to over diuresis in the setting of low EF  · Cardiology following  · Continue IV hydration  · Continue metoprolol with holding parameters

## 2019-09-27 NOTE — CONSULTS
Consultation - Cardiology   Ayesha Quinn 63 y o  female MRN: [id number]  Unit/Bed#: [address] [place] [address] Encounter: [id number]    Assessment/Plan     Assessment:  1  Dyspnea, concerning for exacerbation of COPD  2  Acute kidney injury  3  Hypertension with changes of LVH noted on EKG  4  Diastolic heart failure, mild exacerbation  5  Dyslipidemia  6  Reported coronary artery disease, nonobstructive  7  Hypokalemia and hypo magnesium  8  High risk medications     Plan:  Patient has been admitted to the hospitalist service on observation  1  Hold any further diuretics and continue gentle IV rehydration as previously ordered  2  Continue potassium and magnesium supplementation as previously ordered and recheck electrolyte panel this afternoon  3  Consider addition of Aldactone once patient's renal function has stabilized to help with diastolic heart failure, blood pressure and for his potassium-sparing effect  4  Despite being on Lopid and Lipitor, patient is cholesterol panel is suboptimal   Would recommend following with primary for consideration of PCSK9 inhibitor therapy  5  Resume Cozaar once renal function is stable  Continue metoprolol and other home medications  History of Present Illness   Physician Requesting Consult: Bertha Billingsley DO  Reason for Consult / Principal Problem:  Shortness of breath, which was initially thought to be exacerbation of her diastolic heart failure    HPI: Ayesha Quinn is a 63y o  year old female who presented to the emergency room for evaluation of shortness of breath, lower extremity edema and a 4 lb weight gain in 1 day  Patient also noted midsternal chest heaviness while in the emergency room  She did note to the emergency room physician that she had not taken her medication that morning  After she noted the lower extremity edema, she did take 40 mg of Lasix which is double her usual dose  She then received another dose of Lasix 40 mg IV in the emergency room  Upon arrival to the telemetry unit, patient was noted to be profoundly hypotensive  Labs were repeated at 4:00 a m  This morning and she was noted to have an acute kidney injury with creatinine changed from 0 96 and elevated to 1 43  She was also noted to be profoundly hypokalemic with a potassium of 2 8 down from 4 8 and hypo magnesium with a magnesium level of 1 4  Replacement of electrolytes his on going at this time and patient is for recheck early this afternoon of [facility] and Mag   NT proBNP was slightly elevated at 1000 and troponins have been negative  Review of records from DR VERONICA RAMIRES [facility] note patient has had previous NT proBNP of greater than 4000 and her baseline appears to be around 800  Creatinine has been documented as normal   The patient does appear to have difficulty maintaining a normal potassium with potassiums in the range of 2 6 to 3 5  Patient has significant medical history (see below) and is on multiple medications (Neurontin, Keppra, Abilify and Cymbalta) which can be contributing to her lower extremity edema  Patient states that she is normally very active at home, does not have any difficulty performing activities of daily living and IADLs  She does have a history of COPD with continued smoking of 1 pack of cigarettes per day  She states she has been smoking since the age of 17  Inpatient consult to Cardiology  Consult performed by: NICKY Mishra  Consult ordered by: NICKY Suazo          Review of Systems   Constitutional: Positive for activity change and fatigue  Negative for appetite change  HENT: Negative  Negative for congestion, ear discharge, postnasal drip, sinus pressure and trouble swallowing  Eyes: Negative  Negative for photophobia and visual disturbance  Respiratory: Positive for cough and shortness of breath  Cardiovascular: Positive for leg swelling  Negative for chest pain and palpitations  Gastrointestinal: Negative    Negative for abdominal distention, constipation, diarrhea, nausea and vomiting  Endocrine: Negative  Negative for polydipsia, polyphagia and polyuria  Genitourinary: Negative  Musculoskeletal: Positive for arthralgias, gait problem and myalgias  Skin: Negative  Allergic/Immunologic: Negative  Neurological: Negative for dizziness, syncope, weakness and light-headedness  Hematological: Negative  Psychiatric/Behavioral: Negative          Historical Information   Past Medical History:   Diagnosis Date    Alopecia     Back injury     Bell's palsy     [facility]     Chronic pain     back    Closed left arm fracture     Fibromyalgia     Fibromyalgia, primary     GERD (gastroesophageal reflux disease)     Hyperlipidemia     Hypertension     Lyme disease     [facility] )     2015    [facility]     Cardiac catheterization 2 years ago showed 30% blockage in 1 of the blood vessels    Stroke ([facility] )     TIA (transient ischemic attack)     2017     Past Surgical History:   Procedure Laterality Date     SECTION      CHOLECYSTECTOMY      CORONARY ANGIOPLASTY          ORIF TIBIA FRACTURE Left 2018    Procedure: OPEN REDUCTION W/ INTERNAL FIXATION (ORIF) TIBIA FRACTURE;  Surgeon: Brad Ohara MD;  Location[facility];  Service: Orthopedics     Social History     Substance and Sexual Activity   Alcohol Use Not Currently    Comment: as per patient; former social drinker      Social History     Substance and Sexual Activity   Drug Use No     Social History     Tobacco Use   Smoking Status Current Every Day Smoker    Packs/day: 0 50    Years: 40 00    Pack years: 20 00   Smokeless Tobacco Never Used   Tobacco Comment    1/2 per day     Family History:   Family History   Problem Relation Age of Onset    Heart attack Mother     Heart attack Father        Meds/Allergies   all current active meds have been reviewed, current meds: Current Facility-Administered Medications   Medication Dose Route Frequency    acetaminophen (TYLENOL) tablet 975 mg  975 mg Oral Q8H PRN    ARIPiprazole (ABILIFY) tablet 5 mg  5 mg Oral Daily    aspirin (ECOTRIN LOW STRENGTH) EC tablet 81 mg  81 mg Oral Daily    atorvastatin (LIPITOR) tablet 80 mg  80 mg Oral Daily With Dinner    DULoxetine (CYMBALTA) delayed release capsule 90 mg  90 mg Oral HS    enoxaparin (LOVENOX) subcutaneous injection 40 mg  40 mg Subcutaneous Daily    fluticasone-vilanterol (BREO ELLIPTA) 100-25 mcg/inh inhaler 1 puff  1 puff Inhalation Daily    gabapentin (NEURONTIN) capsule 900 mg  900 mg Oral TID    gemfibrozil (LOPID) tablet 600 mg  600 mg Oral BID AC    ipratropium-albuterol (DUO-NEB) 0 5-2 5 mg/3 mL inhalation solution 3 mL  3 mL Nebulization Q6H    levETIRAcetam (KEPPRA) tablet 500 mg  500 mg Oral Q12H KIKI    methocarbamol (ROBAXIN) tablet 500 mg  500 mg Oral Q6H PRN    metoprolol tartrate (LOPRESSOR) tablet 50 mg  50 mg Oral Q12H [address]    multi-electrolyte (ISOLYTE-S PH 7 4 equivalent) IV solution  75 mL/hr Intravenous Continuous    nicotine (NICODERM CQ) 14 mg/24hr TD 24 hr patch 1 patch  1 patch Transdermal Daily    pantoprazole (PROTONIX) EC tablet 40 mg  40 mg Oral Early Morning    pneumococcal 23-valent polysaccharide vaccine (PNEUMOVAX-23) injection 0 5 mL  0 5 mL Subcutaneous Prior to discharge    potassium chloride (K-DUR,KLOR-CON) CR tablet 40 mEq  40 mEq Oral Q4H    traZODone (DESYREL) tablet 100 mg  100 mg Oral HS    and PTA meds:   Prior to Admission Medications   Prescriptions Last Dose Informant Patient Reported? Taking?    ARIPiprazole (ABILIFY) 5 mg tablet 9/26/2019 at Unknown time  Yes Yes   Sig: Take 5 mg by mouth daily   DULoxetine HCl (CYMBALTA PO) 9/25/2019 at Unknown time Self Yes Yes   Sig: Take 90 mg by mouth daily at bedtime     Tiotropium Bromide Monohydrate ([address] [place]) 9/26/2019 at Unknown time Self Yes Yes   Sig: Inhale aspirin (ECOTRIN LOW STRENGTH) 81 mg EC tablet Unknown at Unknown time Outside Facility (Specify) Yes No   Sig: Take 81 mg by mouth daily   atorvastatin (LIPITOR) 80 mg tablet 9/25/2019 at Unknown time  Yes Yes   Sig: Take 80 mg by mouth daily   fluticasone-salmeterol (AIRDUO RESPICLICK) 887-88 mcg/act dry powder inhaler 9/25/2019 at Unknown time Pharmacy (Specify) Yes Yes   Sig: Inhale 1 puff 2 (two) times a day Rinse mouth after use     furosemide (LASIX) 20 mg tablet 9/26/2019 at Unknown time  No Yes   Sig: Take 2 tablets (40 mg total) by mouth daily   Patient taking differently: Take 20 mg by mouth 2 (two) times a day    gabapentin (NEURONTIN) 600 MG tablet 9/26/2019 at Unknown time Self Yes Yes   Sig: Take 900 mg by mouth 3 (three) times a day    gemfibrozil (LOPID) 600 mg tablet 9/26/2019 at Unknown time Pharmacy ([address] [place] [address]) Yes Yes   Sig: Take 600 mg by mouth 2 (two) times a day before meals   levETIRAcetam (KEPPRA) 500 mg tablet 9/26/2019 at Unknown time  No Yes   Sig: Take 1 tablet (500 mg total) by mouth every 12 (twelve) hours   losartan (COZAAR) 25 mg tablet 9/26/2019 at Unknown time Self Yes Yes   Sig: Take 25 mg by mouth daily    metoprolol tartrate (LOPRESSOR) 25 mg tablet 9/26/2019 at Unknown time  Yes Yes   Sig: Take 100 mg by mouth every 12 (twelve) hours    pantoprazole (PROTONIX) 40 mg tablet 9/26/2019 at Unknown time  Yes Yes   Sig: Take 40 mg by mouth daily   potassium chloride (K-DUR,KLOR-CON) 20 mEq tablet Not Taking at Unknown time  No No   Sig: Take 1 tablet (20 mEq total) by mouth every other day   Patient not taking: Reported on 9/26/2019   traZODone (DESYREL) 100 mg tablet 9/25/2019 at Unknown time Pharmacy (Specify) Yes Yes   Sig: Take 100 mg by mouth daily at bedtime      Facility-Administered Medications: None     Allergies   Allergen Reactions    Hydromorphone Shortness Of Breath    Morphine Shortness Of Breath       Objective   Vitals: Blood pressure 111/52, pulse 70, temperature 97 8 °F (36 6 °C), resp  rate 16, height 5' 4" (1 626 m), weight 73 kg (160 lb 15 oz), last menstrual period 09/04/2009, SpO2 98 %, not currently breastfeeding  Orthostatic Blood Pressures      Most Recent Value   Blood Pressure  111/52 filed at 09/27/2019 0800   Patient Position - Orthostatic VS  Sitting filed at 09/27/2019 0800            Intake/Output Summary (Last 24 hours) at 9/27/2019 0948  Last data filed at 9/27/2019 3104  Gross per 24 hour   Intake 1250 ml   Output 1250 ml   Net 0 ml       Invasive Devices     Peripheral Intravenous Line            Peripheral IV 09/27/19 Left Hand less than 1 day                Physical Exam   Constitutional: She is oriented to person, place, and time  She appears well-developed and well-nourished  No distress  HENT:   Head: Normocephalic  Right Ear: External ear normal    Left Ear: External ear normal    Eyes: Pupils are equal, round, and reactive to light  Conjunctivae are normal  Right eye exhibits no discharge  Left eye exhibits no discharge  No scleral icterus  Neck: Normal range of motion  Neck supple  No JVD present  No thyromegaly present  Cardiovascular: Normal rate, regular rhythm and intact distal pulses  Murmur heard  Systolic murmur is present with a grade of 2/6   2/6 systolic ejection murmur heard at 4th intercostal space left midclavicular line   Pulmonary/Chest: Effort normal  No accessory muscle usage  No respiratory distress  She has decreased breath sounds in the right upper field, the right middle field, the right lower field, the left upper field, the left middle field and the left lower field  Abdominal: Soft  Bowel sounds are normal  She exhibits no distension and no mass  No hernia  Musculoskeletal: She exhibits edema  Trace pedal, ankle and pretibial edema   Neurological: She is alert and oriented to person, place, and time  Skin: Skin is warm and dry  Capillary refill takes less than 2 seconds  She is not diaphoretic  Psychiatric: Her speech is normal and behavior is normal  Judgment and thought content normal  Her affect is blunt  Cognition and memory are normal    Nursing note and vitals reviewed  Lab Results:   I have personally reviewed pertinent lab results  CBC with diff:   Results from last 7 days   Lab Units 09/27/19  0443   WBC Thousand/uL 9 89   RBC Million/uL 4 40   HEMOGLOBIN g/dL 13 7   HEMATOCRIT % 41 4   MCV fL 94   MCH pg 31 1   MCHC g/dL 33 1   RDW % 13 9   MPV fL 10 0   PLATELETS Thousands/uL 242     CMP:   Results from last 7 days   Lab Units 09/27/19  0443 09/26/19  2114   SODIUM mmol/L 138 137   POTASSIUM mmol/L 2 8* 4 8   CHLORIDE mmol/L 102 102   CO2 mmol/L 27 27   BUN mg/dL 21 15   CREATININE mg/dL 1 43* 0 96   CALCIUM mg/dL 7 8* 8 4   AST U/L  --  25   ALT U/L  --  27   ALK PHOS U/L  --  93   EGFR ml/min/1 73sq m 41 66     Troponin:   0   Lab Value Date/Time    TROPONINI <0 02 09/27/2019 0443    TROPONINI <0 02 09/27/2019 0053    TROPONINI <0 02 09/26/2019 2114    TROPONINI <0 02 09/11/2018 1816    TROPONINI <0 02 07/26/2018 0443    TROPONINI <0 02 07/26/2018 0058    TROPONINI <0 02 07/25/2018 2221    TROPONINI <0 02 06/04/2018 1550    TROPONINI <0 02 12/26/2017 2132    TROPONINI <0 02 12/12/2017 1358     BNP:   Results from last 7 days   Lab Units 09/27/19  0443   POTASSIUM mmol/L 2 8*   CHLORIDE mmol/L 102   CO2 mmol/L 27   BUN mg/dL 21   CREATININE mg/dL 1 43*   CALCIUM mg/dL 7 8*   EGFR ml/min/1 73sq m 41     Coags:   Results from last 7 days   Lab Units 09/26/19  2114   PTT seconds 26   INR  1 03     Magnesium:   Results from last 7 days   Lab Units 09/27/19  0443   MAGNESIUM mg/dL 1 4*     Lipid Profile:   Results from last 7 days   Lab Units 09/27/19  0443   HDL mg/dL 28*   LDL CALC mg/dL 120*   TRIGLYCERIDES mg/dL 220*     Imaging: I have personally reviewed pertinent reports      EKG:  Sinus rhythm with voltage criteria for LVH with repolarization abnormality  VTE Prophylaxis: Sequential compression device Maximiliano Yan)     Code Status: Level 1 - Full Code  Advance Directive and Living Will:      Power of :    POLST:      Manjeet Rudolph, [facility]

## 2019-09-27 NOTE — UTILIZATION REVIEW
Initial Clinical Review    Admission: Date/Time/Statement: 09/26/2019 @ 2217  Orders Placed This Encounter   Procedures    Place in Observation     Standing Status:   Standing     Number of Occurrences:   1     Order Specific Question:   Admitting Physician     Answer:   Maliha Maza [[id number]]     Order Specific Question:   Level of Care     Answer:   Med Surg [[id number]]     ED Arrival Information     Expected Arrival Acuity Means of Arrival Escorted By Service Admission Type    - 9/26/2019 20:36 Emergent Walk-In Family Member General Medicine Emergency    Arrival Complaint    SOB, CHF, Legs swollen         Chief Complaint   Patient presents with    Shortness of Breath     patient went to dentist this am and when was walking up stairs developed shortness of breath, noticed a 4lb weight gain since yesterday, and has developed midsternal chest pressure, reports ankles are swollen     Assessment/Plan: 63year old female, presented to the ED from home via car  Admitted as Observation due to CHF  Presents with SOB  This AM while walking felt dyspneic and unsteady in her gait  She went home and noticed that she had increased shortness of breath with ambulation up the stairs  She reports a 5 lb weight gain since yesterday  She has bilateral pedal edema  This evening around 5:00 p m  She developed midsternal chest pressure  Acute on chronic diastolic CHF (congestive heart failure):  Continue lasix 40mg IVP  Repeat echo - last echo normal EF, diastolic dysfunction  Cardiology consultation  Daily Weight  Intake and output  Low sodium diet      ED Triage Vitals   Temperature Pulse Respirations Blood Pressure SpO2   09/26/19 2055 09/26/19 2055 09/26/19 2055 09/26/19 2055 09/26/19 2055   99 4 °F (37 4 °C) (!) 110 20 158/64 95 %      Temp Source Heart Rate Source Patient Position - Orthostatic VS BP Location FiO2 (%)   09/26/19 2055 09/26/19 2055 09/26/19 2055 09/26/19 2055 --   Tympanic Monitor Lying Right arm Pain Score       19       6        Wt Readings from Last 1 Encounters:   19 73 kg (160 lb 15 oz)     Additional Vital Signs:   Date/Time  Temp  Pulse  Resp  BP  SpO2  O2 Device  Patient Position - Orthostatic VS   19  99 1 °F (37 3 °C)  84  18  115/55  96 %  Nasal cannula  Lying   19        108/73         19    94  18  108/62  95 %       19    98  18  127/72  95 %       19    96  18  109/69  95 %       19    97  20  110/68  94 %  Nasal cannula     19    102  20  92/61  95 %  Nasal cannula  Sitting   19            Nasal cannula     19  99 4 °F (37 4 °C)  110Abnormal   20  158/64  95 %  None (Room air)  Lying     Pertinent Labs/Diagnostic Test Results:   2019 @   EC, Sinus Tachycardia    Results from last 7 days   Lab Units 19   WBC Thousand/uL 13 50*   HEMOGLOBIN g/dL 15 4   HEMATOCRIT % 46 4*   PLATELETS Thousands/uL 306   NEUTROS ABS Thousands/µL 9 01*     Results from last 7 days   Lab Units 19   SODIUM mmol/L 137   POTASSIUM mmol/L 4 8   CHLORIDE mmol/L 102   CO2 mmol/L 27   ANION GAP mmol/L 8   BUN mg/dL 15   CREATININE mg/dL 0 96   EGFR ml/min/1 73sq m 66   CALCIUM mg/dL 8 4   MAGNESIUM mg/dL  --      Results from last 7 days   Lab Units 19   AST U/L 25   ALT U/L 27   ALK PHOS U/L 93   TOTAL PROTEIN g/dL 7 3   ALBUMIN g/dL 3 4*   TOTAL BILIRUBIN mg/dL 0 50     Results from last 7 days   Lab Units 19   GLUCOSE RANDOM mg/dL 109     Results from last 7 days   Lab Units 19   TROPONIN I ng/mL <0 02     Results from last 7 days   Lab Units 19   D DIMER QUANT ng/ml (FEU) 480     Results from last 7 days   Lab Units 19   PROTIME seconds 11 1   INR  1 03   PTT seconds 26     Results from last 7 days   Lab Units 19   NT-PRO BNP pg/mL 1,001*     ED Treatment:   Medication Administration from 09/26/2019 2036 to 09/26/2019 2307       Date/Time Order Dose Route Action     09/26/2019 2130 nitroglycerin (NITROSTAT) SL tablet 0 4 mg 0 4 mg Sublingual Given     09/26/2019 2137 furosemide (LASIX) injection 40 mg 40 mg Intravenous Given     09/26/2019 2228 aspirin chewable tablet 324 mg 324 mg Oral Given        Past Medical History:   Diagnosis Date    Alopecia     Back injury     Bell's palsy     [facility]     Chronic pain     back    Closed left arm fracture     Fibromyalgia     Fibromyalgia, primary     GERD (gastroesophageal reflux disease)     Hyperlipidemia     Hypertension     Lyme disease     [facility]     2015    Myocardial infarction ([facility] )     Cardiac catheterization 2 years ago showed 30% blockage in 1 of the blood vessels    [facility]     TIA (transient ischemic attack)     12/2017     Present on Admission:   Acute on chronic diastolic CHF (congestive heart failure) ([facility])   Seizure disorder ([facility])   Hypertension   Hypercholesteremia   Anxiety   Fibromyalgia      Admitting Diagnosis: Shortness of breath [R06 02]  CHF (congestive heart failure) ([facility]) [I50 9]  Chest pain [R07 9]  Age/Sex: 63 y o  female  Admission Orders:  Current Facility-Administered Medications:  ARIPiprazole 5 mg Oral Daily   aspirin 81 mg Oral Daily   atorvastatin 80 mg Oral Daily With Dinner   DULoxetine 90 mg Oral HS   enoxaparin 40 mg Subcutaneous Daily   fluticasone-vilanterol 1 puff Inhalation Daily   gabapentin 900 mg Oral TID   gemfibrozil 600 mg Oral BID AC   ipratropium-albuterol 3 mL Nebulization Q6H   levETIRAcetam 500 mg Oral Q12H [address]   losartan 25 mg Oral Daily   magnesium sulfate 2 g Intravenous Once   metoprolol tartrate 50 mg Oral Q12H [address]   nicotine 1 patch Transdermal Daily   pantoprazole 40 mg Oral Early Morning   pneumococcal 23-valent polysaccharide vaccine 0 5 mL Subcutaneous Prior to discharge   traZODone 100 mg Oral HS TELM  O2 @ 2L via NC  Boni SCDs  IP CONSULT TO CARDIOLOGY    Network Utilization Review Department  Phone: [phone]; Fax [phone]  [email or web address]  ATTENTION: Please call with any questions or concerns to [phone]  and carefully listen to the prompts so that you are directed to the right person  Send all requests for admission clinical reviews, approved or denied determinations and any other requests to fax [phone]   All voicemails are confidential

## 2019-09-27 NOTE — QUICK NOTE
Patient found to have urinary retention and RENÉ this morning  Placed on urinary retention protocol  Urinalysis pending  Diuretics discontinued, remainder of plan per prior notes

## 2019-09-27 NOTE — PLAN OF CARE
Problem: Potential for Falls  Goal: Patient will remain free of falls  Description  INTERVENTIONS:  - Assess patient frequently for physical needs  -  Identify cognitive and physical deficits and behaviors that affect risk of falls    -  Dale fall precautions as indicated by assessment   - Educate patient/family on patient safety including physical limitations  - Instruct patient to call for assistance with activity based on assessment  - Modify environment to reduce risk of injury  - Consider OT/PT consult to assist with strengthening/mobility  Outcome: Progressing     Problem: CARDIOVASCULAR - ADULT  Goal: Maintains optimal cardiac output and hemodynamic stability  Description  INTERVENTIONS:  - Monitor I/O, vital signs and rhythm  - Monitor for S/S and trends of decreased cardiac output  - Administer and titrate ordered vasoactive medications to optimize hemodynamic stability  - Assess quality of pulses, skin color and temperature  - Assess for signs of decreased coronary artery perfusion  - Instruct patient to report change in severity of symptoms  Outcome: Progressing     Problem: RESPIRATORY - ADULT  Goal: Achieves optimal ventilation and oxygenation  Description  INTERVENTIONS:  - Assess for changes in respiratory status  - Assess for changes in mentation and behavior  - Position to facilitate oxygenation and minimize respiratory effort  - Oxygen administered by appropriate delivery if ordered  - Initiate smoking cessation education as indicated  - Encourage broncho-pulmonary hygiene including cough, deep breathe, Incentive Spirometry  - Assess the need for suctioning and aspirate as needed  - Assess and instruct to report SOB or any respiratory difficulty  - Respiratory Therapy support as indicated  Outcome: Progressing     Problem: PAIN - ADULT  Goal: Verbalizes/displays adequate comfort level or baseline comfort level  Description  Interventions:  - Encourage patient to monitor pain and request assistance  - Assess pain using appropriate pain scale  - Administer analgesics based on type and severity of pain and evaluate response  - Implement non-pharmacological measures as appropriate and evaluate response  - Notify physician/advanced practitioner if interventions unsuccessful or patient reports new pain   Outcome: Progressing     Problem: SAFETY ADULT  Goal: Patient will remain free of falls  Description  INTERVENTIONS:  - Assess patient frequently for physical needs  -  Identify cognitive and physical deficits and behaviors that affect risk of falls    -  [place] fall precautions as indicated by assessment   - Educate patient/family on patient safety including physical limitations  - Instruct patient to call for assistance with activity based on assessment  - Modify environment to reduce risk of injury  - Consider OT/PT consult to assist with strengthening/mobility  Outcome: Progressing     Problem: DISCHARGE PLANNING  Goal: Discharge to home or other facility with appropriate resources  Description  INTERVENTIONS:  - Identify barriers to discharge w/patient and caregiver  - Arrange for needed discharge resources and transportation as appropriate  - Identify discharge learning needs (meds, wound care, etc )  - Refer to Case Management Department for coordinating discharge planning if the patient needs post-hospital services based on physician/advanced practitioner order or complex needs related to functional status, cognitive ability, or social support system   Outcome: Progressing

## 2019-09-27 NOTE — ASSESSMENT & PLAN NOTE
On Neurontin 900 mg TID, Cymbalta 90 mg HS  · Consider reducing Neurontin or Cymbalta due to lethargy  · Psychiatry consult

## 2019-09-27 NOTE — UTILIZATION REVIEW
Continued Stay Review    Date: 09/27/2019     OBSERVATION 09/26/2019 @ 2217, CONVERTED TO INPATIENT ADMISSION 09/27/2019 @ 1503, DUE TO FURTHER DIAGNOSTIC WORKUP, REQUIRING AT LEAST 2 MIDNIGHT STAY                       09/27/19 1503  Inpatient Admission Once     Comments: Lethargic, hypotensive, RENÉ requiring cardiology and psychiatry evaluation, medication adjustments, ABG   Transfer Service: General Medicine    Expected Discharge Date: 09/28/19       Question Answer Comment   Admitting Physician [facility], [facility]    Level of Care Med Surg    Estimated length of stay More than 2 Midnights    Certification I certify that inpatient services are medically necessary for this patient for a duration of greater than two midnights  See H&P and MD Progress Notes for additional information about the patient's course of treatment  Current Patient Class: Inpatient Current Level of Care: Med/Surg    HPI:58 y o  female initially admitted on 09/26/2019   Patient found to have urinary retention and RENÉ this morning  Placed on urinary retention protocol  Urinalysis pending  Diuretics discontinued  Assessment/Plan: 09/27/2019 Consult Cardio:  Dyspnea, concerning for exacerbation of COPD  Hold any further diuretics and continue gentle IV rehydration as previously ordered  Continue potassium and magnesium supplementation as previously ordered and recheck electrolyte panel this afternoon  Consider addition of Aldactone once patient's renal function has stabilized to help with diastolic heart failure, blood pressure and for his potassium-sparing effect  Despite being on Lopid and Lipitor, patient is cholesterol panel is suboptimal   Would recommend following with primary for consideration of PCSK9 inhibitor therapy  Resume Cozaar once renal function is stable  Continue metoprolol and other home medications  Pertinent Labs/Diagnostic Results:   09/27/2019 @ 0363  Chest X:  No acute cardiopulmonary disease   Cardiomegaly      Results from last 7 days   Lab Units 09/27/19  0443 09/26/19  2114   WBC Thousand/uL 9 89 13 50*   HEMOGLOBIN g/dL 13 7 15 4   HEMATOCRIT % 41 4 46 4*   PLATELETS Thousands/uL 242 306   NEUTROS ABS Thousands/µL  --  9 01*     Results from last 7 days   Lab Units 09/27/19  0443 09/26/19  2114   SODIUM mmol/L 138 137   POTASSIUM mmol/L 2 8* 4 8   CHLORIDE mmol/L 102 102   CO2 mmol/L 27 27   ANION GAP mmol/L 9 8   BUN mg/dL 21 15   CREATININE mg/dL 1 43* 0 96   EGFR ml/min/1 73sq m 41 66   CALCIUM mg/dL 7 8* 8 4   MAGNESIUM mg/dL 1 4*  --      Results from last 7 days   Lab Units 09/26/19  2114   AST U/L 25   ALT U/L 27   ALK PHOS U/L 93   TOTAL PROTEIN g/dL 7 3   ALBUMIN g/dL 3 4*   TOTAL BILIRUBIN mg/dL 0 50     Results from last 7 days   Lab Units 09/27/19  0443 09/26/19  2114   GLUCOSE RANDOM mg/dL 121 109     Results from last 7 days   Lab Units 09/27/19  0443 09/27/19  0053 09/26/19  2114   TROPONIN I ng/mL <0 02 <0 02 <0 02     Results from last 7 days   Lab Units 09/26/19  2114   D DIMER QUANT ng/ml (FEU) 480     Results from last 7 days   Lab Units 09/26/19  2114   PROTIME seconds 11 1   INR  1 03   PTT seconds 26     Results from last 7 days   Lab Units 09/26/19  2114   NT-PRO BNP pg/mL 1,001*     Results from last 7 days   Lab Units 09/27/19  0601   CLARITY UA  Clear   COLOR UA  Yellow   SPEC GRAV UA  1 010   PH UA  5 0   GLUCOSE UA mg/dl Negative   KETONES UA mg/dl Negative   BLOOD UA  Negative   PROTEIN UA mg/dl Negative   NITRITE UA  Negative   BILIRUBIN UA  Negative   UROBILINOGEN UA E U /dl 0 2   LEUKOCYTES UA  Negative   WBC UA /hpf 0-1*   RBC UA /hpf None Seen   BACTERIA UA /hpf Occasional   EPITHELIAL CELLS WET PREP /hpf Occasional     Vital Signs: /52 (BP Location: Right arm)   Pulse 70   Temp 97 8 °F (36 6 °C)   Resp 16   Ht 5' 4" (1 626 m)   Wt 73 kg (160 lb 15 oz)   LMP 09/04/2009 (Within Days) Comment: per patient   SpO2 98%   BMI 27 62 kg/m²     Medications: Scheduled Meds:   Current Facility-Administered Medications:  acetaminophen 975 mg Oral Q8H PRN   ARIPiprazole 5 mg Oral Daily   aspirin 81 mg Oral Daily   atorvastatin 80 mg Oral Daily With Dinner   DULoxetine 90 mg Oral HS   enoxaparin 40 mg Subcutaneous Daily   fluticasone-vilanterol 1 puff Inhalation Daily   gabapentin 900 mg Oral TID   gemfibrozil 600 mg Oral BID AC   ipratropium-albuterol 3 mL Nebulization Q6H   levETIRAcetam 500 mg Oral Q12H KIKI   magnesium sulfate 2 g Intravenous Once   methocarbamol 500 mg Oral Q6H PRN   metoprolol tartrate 50 mg Oral Q12H [address]   multi-electrolyte 75 mL/hr Intravenous Continuous   nicotine 1 patch Transdermal Daily   pantoprazole 40 mg Oral Early Morning   pneumococcal 23-valent polysaccharide vaccine 0 5 mL Subcutaneous Prior to discharge   potassium chloride 40 mEq Oral Q4H   traZODone 100 mg Oral HS       Discharge Plan: TBD    Network Utilization Review Department  Phone: [phone]; Fax [phone]  [email or web address]  ATTENTION: Please call with any questions or concerns to [phone]  and carefully listen to the prompts so that you are directed to the right person  Send all requests for admission clinical reviews, approved or denied determinations and any other requests to fax [phone]   All voicemails are confidential

## 2019-09-27 NOTE — PLAN OF CARE
Problem: Potential for Falls  Goal: Patient will remain free of falls  Description  INTERVENTIONS:  - Assess patient frequently for physical needs  -  Identify cognitive and physical deficits and behaviors that affect risk of falls  -  [place] fall precautions as indicated by assessment   - Educate patient/family on patient safety including physical limitations  - Instruct patient to call for assistance with activity based on assessment  - Modify environment to reduce risk of injury  - Consider OT/PT consult to assist with strengthening/mobility  Outcome: Progressing  Pt has utilized the call bell and demonstrated understanding  Pt has remained free of injury  Problem: CARDIOVASCULAR - ADULT  Goal: Maintains optimal cardiac output and hemodynamic stability  Description  INTERVENTIONS:  - Monitor I/O, vital signs and rhythm  - Monitor for S/S and trends of decreased cardiac output  - Administer and titrate ordered vasoactive medications to optimize hemodynamic stability  - Assess quality of pulses, skin color and temperature  - Assess for signs of decreased coronary artery perfusion  - Instruct patient to report change in severity of symptoms  Outcome: Progressing  Problem: RESPIRATORY - ADULT  Goal: Achieves optimal ventilation and oxygenation  Description  INTERVENTIONS:  - Assess for changes in respiratory status  - Assess for changes in mentation and behavior  - Position to facilitate oxygenation and minimize respiratory effort  - Oxygen administered by appropriate delivery if ordered  - Initiate smoking cessation education as indicated  - Encourage broncho-pulmonary hygiene including cough, deep breathe, Incentive Spirometry  - Assess the need for suctioning and aspirate as needed  - Assess and instruct to report SOB or any respiratory difficulty  - Respiratory Therapy support as indicated  Outcome: Progressing  Denies has RS or SOB        Problem: PAIN - ADULT  Goal: Verbalizes/displays adequate comfort level or baseline comfort level  Description  Interventions:  - Encourage patient to monitor pain and request assistance  - Assess pain using appropriate pain scale  - Administer analgesics based on type and severity of pain and evaluate response  - Implement non-pharmacological measures as appropriate and evaluate response  - Notify physician/advanced practitioner if interventions unsuccessful or patient reports new pain   Outcome: Progressing  Pt states chronic left leg pain from prior Fx  Problem: SAFETY ADULT  Goal: Patient will remain free of falls  Description  INTERVENTIONS:  - Assess patient frequently for physical needs  -  Identify cognitive and physical deficits and behaviors that affect risk of falls  -  [place] fall precautions as indicated by assessment   - Educate patient/family on patient safety including physical limitations  - Instruct patient to call for assistance with activity based on assessment  - Modify environment to reduce risk of injury  - Consider OT/PT consult to assist with strengthening/mobility  Outcome: Progressing  (see above)     Problem: DISCHARGE PLANNING  Goal: Discharge to home or other facility with appropriate resources  Description  INTERVENTIONS:  - Identify barriers to discharge w/patient and caregiver  - Arrange for needed discharge resources and transportation as appropriate  - Identify discharge learning needs (meds, wound care, etc )  - Refer to Case Management Department for coordinating discharge planning if the patient needs post-hospital services based on physician/advanced practitioner order or complex needs related to functional status, cognitive ability, or social support system   Outcome: Progressing  TBA discharge planning tomorrow

## 2019-09-27 NOTE — PROGRESS NOTES
Progress Note Sukhdeep Hill 1960, 63 y o  female MRN: [id number]    Unit/Bed#: [address] [place] [address] Encounter: [id number]    Primary Care Provider: Von Forte DO   Date and time admitted to hospital: 9/26/2019  8:39 PM        * Acute on chronic diastolic CHF (congestive heart failure) (HCC)  Assessment & Plan  Wt Readings from Last 3 Encounters:   09/26/19 73 kg (160 lb 15 oz)   08/21/19 77 1 kg (170 lb)   09/11/18 81 6 kg (180 lb)     Presented with to ER with shortness of breath  Patient reports a 5lb weight gain since yesterday  She has bilateral pedal edema associated with midsternal chest pressure  No radiation, no worsening or alleviating factors, minimal improvement with nitroglycerin  · Discontinue IV Lasix as patient was over diuresed and now hypotensive  · Repeat echo - LVEF 55%, severe concentric hypertrophy, dynamic obstruction of the LVOT with peak velocity of 3 m/s with Valsalva  · Cardiology consultation appreciated  · Will need an outpatient cardiac MRI which can be done at [facility]  · Continue gentle IV hydration until blood pressure improves  · Metoprolol with holding parameters  · Monitor I&O and daily weights    RENÉ (acute kidney injury) ([facility] [address] )  Assessment & Plan  Cr 0 96 -> 1 43, likely due to over-diuresis  · Hold diuretics, hold Cozaar  · Continue gentle IV hydration  · Avoid nephrotoxin agents and relative hypotension  · Monitor Cr in am    Hypotension  Assessment & Plan  Likely due to over diuresis in the setting of low EF  · Cardiology following  · Continue IV hydration  · Continue metoprolol with holding parameters    Toxic metabolic encephalopathy  Assessment & Plan  Two to medication side effects  Patient is lethargic, easy to arouse but falls asleep within seconds  States she does not know why she is so sleepy  Cannot keep her eyes open    · Consult Psychiatry  · Discontinue trazodone  · Consider reducing doses of Neurontin and/or Cymbalta  · Supportive care with IV hydration  · Up with assistance  · Aspiration precautions, fall precautions  · Urinary retention protocol, straight cath as needed    Anxiety  Assessment & Plan  On Cymbalta 90 mg HS and trazodone 100 mg HS  · Consult Psychiatry  · Stop trazodone due to lethargy    Fibromyalgia  Assessment & Plan  On Neurontin 900 mg TID, Cymbalta 90 mg HS  · Consider reducing Neurontin or Cymbalta due to lethargy  · Psychiatry consult    Seizure disorder [facility])  Assessment & [address]  Outpatient Neurology follow-up    Hypokalemia  Assessment & Plan  K+ 2 8, likely due to over-diuresis  · Replete and monitor in a m  History of TIA (transient ischemic attack)  Assessment & Plan  Continue aspirin and statin    Hypercholesteremia  Assessment & Plan  Continue lopid and lipitor      VTE Pharmacologic Prophylaxis:   Pharmacologic: Enoxaparin (Lovenox)  Mechanical VTE Prophylaxis in Place: Yes    Patient Centered Rounds: I have performed bedside rounds with nursing staff today  Discussions with Specialists or Other Care Team Provider:  Nursing, case management, Cardiology, my attending    Education and Discussions with Family / Patient:  I have answered all questions to the best of my ability  Time Spent for Care: 20 minutes  More than 50% of total time spent on counseling and coordination of care as described above  Current Length of Stay: 0 day(s)    Current Patient Status: Inpatient   Certification Statement: The patient will continue to require additional inpatient hospital stay due to Lethargy, hypotension, acute kidney injury    Discharge Plan:  Patient is not medically stable for discharge today due to persistent lethargy  Will consult Psychiatry to adjust home medications  Likely discharge home tomorrow  Code Status: Level 1 - Full Code      Subjective:   Patient examined at bedside twice  She was lethargic on initial examination but easily arousable  She complained of left leg pain    She denied any shortness of breath, chest pain, or palpitations  On reexamination she was more alert but remained drowsy  She could hardly keep her eyes open for more than 30 seconds  She states "I do not know why so tired "  I made her aware that we would have psychiatry review her medications and we would not be discharging her home today  Objective:     Vitals:   Temp (24hrs), Av 3 °F (36 8 °C), Min:97 2 °F (36 2 °C), Max:99 4 °F (37 4 °C)    Temp:  [97 2 °F (36 2 °C)-99 4 °F (37 4 °C)] 98 2 °F (36 8 °C)  HR:  [] 71  Resp:  [16-20] 16  BP: ()/(42-83) 91/54  SpO2:  [92 %-99 %] 92 %  Body mass index is 27 62 kg/m²  Input and Output Summary (last 24 hours): Intake/Output Summary (Last 24 hours) at 2019 1532  Last data filed at 2019 1301  Gross per 24 hour   Intake 1250 ml   Output 2050 ml   Net -800 ml       Physical Exam:     Physical Exam   Constitutional: She is oriented to person, place, and time  She appears well-developed  No distress  HENT:   Head: Normocephalic  Neck: Normal range of motion  Cardiovascular: Normal rate and regular rhythm  Pulmonary/Chest: Effort normal  No accessory muscle usage  No tachypnea  No respiratory distress  She has decreased breath sounds in the right lower field and the left lower field  She has no wheezes  She has no rhonchi  She has no rales  Abdominal: Soft  Bowel sounds are normal  She exhibits no distension  There is no tenderness  Musculoskeletal: Normal range of motion  She exhibits no edema (Trace BLE) or tenderness  Neurological: She is alert and oriented to person, place, and time  She has normal reflexes  Forgetful, drowsy   Skin: Skin is warm and dry  She is not diaphoretic  Psychiatric: She exhibits a depressed mood  Lethargic and drowsy  Easily arousable but closes eyes within 10-30 seconds  She is inattentive  Nursing note and vitals reviewed        Additional Data:     Labs:    Results from last 7 days Lab Units 09/27/19 0443 09/26/19 2114   WBC Thousand/uL 9 89 13 50*   HEMOGLOBIN g/dL 13 7 15 4   HEMATOCRIT % 41 4 46 4*   PLATELETS Thousands/uL 242 306   NEUTROS PCT %  --  67   LYMPHS PCT %  --  25   MONOS PCT %  --  6   EOS PCT %  --  1     Results from last 7 days   Lab Units 09/27/19 0443 09/26/19 2114   POTASSIUM mmol/L 2 8* 4 8   CHLORIDE mmol/L 102 102   CO2 mmol/L 27 27   BUN mg/dL 21 15   CREATININE mg/dL 1 43* 0 96   CALCIUM mg/dL 7 8* 8 4   ALK PHOS U/L  --  93   ALT U/L  --  27   AST U/L  --  25     Results from last 7 days   Lab Units 09/26/19 2114   INR  1 03       * I Have Reviewed All Lab Data Listed Above  * Additional Pertinent Lab Tests Reviewed:  All Labs Within Last 24 Hours Reviewed    Imaging:    Imaging Reports Reviewed Today Include:  CXR  Imaging Personally Reviewed by Myself Includes:  None    Recent Cultures (last 7 days):           Last 24 Hours Medication List:     Current Facility-Administered Medications:  acetaminophen 975 mg Oral Q8H PRN Bee Candice, CRNP    ARIPiprazole 5 mg Oral Daily Atif Creamer, CRNP    aspirin 81 mg Oral Daily [place] Creamer, CRNP    atorvastatin 80 mg Oral Daily With Dinner Atif Creamer, CRNP    DULoxetine 90 mg Oral HS [place] Creamer, CRNP    enoxaparin 40 mg Subcutaneous Daily Atif Creamer, CRNP    fluticasone-vilanterol 1 puff Inhalation Daily Atif Creamer, CRNP    gabapentin 900 mg Oral TID [place] Creamer, CRNP    gemfibrozil 600 mg Oral BID AC [place] Cream[place], CRNP    ipratropium-albuterol 3 mL Nebulization Q6H Atif Creamer, CRNP    levETIRAcetam 500 mg Oral Q12H [address] [place] Creamer, CRNP    magnesium hydroxide 30 mL Oral Daily PRN Bee Candice, CRNP    methocarbamol 500 mg Oral Q6H PRN Bee Candice, CRNP    metoprolol tartrate 50 mg Oral Q12H [address] [place] Creamer, CRNP    multi-electrolyte 75 mL/hr Intravenous Continuous NICKY Tavera Last Rate: 75 mL/hr (09/27/19 1019)   nicotine 1 patch Transdermal Daily NICKY Hunter    pantoprazole 40 mg Oral Early Morning NICKY Hunter    pneumococcal 23-valent polysaccharide vaccine 0 5 mL Subcutaneous Prior to discharge Billy Mejia, [address]         Today, Patient Was Seen By: NICKY Chaudhary    ** Please Note: Dictation voice to text software may have been used in the creation of this document   **

## 2019-09-27 NOTE — ASSESSMENT & PLAN NOTE
Wt Readings from Last 3 Encounters:   09/26/19 72 1 kg (159 lb)   08/21/19 77 1 kg (170 lb)   09/11/18 81 6 kg (180 lb)   Patient with history of CHF,this morning she was walking into the dentist and felt dyspneic and unsteady while walking in  She went home and noticed she was short of breath with ambulation up the stairs  Patient reports a 5lb weight gain since yesterday  She has bilateral pedal edema  Patient with midsternal chest pressure since 5PM  No radiation, no worsening or alleviating factors, minimal improvement with nitroglycerin      · Admit to telemetry  · Continue lasix 40mg IVP  · Repeat echo - last echo normal EF, diastolic dysfunction  · Cardiology consultation  · Daily Weight  · Intake and output  · Low sodium diet  · Trend troponin

## 2019-09-27 NOTE — ASSESSMENT & PLAN NOTE
Cr 0 96 -> 1 43, likely due to over-diuresis  · Hold diuretics, hold Cozaar  · Continue gentle IV hydration  · Avoid nephrotoxin agents and relative hypotension  · Monitor Cr in am

## 2019-09-27 NOTE — OCCUPATIONAL THERAPY NOTE
OT EVALUATION       09/27/19 1115   Note Type   Note type Eval only   Restrictions/Precautions   Other Precautions Bed Alarm; Chair Alarm; Fall Risk  (lethargic)   Pain Assessment   Pain Assessment Garrison-Baker FACES   Garrison-Baker FACES Pain Rating 0   Home Living   Type of Home House   Home Layout Two level   Home Equipment Cane   Prior Function   Level of [place] Independent with ADLs and functional mobility; Needs assistance with IADLs   Lives With Significant other  ("fiance")   Receives Help From Family   ADL Assistance Independent   IADLs Needs assistance   Comments pt is a poor historian at this time    ADL   Eating Assistance 4  Minimal Assistance   Grooming Assistance 3  Moderate Assistance   UB Bathing Assistance 3  Moderate Assistance   LB Bathing Assistance 3  Moderate Assistance   [address] 3  Moderate Assistance   LB Dressing Assistance 3  Moderate Assistance   Toileting Assistance  3  Moderate Assistance   Bed Mobility   Supine to Sit 3  Moderate assistance   Additional items Assist x 1;Verbal cues   Sit to Supine 3  Moderate assistance   Additional items Assist x 1;Verbal cues   Transfers   Sit to Stand 3  Moderate assistance   Additional items Assist x 1;Verbal cues   Stand to Sit 3  Moderate assistance   Additional items Assist x 1;Verbal cues   Stand pivot 3  Moderate assistance   Additional items Assist x 1;Verbal cues   Functional Mobility   Functional Mobility 3  Moderate assistance   Additional Comments assist of 1, few steps to head of bed, unsteady, lethargic, constant verbal cues to stay awake    Additional items Hand hold assistance   Balance   Static Sitting Poor   Dynamic Sitting Poor +   Static Standing Poor   Dynamic Standing Poor   Activity Tolerance   Activity Tolerance Patient limited by fatigue  (lethargy)   RUE Assessment   RUE Assessment WFL  (grossly 3+/5)   LUE Assessment   LUE Assessment WFL  (grossly 3+/5)   Cognition   Overall Cognitive Status Impaired Arousal/Participation Lethargic   Attention Difficulty attending to directions   Orientation Level Oriented to person   Following Commands Follows one step commands with increased time or repetition   Comments constant verbal cues, patient is lethargic    Assessment   Limitation Decreased ADL status; Decreased Safe judgement during ADL;Decreased UE strength;Decreased endurance;Decreased cognition;Decreased self-care trans;Decreased high-level ADLs  (decreased balance and mobility )   Prognosis Fair   Assessment Patient evaluated by Occupational Therapy  Patient admitted with Acute on chronic diastolic CHF (congestive heart failure) ([facility] [address] )  The patients occupational profile, medical and therapy history includes a extensive additional review of physical, cognitive, or psychosocial history related to current functional performance  Comorbidities affecting functional mobility and ADLS include: [place] Palsy, fibromyalgia, chronic pain, CHF, CVA, hypertension and MI  Prior to admission, patient was independent with functional mobility with cane, independent with ADLS and requiring assist for IADLS  The evaluation identifies the following performance deficits: weakness, decreased ROM, impaired balance, decreased endurance, increased fall risk, new onset of impairment of functional mobility, decreased ADLS, decreased IADLS, decreased activity tolerance, decreased safety awareness, impaired judgement and decreased strength, that result in activity limitations and/or participation restrictions  This evaluation requires clinical decision making of high complexity, because the patient presents with comorbidites that affect occupational performance and required significant modification of tasks or assistance with consideration of multiple treatment options  The Barthel Index was used as a functional outcome tool presenting with a score of 35, indicating marked limitations of functional mobility and ADLS    Patient will benefit from skilled Occupational Therapy services to address above deficits and facilitate a safe return to prior level of function  Goals   Patient Goals unable to state due to lethargy    STG Time Frame   (1-7 days)   Short Term Goal  Patient will increase standing tolerance to 3 minutes during ADL task to decrease assistance level and decrease fall risk; Patient will increase bed mobility to min assist in preparation for ADLS and transfers; Patient will increase functional mobility to and from bathroom with rolling walker with min assist to increase performance with ADLS and to use a toilet; Patient will tolerate 10 minutes of UE ROM/strengthening to increase general activity tolerance and performance in ADLS/IADLS; Patient will improve functional activity tolerance to 10 minutes of sustained functional tasks to increase participation in basic self-care and decrease assistance level;  Patient will increase dynamic sitting balance to fair to improve the ability to sit at edge of bed or on a chair for ADLS;  Patient will increase dynamic standing balance to fair- to improve postural stability and decrease fall risk during standing ADLS and transfers  LTG Time Frame   (8-14 days)   Long Term Goal Patient will increase standing tolerance to 6 minutes during ADL task to decrease assistance level and decrease fall risk; Patient will increase bed mobility to supervision in preparation for ADLS and transfers;  Patient will increase functional mobility to and from bathroom with rolling walker with supervision to increase performance with ADLS and to use a toilet; Patient will tolerate 20 minutes of UE ROM/strengthening to increase general activity tolerance and performance in ADLS/IADLS; Patient will improve functional activity tolerance to 20 minutes of sustained functional tasks to increase participation in basic self-care and decrease assistance level;  Patient will increase dynamic sitting balance to fair+ to improve the ability to sit at edge of bed or on a chair for ADLS;  Patient will increase dynamic standing balance to fair to improve postural stability and decrease fall risk during standing ADLS and transfers  Functional Transfer Goals   Pt Will Perform All Functional Transfers   (STG min assist LTG independent )   ADL Goals   Pt Will Perform Eating   (STG independent )   Pt Will Perform Grooming Standing at sink  (STG supervision LTG independent )   Pt Will Perform Bathing   (STG min assist LTG independent )   Pt Will Perform UE Dressing   (STG supervision LTG independent )   Pt Will Perform LE Dressing   (STG min assist LTG independent )   Pt Will Perform Toileting   (STG min assist LTG independent )   Plan   Treatment Interventions ADL retraining;Functional transfer training;UE strengthening/ROM; Endurance training;Patient/family training;Equipment evaluation/education; Activityengagement; Compensatory technique education   Goal Expiration Date 10/11/19   OT Frequency 3-5x/wk   Recommendation   OT Discharge Recommendation Home with family support  (pending progress, decreased lethargy)   Barthel Index   Feeding 5   Bathing 0   Grooming Score 0   Dressing Score 0   Bladder Score 10   Bowels Score 10   Toilet Use Score 5   Transfers (Bed/Chair) Score 5   Mobility (Level Surface) Score 0   Stairs Score 0   Barthel Index Score 35   Licensure   [place] License Number  Alejandro Schwartz [employer] OTR/L [id number]

## 2019-09-27 NOTE — ASSESSMENT & PLAN NOTE
On Cymbalta 90 mg HS and trazodone 100 mg HS  · Consult Psychiatry  · Stop trazodone due to lethargy

## 2019-09-27 NOTE — PHYSICAL THERAPY NOTE
PT EVALUATION       09/27/19 1120   Note Type   Note type Eval only   Pain Assessment   Pain Assessment Garrison-Baker FACES   Garrison-Baker FACES Pain Rating 8   Pain Type Chronic pain   Pain Location Leg   Pain Orientation Left   Home Living   Type of Home House   Home Layout Two level   Home Equipment Cane   Prior Function   Level of [place] Independent with ADLs and functional mobility  (ambulates with cane, frequent falls)   Lives With Significant other  (fiance)   ADL Assistance Independent   Restrictions/Precautions   Other Precautions Fall Risk;Pain;Bed Alarm; Chair Alarm   General   Additional Pertinent History Pt admitted with CHF with 5# weight gain and SOB  Pt is currently very sleepy so limited participation in therapy  Cognition   Overall Cognitive Status Impaired   Arousal/Participation Lethargic   Attention Difficulty attending to directions   Orientation Level Oriented to person   Following Commands Follows one step commands with increased time or repetition   Comments constant verbal cues to stay awake   RLE Assessment   RLE Assessment   (ROM WFL, MMT at least 3/5)   LLE Assessment   LLE Assessment   (ROM WFL, MMT at least 3/5)   Bed Mobility   Supine to Sit 3  Moderate assistance   Additional items Verbal cues; Increased time required   Sit to Supine 3  Moderate assistance   Additional items Verbal cues; Increased time required   Transfers   Sit to Stand 3  Moderate assistance   Additional items Verbal cues; Increased time required   Stand to Sit 3  Moderate assistance   Additional items Verbal cues; Increased time required   Ambulation/Elevation   Gait Assistance 3  Moderate assist   Additional items Tactile cues; Verbal cues   Assistive Device   (hand held)   Distance few steps to the head of the bed   Balance   Static Sitting Poor   Dynamic Sitting Poor   Static Standing Poor   Dynamic Standing Poor   Assessment   Prognosis Good   Problem List Decreased strength;Decreased endurance; Impaired balance;Decreased mobility;Pain   Assessment Patient seen for Physical Therapy evaluation  Patient admitted with Acute on chronic diastolic CHF (congestive heart failure) ([facility] [address] )  Comorbidities affecting patient's physical performance include: fibromyalgia, CHF, anxiety, TIA, seizure  Personal factors affecting patient at time of initial evaluation include: lives in 2 story house, ambulating with assistive device, inability to ambulate household distances, positive fall history, anxiety and inability to perform physical activity  Prior to admission, patient was independent with functional mobility with cane  Please find objective findings from Physical Therapy assessment regarding body systems outlined above with impairments and limitations including weakness, impaired balance, gait deviations, pain, decreased activity tolerance, decreased functional mobility tolerance and fall risk  The Barthel Index was used as a functional outcome tool presenting with a score of 35 today indicating marked limitations of functional mobility and ADLS  Patient's clinical presentation is currently unstable/unpredictable as seen in patient's presentation of changing level of pain, increased fall risk, new onset of impairment of functional mobility, decreased endurance and new onset of weakness  Pt would benefit from continued Physical Therapy treatment to address deficits as defined above and maximize level of functional mobility  As demonstrated by objective findings, the assigned level of complexity for this evaluation is high     Goals   Patient Goals unable to state, too sleepy   STG Expiration Date 10/04/19   Short Term Goal #1 improve bed mobility to min assist, improve transfers to min assist, pt will sit OOB in chair x 2 hours, improve sitting dynamic balance to at least fair, pt will ambulate with walker 50 feet with min assist   LTG Expiration Date 10/11/19   Long Term Goal #1 no falls, independent bed mobility, independent transfers, independent ambulation with cane 150 feet outdoor surfaces, independent up and down 10 steps so pt can get to her second floor  Plan   Treatment/Interventions ADL retraining;Functional transfer training;LE strengthening/ROM; Therapeutic exercise; Endurance training;Gait training;Bed mobility; Equipment eval/education;Patient/family training   PT Frequency 5x/wk   Recommendation   Recommendation Home with family support; Outpatient PT  ([facility]?  due to frequent falls)   Equipment Recommended   (pt has a cane)   Barthel Index   Feeding 5   Bathing 0   Grooming Score 0   Dressing Score 0   Bladder Score 10   Bowels Score 10   Toilet Use Score 5   Transfers (Bed/Chair) Score 5   Mobility (Level Surface) Score 0   Stairs Score 0   Barthel Index Score 28   Licensure   [place] License Number  Saravanan CRANE [id number]

## 2019-09-27 NOTE — ASSESSMENT & PLAN NOTE
Wt Readings from Last 3 Encounters:   09/26/19 73 kg (160 lb 15 oz)   08/21/19 77 1 kg (170 lb)   09/11/18 81 6 kg (180 lb)     Presented with to ER with shortness of breath  Patient reports a 5lb weight gain since yesterday  She has bilateral pedal edema associated with midsternal chest pressure  No radiation, no worsening or alleviating factors, minimal improvement with nitroglycerin  · Discontinue IV Lasix as patient was over diuresed and now hypotensive  · Repeat echo - LVEF 55%, severe concentric hypertrophy, dynamic obstruction of the LVOT with peak velocity of 3 m/s with Valsalva    · Cardiology consultation appreciated  · Will need an outpatient cardiac MRI which can be done at [facility]  · Continue gentle IV hydration until blood pressure improves  · Metoprolol with holding parameters  · Monitor I&O and daily weights

## 2019-09-27 NOTE — H&P
H&P- Pete Coyle 1960, 63 y o  female MRN: [id number]    Unit/Bed#: [address] [place] [address] Encounter: [id number]    Primary Care Provider: Kayla Mccormick DO   Date and time admitted to hospital: 9/26/2019  8:39 PM    * Acute on chronic diastolic CHF (congestive heart failure) (HCC)  Assessment & Plan  Wt Readings from Last 3 Encounters:   09/26/19 72 1 kg (159 lb)   08/21/19 77 1 kg (170 lb)   09/11/18 81 6 kg (180 lb)   Patient with history of CHF,this morning she was walking into the dentist and felt dyspneic and unsteady while walking in  She went home and noticed she was short of breath with ambulation up the stairs  Patient reports a 5lb weight gain since yesterday  She has bilateral pedal edema  Patient with midsternal chest pressure since 5PM  No radiation, no worsening or alleviating factors, minimal improvement with nitroglycerin  · Admit to telemetry  · Continue lasix 40mg IVP  · Repeat echo - last echo normal EF, diastolic dysfunction  · Cardiology consultation  · Daily Weight  · Intake and output  · Low sodium diet    Seizure disorder (HCC)  Assessment & Plan  Continue Keppra    History of TIA (transient ischemic attack)  Assessment & Plan  Continue aspirin and statin    Anxiety  Assessment & Plan  Continue cymbalta    Hypercholesteremia  Assessment & Plan  Continue lopid and lipitor    Hypertension  Assessment & Plan  Continue metoprolol and cozaar    Fibromyalgia  Assessment & Plan  Continue neurontin    VTE Prophylaxis: Enoxaparin (Lovenox)  / sequential compression device   Code Status: Prior    Anticipated Length of Stay:  Patient will be admitted on an Observation basis with an anticipated length of stay of less than 2 midnights  Justification for Hospital Stay: acute on chronic heart failure    Total Time for Visit, including Counseling / Coordination of Care: 20 minutes  Greater than 50% of this total time spent on direct patient counseling and coordination of care      Chief Complaint: Shortness of Breath (patient went to dentist this am and when was walking up stairs developed shortness of breath, noticed a 4lb weight gain since yesterday, and has developed midsternal chest pressure, reports ankles are swollen)      History of Present Illness:    Yuan Senior is a 63 y o  female with a PMH of TIA, seizures, congestive heart failure, fibromyalgia, hypertension, hyperlipidemia, anxiety who presents with shortness of breath  Patient has a known history of congestive heart failure  This morning while she was walking to the dense this she felt dyspneic and unsteady while walking in  She went home and noticed that she had increased shortness of breath with ambulation up the stairs  She reports a 5 lb weight gain since yesterday  She has bilateral pedal edema  This evening around 5:00 p m  She developed midsternal chest pressure with no radiation, precipitating or alleviating factors  She states she felt minimal improvement with nitroglycerin  Labs in the ER revealed a BNP of 1000, leukocytosis with a white count of 13 5  D-dimer was unremarkable  CXR pending  Review of Systems:    Review of Systems   Constitutional: Positive for unexpected weight change  HENT: Negative  Respiratory: Positive for shortness of breath  Cardiovascular: Positive for chest pain and leg swelling  Gastrointestinal: Negative  Endocrine: Negative  Genitourinary: Negative  Musculoskeletal: Negative  Skin: Negative  Allergic/Immunologic: Negative  Neurological: Negative  Hematological: Negative  Psychiatric/Behavioral: Negative          Past Medical and Surgical History:     Past Medical History:   Diagnosis Date    Alopecia     Back injury     Bell's palsy     [facility])     Chronic pain     back    Closed left arm fracture     Fibromyalgia     Fibromyalgia, primary     GERD (gastroesophageal reflux disease)     Hyperlipidemia     Hypertension     Lyme disease     Myocardial infarct ([facility] [address] )     2015    Myocardial infarction [facility])     Cardiac catheterization 2 years ago showed 30% blockage in 1 of the blood vessels    Stroke ([facility] [address] )     TIA (transient ischemic attack)     2017       Past Surgical History:   Procedure Laterality Date     SECTION      CHOLECYSTECTOMY      CORONARY ANGIOPLASTY      2015    ORIF TIBIA FRACTURE Left 2018    Procedure: OPEN REDUCTION W/ INTERNAL FIXATION (ORIF) TIBIA FRACTURE;  Surgeon: Thais Barksdale MD;  Location: [facility];  Service: Orthopedics       Meds/Allergies:    Prior to Admission medications    Medication Sig Start Date End Date Taking? Authorizing Provider   ARIPiprazole (ABILIFY) 5 mg tablet Take 5 mg by mouth daily   Yes Historical Provider, MD   atorvastatin (LIPITOR) 80 mg tablet Take 80 mg by mouth daily   Yes Historical Provider, MD   DULoxetine HCl (CYMBALTA PO) Take 90 mg by mouth daily at bedtime     Yes Historical Provider, MD   fluticasone-salmeterol (AIRDUO RESPICLICK) 988-38 mcg/act dry powder inhaler Inhale 1 puff 2 (two) times a day Rinse mouth after use     Yes Historical Provider, MD   furosemide (LASIX) 20 mg tablet Take 2 tablets (40 mg total) by mouth daily  Patient taking differently: Take 20 mg by mouth 2 (two) times a day  18  Yes Rosalie Babb MD   gabapentin (NEURONTIN) 600 MG tablet Take 900 mg by mouth 3 (three) times a day    Yes Historical Provider, MD   gemfibrozil (LOPID) 600 mg tablet Take 600 mg by mouth 2 (two) times a day before meals   Yes Historical Provider, MD   levETIRAcetam (KEPPRA) 500 mg tablet Take 1 tablet (500 mg total) by mouth every 12 (twelve) hours 19  Yes Elodia Colon MD   losartan (COZAAR) 25 mg tablet Take 25 mg by mouth daily    Yes Historical Provider, MD   metoprolol tartrate (LOPRESSOR) 25 mg tablet Take 100 mg by mouth every 12 (twelve) hours    Yes Historical Provider, MD   pantoprazole (PROTONIX) 40 mg tablet Take 40 mg by mouth daily   Yes Historical Provider, MD   Tiotropium Bromide Monohydrate (SPIRIVA HANDIHALER IN) Inhale   Yes Historical Provider, MD   traZODone (DESYREL) 100 mg tablet Take 100 mg by mouth daily at bedtime   Yes Historical Provider, MD   aspirin (ECOTRIN LOW STRENGTH) 81 mg EC tablet Take 81 mg by mouth daily    Historical Provider, MD   potassium chloride (K-DUR,KLOR-CON) 20 mEq tablet Take 1 tablet (20 mEq total) by mouth every other day  Patient not taking: Reported on 9/26/2019 7/29/18   Joseph Loya MD       Allergies: Allergies   Allergen Reactions    Hydromorphone Shortness Of Breath    Morphine Shortness Of Breath       Social History:     Marital Status: Single   Substance Use History:   Social History     Substance and Sexual Activity   Alcohol Use No    Comment: as per patient; former social drinker      Social History     Tobacco Use   Smoking Status Current Every Day Smoker    Packs/day: 0 50    Years: 40 00    Pack years: 20 00   Smokeless Tobacco Never Used   Tobacco Comment    1/2 per day     Social History     Substance and Sexual Activity   Drug Use No       Family History:    Family History   Problem Relation Age of Onset    Heart attack Mother     Heart attack Father        Physical Exam:     Vitals:   Blood Pressure: 108/62 (09/26/19 2230)  Pulse: 94 (09/26/19 2230)  Temperature: 99 4 °F (37 4 °C) (09/26/19 2055)  Temp Source: Tympanic (09/26/19 2055)  Respirations: 18 (09/26/19 2230)  Height: 5' 4" (162 6 cm) (09/26/19 2309)  Weight - Scale: 73 kg (160 lb 15 oz) (09/26/19 2309)  SpO2: 95 % (09/26/19 2230)    Physical Exam   Constitutional: She is oriented to person, place, and time  She appears well-developed and well-nourished  HENT:   Head: Normocephalic and atraumatic  Eyes: Pupils are equal, round, and reactive to light  EOM are normal    Neck: Normal range of motion  Cardiovascular: Normal rate and regular rhythm     Murmur heard   Pulmonary/Chest: Effort normal  She has rhonchi in the right lower field  Abdominal: Bowel sounds are normal  She exhibits no distension  There is no tenderness  Musculoskeletal: Normal range of motion  She exhibits edema (2+ pedal edema)  Neurological: She is alert and oriented to person, place, and time  Skin: Skin is warm and dry  Psychiatric: She has a normal mood and affect  Her behavior is normal    Nursing note and vitals reviewed  Additional Data:     Lab Results: I have personally reviewed pertinent reports  Results from last 7 days   Lab Units 09/26/19 2114   WBC Thousand/uL 13 50*   HEMOGLOBIN g/dL 15 4   HEMATOCRIT % 46 4*   PLATELETS Thousands/uL 306   NEUTROS PCT % 67     Results from last 7 days   Lab Units 09/26/19 2114   SODIUM mmol/L 137   POTASSIUM mmol/L 4 8   CHLORIDE mmol/L 102   CO2 mmol/L 27   BUN mg/dL 15   CREATININE mg/dL 0 96   CALCIUM mg/dL 8 4   TOTAL BILIRUBIN mg/dL 0 50   ALK PHOS U/L 93   ALT U/L 27   AST U/L 25     Results from last 7 days   Lab Units 09/26/19 2114   INR  1 03     Results from last 7 days   Lab Units 09/26/19 2114   TROPONIN I ng/mL <0 02               Imaging: I have personally reviewed pertinent reports  XR chest 1 view portable    (Results Pending)       XR chest 1 view portable    (Results Pending)       EKG, Pathology, and Other Studies Reviewed on Admission:   · EKG: ST with LAE, LVH - no significant change    Allscripts / Epic Records Reviewed: Yes     ** Please Note: This note has been constructed using a voice recognition system   **

## 2019-09-27 NOTE — QUICK NOTE
Notified of patient's hypotension - asymptomatic  Patient recently increased her metoprolol  She also took 40mg of lasix prior to arrival to the ER, so she received a total of 80mg of lasix this evening  She does have two SIRs criteria, however, denies fevers, chills, cough  No PNA on CXR as read by ER attending and reviewed by myself  Will send urinalysis for completeness but asymptomatic  No obvious source of infection at this point  Suspect hypotension secondary to new increased BP medications in combination with high dose lasix  Will fluid challenge and closely monitor

## 2019-09-27 NOTE — ED PROVIDER NOTES
History  Chief Complaint   Patient presents with    Shortness of Breath     patient went to dentist this am and when was walking up stairs developed shortness of breath, noticed a 4lb weight gain since yesterday, and has developed midsternal chest pressure, reports ankles are swollen     Pt in ER with c/o 1 day of worsening SOB and weight gain  Pt was at the dentist earlier today, and didn't take her morning meds, which include Lasix  Pt with increasing ACUNA over the course of the day, and now with chest pain/pressure  She states that she gained approx 4 pounds overnight  Pt also with increasing b/l LE edema  She took 40mg of Lasix, which is double her usual dose tonight  She denies cough/fevers/chills  History provided by:  Patient   used: No    Shortness of Breath   Severity:  Moderate  Onset quality:  Sudden  Timing:  Constant  Progression:  Worsening  Chronicity:  Recurrent  Relieved by:  Nothing  Worsened by:  Exertion and movement  Ineffective treatments:  Diuretics  Associated symptoms: chest pain    Associated symptoms: no abdominal pain, no cough, no fever, no neck pain and no vomiting        Prior to Admission Medications   Prescriptions Last Dose Informant Patient Reported? Taking?    ARIPiprazole (ABILIFY) 5 mg tablet 9/26/2019 at Unknown time  Yes Yes   Sig: Take 5 mg by mouth daily   DULoxetine HCl (CYMBALTA PO) 9/25/2019 at Unknown time Self Yes Yes   Sig: Take 90 mg by mouth daily at bedtime     Tiotropium Bromide Monohydrate ([employer]) 9/26/2019 at Unknown time Self Yes Yes   Sig: Inhale   aspirin (ECOTRIN LOW STRENGTH) 81 mg EC tablet Unknown at Unknown time Outside Facility (Specify) Yes No   Sig: Take 81 mg by mouth daily   atorvastatin (LIPITOR) 80 mg tablet 9/25/2019 at Unknown time  Yes Yes   Sig: Take 80 mg by mouth daily   fluticasone-salmeterol (AIRDUO RESPICLICK) 682-86 mcg/act dry powder inhaler 9/25/2019 at Unknown time Pharmacy [facility]) Yes Yes Sig: Inhale 1 puff 2 (two) times a day Rinse mouth after use     furosemide (LASIX) 20 mg tablet 9/26/2019 at Unknown time  No Yes   Sig: Take 2 tablets (40 mg total) by mouth daily   Patient taking differently: Take 20 mg by mouth 2 (two) times a day    gabapentin (NEURONTIN) 600 MG tablet 9/26/2019 at Unknown time Self Yes Yes   Sig: Take 900 mg by mouth 3 (three) times a day    gemfibrozil (LOPID) 600 mg tablet 9/26/2019 at Unknown time Pharmacy ([address] [place] [address]) Yes Yes   Sig: Take 600 mg by mouth 2 (two) times a day before meals   levETIRAcetam (KEPPRA) 500 mg tablet   No No   Sig: Take 1 tablet (500 mg total) by mouth every 12 (twelve) hours   losartan (COZAAR) 25 mg tablet 9/26/2019 at Unknown time Self Yes Yes   Sig: Take 25 mg by mouth daily    metoprolol tartrate (LOPRESSOR) 25 mg tablet 9/26/2019 at Unknown time  Yes Yes   Sig: Take 100 mg by mouth every 12 (twelve) hours    pantoprazole (PROTONIX) 40 mg tablet 9/26/2019 at Unknown time  Yes Yes   Sig: Take 40 mg by mouth daily   potassium chloride (K-DUR,KLOR-CON) 20 mEq tablet Not Taking at Unknown time  No No   Sig: Take 1 tablet (20 mEq total) by mouth every other day   Patient not taking: Reported on 9/26/2019   traZODone (DESYREL) 100 mg tablet 9/25/2019 at Unknown time Pharmacy ([address] [place] [address]) Yes Yes   Sig: Take 100 mg by mouth daily at bedtime      Facility-Administered Medications: None       Past Medical History:   Diagnosis Date    Alopecia     Back injury     Bell's palsy     [facility]     Chronic pain     back    Closed left arm fracture     Fibromyalgia     Fibromyalgia, primary     GERD (gastroesophageal reflux disease)     Hyperlipidemia     Hypertension     Lyme disease     Myocardial infarct ([facility] )     2015    Myocardial infarction ([facility] )     Cardiac catheterization 2 years ago showed 30% blockage in 1 of the blood vessels    [facility]     TIA (transient ischemic attack)     12/2017       Past Surgical History:   Procedure Laterality Date     SECTION      CHOLECYSTECTOMY      CORONARY ANGIOPLASTY          ORIF TIBIA FRACTURE Left 2018    Procedure: OPEN REDUCTION W/ INTERNAL FIXATION (ORIF) TIBIA FRACTURE;  Surgeon: Sid Johnson MD;  Location: [facility] OR;  Service: Orthopedics       Family History   Problem Relation Age of Onset    Heart attack Mother     Heart attack Father      I have reviewed and agree with the history as documented  Social History     Tobacco Use    Smoking status: Current Every Day Smoker     Packs/day: 0 50     Years: 40 00     Pack years: 20 00    Smokeless tobacco: Never Used   Substance Use Topics    Alcohol use: No     Comment: as per patient; former social drinker     Drug use: No        Review of Systems   Constitutional: Negative for chills and fever  Respiratory: Positive for shortness of breath  Negative for cough and chest tightness  Cardiovascular: Positive for chest pain and leg swelling  Negative for palpitations  Gastrointestinal: Negative for abdominal pain, diarrhea, nausea and vomiting  Genitourinary: Negative for dysuria, frequency, hematuria and urgency  Musculoskeletal: Negative for back pain, neck pain and neck stiffness  All other systems reviewed and are negative  Physical Exam  Physical Exam   Constitutional: She is oriented to person, place, and time  She appears well-developed and well-nourished  No distress  HENT:   Head: Normocephalic and atraumatic  Eyes: Pupils are equal, round, and reactive to light  Conjunctivae are normal    Neck: Normal range of motion  Neck supple  Cardiovascular: Normal rate, regular rhythm and normal heart sounds  No murmur heard  Pulmonary/Chest: Effort normal  Tachypnea noted  No respiratory distress  She has rales in the right lower field and the left lower field  Abdominal: Soft  Bowel sounds are normal  She exhibits no distension  There is no tenderness  Musculoskeletal: Normal range of motion  She exhibits no edema or deformity  Neurological: She is alert and oriented to person, place, and time  No cranial nerve deficit  Skin: Skin is warm and dry  No rash noted  She is not diaphoretic  No pallor  Psychiatric: She has a normal mood and affect  Her behavior is normal    Nursing note and vitals reviewed        Vital Signs  ED Triage Vitals   Temperature Pulse Respirations Blood Pressure SpO2   09/26/19 2055 09/26/19 2055 09/26/19 2055 09/26/19 2055 09/26/19 2055   99 4 °F (37 4 °C) (!) 110 20 158/64 95 %      Temp Source Heart Rate Source Patient Position - Orthostatic VS BP Location FiO2 (%)   09/26/19 2055 09/26/19 2055 09/26/19 2055 09/26/19 2055 --   Tympanic Monitor Lying Right arm       Pain Score       09/26/19 2137       6           Vitals:    09/26/19 2055 09/26/19 2137 09/26/19 2147 09/26/19 2200   BP: 158/64 92/61 110/68 109/69   Pulse: (!) 110 102 97 96   Patient Position - Orthostatic VS: Lying Sitting           Visual Acuity      ED Medications  Medications   aspirin chewable tablet 324 mg (has no administration in time range)   nitroglycerin (NITROSTAT) SL tablet 0 4 mg (0 4 mg Sublingual Given 9/26/19 2130)   furosemide (LASIX) injection 40 mg (40 mg Intravenous Given 9/26/19 2137)       Diagnostic Studies  Results Reviewed     Procedure Component Value Units Date/Time    Comprehensive metabolic panel [[id number]]  (Abnormal) Collected:  09/26/19 2114    Lab Status:  Final result Specimen:  Blood from Arm, Right Updated:  09/26/19 2143     Sodium 137 mmol/L      Potassium 4 8 mmol/L      Chloride 102 mmol/L      CO2 27 mmol/L      ANION GAP 8 mmol/L      BUN 15 mg/dL      Creatinine 0 96 mg/dL      Glucose 109 mg/dL      Calcium 8 4 mg/dL      AST 25 U/L      ALT 27 U/L      Alkaline Phosphatase 93 U/L      Total Protein 7 3 g/dL      Albumin 3 4 g/dL      Total Bilirubin 0 50 mg/dL      eGFR 66 ml/min/1 73sq m     Narrative:       Consolidated [employer] guidelines for Chronic Kidney Disease (CKD):     Stage 1 with normal or high GFR (GFR > 90 mL/min/1 73 square meters)    Stage 2 Mild CKD (GFR = 60-89 mL/min/1 73 square meters)    Stage 3A Moderate CKD (GFR = 45-59 mL/min/1 73 square meters)    Stage 3B Moderate CKD (GFR = 30-44 mL/min/1 73 square meters)    Stage 4 Severe CKD (GFR = 15-29 mL/min/1 73 square meters)    Stage 5 End Stage CKD (GFR <15 mL/min/1 73 square meters)  Note: GFR calculation is accurate only with a steady state creatinine    B-type natriuretic peptide [[id number]]  (Abnormal) Collected:  09/26/19 2114    Lab Status:  Final result Specimen:  Blood from Arm, Right Updated:  09/26/19 2143     NT-proBNP 1,001 pg/mL     Troponin I [[id number]]  (Normal) Collected:  09/26/19 2114    Lab Status:  Final result Specimen:  Blood from Arm, Right Updated:  09/26/19 2138     Troponin I <0 02 ng/mL     Protime-INR [[id number]]  (Normal) Collected:  09/26/19 2114    Lab Status:  Final result Specimen:  Blood from Arm, Right Updated:  09/26/19 2134     Protime 11 1 seconds      INR 1 03    APTT [[id number]]  (Normal) Collected:  09/26/19 2114    Lab Status:  Final result Specimen:  Blood from Arm, Right Updated:  09/26/19 2134     PTT 26 seconds     CBC and differential [[id number]]  (Abnormal) Collected:  09/26/19 2114    Lab Status:  Final result Specimen:  Blood from Arm, Right Updated:  09/26/19 2119     WBC 13 50 Thousand/uL      RBC 5 04 Million/uL      Hemoglobin 15 4 g/dL      Hematocrit 46 4 %      MCV 92 fL      MCH 30 6 pg      MCHC 33 2 g/dL      RDW 13 8 %      MPV 9 8 fL      Platelets 319 Thousands/uL      nRBC 0 /100 WBCs      Neutrophils Relative 67 %      Immat GRANS % 0 %      Lymphocytes Relative 25 %      Monocytes Relative 6 %      Eosinophils Relative 1 %      Basophils Relative 1 %      Neutrophils Absolute 9 01 Thousands/µL      Immature Grans Absolute 0 05 Thousand/uL      Lymphocytes Absolute 3 34 Thousands/µL      Monocytes Absolute 0 85 Thousand/µL      Eosinophils Absolute 0 15 Thousand/µL      Basophils Absolute 0 10 Thousands/µL                  XR chest 1 view portable    (Results Pending)              Procedures  ECG 12 Lead Documentation Only  Date/Time: 9/26/2019 9:20 PM  Performed by: Yanira Orlando DO  Authorized by: Yanira Orlando DO     Indications / Diagnosis:  Sob  ECG reviewed by me, the ED Provider: yes    Patient location:  ED  Previous ECG:     Previous ECG:  Unavailable    Comparison to cardiac monitor: Yes    Interpretation:     Interpretation: non-specific    Rate:     ECG rate:  109bpm    ECG rate assessment: tachycardic    Rhythm:     Rhythm: sinus tachycardia    Ectopy:     Ectopy: none    QRS:     QRS axis:  Normal           ED Course                               MDM  Number of Diagnoses or Management Options  Chest pain:   CHF (congestive heart failure) ([place] [address] ):      Amount and/or Complexity of Data Reviewed  Clinical lab tests: ordered and reviewed  Tests in the radiology section of CPT®: ordered and reviewed    Risk of Complications, Morbidity, and/or Mortality  Presenting problems: moderate  Diagnostic procedures: moderate  Management options: moderate    Patient Progress  Patient progress: stable      Disposition  Final diagnoses:   CHF (congestive heart failure) ([place] [address] )   Chest pain     Time reflects when diagnosis was documented in both MDM as applicable and the Disposition within this note     Time User Action Codes Description Comment    9/26/2019 10:16 PM Debby RAZA Add [I50 9] CHF (congestive heart failure) ([place] [address] )     9/26/2019 10:16 PM Debby Martin Add [R07 9] Chest pain       ED Disposition     ED Disposition Condition Date/Time Comment    Admit Stable Thu Sep 26, 2019 10:16 PM Case was discussed with Krish Lane NP and the patient's admission status was agreed to be Admission Status: observation status to the service of Dr Arnold Montanez   Follow-up Information    None         Patient's Medications   Discharge Prescriptions    No medications on file     No discharge procedures on file      ED Provider  Electronically Signed by           Cain Kern DO  09/26/19 7708

## 2019-09-27 NOTE — NURSING NOTE
As per urinary retention protocol, patient required straight cath for bladder scan indicating >300 ml  Azeb SAUNDERS notified and aware  600 ml obtained from straight cath  Urine samples obtained as ordered  Will continue to monitor patient

## 2019-09-27 NOTE — PLAN OF CARE
Problem: RESPIRATORY - ADULT  Goal: Achieves optimal ventilation and oxygenation  Description  INTERVENTIONS:  - Assess for changes in respiratory status  - Position to facilitate oxygenation and minimize respiratory effort  - Oxygen administered by appropriate delivery if ordered  - Initiate smoking cessation education as indicated  - Encourage broncho-pulmonary hygiene including cough, deep breathe, Incentive Spirometry  - Assess the need for suctioning and aspirate as needed  - Assess and instruct to report SOB or any respiratory difficulty  - Respiratory Therapy support as indicated  - Aerosol treatments as per orders  Outcome: Progressing

## 2019-09-28 VITALS
BODY MASS INDEX: 28.98 KG/M2 | HEIGHT: 64 IN | DIASTOLIC BLOOD PRESSURE: 67 MMHG | OXYGEN SATURATION: 97 % | SYSTOLIC BLOOD PRESSURE: 109 MMHG | WEIGHT: 169.75 LBS | TEMPERATURE: 97.8 F | RESPIRATION RATE: 20 BRPM | HEART RATE: 88 BPM

## 2019-09-28 LAB
ANION GAP SERPL CALCULATED.3IONS-SCNC: 5 MMOL/L (ref 4–13)
ATRIAL RATE: 109 BPM
BUN SERPL-MCNC: 15 MG/DL (ref 5–25)
CALCIUM SERPL-MCNC: 7.8 MG/DL (ref 8.3–10.1)
CHLORIDE SERPL-SCNC: 108 MMOL/L (ref 100–108)
CO2 SERPL-SCNC: 28 MMOL/L (ref 21–32)
CREAT SERPL-MCNC: 0.87 MG/DL (ref 0.6–1.3)
GFR SERPL CREATININE-BSD FRML MDRD: 74 ML/MIN/1.73SQ M
GLUCOSE SERPL-MCNC: 96 MG/DL (ref 65–140)
MAGNESIUM SERPL-MCNC: 1.9 MG/DL (ref 1.6–2.6)
MRSA NOSE QL CULT: NORMAL
P AXIS: 24 DEGREES
PHOSPHATE SERPL-MCNC: 2.8 MG/DL (ref 2.7–4.5)
POTASSIUM SERPL-SCNC: 4.2 MMOL/L (ref 3.5–5.3)
PR INTERVAL: 144 MS
QRS AXIS: -9 DEGREES
QRSD INTERVAL: 84 MS
QT INTERVAL: 362 MS
QTC INTERVAL: 487 MS
SODIUM SERPL-SCNC: 141 MMOL/L (ref 136–145)
T WAVE AXIS: 78 DEGREES
VENTRICULAR RATE: 109 BPM

## 2019-09-28 PROCEDURE — 94760 N-INVAS EAR/PLS OXIMETRY 1: CPT

## 2019-09-28 PROCEDURE — 84100 ASSAY OF PHOSPHORUS: CPT | Performed by: NURSE PRACTITIONER

## 2019-09-28 PROCEDURE — 80048 BASIC METABOLIC PNL TOTAL CA: CPT | Performed by: NURSE PRACTITIONER

## 2019-09-28 PROCEDURE — 99239 HOSP IP/OBS DSCHRG MGMT >30: CPT | Performed by: FAMILY MEDICINE

## 2019-09-28 PROCEDURE — 83735 ASSAY OF MAGNESIUM: CPT | Performed by: NURSE PRACTITIONER

## 2019-09-28 PROCEDURE — 93010 ELECTROCARDIOGRAM REPORT: CPT | Performed by: INTERNAL MEDICINE

## 2019-09-28 PROCEDURE — 94640 AIRWAY INHALATION TREATMENT: CPT

## 2019-09-28 RX ORDER — POLYETHYLENE GLYCOL 3350 17 G/17G
17 POWDER, FOR SOLUTION ORAL DAILY
Qty: 14 EACH | Refills: 0
Start: 2019-09-29 | End: 2019-11-20

## 2019-09-28 RX ORDER — ACETAMINOPHEN 325 MG/1
975 TABLET ORAL EVERY 8 HOURS PRN
Qty: 30 TABLET | Refills: 0
Start: 2019-09-28

## 2019-09-28 RX ORDER — POLYETHYLENE GLYCOL 3350 17 G/17G
17 POWDER, FOR SOLUTION ORAL DAILY
Status: DISCONTINUED | OUTPATIENT
Start: 2019-09-28 | End: 2019-09-28 | Stop reason: HOSPADM

## 2019-09-28 RX ORDER — LORAZEPAM 0.5 MG/1
0.5 TABLET ORAL EVERY 8 HOURS PRN
Status: DISCONTINUED | OUTPATIENT
Start: 2019-09-28 | End: 2019-09-28 | Stop reason: HOSPADM

## 2019-09-28 RX ORDER — NICOTINE 21 MG/24HR
1 PATCH, TRANSDERMAL 24 HOURS TRANSDERMAL DAILY
Qty: 28 PATCH | Refills: 0 | Status: SHIPPED | OUTPATIENT
Start: 2019-09-29

## 2019-09-28 RX ORDER — SPIRONOLACTONE 25 MG/1
25 TABLET ORAL DAILY
Qty: 30 TABLET | Refills: 0 | Status: SHIPPED | OUTPATIENT
Start: 2019-09-28 | End: 2019-10-17 | Stop reason: SDUPTHER

## 2019-09-28 RX ADMIN — GEMFIBROZIL 600 MG: 600 TABLET ORAL at 06:21

## 2019-09-28 RX ADMIN — IPRATROPIUM BROMIDE AND ALBUTEROL SULFATE 3 ML: 2.5; .5 SOLUTION RESPIRATORY (INHALATION) at 07:20

## 2019-09-28 RX ADMIN — GABAPENTIN 300 MG: 300 CAPSULE ORAL at 08:41

## 2019-09-28 RX ADMIN — FLUTICASONE FUROATE AND VILANTEROL TRIFENATATE 1 PUFF: 100; 25 POWDER RESPIRATORY (INHALATION) at 08:41

## 2019-09-28 RX ADMIN — POLYETHYLENE GLYCOL 3350 17 G: 17 POWDER, FOR SOLUTION ORAL at 11:44

## 2019-09-28 RX ADMIN — LEVETIRACETAM 500 MG: 500 TABLET, FILM COATED ORAL at 08:41

## 2019-09-28 RX ADMIN — HEPARIN SODIUM 5000 UNITS: 5000 INJECTION INTRAVENOUS; SUBCUTANEOUS at 06:21

## 2019-09-28 RX ADMIN — PANTOPRAZOLE SODIUM 40 MG: 40 TABLET, DELAYED RELEASE ORAL at 06:21

## 2019-09-28 RX ADMIN — ARIPIPRAZOLE 5 MG: 5 TABLET ORAL at 08:41

## 2019-09-28 RX ADMIN — NICOTINE 1 PATCH: 14 PATCH TRANSDERMAL at 08:46

## 2019-09-28 RX ADMIN — ASPIRIN 81 MG: 81 TABLET, COATED ORAL at 08:41

## 2019-09-28 NOTE — NURSING NOTE
Patient failed urinary retention protocol x3 previous attempts  Patient urinated 300+ urine on commode with some difficulty starting stream  Post void Bladder scan read 332 ml urine  Order obtained for lemon cath insertion per protocol  Lemon catheter #16 fr inserted with 10 ml balloon using sterile technique with 320 ml urine return  Patient tolerated well

## 2019-09-28 NOTE — CONSULTS
Consultation - Juliano Vogel 63 y o  female MRN: [id number]    Unit/Bed#: [address] [place] [address] Encounter: [id number]      Identifying Data:  63years old white female is admitted at [facility] on 2019 with complaining of shortness of breath  Psychiatric consultation is asked for depression anxiety  Patient examined spoke with the nurse history physical medications labs reviewed and noted  Patient is positive with benzo and alcohol level was not done  Patient is able to give out her background history  Patient reports that she is  and lives with a fiancee she has 1 son and 2 daughters she still smokes cigarette and trying to quit cigarette she denies abusing alcohol or drugs she has no history of drug and alcohol abuse she has 2 years of college and 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  She has been disabled since then she is not working at present time  Patient is suffering from depression anxiety history of back injury Bell's palsy CHF left arm fracture fibromyalgia and GERD high cholesterol hypertension history of Lyme disease history of myocardial infraction history of TIA and stroke  section cholecystectomy coronary angioplasty tibia fracture with ORIF patient is allergic to morphine and hydromorphone patient reports that she had been taking Cymbalta and Abilify since many years she used to see a psychiatrist but she has been getting these medications from the family physician and her anxiety has been up lately so she is started on Xanax 0 5 mg p o  B i d  week ago from her family physician before this admission  It is helping her with anxiety and she is able to relax and sleep better    Patient is suffering from seizure    Chief Complaints:  Depression and anxiety    Family History:  Patient denies  Family History   Problem Relation Age of Onset    Heart attack Mother     Heart attack Father        Legal History:  Patient denies    Mental Status Exam:  63years old white female is alert awake oriented x3 cooperative communicates well and she reports being depressed and anxious she has lot going on physically multiple medical problems and she is stressed she has trouble in sleeping poor appetite she looks mild nourished  She is trying to quit smoking  Patient denies auditory or visual hallucinations  Patient denies suicidal or homicidal ideation  Poor concentration  Guarded but no paranoia no delusion elucidated  Impulsive mood swings  Insight and judgments are adequate  History of Present Illness     HPI: Snow Wiley is a 63y o  year old female who presents with shortness of breath    Consults      Historical Information   Past Psychiatric History:  Patient reports a long history of depression over many years and she used to see a psychiatrist and she has been taking psychiatric medications since many years but since last several years she has been taking her medication Abilify 5 mg daily and Cymbalta 90 mg daily at bedtime from the family physician  Patient gives a history of at least 1 psychiatric admission 2-3 years ago but no suicide attempt  Patient has no history of drug and alcohol abuse  Patient denies legal history  Currently patient is not under psychiatric care but she is getting her medications from the family physician  Patient is encouraged to go back to see a psychiatrist and therapist after the discharge    Past Medical History:   Diagnosis Date    Alopecia     Back injury     Bell's palsy     CHF (congestive heart failure) (HCC)     Chronic pain     back    Closed left arm fracture     Fibromyalgia     Fibromyalgia, primary     GERD (gastroesophageal reflux disease)     Hyperlipidemia     Hypertension     Lyme disease     Myocardial infarct ([facility] )     2015    Myocardial infarction [facility])     Cardiac catheterization 2 years ago showed 30% blockage in 1 of the blood vessels    [facility] ([facility] )     TIA (transient ischemic attack)     2017     Past Surgical History:   Procedure Laterality Date     SECTION      CHOLECYSTECTOMY      CORONARY ANGIOPLASTY          ORIF TIBIA FRACTURE Left 2018    Procedure: OPEN REDUCTION W/ INTERNAL FIXATION (ORIF) TIBIA FRACTURE;  Surgeon: Tammi Thompson MD;  Location: [address] [place] [address];  Service: Orthopedics     Social History   Social History     Substance and Sexual Activity   Alcohol Use Not Currently    Comment: as per patient; former social drinker      Social History     Substance and Sexual Activity   Drug Use No     Social History     Tobacco Use   Smoking Status Current Every Day Smoker    Packs/day: 0 50    Years: 40 00    Pack years: 20 00   Smokeless Tobacco Never Used   Tobacco Comment    1/2 per day       Meds/Allergies   current meds:   Current Facility-Administered Medications   Medication Dose Route Frequency    acetaminophen (TYLENOL) tablet 975 mg  975 mg Oral Q8H PRN    ARIPiprazole (ABILIFY) tablet 5 mg  5 mg Oral Daily    aspirin (ECOTRIN LOW STRENGTH) EC tablet 81 mg  81 mg Oral Daily    atorvastatin (LIPITOR) tablet 80 mg  80 mg Oral Daily With Dinner    DULoxetine (CYMBALTA) delayed release capsule 90 mg  90 mg Oral HS    fluticasone-vilanterol (BREO ELLIPTA) 100-25 mcg/inh inhaler 1 puff  1 puff Inhalation Daily    gabapentin (NEURONTIN) capsule 300 mg  300 mg Oral TID    gemfibrozil (LOPID) tablet 600 mg  600 mg Oral BID AC    heparin (porcine) subcutaneous injection 5,000 Units  5,000 Units Subcutaneous Q8H [facility]    ipratropium-albuterol (DUO-NEB) 0 5-2 5 mg/3 mL inhalation solution 3 mL  3 mL Nebulization BID    levETIRAcetam (KEPPRA) tablet 500 mg  500 mg Oral Q12H [facility]    magnesium hydroxide (MILK OF MAGNESIA) 400 mg/5 mL oral suspension 30 mL  30 mL Oral Daily PRN    metoprolol tartrate (LOPRESSOR) tablet 50 mg  50 mg Oral Q12H [facility]    multi-electrolyte (ISOLYTE-S PH 7 4 equivalent) IV solution  50 mL/hr Intravenous Continuous    nicotine (NICODERM CQ) 14 mg/24hr TD 24 hr patch 1 patch  1 patch Transdermal Daily    pantoprazole (PROTONIX) EC tablet 40 mg  40 mg Oral Early Morning    polyethylene glycol (MIRALAX) packet 17 g  17 g Oral Daily    and PTA meds:    Medications Prior to Admission   Medication    ARIPiprazole (ABILIFY) 5 mg tablet    atorvastatin (LIPITOR) 80 mg tablet    DULoxetine HCl (CYMBALTA PO)    fluticasone-salmeterol (AIRDUO RESPICLICK) 211-14 mcg/act dry powder inhaler    furosemide (LASIX) 20 mg tablet    gabapentin (NEURONTIN) 600 MG tablet    gemfibrozil (LOPID) 600 mg tablet    levETIRAcetam (KEPPRA) 500 mg tablet    losartan (COZAAR) 25 mg tablet    metoprolol tartrate (LOPRESSOR) 25 mg tablet    pantoprazole (PROTONIX) 40 mg tablet    Tiotropium Bromide Monohydrate (SPIRIVA HANDIHALER IN)    traZODone (DESYREL) 100 mg tablet    aspirin (ECOTRIN LOW STRENGTH) 81 mg EC tablet    potassium chloride (K-DUR,KLOR-CON) 20 mEq tablet     Allergies   Allergen Reactions    Hydromorphone Shortness Of Breath    Morphine Shortness Of Breath       Objective   Vitals: Blood pressure 109/67, pulse 88, temperature 97 8 °F (36 6 °C), temperature source Oral, resp  rate 20, height 5' 4" (1 626 m), weight 77 kg (169 lb 12 1 oz), last menstrual period 09/04/2009, SpO2 97 %, not currently breastfeeding        Routine Lab Results:   Admission on 09/26/2019   Component Date Value Ref Range Status    WBC 09/26/2019 13 50* 4 31 - 10 16 Thousand/uL Final    RBC 09/26/2019 5 04  3 81 - 5 12 Million/uL Final    Hemoglobin 09/26/2019 15 4  11 5 - 15 4 g/dL Final    Hematocrit 09/26/2019 46 4* 34 8 - 46 1 % Final    MCV 09/26/2019 92  82 - 98 fL Final    MCH 09/26/2019 30 6  26 8 - 34 3 pg Final    MCHC 09/26/2019 33 2  31 4 - 37 4 g/dL Final    RDW 09/26/2019 13 8  11 6 - 15 1 % Final    MPV 09/26/2019 9 8  8 9 - 12 7 fL Final    Platelets 64/09/2315 306  149 - 390 Thousands/uL Final    nRBC 09/26/2019 0  /100 WBCs Final    Neutrophils Relative 09/26/2019 67  43 - 75 % Final    Immat GRANS % 09/26/2019 0  0 - 2 % Final    Lymphocytes Relative 09/26/2019 25  14 - 44 % Final    Monocytes Relative 09/26/2019 6  4 - 12 % Final    Eosinophils Relative 09/26/2019 1  0 - 6 % Final    Basophils Relative 09/26/2019 1  0 - 1 % Final    Neutrophils Absolute 09/26/2019 9 01* 1 85 - 7 62 Thousands/µL Final    Immature Grans Absolute 09/26/2019 0 05  0 00 - 0 20 Thousand/uL Final    Lymphocytes Absolute 09/26/2019 3 34  0 60 - 4 47 Thousands/µL Final    Monocytes Absolute 09/26/2019 0 85  0 17 - 1 22 Thousand/µL Final    Eosinophils Absolute 09/26/2019 0 15  0 00 - 0 61 Thousand/µL Final    Basophils Absolute 09/26/2019 0 10  0 00 - 0 10 Thousands/µL Final    Sodium 09/26/2019 137  136 - 145 mmol/L Final    Potassium 09/26/2019 4 8  3 5 - 5 3 mmol/L Final    Moderately Hemolyzed; Results May be Affected    Chloride 09/26/2019 102  100 - 108 mmol/L Final    CO2 09/26/2019 27  21 - 32 mmol/L Final    ANION GAP 09/26/2019 8  4 - 13 mmol/L Final    BUN 09/26/2019 15  5 - 25 mg/dL Final    Creatinine 09/26/2019 0 96  0 60 - 1 30 mg/dL Final    Standardized to IDMS reference method    Glucose 09/26/2019 109  65 - 140 mg/dL Final      If the patient is fasting, the ADA then defines impaired fasting glucose as > 100 mg/dL and diabetes as > or equal to 123 mg/dL  Specimen collection should occur prior to Sulfasalazine administration due to the potential for falsely depressed results  Specimen collection should occur prior to Sulfapyridine administration due to the potential for falsely elevated results   Calcium 09/26/2019 8 4  8 3 - 10 1 mg/dL Final    AST 09/26/2019 25  5 - 45 U/L Final    Moderately Hemolyzed; Results May be Affected  Specimen collection should occur prior to Sulfasalazine administration due to the potential for falsely depressed results       ALT 09/26/2019 27  12 - 78 U/L Final Specimen collection should occur prior to Sulfasalazine administration due to the potential for falsely depressed results   Alkaline Phosphatase 09/26/2019 93  46 - 116 U/L Final    Total Protein 09/26/2019 7 3  6 4 - 8 2 g/dL Final    Albumin 09/26/2019 3 4* 3 5 - 5 0 g/dL Final    Total Bilirubin 09/26/2019 0 50  0 20 - 1 00 mg/dL Final    eGFR 09/26/2019 66  ml/min/1 73sq m Final    Troponin I 09/26/2019 <0 02  <=0 04 ng/mL Final    Autovalidation override  [employer] Chemistry analyzer 99% cutoff is > 0 04 ng/mL in network labs     o cTnI 99% cutoff is useful only when applied to patients in the clinical setting of myocardial ischemia   o cTnI 99% cutoff should be interpreted in the context of clinical history, ECG findings and possibly cardiac imaging to establish correct diagnosis  o cTnI 99% cutoff may be suggestive but clearly not indicative of a coronary event without the clinical setting of myocardial ischemia   NT-proBNP 09/26/2019 1,001* <125 pg/mL Final    PTT 09/26/2019 26  23 - 37 seconds Final    Therapeutic Heparin Range =  60-90 seconds    Protime 09/26/2019 11 1  9 8 - 12 0 seconds Final    INR 09/26/2019 1 03  0 91 - 1 09 Final    D-Dimer, Quant 09/26/2019 480  190 - 520 ng/ml (FEU) Final      Reference and upper limits to exclude DVT and PE are the same  Do not use to exclude if clinical symptoms are present  Pregnant women:  1st trimester:  <220 - 1060 ng/ml (FEU)  2nd trimester:  <220 - 1880 ng/ml (FEU)  3rd trimester:   238 - 3280 ng/ml (FEU)    Note: Normal ranges may not apply to patients who are transgender, non-binary, or whose legal sex, sex at birth, and gender identity differ      Troponin I 09/27/2019 <0 02  <=0 04 ng/mL Final    Autovalidation override  Siemens Chemistry analyzer 99% cutoff is > 0 04 ng/mL in network labs     o cTnI 99% cutoff is useful only when applied to patients in the clinical setting of myocardial ischemia   o cTnI 99% cutoff should be interpreted in the context of clinical history, ECG findings and possibly cardiac imaging to establish correct diagnosis  o cTnI 99% cutoff may be suggestive but clearly not indicative of a coronary event without the clinical setting of myocardial ischemia   Sodium 09/27/2019 138  136 - 145 mmol/L Final    Potassium 09/27/2019 2 8* 3 5 - 5 3 mmol/L Final    Chloride 09/27/2019 102  100 - 108 mmol/L Final    CO2 09/27/2019 27  21 - 32 mmol/L Final    ANION GAP 09/27/2019 9  4 - 13 mmol/L Final    BUN 09/27/2019 21  5 - 25 mg/dL Final    Creatinine 09/27/2019 1 43* 0 60 - 1 30 mg/dL Final    Standardized to IDMS reference method    Glucose 09/27/2019 121  65 - 140 mg/dL Final      If the patient is fasting, the ADA then defines impaired fasting glucose as > 100 mg/dL and diabetes as > or equal to 123 mg/dL  Specimen collection should occur prior to Sulfasalazine administration due to the potential for falsely depressed results  Specimen collection should occur prior to Sulfapyridine administration due to the potential for falsely elevated results   Calcium 09/27/2019 7 8* 8 3 - 10 1 mg/dL Final    eGFR 09/27/2019 41  ml/min/1 73sq m Final    Magnesium 09/27/2019 1 4* 1 6 - 2 6 mg/dL Final    Troponin I 09/27/2019 <0 02  <=0 04 ng/mL Final    Autovalidation override  S[employer]ens Chemistry analyzer 99% cutoff is > 0 04 ng/mL in network labs     o cTnI 99% cutoff is useful only when applied to patients in the clinical setting of myocardial ischemia   o cTnI 99% cutoff should be interpreted in the context of clinical history, ECG findings and possibly cardiac imaging to establish correct diagnosis  o cTnI 99% cutoff may be suggestive but clearly not indicative of a coronary event without the clinical setting of myocardial ischemia        Hepatitis C Ab 09/27/2019 Non-reactive  Non-reactive Final    Cholesterol 09/27/2019 192  50 - 200 mg/dL Final      Cholesterol:       Desirable         <200 mg/dl       Borderline         200-239 mg/dl       High              >239           Triglycerides 09/27/2019 220* <=150 mg/dL Final      Triglyceride:     Normal          <150 mg/dl     Borderline High 150-199 mg/dl     High            200-499 mg/dl        Very High       >499 mg/dl    Specimen collection should occur prior to N-Acetylcysteine or Metamizole administration due to the potential for falsely depressed results   HDL, Direct 09/27/2019 28* 40 - 60 mg/dL Final      HDL Cholesterol:       High    >60 mg/dL       Low     <41 mg/dL  Specimen collection should occur prior to Metamizole administration due to the potential for falsley depressed results   LDL Calculated 09/27/2019 120* 0 - 100 mg/dL Final      LDL Cholesterol:     Optimal           <100 mg/dl     Near Optimal      100-129 mg/dl     Above Optimal       Borderline High 130-159 mg/dl       High            160-189 mg/dl       Very High       >189 mg/dl         This screening LDL is a calculated result  It does not have the accuracy of the Direct Measured LDL in the monitoring of patients with hyperlipidemia and/or statin therapy  Direct Measure LDL ([id number]) must be ordered separately in these patients      WBC 09/27/2019 9 89  4 31 - 10 16 Thousand/uL Final    RBC 09/27/2019 4 40  3 81 - 5 12 Million/uL Final    Hemoglobin 09/27/2019 13 7  11 5 - 15 4 g/dL Final    Hematocrit 09/27/2019 41 4  34 8 - 46 1 % Final    MCV 09/27/2019 94  82 - 98 fL Final    MCH 09/27/2019 31 1  26 8 - 34 3 pg Final    MCHC 09/27/2019 33 1  31 4 - 37 4 g/dL Final    RDW 09/27/2019 13 9  11 6 - 15 1 % Final    Platelets 60/68/0958 242  149 - 390 Thousands/uL Final    MPV 09/27/2019 10 0  8 9 - 12 7 fL Final    Clarity, UA 09/27/2019 Clear   Final    Color, UA 09/27/2019 Yellow   Final    Specific [place], UA 09/27/2019 1 010  1 000 - 1 030 Final    pH, UA 09/27/2019 5 0  5 0, 5 5, 6 0, 6 5, 7 0, 7 5, 8 0, 8 5, 9 0 Final    Glucose, UA 09/27/2019 Negative Negative mg/dl Final    Ketones, UA 09/27/2019 Negative  Negative mg/dl Final    Blood, UA 09/27/2019 Negative  Negative Final    Protein, UA 09/27/2019 Negative  Negative mg/dl Final    Nitrite, UA 09/27/2019 Negative  Negative Final    Bilirubin, UA 09/27/2019 Negative  Negative Final    Urobilinogen, UA 09/27/2019 0 2  0 2, 1 0 E U /dl E U /dl Final    Leukocytes, UA 09/27/2019 Negative  Negative Final    WBC, UA 09/27/2019 0-1* None Seen, 0-5, 5-55, 5-65 /hpf Final    RBC, UA 09/27/2019 None Seen  None Seen, 0-5 /hpf Final    Hyaline Casts, UA 09/27/2019 1-2* (none) /lpf Final    Bacteria, UA 09/27/2019 Occasional  None Seen, Occasional /hpf Final    Epithelial Cells 09/27/2019 Occasional  None Seen, Occasional /hpf Final    Sodium 09/27/2019 139  136 - 145 mmol/L Final    Potassium 09/27/2019 3 9  3 5 - 5 3 mmol/L Final    Chloride 09/27/2019 103  100 - 108 mmol/L Final    CO2 09/27/2019 28  21 - 32 mmol/L Final    ANION GAP 09/27/2019 8  4 - 13 mmol/L Final    BUN 09/27/2019 19  5 - 25 mg/dL Final    Creatinine 09/27/2019 0 99  0 60 - 1 30 mg/dL Final    Standardized to IDMS reference method    Glucose 09/27/2019 120  65 - 140 mg/dL Final      If the patient is fasting, the ADA then defines impaired fasting glucose as > 100 mg/dL and diabetes as > or equal to 123 mg/dL  Specimen collection should occur prior to Sulfasalazine administration due to the potential for falsely depressed results  Specimen collection should occur prior to Sulfapyridine administration due to the potential for falsely elevated results      Calcium 09/27/2019 8 0* 8 3 - 10 1 mg/dL Final    eGFR 09/27/2019 63  ml/min/1 73sq m Final    Magnesium 09/27/2019 2 3  1 6 - 2 6 mg/dL Final    Amph/Meth UR 09/27/2019 Negative  Negative Final    Barbiturate Ur 09/27/2019 Negative  Negative Final    Benzodiazepine Urine 09/27/2019 Positive* Negative Final    Cocaine Urine 09/27/2019 Negative  Negative Final    Methadone Urine 09/27/2019 Negative  Negative Final    Opiate Urine 09/27/2019 Negative  Negative Final    PCP Ur 09/27/2019 Negative  Negative Final    THC Urine 09/27/2019 Negative  Negative Final    pH, Arterial 09/27/2019 7 382  7 350 - 7 450 Final    PH ART TC 09/27/2019 7 385  7 350 - 7 450 Final    pCO2, Arterial 09/27/2019 47 5* 36 0 - 44 0 mm Hg Final    PCO2 (TC) Arterial 09/27/2019 47 1* 36 0 - 44 0 mm Hg Final    pO2, Arterial 09/27/2019 65 2* 75 0 - 129 0 mm Hg Final    PO2 (TC) Arterial 09/27/2019 64 3* 75 0 - 129 0 mm Hg Final    HCO3, Arterial 09/27/2019 27 6  22 0 - 28 0 mmol/L Final    Base Excess, Arterial 09/27/2019 1 9  mmol/L Final    O2 Content, Arterial 09/27/2019 17 2  16 0 - 23 0 mL/dL Final    O2 HGB,Arterial  09/27/2019 91 4* 94 0 - 97 0 % Final    SOURCE 09/27/2019 Radial, Right   Final    MALIK TEST 09/27/2019 Yes   Final    Temperature 09/27/2019 98 2  Degrees Fehrenheit Final    ROOM AIR FIO2 09/27/2019 21  % Final    Sodium 09/28/2019 141  136 - 145 mmol/L Final    Potassium 09/28/2019 4 2  3 5 - 5 3 mmol/L Final    Chloride 09/28/2019 108  100 - 108 mmol/L Final    CO2 09/28/2019 28  21 - 32 mmol/L Final    ANION GAP 09/28/2019 5  4 - 13 mmol/L Final    BUN 09/28/2019 15  5 - 25 mg/dL Final    Creatinine 09/28/2019 0 87  0 60 - 1 30 mg/dL Final    Standardized to IDMS reference method    Glucose 09/28/2019 96  65 - 140 mg/dL Final      If the patient is fasting, the ADA then defines impaired fasting glucose as > 100 mg/dL and diabetes as > or equal to 123 mg/dL  Specimen collection should occur prior to Sulfasalazine administration due to the potential for falsely depressed results  Specimen collection should occur prior to Sulfapyridine administration due to the potential for falsely elevated results      Calcium 09/28/2019 7 8* 8 3 - 10 1 mg/dL Final    eGFR 09/28/2019 74  ml/min/1 73sq m Final    Magnesium 09/28/2019 1 9  1 6 - 2 6 mg/dL Final    Phosphorus 09/28/2019 2 8  2 7 - 4 5 mg/dL Final         Diagnosis:  Major depression recurrent  Anxiety    Plan:  Continue Cymbalta 90 mg HS  Patient may discontinue Abilify 5 mg a m  Ativan 0 5 mg p o  Q 8 hours p r n  For anxiety  Psychotherapy  Patient is recommended psychiatric follow-up after the discharge  I will follow up during the hospital stay      Marek Boggs MD

## 2019-09-28 NOTE — ASSESSMENT & PLAN NOTE
Resolved  Cr 0 96 -> 1 43->0 87, likely due to over-diuresis  · Will continue to hold patient's Lasix as per discussion with Cardiology  · Will reintroduce patient's home medication of Cozaar and add low-dose Aldactone as per Cardiology  · Patient will follow up with her primary care physician in 1-2 weeks  · Patient is to follow up with Cardiology in 2-4 weeks

## 2019-09-28 NOTE — CONSULTS
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 Casas out as soon as possible and apparently is now on any psychotropic medications  Review of Systems:   Constitutional:  Negative for activity change, fever, chills and diaphoresis  HENT: Negative for hearing loss and trouble swallowing  Eyes: Negative for itching and visual disturbance  Respiratory: Negative for chest tightness and shortness of breath  Cardiovascular: Negative for chest pain, edema  Gastrointestinal: Negative for abdominal distention, na abdominal pain, constipation, diarrhea, Nausea and vomiting  Genitourinary: Negative for decreased urine volume, difficulty urinating, dysuria, enuresis, frequency, hematuria and urgency  Musculoskeletal: Negative for gait problem and myalgias  Neurological: Negative for dizziness and headaches  Hematological: Does not bruise/bleed easily         Historical Information   Past Medical History:   Diagnosis Date    Alopecia     Back injury     Bell's palsy     [facility])     Chronic pain     back    Closed left arm fracture     Fibromyalgia     Fibromyalgia, primary     GERD (gastroesophageal reflux disease)     Hyperlipidemia     Hypertension     Lyme disease     Myocardial infarct ([facility] [address] )     2015    Myocardial infarction [facility])     Cardiac catheterization 2 years ago showed 30% blockage in 1 of the blood vessels    Stroke [facility])     TIA (transient ischemic attack)     2017     Past Surgical History:   Procedure Laterality Date     SECTION      CHOLECYSTECTOMY      CORONARY ANGIOPLASTY          ORIF TIBIA FRACTURE Left 2018    Procedure: OPEN REDUCTION W/ INTERNAL FIXATION (ORIF) TIBIA FRACTURE;  Surgeon: Tammi Thompson MD;  Location: [facility];  Service: Orthopedics     Social History   Social History     Substance and Sexual Activity   Alcohol Use Not Currently    Comment: as per patient; former social drinker      Social History     Substance and Sexual Activity   Drug Use No     Social History     Tobacco Use   Smoking Status Current Every Day Smoker    Packs/day: 0 50    Years: 40 00    Pack years: 20 00   Smokeless Tobacco Never Used   Tobacco Comment    1/2 per day     Family History:   Family History   Problem Relation Age of Onset    Heart attack Mother     Heart attack Father        Meds/Allergies   Medications Prior to Admission   Medication    ARIPiprazole (ABILIFY) 5 mg tablet    atorvastatin (LIPITOR) 80 mg tablet    DULoxetine HCl (CYMBALTA PO)    fluticasone-salmeterol (AIRDUO RESPICLICK) 973-43 mcg/act dry powder inhaler    furosemide (LASIX) 20 mg tablet    gabapentin (NEURONTIN) 600 MG tablet    gemfibrozil (LOPID) 600 mg tablet    levETIRAcetam (KEPPRA) 500 mg tablet    losartan (COZAAR) 25 mg tablet    metoprolol tartrate (LOPRESSOR) 25 mg tablet    pantoprazole (PROTONIX) 40 mg tablet    Tiotropium Bromide Monohydrate (SPIRIVA HANDIHALER IN)    traZODone (DESYREL) 100 mg tablet    aspirin (ECOTRIN LOW STRENGTH) 81 mg EC tablet    potassium chloride (K-DUR,KLOR-CON) 20 mEq tablet     Allergies   Allergen Reactions    Hydromorphone Shortness Of Breath    Morphine Shortness Of Breath       Objective   Vitals: /67 (BP Location: Right arm)   Pulse 88   Temp 97 8 °F (36 6 °C) (Oral)   Resp 20   Ht 5' 4" (1 626 m)   Wt 77 kg (169 lb 12 1 oz)   LMP 09/04/2009 (Within Days) Comment: per patient   SpO2 97%   BMI 29 14 kg/m²     Physical Exam:  General Alert awake   Normocephalic atraumatic PERRLA  Lungs clear bilaterally  Cardiac normal S1 normal S2  Abdomen soft, flank pain none  Vaginal exam normal indwelling Casas draining clear urine  Extremities no edema    I/O last 24 hours:   In: 2200 [P O :2200]  Out: 3545 [Urine:3545]    Invasive Devices     Peripheral Intravenous Line            Peripheral IV 09/27/19 Left Hand 1 day          Drain            Urethral Catheter 16 Fr  less than 1 day                    Lab Results: CBC:   Lab Results   Component Value Date    WBC 9 89 09/27/2019    HGB 13 7 09/27/2019    HCT 41 4 09/27/2019    MCV 94 09/27/2019     09/27/2019    MCH 31 1 09/27/2019    MCHC 33 1 09/27/2019    RDW 13 9 09/27/2019    MPV 10 0 09/27/2019    NRBC 0 09/26/2019     CMP:   Lab Results   Component Value Date     09/28/2019    CO2 28 09/28/2019    BUN 15 09/28/2019    CREATININE 0 87 09/28/2019    CALCIUM 7 8 (L) 09/28/2019    AST 25 09/26/2019    ALT 27 09/26/2019    ALKPHOS 93 09/26/2019    EGFR 74 09/28/2019     Urinalysis:   Lab Results   Component Value Date    COLORU Yellow 09/27/2019    CLARITYU Clear 09/27/2019    SPECGRAV 1 010 09/27/2019    PHUR 5 0 09/27/2019    PHUR 5 5 09/11/2018    LEUKOCYTESUR Negative 09/27/2019    NITRITE Negative 09/27/2019    GLUCOSEU Negative 09/27/2019    KETONESU Negative 09/27/2019    BILIRUBINUR Negative 09/27/2019    BLOODU Negative 09/27/2019     Urine Culture: No results found for: URINECX  PSA: No results found for: PSA        Assessment/ Plan:  Postvoid residual/urinary tension  May be caused by  Constipation and  Any psychotropic medications  Plan patient's residuals only 300 cc  Can start tamsulosin 0 4 mg  DC Casas catheter tomorrow morning if not following output  Was seen in the office for follow-up in a few weeks        Hima Florez MD

## 2019-09-28 NOTE — ASSESSMENT & PLAN NOTE
Resolved  Likely due to over diuresis in the setting of low EF  · Cardiology following, recommend to continue to hold Lasix, resume patient's Cozaar and and low-dose Aldactone  · Patient will follow up outpatient Cardiology in 2-4 weeks  · Patient will follow up with her primary care physician in 1-2 weeks  · Discharged to home today

## 2019-09-28 NOTE — UTILIZATION REVIEW
Notification of Inpatient Admission/Inpatient Authorization Request  This is a Notification of Inpatient Admission/Request for Inpatient Authorization for our facility [address] [facility]  Be advised that this patient was admitted to our facility under Inpatient Status  Please contact the Utilization Review Department where the patient is receiving care services for additional admission information  Place of Service Code: 24   Place of Service Name: Inpatient Hospital  Presentation Date & Time: 9/26/2019  8:39 PM  Inpatient Admission Date & Time: 9/27/19 1503  Discharge Date & Time: No discharge date for patient encounter  Discharge Disposition (if discharged): Home/Self Care  Attending Physician and NPI#: Casa Dave [[id number]]  Admission Orders (From admission, onward)     Ordered        09/27/19 1503  Inpatient Admission  Once         09/26/19 2217  Place in Observation  Once                   Facility: [address] [place] [address] Utilization Review Department  Phone: [phone]; Fax [phone]  [email or web address]  ATTENTION: Please call with any questions or concerns to [phone]  and carefully listen to the prompts so that you are directed to the right person  Send all requests for admission clinical reviews, approved or denied determinations and any other requests to fax [phone]   All voicemails are confidential

## 2019-09-28 NOTE — ASSESSMENT & PLAN NOTE
On Cymbalta 90 mg HS and trazodone 100 mg HS  · Consulted Psychiatry, appreciate recommendations  · Psychiatry recommends that the patient follow up with outpatient psych, contact information provided to patient  · Will also follow with her primary care physician in 1-2 weeks

## 2019-09-28 NOTE — UTILIZATION REVIEW
Continued Stay Review    Date: 9/28/19                        Current Patient Class: INPATIENT  Current Level of Care: MED SURG  Assessment/Plan:   LETHARGIC YESTERDAY, IMPROVED TODAY  ACUTE ON CHRONIC CHF, OVER DIURESED, NOW RECEIVING IVF  UROLOGY CONSULTED FOR URINARY RETENTION, POSSIBLY CAUSED BY CONSTIPATION, PSYCHOTROPIC MEDS  STARTED ON MIRALAX DAILY,  SOLIMAN CATH PLACED  VOIDING TRIAL SCHEDULED FOR TOMORROW     Pertinent Labs/Diagnostic Results:   Results from last 7 days   Lab Units 09/27/19  0443 09/26/19  2114   WBC Thousand/uL 9 89 13 50*   HEMOGLOBIN g/dL 13 7 15 4   HEMATOCRIT % 41 4 46 4*   PLATELETS Thousands/uL 242 306   NEUTROS ABS Thousands/µL  --  9 01*     Results from last 7 days   Lab Units 09/28/19  0646 09/27/19  1559 09/27/19 0443 09/26/19 2114   SODIUM mmol/L 141 139 138 137   POTASSIUM mmol/L 4 2 3 9 2 8* 4 8   CHLORIDE mmol/L 108 103 102 102   CO2 mmol/L 28 28 27 27   ANION GAP mmol/L 5 8 9 8   BUN mg/dL 15 19 21 15   CREATININE mg/dL 0 87 0 99 1 43* 0 96   EGFR ml/min/1 73sq m 74 63 41 66   CALCIUM mg/dL 7 8* 8 0* 7 8* 8 4   MAGNESIUM mg/dL 1 9 2 3 1 4*  --    PHOSPHORUS mg/dL 2 8  --   --   --      Results from last 7 days   Lab Units 09/26/19 2114   AST U/L 25   ALT U/L 27   ALK PHOS U/L 93   TOTAL PROTEIN g/dL 7 3   ALBUMIN g/dL 3 4*   TOTAL BILIRUBIN mg/dL 0 50     Results from last 7 days   Lab Units 09/28/19  0646 09/27/19  1559 09/27/19 0443 09/26/19 2114   GLUCOSE RANDOM mg/dL 96 120 121 109     Results from last 7 days   Lab Units 09/27/19  1516   PH ART  7 382   PCO2 ART mm Hg 47 5*   PO2 ART mm Hg 65 2*   HCO3 ART mmol/L 27 6   BASE EXC ART mmol/L 1 9   O2 CONTENT ART mL/dL 17 2   O2 HGB, ARTERIAL % 91 4*   ABG SOURCE  Radial, Right     Results from last 7 days   Lab Units 09/27/19  0443 09/27/19  0053 09/26/19 2114   TROPONIN I ng/mL <0 02 <0 02 <0 02     Results from last 7 days   Lab Units 09/26/19  2114   D DIMER QUANT ng/ml (FEU) 480     Results from last 7 days   Lab Units 09/26/19  2114   PROTIME seconds 11 1   INR  1 03   PTT seconds 26     Results from last 7 days   Lab Units 09/26/19  2114   NT-PRO BNP pg/mL 1,001*     Results from last 7 days   Lab Units 09/27/19  0443   HEP C AB  Non-reactive     Results from last 7 days   Lab Units 09/27/19  0601   CLARITY UA  Clear   COLOR UA  Yellow   SPEC GRAV UA  1 010   PH UA  5 0   GLUCOSE UA mg/dl Negative   KETONES UA mg/dl Negative   BLOOD UA  Negative   PROTEIN UA mg/dl Negative   NITRITE UA  Negative   BILIRUBIN UA  Negative   UROBILINOGEN UA E U /dl 0 2   LEUKOCYTES UA  Negative   WBC UA /hpf 0-1*   RBC UA /hpf None Seen   BACTERIA UA /hpf Occasional   EPITHELIAL CELLS WET PREP /hpf Occasional     Results from last 7 days   Lab Units 09/27/19  1324   AMPH/METH  Negative   BARBITURATE UR  Negative   BENZODIAZEPINE UR  Positive*   COCAINE UR  Negative   METHADONE URINE  Negative   OPIATE UR  Negative   PCP UR  Negative   THC UR  Negative     Vital Signs: /67 (BP Location: Right arm)   Pulse 88   Temp 97 8 °F (36 6 °C) (Oral)   Resp 20   Ht 5' 4" (1 626 m)   Wt 77 kg (169 lb 12 1 oz)   LMP 09/04/2009 (Within Days) Comment: per patient   SpO2 97%   BMI 29 14 kg/m²   Medications:   acetaminophen 975 mg Oral Q8H PRN   ARIPiprazole 5 mg Oral Daily   aspirin 81 mg Oral Daily   atorvastatin 80 mg Oral Daily With Dinner   DULoxetine 90 mg Oral HS   fluticasone-vilanterol 1 puff Inhalation Daily   gabapentin 300 mg Oral TID   gemfibrozil 600 mg Oral BID AC   heparin (porcine) 5,000 Units Subcutaneous Q8H [address]   ipratropium-albuterol 3 mL Nebulization BID   levETIRAcetam 500 mg Oral Q12H KIKI   magnesium hydroxide 30 mL Oral Daily PRN   metoprolol tartrate 50 mg Oral Q12H [address]   multi-electrolyte 50 mL/hr Intravenous Continuous   nicotine 1 patch Transdermal Daily   pantoprazole 40 mg Oral Early Morning     Discharge Plan: TBD  Network Utilization Review Department  Phone: [phone];  Fax [phone]  [email or web address]  ATTENTION: Please call with any questions or concerns to [phone] and carefully listen to the prompts so that you are directed to the right person  Send all requests for admission clinical reviews, approved or denied determinations and any other requests to fax [phone]   All voicemails are confidential

## 2019-09-28 NOTE — DISCHARGE SUMMARY
Discharge- Juliano Vogel 1960, 63 y o  female MRN: [id number]    Unit/Bed#: [address] [place] [address] Encounter: [id number]    Primary Care Provider: Roxane Sharif DO   Date and time admitted to hospital: 9/26/2019  8:39 PM        * Acute on chronic diastolic CHF (congestive heart failure) ([address] )  Assessment & Plan  Wt Readings from Last 3 Encounters:   09/28/19 77 kg (169 lb 12 1 oz)   08/21/19 77 1 kg (170 lb)   09/11/18 81 6 kg (180 lb)     Presented with to ER with shortness of breath  Patient reports a 5lb weight gain since day prior to admission  She had bilateral pedal edema associated with midsternal chest pressure  No radiation, no worsening or alleviating factors, minimal improvement with nitroglycerin  · Discontinue IV Lasix as patient was over diuresed and became transiently hypotensive  · Repeat echo - LVEF 55%, severe concentric hypertrophy, dynamic obstruction of the LVOT with peak velocity of 3 m/s with Valsalva  · Cardiology consultation appreciated, recommending to continue holding Lasix at this time, resume patient's Cozaar and add low-dose Aldactone  · Patient will follow up in 2-4 weeks post discharge with Cardiology  · Will need an outpatient cardiac MRI which [facility] be [facility] at [facility]  · Most current blood pressure 109/67  · Encouraged patient to monitor her blood pressure at home  · Patient to follow up with her primary care physician 1-2 weeks post discharge  · Also instructed to weigh herself on a daily basis and report any increase of 3 lb in 2 days to her doctor  Toxic metabolic encephalopathy  Assessment & Plan  Resolved  Most likely due due to medication side effects     · Consulted Psychiatry  · Indicated patient has been on her psych medications for some years now  · Patient follows with her family practice doctor    · Psychiatry recommended to patient to follow up with an outpatient psychiatrist for management of these medications, and for counseling  · Contact information for psychiatry provided on discharge instructions  RENÉ (acute kidney injury) ([address] )  Assessment & Plan  Resolved  Cr 0 96 -> 1 43->0 87, likely due to over-diuresis  · Will continue to hold patient's Lasix as per discussion with Cardiology  · Will reintroduce patient's home medication of Cozaar and add low-dose Aldactone as per Cardiology  · Patient will follow up with her primary care physician in 1-2 weeks  · Patient is to follow up with Cardiology in 2-4 weeks  Hypotension  Assessment & Plan  Resolved  Likely due to over diuresis in the setting of low EF  · Cardiology following, recommend to continue to hold Lasix, resume patient's Cozaar and and low-dose Aldactone  · Patient will follow up outpatient Cardiology in 2-4 weeks  · Patient will follow up with her primary care physician in 1-2 weeks  · Discharged to home today  Hypomagnesemia  Assessment & Plan  Resolved  Most current magnesium 1 9  Follow-up with primary care physician in 1-2 weeks  Hypokalemia  Assessment & Plan  · Resolved  · Patient received potassium repletion, most current potassium 4 2  · Patient's home medication of Lasix is being discontinued, as is her potassium  · Patient will be on low-dose Aldactone in addition to Cozaar as per Cardiology  · She will follow up with her primary care physician in 1-2 weeks  · She will follow up with Cardiology in 2-4 weeks  Hypercholesteremia  Assessment & Plan  Continue lopid and lipitor  Therapeutic lifestyle changes counseled  Heart healthy diet  Seizure disorder [facility])  Assessment & Plan  Continue Keppra  Outpatient Neurology follow-up    History of TIA (transient ischemic attack)  Assessment & Plan  Continue aspirin and statin    Anxiety  Assessment & Plan  On Cymbalta 90 mg HS and trazodone 100 mg HS  · Consulted Psychiatry, appreciate recommendations    · Psychiatry recommends that the patient follow up with outpatient psych, contact information provided to patient  · Will also follow with her primary care physician in 1-2 weeks  Fibromyalgia  Assessment & Plan  On Neurontin 900 mg TID, Cymbalta 90 mg HS  · Psychiatry consulted, appreciate recommendations  · Follow up with primary care physician in 1 - 2 weeks  Discharging Physician / Practitioner: NICKY Dubois  PCP: Kayla Mccormick DO  Admission Date:   Admission Orders (From admission, onward)     Ordered        09/27/19 1503  Inpatient Admission  Once         09/26/19 2217  Place in Observation  Once                   Discharge Date: 09/28/19    Resolved Problems  Date Reviewed: 9/28/2019    None          Consultations During Hospital Stay:  · Cardiology  · Urology  · Psychiatry  Procedures Performed:     · None  Significant Findings / Test Results:     · Echocardiogram showed EF 55%, no regional wall motion abnormalities  Severe concentric hypertrophy  Dynamic obstruction of the LVOT  · Chest x-ray showed no acute cardiopulmonary disease  Cardiomegaly  Incidental Findings:   · See above  Test Results Pending at Discharge (will require follow up): · None  Outpatient Tests Requested:  · Patient is to have an outpatient cardiac MRI  She will be following up with Cardiology in 2-4 weeks  Complications:  None  Reason for Admission:  Shortness of breath  Hospital Course:     Pete Coyle is a 63 y o  female patient past medical history of TIA, seizures, congestive heart failure, fibromyalgia, hypertension, hyperlipidemia and anxiety who originally presented to the hospital on 9/26/2019 due to shortness of breath  On morning of admission patient was ambulating into her dentist's office when she experienced shortness of breath  She also reported that she had a 5 lb weight gain in 1 day  She also noted that she had lower extremity swelling  Around 5:00 p m   On day of admission she developed midsternal chest discomfort with no radiation  She presented to the emergency department for further evaluation and treatment  In the ER patient was found to have BNP of 1000, leukocytosis with a white count of 13 5  D-dimer was negative  Chest x-ray shows no acute cardiopulmonary disease, cardiomegaly  Patient was given Lasix 40 mg IV in the emergency department, and she had already taken 40 mg orally prior to admission  Overnight patient was noted to have hypotension, most likely secondary to over diuresis  Patient also developed hypokalemia and hypo magnesium  These were repleted, and on day of discharge were with in normal limits  Patient also developed an acute kidney injury with creatinine peaking at 1 43, and normalizing to 0 87 after IV hydration  Urology was also consulted as patient had failed her voiding trial   Patient was also constipated  She was given medications to help with this, had a bowel movement and was able to successfully have Casas catheter removed and have a postvoid residual of 91 cc  Cardiology was consulted, echocardiogram was performed which showed EF 55%, no regional wall motion abnormalities with severe concentric hypertrophy  Dynamic obstruction of the LVOT was also documented  Patient will be following up with outpatient Cardiology in 2-4 weeks, and she will also have an outpatient cardiac MRI performed  Home medications were adjusted as per Cardiology recommendations  Lasix and potassium will be held, patient will resume her home medication of Cozaar and start low-dose Aldactone 25 mg once daily  Patient will follow up in 1-2 weeks with her primary care physician  She is to follow up with Cardiology in 2-4 weeks  Psychiatry was also consulted for the patient during her hospitalization secondary to a period of lethargy, thought due to her psychiatric and fibromyalgia medications  No medication changes were made, patient was recommended to follow up with Psychiatry outpatient    Contact information was provided to the patient for this  Please see above list of diagnoses and related plan for additional information  Condition at Discharge: good     Discharge Day Visit / Exam:     Subjective:  Patient sitting up in bed, had just had breakfast, is asking if she can go home today that she feels much better  She reports that the swelling in her lower extremities has resolved  She reports that yesterday she felt very tired but today she is refreshed and feels very good  She denies any shortness of breath, or chest pain  Vitals: Blood Pressure: 109/67 (09/28/19 0748)  Pulse: 88 (09/28/19 0748)  Temperature: 97 8 °F (36 6 °C) (09/28/19 0748)  Temp Source: Oral (09/28/19 0748)  Respirations: 20 (09/28/19 0748)  Height: 5' 4" (162 6 cm) (09/26/19 2309)  Weight - Scale: 77 kg (169 lb 12 1 oz) (09/28/19 0600)  SpO2: 97 % (09/28/19 0748)     Exam:   Physical Exam   Constitutional: She is oriented to person, place, and time  She appears well-nourished  No distress  HENT:   Head: Normocephalic  Eyes: Conjunctivae and EOM are normal    Neck: Normal range of motion  Cardiovascular: Normal rate and regular rhythm  Murmur heard  Pulmonary/Chest: Effort normal and breath sounds normal  No respiratory distress  Abdominal: Soft  Bowel sounds are normal  She exhibits no distension  There is no tenderness  Genitourinary:   Genitourinary Comments: Patient had Casas catheter present, removed  Patient was able to void spontaneously after having a bowel movement  Postvoid residual 91 cc  Discussed with Urology  Musculoskeletal: Normal range of motion  She exhibits no edema  Neurological: She is alert and oriented to person, place, and time  Skin: Skin is warm and dry  Capillary refill takes less than 2 seconds  Psychiatric: She has a normal mood and affect   Her behavior is normal          Discussion with Family:  I spoke with patient at the bedside, I have answered all questions to the best of my abilities  Discharge instructions/Information to patient and family:   See after visit summary for information provided to patient and family  Provisions for Follow-Up Care:  See after visit summary for information related to follow-up care and any pertinent home health orders  Disposition:     Home    For Discharges to [facility] SNF:   · Not Applicable to this Patient - Not Applicable to this Patient    Planned Readmission:  No      Discharge Statement:  I spent greater than 30 minutes discharging the patient  This time was spent on the day of discharge  I had direct contact with the patient on the day of discharge  Greater than 50% of the total time was spent examining patient, answering all patient questions, arranging and discussing plan of care with patient as well as directly providing post-discharge instructions  Additional time then spent on discharge activities  Discharge Medications:  See after visit summary for reconciled discharge medications provided to patient and family        ** Please Note: This note has been constructed using a voice recognition system **

## 2019-09-28 NOTE — ASSESSMENT & PLAN NOTE
Resolved  Most likely due due to medication side effects     · Consulted Psychiatry  · Indicated patient has been on her psych medications for some years now  · Patient follows with her family practice doctor  · Psychiatry recommended to patient to follow up with an outpatient psychiatrist for management of these medications, and for counseling  · Contact information for psychiatry provided on discharge instructions

## 2019-09-28 NOTE — ASSESSMENT & PLAN NOTE
· Resolved  · Patient received potassium repletion, most current potassium 4 2  · Patient's home medication of Lasix is being discontinued, as is her potassium  · Patient will be on low-dose Aldactone in addition to Cozaar as per Cardiology  · She will follow up with her primary care physician in 1-2 weeks  · She will follow up with Cardiology in 2-4 weeks

## 2019-09-28 NOTE — ASSESSMENT & PLAN NOTE
Wt Readings from Last 3 Encounters:   09/28/19 77 kg (169 lb 12 1 oz)   08/21/19 77 1 kg (170 lb)   09/11/18 81 6 kg (180 lb)     Presented with to ER with shortness of breath  Patient reports a 5lb weight gain since day prior to admission  She had bilateral pedal edema associated with midsternal chest pressure  No radiation, no worsening or alleviating factors, minimal improvement with nitroglycerin  · Discontinue IV Lasix as patient was over diuresed and became transiently hypotensive  · Repeat echo - LVEF 55%, severe concentric hypertrophy, dynamic obstruction of the LVOT with peak velocity of 3 m/s with Valsalva  · Cardiology consultation appreciated, recommending to continue holding Lasix at this time, resume patient's Cozaar and add low-dose Aldactone  · Patient will follow up in 2-4 weeks post discharge with Cardiology  · Will need an outpatient cardiac MRI which can be done at [facility]  · Most current blood pressure 109/67  · Encouraged patient to monitor her blood pressure at home  · Patient to follow up with her primary care physician 1-2 weeks post discharge  · Also instructed to weigh herself on a daily basis and report any increase of 3 lb in 2 days to her doctor

## 2019-09-28 NOTE — ASSESSMENT & PLAN NOTE
On Neurontin 900 mg TID, Cymbalta 90 mg HS  · Psychiatry consulted, appreciate recommendations  · Follow up with primary care physician in 1 - 2 weeks

## 2019-09-28 NOTE — DISCHARGE INSTRUCTIONS
Heart Failure   WHAT YOU NEED TO KNOW:   Heart failure (HF) is a condition that does not allow your heart to fill or pump properly  Not enough oxygen in your blood gets to your organs and tissues  HF can occur in the right side, the left side, or both lower chambers of your heart  HF is often caused by damage or injury to your heart  The damage may be caused by heart attack, other heart conditions, or high blood pressure  HF is a long-term condition that tends to get worse over time  It is important to manage your health to improve your quality of life  HF can be worsened by heavy alcohol use, smoking, diabetes that is not controlled, or obesity  DISCHARGE INSTRUCTIONS:   Call 911 if:   · You have any of the following signs of a heart attack:      ¨ Squeezing, pressure, or pain in your chest that lasts longer than 5 minutes or returns    ¨ Discomfort or pain in your back, neck, jaw, stomach, or arm     ¨ Trouble breathing    ¨ Nausea or vomiting    ¨ Lightheadedness or a sudden cold sweat, especially with chest pain or trouble breathing    Seek care immediately if:   · You gain 3 or more pounds (1 4 kg) in a day, or more than your healthcare provider says you should  · Your heartbeat is fast, slow, or uneven all the time  Contact your healthcare provider if:   · You have symptoms of worsening HF:      ¨ Shortness of breath at rest, at night, or that is getting worse in any way     ¨ Weight gain of 5 or more pounds (2 2 kg) in a week     ¨ More swelling in your legs or ankles     ¨ Abdominal pain or swelling     ¨ More coughing     ¨ Loss of appetite     ¨ Feeling tired all the time    · You feel hopeless or depressed, or you have lost interest in things you used to enjoy  · You often feel worried or afraid  · You have questions or concerns about your condition or care  Medicines: You may  need any of the following:  · Medicines  may be given to help regulate your heart rhythm   You may also need medicines to lower your blood pressure, and to get rid of extra fluids  · Take your medicine as directed  Contact your healthcare provider if you think your medicine is not helping or if you have side effects  Tell him or her if you are allergic to any medicine  Keep a list of the medicines, vitamins, and herbs you take  Include the amounts, and when and why you take them  Bring the list or the pill bottles to follow-up visits  Carry your medicine list with you in case of an emergency  Follow up with your healthcare provider or cardiologist as directed: You may need to return for other tests  You may need home health care  A healthcare provider will monitor your vital signs, weight, and make sure your medicines are working  Write down your questions so you remember to ask them during your visits  Go to cardiac rehab as directed:  Cardiac rehab is a program run by specialists who will help you safely strengthen your heart  The program includes exercise, relaxation, stress management, and heart-healthy nutrition  Healthcare providers will also make sure your medicines are helping to reduce your symptoms  Manage your HF:  · Do not smoke  Nicotine and other chemicals in cigarettes and cigars can cause lung damage and make HF difficult to manage  Ask your healthcare provider for information if you currently smoke and need help to quit  E-cigarettes or smokeless tobacco still contain nicotine  Talk to your healthcare provider before you use these products  · Do not drink alcohol or take illegal drugs  Alcohol and drugs can worsen your symptoms quickly  · Weigh yourself every morning  Use the same scale, in the same spot  Do this after you use the bathroom, but before you eat or drink anything  Wear the same type of clothing  Do not wear shoes  Record your weight each day so you will notice any sudden weight gain  Swelling and weight gain are signs of fluid retention   If you are overweight, ask how to lose weight safely  · Check your blood pressure and heart rate every day  Ask for more information about how to measure your blood pressure and heart rate correctly  Ask what these numbers should be for you  · Manage any chronic health conditions you have  These include high blood pressure, diabetes, obesity, high cholesterol, metabolic syndrome, and COPD  You will have fewer symptoms if you manage these health conditions  Follow your healthcare provider's recommendations and follow up with him or her regularly  · Eat heart-healthy foods and limit sodium (salt)  An easy way to do this is to eat more fresh fruits and vegetables and fewer canned and processed foods  Replace butter and margarine with heart-healthy oils such as olive oil and canola oil  Other heart-healthy foods include walnuts, whole-grain breads, low-fat dairy products, beans, and lean meats  Fatty fish such as salmon and tuna are also heart healthy  Ask how much salt you can eat each day  Do not use salt substitutes  · Drink liquids as directed  You may need to limit the amount of liquids you drink if you retain fluid  Ask how much liquid to drink each day and which liquids are best for you  · Stay active  If you are not active, your symptoms are likely to worsen quickly  Walking, bicycling, and other types of physical activity help maintain your strength and improve your mood  Physical activity also helps you manage your weight  Work with your healthcare provider to create an exercise plan that is right for you  · Get vaccines as directed  Get a flu shot every year  You may also need the pneumonia vaccine  The flu and pneumonia can be severe for a person who has HF  Vaccines protect you from these infections  Join a support group:  Living with HF can be difficult  It may be helpful to talk with others who have HF  You may learn how to better manage your condition or get emotional support   For more information:   · [address]  [place] , [place]   Phone: [address]  Web Address: https://[employer] com/  org   © 2017 [address] [employer] Information is for End User's use only and may not be sold, redistributed or otherwise used for commercial purposes  All illustrations and images included in CareNotes® are the copyrighted property of [employer] , [employer]  or [employer] Robles  The above information is an  only  It is not intended as medical advice for individual conditions or treatments  Talk to your doctor, nurse or pharmacist before following any medical regimen to see if it is safe and effective for you

## 2019-09-29 ENCOUNTER — HOSPITAL ENCOUNTER (INPATIENT)
Facility: HOSPITAL | Age: 59
LOS: 2 days | Discharge: HOME | DRG: 127 | End: 2019-10-01
Attending: EMERGENCY MEDICINE | Admitting: FAMILY MEDICINE
Payer: COMMERCIAL

## 2019-09-29 ENCOUNTER — APPOINTMENT (EMERGENCY)
Dept: RADIOLOGY | Facility: HOSPITAL | Age: 59
DRG: 127 | End: 2019-09-29
Payer: COMMERCIAL

## 2019-09-29 DIAGNOSIS — R06.02 SHORTNESS OF BREATH: ICD-10-CM

## 2019-09-29 DIAGNOSIS — N39.0 URINARY TRACT INFECTION: ICD-10-CM

## 2019-09-29 DIAGNOSIS — I50.9 CHF (CONGESTIVE HEART FAILURE) (HCC): Primary | ICD-10-CM

## 2019-09-29 DIAGNOSIS — N39.0 UTI (URINARY TRACT INFECTION): ICD-10-CM

## 2019-09-29 DIAGNOSIS — E78.00 HYPERCHOLESTEREMIA: ICD-10-CM

## 2019-09-29 DIAGNOSIS — I50.33 ACUTE ON CHRONIC DIASTOLIC CHF (CONGESTIVE HEART FAILURE) (HCC): ICD-10-CM

## 2019-09-29 PROBLEM — R82.90 ABNORMAL URINALYSIS: Status: ACTIVE | Noted: 2019-09-29

## 2019-09-29 LAB
ALBUMIN SERPL BCP-MCNC: 2.8 G/DL (ref 3.5–5)
ALP SERPL-CCNC: 79 U/L (ref 46–116)
ALT SERPL W P-5'-P-CCNC: 21 U/L (ref 12–78)
AMPHETAMINES SERPL QL SCN: NEGATIVE
ANION GAP SERPL CALCULATED.3IONS-SCNC: 8 MMOL/L (ref 4–13)
APTT PPP: 26 SECONDS (ref 25–32)
AST SERPL W P-5'-P-CCNC: 15 U/L (ref 5–45)
BACTERIA UR QL AUTO: ABNORMAL /HPF
BARBITURATES UR QL: POSITIVE
BASOPHILS # BLD AUTO: 0.04 THOUSANDS/ΜL (ref 0–0.1)
BASOPHILS NFR BLD AUTO: 1 % (ref 0–1)
BENZODIAZ UR QL: POSITIVE
BILIRUB SERPL-MCNC: 0.5 MG/DL (ref 0.2–1)
BILIRUB UR QL STRIP: NEGATIVE
BUN SERPL-MCNC: 8 MG/DL (ref 5–25)
CALCIUM SERPL-MCNC: 7.9 MG/DL (ref 8.3–10.1)
CHLORIDE SERPL-SCNC: 108 MMOL/L (ref 100–108)
CLARITY UR: ABNORMAL
CO2 SERPL-SCNC: 24 MMOL/L (ref 21–32)
COCAINE UR QL: NEGATIVE
COLOR UR: YELLOW
CREAT SERPL-MCNC: 0.76 MG/DL (ref 0.6–1.3)
EOSINOPHIL # BLD AUTO: 0.23 THOUSAND/ΜL (ref 0–0.61)
EOSINOPHIL NFR BLD AUTO: 3 % (ref 0–6)
ERYTHROCYTE [DISTWIDTH] IN BLOOD BY AUTOMATED COUNT: 14.9 % (ref 11.6–15.1)
ETHANOL SERPL-MCNC: <3 MG/DL (ref 0–3)
GFR SERPL CREATININE-BSD FRML MDRD: 87 ML/MIN/1.73SQ M
GLUCOSE SERPL-MCNC: 85 MG/DL (ref 65–140)
GLUCOSE UR STRIP-MCNC: NEGATIVE MG/DL
HCT VFR BLD AUTO: 39.2 % (ref 34.8–46.1)
HGB BLD-MCNC: 12.8 G/DL (ref 11.5–15.4)
HGB UR QL STRIP.AUTO: NEGATIVE
HYALINE CASTS #/AREA URNS LPF: ABNORMAL /LPF
INR PPP: 1 (ref 0.91–1.09)
KETONES UR STRIP-MCNC: NEGATIVE MG/DL
LEUKOCYTE ESTERASE UR QL STRIP: ABNORMAL
LYMPHOCYTES # BLD AUTO: 2.69 THOUSANDS/ΜL (ref 0.6–4.47)
LYMPHOCYTES NFR BLD AUTO: 36 % (ref 14–44)
MAGNESIUM SERPL-MCNC: 1.8 MG/DL (ref 1.6–2.6)
MCH RBC QN AUTO: 31.3 PG (ref 26.8–34.3)
MCHC RBC AUTO-ENTMCNC: 32.7 G/DL (ref 31.4–37.4)
MCV RBC AUTO: 96 FL (ref 82–98)
METHADONE UR QL: NEGATIVE
MONOCYTES # BLD AUTO: 0.58 THOUSAND/ΜL (ref 0.17–1.22)
MONOCYTES NFR BLD AUTO: 8 % (ref 4–12)
NEUTROPHILS # BLD AUTO: 3.94 THOUSANDS/ΜL (ref 1.85–7.62)
NEUTS SEG NFR BLD AUTO: 52 % (ref 43–75)
NITRITE UR QL STRIP: POSITIVE
NON-SQ EPI CELLS URNS QL MICRO: ABNORMAL /HPF
NT-PROBNP SERPL-MCNC: 2944 PG/ML
OPIATES UR QL SCN: NEGATIVE
PCP UR QL: NEGATIVE
PH UR STRIP.AUTO: 6 [PH]
PHOSPHATE SERPL-MCNC: 2.9 MG/DL (ref 2.7–4.5)
PLATELET # BLD AUTO: 235 THOUSANDS/UL (ref 149–390)
PMV BLD AUTO: 10.7 FL (ref 8.9–12.7)
POTASSIUM SERPL-SCNC: 4.9 MMOL/L (ref 3.5–5.3)
PROT SERPL-MCNC: 5.9 G/DL (ref 6.4–8.2)
PROT UR STRIP-MCNC: NEGATIVE MG/DL
PROTHROMBIN TIME: 10.7 SECONDS (ref 9.8–12)
RBC # BLD AUTO: 4.09 MILLION/UL (ref 3.81–5.12)
RBC #/AREA URNS AUTO: ABNORMAL /HPF
SODIUM SERPL-SCNC: 140 MMOL/L (ref 136–145)
SP GR UR STRIP.AUTO: 1.01 (ref 1–1.03)
THC UR QL: NEGATIVE
TROPONIN I SERPL-MCNC: <0.02 NG/ML
TROPONIN I SERPL-MCNC: <0.02 NG/ML
UROBILINOGEN UR QL STRIP.AUTO: 0.2 E.U./DL
WBC # BLD AUTO: 7.48 THOUSAND/UL (ref 4.31–10.16)
WBC #/AREA URNS AUTO: ABNORMAL /HPF

## 2019-09-29 PROCEDURE — 87086 URINE CULTURE/COLONY COUNT: CPT | Performed by: EMERGENCY MEDICINE

## 2019-09-29 PROCEDURE — 99285 EMERGENCY DEPT VISIT HI MDM: CPT

## 2019-09-29 PROCEDURE — 80307 DRUG TEST PRSMV CHEM ANLYZR: CPT | Performed by: FAMILY MEDICINE

## 2019-09-29 PROCEDURE — 84100 ASSAY OF PHOSPHORUS: CPT | Performed by: EMERGENCY MEDICINE

## 2019-09-29 PROCEDURE — 93005 ELECTROCARDIOGRAM TRACING: CPT

## 2019-09-29 PROCEDURE — 71045 X-RAY EXAM CHEST 1 VIEW: CPT

## 2019-09-29 PROCEDURE — 87077 CULTURE AEROBIC IDENTIFY: CPT | Performed by: EMERGENCY MEDICINE

## 2019-09-29 PROCEDURE — 36415 COLL VENOUS BLD VENIPUNCTURE: CPT | Performed by: EMERGENCY MEDICINE

## 2019-09-29 PROCEDURE — 84484 ASSAY OF TROPONIN QUANT: CPT | Performed by: NURSE PRACTITIONER

## 2019-09-29 PROCEDURE — 84484 ASSAY OF TROPONIN QUANT: CPT | Performed by: EMERGENCY MEDICINE

## 2019-09-29 PROCEDURE — 81001 URINALYSIS AUTO W/SCOPE: CPT | Performed by: EMERGENCY MEDICINE

## 2019-09-29 PROCEDURE — 85730 THROMBOPLASTIN TIME PARTIAL: CPT | Performed by: EMERGENCY MEDICINE

## 2019-09-29 PROCEDURE — 83880 ASSAY OF NATRIURETIC PEPTIDE: CPT | Performed by: EMERGENCY MEDICINE

## 2019-09-29 PROCEDURE — 87186 SC STD MICRODIL/AGAR DIL: CPT | Performed by: EMERGENCY MEDICINE

## 2019-09-29 PROCEDURE — 87081 CULTURE SCREEN ONLY: CPT | Performed by: NURSE PRACTITIONER

## 2019-09-29 PROCEDURE — 80320 DRUG SCREEN QUANTALCOHOLS: CPT | Performed by: NURSE PRACTITIONER

## 2019-09-29 PROCEDURE — 85025 COMPLETE CBC W/AUTO DIFF WBC: CPT | Performed by: EMERGENCY MEDICINE

## 2019-09-29 PROCEDURE — 87181 SC STD AGAR DILUTION PER AGT: CPT | Performed by: EMERGENCY MEDICINE

## 2019-09-29 PROCEDURE — 83735 ASSAY OF MAGNESIUM: CPT | Performed by: EMERGENCY MEDICINE

## 2019-09-29 PROCEDURE — 80053 COMPREHEN METABOLIC PANEL: CPT | Performed by: EMERGENCY MEDICINE

## 2019-09-29 PROCEDURE — 99223 1ST HOSP IP/OBS HIGH 75: CPT | Performed by: FAMILY MEDICINE

## 2019-09-29 PROCEDURE — 85610 PROTHROMBIN TIME: CPT | Performed by: EMERGENCY MEDICINE

## 2019-09-29 RX ORDER — LEVETIRACETAM 500 MG/1
500 TABLET ORAL EVERY 12 HOURS SCHEDULED
Status: DISCONTINUED | OUTPATIENT
Start: 2019-09-29 | End: 2019-10-01 | Stop reason: HOSPADM

## 2019-09-29 RX ORDER — NICOTINE 21 MG/24HR
1 PATCH, TRANSDERMAL 24 HOURS TRANSDERMAL DAILY
Status: DISCONTINUED | OUTPATIENT
Start: 2019-09-30 | End: 2019-09-29 | Stop reason: SDUPTHER

## 2019-09-29 RX ORDER — POLYETHYLENE GLYCOL 3350 17 G/17G
17 POWDER, FOR SOLUTION ORAL DAILY
Status: DISCONTINUED | OUTPATIENT
Start: 2019-09-30 | End: 2019-10-01 | Stop reason: HOSPADM

## 2019-09-29 RX ORDER — ASPIRIN 81 MG/1
81 TABLET ORAL DAILY
Status: DISCONTINUED | OUTPATIENT
Start: 2019-09-30 | End: 2019-10-01 | Stop reason: HOSPADM

## 2019-09-29 RX ORDER — ATORVASTATIN CALCIUM 80 MG/1
80 TABLET, FILM COATED ORAL DAILY
Status: DISCONTINUED | OUTPATIENT
Start: 2019-09-30 | End: 2019-10-01 | Stop reason: HOSPADM

## 2019-09-29 RX ORDER — PANTOPRAZOLE SODIUM 40 MG/1
40 TABLET, DELAYED RELEASE ORAL DAILY
Status: DISCONTINUED | OUTPATIENT
Start: 2019-09-30 | End: 2019-10-01 | Stop reason: HOSPADM

## 2019-09-29 RX ORDER — CEFTRIAXONE 1 G/50ML
1000 INJECTION, SOLUTION INTRAVENOUS ONCE
Status: COMPLETED | OUTPATIENT
Start: 2019-09-29 | End: 2019-09-29

## 2019-09-29 RX ORDER — NICOTINE 21 MG/24HR
1 PATCH, TRANSDERMAL 24 HOURS TRANSDERMAL DAILY
Status: DISCONTINUED | OUTPATIENT
Start: 2019-09-30 | End: 2019-10-01 | Stop reason: HOSPADM

## 2019-09-29 RX ORDER — ALBUMIN, HUMAN INJ 5% 5 %
12.5 SOLUTION INTRAVENOUS ONCE
Status: COMPLETED | OUTPATIENT
Start: 2019-09-29 | End: 2019-09-30

## 2019-09-29 RX ORDER — TRAZODONE HYDROCHLORIDE 50 MG/1
100 TABLET ORAL
Status: DISCONTINUED | OUTPATIENT
Start: 2019-09-29 | End: 2019-10-01 | Stop reason: HOSPADM

## 2019-09-29 RX ORDER — FLUTICASONE FUROATE AND VILANTEROL 100; 25 UG/1; UG/1
1 POWDER RESPIRATORY (INHALATION)
Status: DISCONTINUED | OUTPATIENT
Start: 2019-09-30 | End: 2019-10-01 | Stop reason: HOSPADM

## 2019-09-29 RX ORDER — LOSARTAN POTASSIUM 25 MG/1
25 TABLET ORAL DAILY
Status: DISCONTINUED | OUTPATIENT
Start: 2019-09-30 | End: 2019-09-30

## 2019-09-29 RX ORDER — GEMFIBROZIL 600 MG/1
600 TABLET, FILM COATED ORAL
Status: DISCONTINUED | OUTPATIENT
Start: 2019-09-29 | End: 2019-09-30

## 2019-09-29 RX ORDER — SPIRONOLACTONE 25 MG/1
25 TABLET ORAL DAILY
Status: DISCONTINUED | OUTPATIENT
Start: 2019-09-30 | End: 2019-10-01

## 2019-09-29 RX ORDER — FUROSEMIDE 10 MG/ML
40 INJECTION INTRAMUSCULAR; INTRAVENOUS ONCE
Status: COMPLETED | OUTPATIENT
Start: 2019-09-29 | End: 2019-09-29

## 2019-09-29 RX ORDER — ARIPIPRAZOLE 5 MG/1
5 TABLET ORAL DAILY
Status: DISCONTINUED | OUTPATIENT
Start: 2019-09-30 | End: 2019-09-29

## 2019-09-29 RX ORDER — ONDANSETRON 2 MG/ML
4 INJECTION INTRAMUSCULAR; INTRAVENOUS EVERY 6 HOURS PRN
Status: DISCONTINUED | OUTPATIENT
Start: 2019-09-29 | End: 2019-10-01 | Stop reason: HOSPADM

## 2019-09-29 RX ORDER — METOPROLOL TARTRATE 100 MG/1
100 TABLET ORAL EVERY 12 HOURS SCHEDULED
Status: DISCONTINUED | OUTPATIENT
Start: 2019-09-29 | End: 2019-09-30

## 2019-09-29 RX ORDER — GABAPENTIN 300 MG/1
300 CAPSULE ORAL 3 TIMES DAILY
Status: DISCONTINUED | OUTPATIENT
Start: 2019-09-29 | End: 2019-10-01 | Stop reason: HOSPADM

## 2019-09-29 RX ORDER — ACETAMINOPHEN 325 MG/1
975 TABLET ORAL EVERY 8 HOURS PRN
Status: DISCONTINUED | OUTPATIENT
Start: 2019-09-29 | End: 2019-10-01 | Stop reason: HOSPADM

## 2019-09-29 RX ORDER — CEFTRIAXONE 1 G/50ML
1000 INJECTION, SOLUTION INTRAVENOUS EVERY 24 HOURS
Status: DISCONTINUED | OUTPATIENT
Start: 2019-09-30 | End: 2019-10-01 | Stop reason: HOSPADM

## 2019-09-29 RX ORDER — FUROSEMIDE 10 MG/ML
40 INJECTION INTRAMUSCULAR; INTRAVENOUS DAILY
Status: DISCONTINUED | OUTPATIENT
Start: 2019-09-30 | End: 2019-09-30

## 2019-09-29 RX ORDER — TRAMADOL HYDROCHLORIDE 50 MG/1
50 TABLET ORAL ONCE AS NEEDED
Status: COMPLETED | OUTPATIENT
Start: 2019-09-29 | End: 2019-09-30

## 2019-09-29 RX ADMIN — LEVETIRACETAM 500 MG: 500 TABLET, FILM COATED ORAL at 21:38

## 2019-09-29 RX ADMIN — TRAZODONE HYDROCHLORIDE 100 MG: 50 TABLET ORAL at 22:32

## 2019-09-29 RX ADMIN — GEMFIBROZIL 600 MG: 600 TABLET ORAL at 18:38

## 2019-09-29 RX ADMIN — DULOXETINE HYDROCHLORIDE 90 MG: 60 CAPSULE, DELAYED RELEASE ORAL at 22:32

## 2019-09-29 RX ADMIN — FUROSEMIDE 40 MG: 10 INJECTION, SOLUTION INTRAMUSCULAR; INTRAVENOUS at 16:28

## 2019-09-29 RX ADMIN — ALBUMIN (HUMAN) 12.5 G: 12.5 SOLUTION INTRAVENOUS at 23:33

## 2019-09-29 RX ADMIN — CEFTRIAXONE 1000 MG: 1 INJECTION, SOLUTION INTRAVENOUS at 16:28

## 2019-09-29 RX ADMIN — GABAPENTIN 300 MG: 300 CAPSULE ORAL at 21:38

## 2019-09-29 NOTE — NURSING NOTE
Awaiting pharmacy to pickup pt home med, Ativan   Med given to night nurse Bruno Núñez to give to pharmacy upon arrival

## 2019-09-29 NOTE — PLAN OF CARE
Problem: Potential for Falls  Goal: Patient will remain free of falls  Description  INTERVENTIONS:  - Assess patient frequently for physical needs  -  Identify cognitive and physical deficits and behaviors that affect risk of falls    -  [place] fall precautions as indicated by assessment   - Educate patient/family on patient safety including physical limitations  - Instruct patient to call for assistance with activity based on assessment  - Modify environment to reduce risk of injury  - Consider OT/PT consult to assist with strengthening/mobility  Outcome: Progressing     Problem: CARDIOVASCULAR - ADULT  Goal: Maintains optimal cardiac output and hemodynamic stability  Description  INTERVENTIONS:  - Monitor I/O, vital signs and rhythm  - Monitor for S/S and trends of decreased cardiac output  - Administer and titrate ordered vasoactive medications to optimize hemodynamic stability  - Assess quality of pulses, skin color and temperature  - Assess for signs of decreased coronary artery perfusion  - Instruct patient to report change in severity of symptoms  Outcome: Progressing  Goal: Absence of cardiac dysrhythmias or at baseline rhythm  Description  INTERVENTIONS:  - Continuous cardiac monitoring, vital signs, obtain 12 lead EKG if ordered  - Administer antiarrhythmic and heart rate control medications as ordered  - Monitor electrolytes and administer replacement therapy as ordered  Outcome: Progressing     Problem: GENITOURINARY - ADULT  Goal: Maintains or returns to baseline urinary function  Description  INTERVENTIONS:  - Assess urinary function  - Encourage oral fluids to ensure adequate hydration if ordered  - Administer IV fluids as ordered to ensure adequate hydration  - Administer ordered medications as needed  - Offer frequent toileting  - Follow urinary retention protocol if ordered  Outcome: Progressing  Goal: Absence of urinary retention  Description  INTERVENTIONS:  - Assess patients ability to void and empty bladder  - Monitor I/O  - Bladder scan as needed  - Discuss with physician/AP medications to alleviate retention as needed  - Discuss catheterization for long term situations as appropriate  Outcome: Progressing  Goal: Urinary catheter remains patent  Description  INTERVENTIONS:  - Assess patency of urinary catheter  - If patient has a chronic lemon, consider changing catheter if non-functioning  - Follow guidelines for intermittent irrigation of non-functioning urinary catheter  Outcome: Progressing     Problem: PAIN - ADULT  Goal: Verbalizes/displays adequate comfort level or baseline comfort level  Description  Interventions:  - Encourage patient to monitor pain and request assistance  - Assess pain using appropriate pain scale  - Administer analgesics based on type and severity of pain and evaluate response  - Implement non-pharmacological measures as appropriate and evaluate response  - Consider cultural and social influences on pain and pain management  - Notify physician/advanced practitioner if interventions unsuccessful or patient reports new pain  Outcome: Progressing     Problem: INFECTION - ADULT  Goal: Absence or prevention of progression during hospitalization  Description  INTERVENTIONS:  - Assess and monitor for signs and symptoms of infection  - Monitor lab/diagnostic results  - Monitor all insertion sites, i e  indwelling lines, tubes, and drains  - Monitor endotracheal if appropriate and nasal secretions for changes in amount and color  - Gray appropriate cooling/warming therapies per order  - Administer medications as ordered  - Instruct and encourage patient and family to use good hand hygiene technique  - Identify and instruct in appropriate isolation precautions for identified infection/condition  Outcome: Progressing  Goal: Absence of fever/infection during neutropenic period  Description  INTERVENTIONS:  - Monitor WBC    Outcome: Progressing     Problem: SAFETY ADULT  Goal: Maintain or return to baseline ADL function  Description  INTERVENTIONS:  -  Assess patient's ability to carry out ADLs; assess patient's baseline for ADL function and identify physical deficits which impact ability to perform ADLs (bathing, care of mouth/teeth, toileting, grooming, dressing, etc )  - Assess/evaluate cause of self-care deficits   - Assess range of motion  - Assess patient's mobility; develop plan if impaired  - Assess patient's need for assistive devices and provide as appropriate  - Encourage maximum independence but intervene and supervise when necessary  - Involve family in performance of ADLs  - Assess for home care needs following discharge   - Consider OT consult to assist with ADL evaluation and planning for discharge  - Provide patient education as appropriate  Outcome: Progressing  Goal: Maintain or return mobility status to optimal level  Description  INTERVENTIONS:  - Assess patient's baseline mobility status (ambulation, transfers, stairs, etc )    - Identify cognitive and physical deficits and behaviors that affect mobility  - Identify mobility aids required to assist with transfers and/or ambulation (gait belt, sit-to-stand, lift, walker, cane, etc )  - [place] fall precautions as indicated by assessment  - Record patient progress and toleration of activity level on Mobility SBAR; progress patient to next Phase/Stage  - Instruct patient to call for assistance with activity based on assessment  - Consider rehabilitation consult to assist with strengthening/weightbearing, etc   Outcome: Progressing     Problem: DISCHARGE PLANNING  Goal: Discharge to home or other facility with appropriate resources  Description  INTERVENTIONS:  - Identify barriers to discharge w/patient and caregiver  - Arrange for needed discharge resources and transportation as appropriate  - Identify discharge learning needs (meds, wound care, etc )  - Arrange for interpretive services to assist at discharge as needed  - Refer to Case Management Department for coordinating discharge planning if the patient needs post-hospital services based on physician/advanced practitioner order or complex needs related to functional status, cognitive ability, or social support system  Outcome: Progressing     Problem: Knowledge Deficit  Goal: Patient/family/caregiver demonstrates understanding of disease process, treatment plan, medications, and discharge instructions  Description  Complete learning assessment and assess knowledge base    Interventions:  - Provide teaching at level of understanding  - Provide teaching via preferred learning methods  Outcome: Progressing

## 2019-09-29 NOTE — ASSESSMENT & PLAN NOTE
Urinalysis showed small leukocytes and positive nitrates  Patient received 1 dose of ceftriaxone in emergency department  Patient denies any symptoms of pain, burning or frequency with urination  WBC 7 48  Afebrile  Monitor

## 2019-09-29 NOTE — PROGRESS NOTES
09/29/19 [facility] [employer]    Patient Information   Mental Status Alert   Primary Caregiver Self   Activities of Daily Living Prior to Admission   Functional Status Independent   Ambulation Independent   Means of Transportation   Means of Transport to Appts: Family     Per chart review and rounds with nursing and provider pt is reported to be independent with no apparent discharge needs at this time

## 2019-09-29 NOTE — ASSESSMENT & PLAN NOTE
Patient smokes half a pack of cigarettes per day  Nicotine patch 14 mg ordered   Smoking cessation encouraged

## 2019-09-29 NOTE — ED NOTES
Dr Mary Oh aware of pt c/o chest pain  Will send labs as ordered and continue to monitor closely         Franklin Dillon RN  09/29/19 [id number]

## 2019-09-29 NOTE — ASSESSMENT & PLAN NOTE
Blood pressure initially 98/59 on presentation to ED  Had received 500 cc bolus in squad en route to hospital   Blood pressure increased to 117/56  Vital signs as per unit routine  Monitor closely

## 2019-09-29 NOTE — ASSESSMENT & PLAN NOTE
Patient on Neurontin 3 times a day  Reports taking 900 mg 3 times a day, discussed with attending and will trial 300 mg 3 times a day

## 2019-09-29 NOTE — H&P
H&P- Elva Rosario 1960, 63 y o  female MRN: [id number]    Unit/Bed#: [address] [place] [address] Encounter: [id number]    Primary Care Provider: Trinda Meigs, DO   Date and time admitted to hospital: 9/29/2019  2:34 PM        * Acute on chronic diastolic CHF (congestive heart failure) (HCC)  Assessment & Plan  Wt Readings from Last 3 Encounters:   09/29/19 86 6 kg (190 lb 14 7 oz)   09/28/19 77 kg (169 lb 12 1 oz)   08/21/19 77 1 kg (170 lb)     Patient presented to emergency department with shortness of breath, increased swelling in ankles  BNP 2944  Chest xray results pending  Rales present bilaterally 1/4 way up    +2 edema noted in bilateral lower extremities  Does have dyspnea, RR 23, O2 sat initially 89% on room air on presentation to ER  Improved to 96 % with O2 2 liters  Lasix 40 mg IV given in ED  Patient reported that she did not have her aldactone to take this morning as her boyfriend had picked up her prescription, and did not bring it to her  I&O's ordered  Daily weights ordered  Cardiac diet  Cardiology consulted  Vital signs as per unit routine  Hypotension  Assessment & Plan  Blood pressure initially 98/59 on presentation to ED  Had received 500 cc bolus in squad en route to hospital   Blood pressure increased to 117/56  Vital signs as per unit routine  Monitor closely  Seizure disorder (HCC)  Assessment & Plan  Continue Keppra 500 mg po q 2 hours  Monitor  Hypercholesteremia  Assessment & Plan  Continue patient's home medications of Lopid and Lipitor  Heart healthy diet  History of TIA (transient ischemic attack)  Assessment & Plan  Continue patient's home medication of ASA  No neuro deficits  Monitor  Anxiety  Assessment & Plan  Continue Cymbalta  On previous admission, patient was advised to follow up with outpatient psychiatry  Will encourage follow up         Fibromyalgia  Assessment & Plan  Patient on Neurontin 3 times a day   Reports taking 900 mg 3 times a day, discussed with attending and will trial 300 mg 3 times a day  Dependence on nicotine from cigarettes  Assessment & Plan  Patient smokes half a pack of cigarettes per day  Nicotine patch 14 mg ordered   Smoking cessation encouraged  Abnormal urinalysis  Assessment & Plan  Urinalysis showed small leukocytes and positive nitrates  Patient received 1 dose of ceftriaxone in emergency department  Patient denies any symptoms of pain, burning or frequency with urination  WBC 7 48  Afebrile  Monitor  VTE Prophylaxis: Enoxaparin (Lovenox)  / sequential compression device   Code Status: Full code  POLST: POLST is not applicable to this patient  Discussion with family:  Spoke with patient at the bedside, I have answered all questions to the best of my abilities  Anticipated Length of Stay:  Patient will be admitted on an Inpatient basis with an anticipated length of stay of  greater than to 2 midnights  Justification for Hospital Stay:  Diuresis with IV Lasix, careful monitoring of blood pressure and kidney functions  Total Time for Visit, including Counseling / Coordination of Care: 1 hour  Greater than 50% of this total time spent on direct patient counseling and coordination of care  Chief Complaint:   Shortness of breath  History of Present Illness:    Snow Wiley is a 63 y o  female past medical history of TIA, seizures, congestive heart failure, fibromyalgia, hypertension, hyperlipidemia and anxiety who presents with shortness of breath  Patient was discharged from the hospital on day previous to admission with diagnoses of CHF, metabolic encephalopathy, acute kidney injury and urinary retention requiring insertion of Casas catheter secondary to constipation  During that hospitalization cardiology was consulted, and recommendations were made to change patient's diuretic from Lasix to Aldactone    Patient indicated that she did not  her prescription of Aldactone and did not have any diuretics on day of admission  She reported that she left the hospital and went to her stepped mother's house in [place], had hamburgers for dinner and in the morning time she woke up with swollen ankles and was having difficulty walking around  Her stepmother looked at her ankles and informed her that they were swollen and because of her shortness of breath they activated EMS and patient presented to emergency room for further evaluation and treatment  In the ED workup was significant for BNP of 2944 and rales were noted bilaterally 1/4 the way up  Patient was also noted to have hypotension with blood pressure 98/59  Urinalysis was sent which showed small leukocytes and nitrates  Patient received Lasix 40 mg IV x1 in the ER and ceftriaxone IV x1 in emergency department  When asked if patient had symptoms of pain, burning or frequency when urinating she denied  Initial troponin negative  Will repeat x2 for serial troponins as patient did complain of chest discomfort  EKG showed sinus rhythm no acute ST elevations noted, biphasic T-wave noted in V3 and T-wave inversions V4 through V6  Cardiology is consulted  Telemetry is ordered  Patient is admitted for further evaluation and treatment  Review of Systems:    Review of Systems   Constitutional: Positive for activity change  Negative for fever  HENT: Negative  Eyes: Negative  Respiratory: Positive for shortness of breath  Negative for cough  Cardiovascular: Positive for chest pain and leg swelling  Negative for palpitations  Gastrointestinal: Negative for constipation, diarrhea, nausea and vomiting  Endocrine: Negative  Genitourinary: Negative for difficulty urinating, frequency and urgency  Musculoskeletal:        Patient reports she had some difficulty walking around this morning; ankles swollen  Skin: Negative  Allergic/Immunologic: Negative      Neurological: Negative for dizziness, syncope and headaches  Hematological: Negative  Psychiatric/Behavioral: The patient is nervous/anxious  Past Medical and Surgical History:     Past Medical History:   Diagnosis Date    Alopecia     Back injury     Bell's palsy     [facility]     Chronic pain     back    Closed left arm fracture     Fibromyalgia     Fibromyalgia, primary     GERD (gastroesophageal reflux disease)     Hyperlipidemia     Hypertension     Lyme disease     [facility]         Myocardial infarction ([facility]     Cardiac catheterization 2 years ago showed 30% blockage in 1 of the blood vessels    Stroke ([facility] )     TIA (transient ischemic attack)     2017       Past Surgical History:   Procedure Laterality Date     SECTION      CHOLECYSTECTOMY      CORONARY ANGIOPLASTY          ORIF TIBIA FRACTURE Left 2018    Procedure: OPEN REDUCTION W/ INTERNAL FIXATION (ORIF) TIBIA FRACTURE;  Surgeon: Yue Damon MD;  Location: [facility];  Service: Orthopedics       Meds/Allergies:    Prior to Admission medications    Medication Sig Start Date End Date Taking? Authorizing Provider   acetaminophen (TYLENOL) 325 mg tablet Take 3 tablets (975 mg total) by mouth every 8 (eight) hours as needed for mild pain or moderate pain 19   NICKY Huston   ARIPiprazole (ABILIFY) 5 mg tablet Take 5 mg by mouth daily    Historical Provider, MD   aspirin (ECOTRIN LOW STRENGTH) 81 mg EC tablet Take 81 mg by mouth daily    Historical Provider, MD   atorvastatin (LIPITOR) 80 mg tablet Take 80 mg by mouth daily    Historical Provider, MD   DULoxetine HCl (CYMBALTA PO) Take 90 mg by mouth daily at bedtime      Historical Provider, MD   fluticasone-salmeterol (AIRDUO RESPICLICK) 144-85 mcg/act dry powder inhaler Inhale 1 puff 2 (two) times a day Rinse mouth after use      Historical Provider, MD   gabapentin (NEURONTIN) 600 MG tablet Take 900 mg by mouth 3 (three) times a day     Historical Provider, MD   gemfibrozil (LOPID) 600 mg tablet Take 600 mg by mouth 2 (two) times a day before meals    Historical Provider, MD   levETIRAcetam (KEPPRA) 500 mg tablet Take 1 tablet (500 mg total) by mouth every 12 (twelve) hours 8/21/19   Christine Pina MD   losartan (COZAAR) 25 mg tablet Take 25 mg by mouth daily     Historical Provider, MD   magnesium hydroxide (MILK OF MAGNESIA) 400 mg/5 mL oral suspension Take 30 mL by mouth daily as needed for constipation 9/28/19   NICKY Noel   metoprolol tartrate (LOPRESSOR) 25 mg tablet Take 100 mg by mouth every 12 (twelve) hours     Historical Provider, MD   nicotine (NICODERM CQ) 14 mg/24hr TD 24 hr patch Place 1 patch on the skin daily 9/29/19   NICKY Noel   pantoprazole (PROTONIX) 40 mg tablet Take 40 mg by mouth daily    Historical Provider, MD   polyethylene glycol (MIRALAX) 17 g packet Take 17 g by mouth daily 9/29/19   NICKY Noel   spironolactone (ALDACTONE) 25 mg tablet Take 1 tablet (25 mg total) by mouth daily 9/28/19 10/28/19  NICKY Noel   Tiotropium Bromide Monohydrate (Lenny Baas [place]) Inhale    Historical Provider, MD   traZODone (DESYREL) 100 mg tablet Take 100 mg by mouth daily at bedtime    Historical Provider, MD     I have reviewed home medications with patient personally  Allergies: Allergies   Allergen Reactions    Hydromorphone Shortness Of Breath    Morphine Shortness Of Breath       Social History:     Marital Status: Single   Occupation: disabled  Patient Pre-hospital Living Situation: Home with boyfriend, sometimes stays with step-mom in [place]  Patient Pre-hospital Level of Mobility: independent  Patient Pre-hospital Diet Restrictions: Heart healthy, low sodium     Substance Use History:   Social History     Substance and Sexual Activity   Alcohol Use Not Currently    Comment: as per patient; former social drinker      Social History     Tobacco Use   Smoking Status Current Every Day Smoker    Packs/day: 0 50    Years: 40 00    Pack years: 20 00   Smokeless Tobacco Never Used   Tobacco Comment    1/2 per day     Social History     Substance and Sexual Activity   Drug Use No       Family History:    Family History   Problem Relation Age of Onset    Heart attack Mother     Heart attack Father        Physical Exam:     Vitals:   Blood Pressure: 140/69 (09/29/19 1711)  Pulse: 67 (09/29/19 1711)  Temperature: 97 7 °F (36 5 °C) (09/29/19 1711)  Temp Source: Tympanic (09/29/19 1711)  Respirations: 20 (09/29/19 1711)  Weight - Scale: 86 6 kg (190 lb 14 7 oz) (09/29/19 1711)  SpO2: 100 % (09/29/19 1711)    Physical Exam   Constitutional: She is oriented to person, place, and time  Patient lying on stretcher, appears tired, has some dyspnea  Appears anxious  HENT:   Head: Normocephalic  Eyes: Conjunctivae and EOM are normal    Neck: Normal range of motion  Cardiovascular: Normal rate and regular rhythm  Murmur heard  Pulmonary/Chest: She has rales  Abdominal: Soft  Bowel sounds are normal  She exhibits no distension  There is no tenderness  Genitourinary:   Genitourinary Comments: Voiding spontaneously  Musculoskeletal: She exhibits edema  Neurological: She is alert and oriented to person, place, and time  Skin: Skin is warm and dry  Capillary refill takes less than 2 seconds  There is pallor  Psychiatric: Her mood appears anxious  She is slowed  Additional Data:     Lab Results: I have personally reviewed pertinent reports        Results from last 7 days   Lab Units 09/29/19  1506   WBC Thousand/uL 7 48   HEMOGLOBIN g/dL 12 8   HEMATOCRIT % 39 2   PLATELETS Thousands/uL 235   NEUTROS PCT % 52   LYMPHS PCT % 36   MONOS PCT % 8   EOS PCT % 3     Results from last 7 days   Lab Units 09/29/19  1506   POTASSIUM mmol/L 4 9   CHLORIDE mmol/L 108   CO2 mmol/L 24   BUN mg/dL 8   CREATININE mg/dL 0 76   CALCIUM mg/dL 7 9*   ALK PHOS U/L 79   ALT U/L 21   AST U/L 15     Results from last 7 days   Lab Units 09/29/19  1506   INR  1 00               Imaging: I have personally reviewed pertinent reports  XR chest 1 view portable    (Results Pending)       EKG, Pathology, and Other Studies Reviewed on Admission:   · EKG: EKG showed sinus rhythm no acute ST elevations noted, biphasic T-wave noted in V3 and T-wave inversions V4 through V6  Allscripts / Epic Records Reviewed: Yes     ** Please Note: This note has been constructed using a voice recognition system   **

## 2019-09-29 NOTE — ASSESSMENT & PLAN NOTE
Wt Readings from Last 3 Encounters:   09/29/19 86 6 kg (190 lb 14 7 oz)   09/28/19 77 kg (169 lb 12 1 oz)   08/21/19 77 1 kg (170 lb)     Patient presented to emergency department with shortness of breath, increased swelling in ankles  BNP 2944  Chest xray results pending  Rales present bilaterally 1/4 way up    +2 edema noted in bilateral lower extremities  Does have dyspnea, RR 23, O2 sat initially 89% on room air on presentation to ER  Improved to 96 % with O2 2 liters  Lasix 40 mg IV given in ED  Patient reported that she did not have her aldactone to take this morning as her boyfriend had picked up her prescription, and did not bring it to her  I&O's ordered  Daily weights ordered  Cardiac diet  Cardiology consulted  Vital signs as per unit routine

## 2019-09-29 NOTE — ASSESSMENT & PLAN NOTE
Continue Cymbalta  On previous admission, patient was advised to follow up with outpatient psychiatry  Will encourage follow up

## 2019-09-29 NOTE — ED PROVIDER NOTES
History  Chief Complaint   Patient presents with    Shortness of Breath     Pt arrived via squad  Was discharged yesterday after CHF and has felt weak and SOB  63 y o  female patient past medical history of TIA, seizures, congestive heart failure, fibromyalgia, hypertension, hyperlipidemia and anxiety, presents to the ED with increasing shortness of breath and pedal edema over the past few days  Patient also complains of anterior chest wall pain  Patient was recently admitted for CHF exacerbation  Patient was discharged 2 days ago  While in the hospital patient was hypotensive that was thought to be secondary to over diuresis with Lasix  Patient's Lasix was discontinued and spironolactone was started  Patient states that she is compliant with her medications since being discharged however started to have increasing shortness of breath and pedal edema  Patient arrived to the ED with oxygen saturation of 89% on room air  Patient has bibasilar rales on exam   Oxygenation improved with 3 L nasal cannula oxygen  History provided by:  Patient  Shortness of Breath   Associated symptoms: no abdominal pain, no chest pain, no cough, no ear pain, no fever, no headaches, no rash and no wheezing        Prior to Admission Medications   Prescriptions Last Dose Informant Patient Reported? Taking?    ARIPiprazole (ABILIFY) 5 mg tablet   Yes No   Sig: Take 5 mg by mouth daily   DULoxetine HCl (CYMBALTA PO)  Self Yes No   Sig: Take 90 mg by mouth daily at bedtime     Tiotropium Bromide Monohydrate (SPIRIVA HANDIHALER IN)  Self Yes No   Sig: Inhale   acetaminophen (TYLENOL) 325 mg tablet   No No   Sig: Take 3 tablets (975 mg total) by mouth every 8 (eight) hours as needed for mild pain or moderate pain   aspirin (ECOTRIN LOW STRENGTH) 81 mg EC tablet  Outside Facility (Specify) Yes No   Sig: Take 81 mg by mouth daily   atorvastatin (LIPITOR) 80 mg tablet   Yes No   Sig: Take 80 mg by mouth daily fluticasone-salmeterol (AIRDUO RESPICLICK) 072-99 mcg/act dry powder inhaler  Pharmacy (Specify) Yes No   Sig: Inhale 1 puff 2 (two) times a day Rinse mouth after use    gabapentin (NEURONTIN) 600 MG tablet  Self Yes No   Sig: Take 900 mg by mouth 3 (three) times a day    gemfibrozil (LOPID) 600 mg tablet  Pharmacy (Specify) Yes No   Sig: Take 600 mg by mouth 2 (two) times a day before meals   levETIRAcetam (KEPPRA) 500 mg tablet   No No   Sig: Take 1 tablet (500 mg total) by mouth every 12 (twelve) hours   losartan (COZAAR) 25 mg tablet  Self Yes No   Sig: Take 25 mg by mouth daily    magnesium hydroxide (MILK OF MAGNESIA) 400 mg/5 mL oral suspension   No No   Sig: Take 30 mL by mouth daily as needed for constipation   metoprolol tartrate (LOPRESSOR) 25 mg tablet   Yes No   Sig: Take 100 mg by mouth every 12 (twelve) hours    nicotine (NICODERM CQ) 14 mg/24hr TD 24 hr patch   No No   Sig: Place 1 patch on the skin daily   pantoprazole (PROTONIX) 40 mg tablet   Yes No   Sig: Take 40 mg by mouth daily   polyethylene glycol (MIRALAX) 17 g packet   No No   Sig: Take 17 g by mouth daily   spironolactone (ALDACTONE) 25 mg tablet   No No   Sig: Take 1 tablet (25 mg total) by mouth daily   traZODone (DESYREL) 100 mg tablet  Pharmacy (Specify) Yes No   Sig: Take 100 mg by mouth daily at bedtime      Facility-Administered Medications: None       Past Medical History:   Diagnosis Date    Alopecia     Back injury     Bell's palsy     CHF (congestive heart failure) (HCC)     Chronic pain     back    Closed left arm fracture     Fibromyalgia     Fibromyalgia, primary     GERD (gastroesophageal reflux disease)     Hyperlipidemia     Hypertension     Lyme disease     [facility]     2015    Myocardial infarction [facility]     Cardiac catheterization 2 years ago showed 30% blockage in 1 of the blood vessels    Stroke [facility]     TIA (transient ischemic attack)     12/2017       Past Surgical History: Procedure Laterality Date     SECTION      CHOLECYSTECTOMY      CORONARY ANGIOPLASTY          ORIF TIBIA FRACTURE Left 2018    Procedure: OPEN REDUCTION W/ INTERNAL FIXATION (ORIF) TIBIA FRACTURE;  Surgeon: Fox Rosario MD;  Location: [facility];  Service: Orthopedics       Family History   Problem Relation Age of Onset    Heart attack Mother     Heart attack Father      I have reviewed and agree with the history as documented  Social History     Tobacco Use    Smoking status: Current Every Day Smoker     Packs/day: 0 50     Years: 40 00     Pack years: 20 00    Smokeless tobacco: Never Used    Tobacco comment: 1/2 per day   Substance Use Topics    Alcohol use: Not Currently     Comment: as per patient; former social drinker     Drug use: No        Review of Systems   Constitutional: Negative for activity change, appetite change, chills and fever  HENT: Negative for congestion and ear pain  Eyes: Negative for pain and discharge  Respiratory: Positive for shortness of breath  Negative for cough, chest tightness, wheezing and stridor  Cardiovascular: Positive for leg swelling  Negative for chest pain and palpitations  Gastrointestinal: Negative for abdominal distention, abdominal pain, constipation, diarrhea and nausea  Endocrine: Negative for cold intolerance  Genitourinary: Negative for dysuria, frequency and urgency  Musculoskeletal: Negative for arthralgias and back pain  Skin: Negative for color change and rash  Allergic/Immunologic: Negative for environmental allergies and food allergies  Neurological: Negative for dizziness, weakness, numbness and headaches  Hematological: Negative for adenopathy  Psychiatric/Behavioral: Negative for agitation, behavioral problems and confusion  The patient is not nervous/anxious  All other systems reviewed and are negative        Physical Exam  Physical Exam   Constitutional: She is oriented to person, place, and time  She appears well-developed and well-nourished  HENT:   Head: Normocephalic and atraumatic  Mouth/Throat: Oropharynx is clear and moist    Eyes: Conjunctivae and EOM are normal    Neck: Normal range of motion  Neck supple  Cardiovascular: Normal rate, regular rhythm, normal heart sounds and intact distal pulses  Pulmonary/Chest: Effort normal  She has rales in the right middle field, the right lower field, the left middle field and the left lower field  Abdominal: Soft  Bowel sounds are normal  She exhibits no distension  There is no tenderness  Musculoskeletal: Normal range of motion  1+ pitting edema noted to bilateral lower extremities   Neurological: She is alert and oriented to person, place, and time  Skin: Skin is warm and dry  Psychiatric: She has a normal mood and affect  Her behavior is normal  Judgment and thought content normal    Nursing note and vitals reviewed        Vital Signs  ED Triage Vitals   Temperature Pulse Respirations Blood Pressure SpO2   09/29/19 1450 09/29/19 1445 09/29/19 1445 09/29/19 1445 09/29/19 1445   98 8 °F (37 1 °C) 70 (!) 28 98/59 (!) 89 %      Temp src Heart Rate Source Patient Position - Orthostatic VS BP Location FiO2 (%)   -- -- -- 09/29/19 1515 --      Right arm       Pain Score       09/29/19 1450       6           Vitals:    09/29/19 1445 09/29/19 1450 09/29/19 1515   BP: 98/59 98/59 103/62   Pulse: 70 72 62         Visual Acuity      ED Medications  Medications   cefTRIAXone (ROCEPHIN) IVPB (premix) 1,000 mg (has no administration in time range)   furosemide (LASIX) injection 40 mg (has no administration in time range)       Diagnostic Studies  Results Reviewed     Procedure Component Value Units Date/Time    Comprehensive metabolic panel [[id number]]  (Abnormal) Collected:  09/29/19 1506    Lab Status:  Final result Specimen:  Blood from Arm, Right Updated:  09/29/19 1542     Sodium 140 mmol/L      Potassium 4 9 mmol/L      Chloride 108 mmol/L CO2 24 mmol/L      ANION GAP 8 mmol/L      BUN 8 mg/dL      Creatinine 0 76 mg/dL      Glucose 85 mg/dL      Calcium 7 9 mg/dL      AST 15 U/L      ALT 21 U/L      Alkaline Phosphatase 79 U/L      Total Protein 5 9 g/dL      Albumin 2 8 g/dL      Total Bilirubin 0 50 mg/dL      eGFR 87 ml/min/1 73sq m     Narrative:       [place] guidelines for Chronic Kidney Disease (CKD):     Stage 1 with normal or high GFR (GFR > 90 mL/min/1 73 square meters)    Stage 2 Mild CKD (GFR = 60-89 mL/min/1 73 square meters)    Stage 3A Moderate CKD (GFR = 45-59 mL/min/1 73 square meters)    Stage 3B Moderate CKD (GFR = 30-44 mL/min/1 73 square meters)    Stage 4 Severe CKD (GFR = 15-29 mL/min/1 73 square meters)    Stage 5 End Stage CKD (GFR <15 mL/min/1 73 square meters)  Note: GFR calculation is accurate only with a steady state creatinine    B-type natriuretic peptide [[id number]]  (Abnormal) Collected:  09/29/19 1506    Lab Status:  Final result Specimen:  Blood from Arm, Right Updated:  09/29/19 1542     NT-proBNP 2,944 pg/mL     Magnesium [[id number]]  (Normal) Collected:  09/29/19 1506    Lab Status:  Final result Specimen:  Blood from Arm, Right Updated:  09/29/19 1542     Magnesium 1 8 mg/dL     Phosphorus [[id number]]  (Normal) Collected:  09/29/19 1506    Lab Status:  Final result Specimen:  Blood from Arm, Right Updated:  09/29/19 1542     Phosphorus 2 9 mg/dL     Troponin I [[id number]]  (Normal) Collected:  09/29/19 1506    Lab Status:  Final result Specimen:  Blood from Arm, Right Updated:  09/29/19 1539     Troponin I <0 02 ng/mL     Urine Microscopic [[id number]]  (Abnormal) Collected:  09/29/19 1521    Lab Status:  Final result Specimen:  Urine, Other Updated:  09/29/19 1539     RBC, UA 1-2 /hpf      WBC, UA 10-20 /hpf      Epithelial Cells Occasional /hpf      Bacteria, UA Innumerable /hpf      Hyaline Casts, UA 1-2 /lpf     Urine culture [[id number]] Collected:  09/29/19 1521    Lab Status:   In process Specimen:  Urine, Other Updated:  09/29/19 1539    Protime-INR [[id number]]  (Normal) Collected:  09/29/19 1506    Lab Status:  Final result Specimen:  Blood from Arm, Right Updated:  09/29/19 1538     Protime 10 7 seconds      INR 1 00    APTT [[id number]]  (Normal) Collected:  09/29/19 1506    Lab Status:  Final result Specimen:  Blood from Arm, Right Updated:  09/29/19 1538     PTT 26 seconds     UA w Reflex to Microscopic w Reflex to Culture [[id number]]  (Abnormal) Collected:  09/29/19 1521    Lab Status:  Final result Specimen:  Urine, Other Updated:  09/29/19 1530     Color, UA Yellow     Clarity, UA Slightly Cloudy     Specific Koppel, UA 1 010     pH, UA 6 0     Leukocytes, UA Small     Nitrite, UA Positive     Protein, UA Negative mg/dl      Glucose, UA Negative mg/dl      Ketones, UA Negative mg/dl      Urobilinogen, UA 0 2 E U /dl      Bilirubin, UA Negative     Blood, UA Negative    CBC and differential [[id number]]  (Normal) Collected:  09/29/19 1506    Lab Status:  Final result Specimen:  Blood from Arm, Right Updated:  09/29/19 1513     WBC 7 48 Thousand/uL      RBC 4 09 Million/uL      Hemoglobin 12 8 g/dL      Hematocrit 39 2 %      MCV 96 fL      MCH 31 3 pg      MCHC 32 7 g/dL      RDW 14 9 %      MPV 10 7 fL      Platelets 980 Thousands/uL      Neutrophils Relative 52 %      Lymphocytes Relative 36 %      Monocytes Relative 8 %      Eosinophils Relative 3 %      Basophils Relative 1 %      Neutrophils Absolute 3 94 Thousands/µL      Lymphocytes Absolute 2 69 Thousands/µL      Monocytes Absolute 0 58 Thousand/µL      Eosinophils Absolute 0 23 Thousand/µL      Basophils Absolute 0 04 Thousands/µL                  XR chest 1 view portable    (Results Pending)              Procedures  ECG 12 Lead Documentation Only  Date/Time: 9/29/2019 2:39 PM  Performed by: Stephane Landrum DO  Authorized by: Stephane Landrum DO     Indications / Diagnosis:  Shortness of breath  ECG reviewed by me, the ED Provider: yes    Patient location:  ED  Previous ECG:     Previous ECG:  Compared to current    Similarity:  Changes noted    Comparison to cardiac monitor: Yes    Interpretation:     Interpretation: abnormal    Comments:      Sinus rhythm rate 69, normal axis, QT prolonging, no acute ST elevations noted, biphasic T-wave noted in V3 with T-wave inversions noted in V4 through V6, lateral ischemia with QT prolongation that is new compared to previous EKG  ED Course                               MDM  Number of Diagnoses or Management Options  CHF (congestive heart failure) ([address] ): new and requires workup  Shortness of breath: new and requires workup  UTI (urinary tract infection): new and requires workup  Diagnosis management comments: Obtain blood work, BMP, troponin, EKG, chest x-ray         Amount and/or Complexity of Data Reviewed  Clinical lab tests: ordered and reviewed  Tests in the radiology section of CPT®: ordered and reviewed  Tests in the medicine section of CPT®: ordered and reviewed  Review and summarize past medical records: yes  Independent visualization of images, tracings, or specimens: yes    Risk of Complications, Morbidity, and/or Mortality  General comments: Patient's BNP was significantly elevated compared to previous study  Chest x-ray showed bilateral pulmonary congestion  Patient's UA also showed concern for UTI  At this point patient given a dose of Lasix as well as Rocephin  Patient's oxygenation improved in the ER with 3L nasal cannula supplemental oxygen  Patient is admitted for further management  Patient agrees with admission plans      Patient Progress  Patient progress: improved      Disposition  Final diagnoses:   CHF (congestive heart failure) (HCC)   Shortness of breath   UTI (urinary tract infection)     Time reflects when diagnosis was documented in both MDM as applicable and the Disposition within this note     Time User Action Codes Description Comment 9/29/2019  4:05 PM Sola Billy Add [I50 9] CHF (congestive heart failure) ([address] )     9/29/2019  4:05 PM Sola Billy Add [R06 02] Shortness of breath     9/29/2019  4:05 PM Sola Billy Add [N39 0] UTI (urinary tract infection)       ED Disposition     ED Disposition Condition Date/Time Comment    Admit Stable Sun Sep 29, 2019  4:06 PM Case was discussed with Dr Eyal Choi and the patient's admission status was agreed to be Admission Status: inpatient status to the service of Dr Eyal Choi  Follow-up Information    None         Patient's Medications   Discharge Prescriptions    No medications on file     No discharge procedures on file      ED Provider  Electronically Signed by           Keya Osman DO  09/29/19 6313

## 2019-09-30 LAB
ANION GAP SERPL CALCULATED.3IONS-SCNC: 9 MMOL/L (ref 4–13)
ATRIAL RATE: 69 BPM
BASOPHILS # BLD AUTO: 0.06 THOUSANDS/ΜL (ref 0–0.1)
BASOPHILS NFR BLD AUTO: 1 % (ref 0–1)
BUN SERPL-MCNC: 10 MG/DL (ref 5–25)
CALCIUM SERPL-MCNC: 8.5 MG/DL (ref 8.3–10.1)
CHLORIDE SERPL-SCNC: 105 MMOL/L (ref 100–108)
CO2 SERPL-SCNC: 25 MMOL/L (ref 21–32)
CREAT SERPL-MCNC: 0.89 MG/DL (ref 0.6–1.3)
EOSINOPHIL # BLD AUTO: 0.23 THOUSAND/ΜL (ref 0–0.61)
EOSINOPHIL NFR BLD AUTO: 3 % (ref 0–6)
ERYTHROCYTE [DISTWIDTH] IN BLOOD BY AUTOMATED COUNT: 14.2 % (ref 11.6–15.1)
GFR SERPL CREATININE-BSD FRML MDRD: 72 ML/MIN/1.73SQ M
GLUCOSE SERPL-MCNC: 109 MG/DL (ref 65–140)
HCT VFR BLD AUTO: 38.4 % (ref 34.8–46.1)
HGB BLD-MCNC: 12.2 G/DL (ref 11.5–15.4)
IMM GRANULOCYTES # BLD AUTO: 0.02 THOUSAND/UL (ref 0–0.2)
IMM GRANULOCYTES NFR BLD AUTO: 0 % (ref 0–2)
LYMPHOCYTES # BLD AUTO: 1.88 THOUSANDS/ΜL (ref 0.6–4.47)
LYMPHOCYTES NFR BLD AUTO: 26 % (ref 14–44)
MAGNESIUM SERPL-MCNC: 1.9 MG/DL (ref 1.6–2.6)
MCH RBC QN AUTO: 30.6 PG (ref 26.8–34.3)
MCHC RBC AUTO-ENTMCNC: 31.8 G/DL (ref 31.4–37.4)
MCV RBC AUTO: 96 FL (ref 82–98)
MONOCYTES # BLD AUTO: 0.54 THOUSAND/ΜL (ref 0.17–1.22)
MONOCYTES NFR BLD AUTO: 8 % (ref 4–12)
NEUTROPHILS # BLD AUTO: 4.44 THOUSANDS/ΜL (ref 1.85–7.62)
NEUTS SEG NFR BLD AUTO: 62 % (ref 43–75)
NRBC BLD AUTO-RTO: 0 /100 WBCS
P AXIS: 16 DEGREES
PLATELET # BLD AUTO: 234 THOUSANDS/UL (ref 149–390)
PMV BLD AUTO: 10.4 FL (ref 8.9–12.7)
POTASSIUM SERPL-SCNC: 4.1 MMOL/L (ref 3.5–5.3)
PR INTERVAL: 152 MS
QRS AXIS: -12 DEGREES
QRSD INTERVAL: 84 MS
QT INTERVAL: 502 MS
QTC INTERVAL: 537 MS
RBC # BLD AUTO: 3.99 MILLION/UL (ref 3.81–5.12)
SODIUM SERPL-SCNC: 139 MMOL/L (ref 136–145)
T WAVE AXIS: 99 DEGREES
TROPONIN I SERPL-MCNC: <0.02 NG/ML
VENTRICULAR RATE: 69 BPM
WBC # BLD AUTO: 7.17 THOUSAND/UL (ref 4.31–10.16)

## 2019-09-30 PROCEDURE — 80048 BASIC METABOLIC PNL TOTAL CA: CPT | Performed by: NURSE PRACTITIONER

## 2019-09-30 PROCEDURE — 93010 ELECTROCARDIOGRAM REPORT: CPT | Performed by: INTERNAL MEDICINE

## 2019-09-30 PROCEDURE — 99255 IP/OBS CONSLTJ NEW/EST HI 80: CPT | Performed by: INTERNAL MEDICINE

## 2019-09-30 PROCEDURE — 90682 RIV4 VACC RECOMBINANT DNA IM: CPT | Performed by: NURSE PRACTITIONER

## 2019-09-30 PROCEDURE — 99232 SBSQ HOSP IP/OBS MODERATE 35: CPT | Performed by: FAMILY MEDICINE

## 2019-09-30 PROCEDURE — 85025 COMPLETE CBC W/AUTO DIFF WBC: CPT | Performed by: NURSE PRACTITIONER

## 2019-09-30 PROCEDURE — 84484 ASSAY OF TROPONIN QUANT: CPT | Performed by: NURSE PRACTITIONER

## 2019-09-30 PROCEDURE — 83735 ASSAY OF MAGNESIUM: CPT | Performed by: NURSE PRACTITIONER

## 2019-09-30 RX ORDER — ALPRAZOLAM 0.5 MG/1
0.5 TABLET ORAL 2 TIMES DAILY PRN
Status: DISCONTINUED | OUTPATIENT
Start: 2019-09-30 | End: 2019-10-01 | Stop reason: HOSPADM

## 2019-09-30 RX ORDER — FUROSEMIDE 10 MG/ML
20 INJECTION INTRAMUSCULAR; INTRAVENOUS ONCE
Status: DISCONTINUED | OUTPATIENT
Start: 2019-09-30 | End: 2019-09-30

## 2019-09-30 RX ORDER — FUROSEMIDE 10 MG/ML
10 INJECTION INTRAMUSCULAR; INTRAVENOUS ONCE
Status: COMPLETED | OUTPATIENT
Start: 2019-09-30 | End: 2019-09-30

## 2019-09-30 RX ORDER — FENOFIBRATE 145 MG/1
145 TABLET, COATED ORAL DAILY
Status: DISCONTINUED | OUTPATIENT
Start: 2019-09-30 | End: 2019-10-01 | Stop reason: HOSPADM

## 2019-09-30 RX ADMIN — TRAZODONE HYDROCHLORIDE 100 MG: 50 TABLET ORAL at 22:56

## 2019-09-30 RX ADMIN — ENOXAPARIN SODIUM 40 MG: 40 INJECTION SUBCUTANEOUS at 10:40

## 2019-09-30 RX ADMIN — CEFTRIAXONE 1000 MG: 1 INJECTION, SOLUTION INTRAVENOUS at 17:28

## 2019-09-30 RX ADMIN — FLUTICASONE FUROATE AND VILANTEROL TRIFENATATE 1 PUFF: 100; 25 POWDER RESPIRATORY (INHALATION) at 10:40

## 2019-09-30 RX ADMIN — DULOXETINE HYDROCHLORIDE 90 MG: 60 CAPSULE, DELAYED RELEASE ORAL at 22:56

## 2019-09-30 RX ADMIN — ALPRAZOLAM 0.5 MG: 0.5 TABLET ORAL at 19:07

## 2019-09-30 RX ADMIN — GABAPENTIN 300 MG: 300 CAPSULE ORAL at 17:30

## 2019-09-30 RX ADMIN — NICOTINE 1 PATCH: 14 PATCH TRANSDERMAL at 10:45

## 2019-09-30 RX ADMIN — INFLUENZA A VIRUS A/BRISBANE/02/2018 (H1N1) RECOMBINANT HEMAGGLUTININ ANTIGEN, INFLUENZA A VIRUS A/KANSAS/14/2017 (H3N2) RECOMBINANT HEMAGGLUTININ ANTIGEN, INFLUENZA B VIRUS B/PHUKET/3073/2013 RECOMBINANT HEMAGGLUTININ ANTIGEN, AND INFLUENZA B VIRUS B/MARYLAND/15/2016 RECOMBINANT HEMAGGLUTININ ANTIGEN 0.5 ML: 45; 45; 45; 45 INJECTION INTRAMUSCULAR at 17:29

## 2019-09-30 RX ADMIN — METOPROLOL TARTRATE 125 MG: 100 TABLET, FILM COATED ORAL at 21:54

## 2019-09-30 RX ADMIN — LEVETIRACETAM 500 MG: 500 TABLET, FILM COATED ORAL at 21:54

## 2019-09-30 RX ADMIN — TIOTROPIUM BROMIDE 18 MCG: 18 CAPSULE ORAL; RESPIRATORY (INHALATION) at 10:41

## 2019-09-30 RX ADMIN — LEVETIRACETAM 500 MG: 500 TABLET, FILM COATED ORAL at 10:38

## 2019-09-30 RX ADMIN — GABAPENTIN 300 MG: 300 CAPSULE ORAL at 21:54

## 2019-09-30 RX ADMIN — FUROSEMIDE 10 MG: 10 INJECTION, SOLUTION INTRAMUSCULAR; INTRAVENOUS at 13:39

## 2019-09-30 RX ADMIN — GABAPENTIN 300 MG: 300 CAPSULE ORAL at 10:38

## 2019-09-30 RX ADMIN — GEMFIBROZIL 600 MG: 600 TABLET ORAL at 06:48

## 2019-09-30 RX ADMIN — PANTOPRAZOLE SODIUM 40 MG: 40 TABLET, DELAYED RELEASE ORAL at 10:39

## 2019-09-30 RX ADMIN — FENOFIBRATE 145 MG: 145 TABLET, COATED ORAL at 17:35

## 2019-09-30 RX ADMIN — SPIRONOLACTONE 25 MG: 25 TABLET ORAL at 10:39

## 2019-09-30 RX ADMIN — ATORVASTATIN CALCIUM 80 MG: 80 TABLET ORAL at 10:40

## 2019-09-30 RX ADMIN — ASPIRIN 81 MG: 81 TABLET, COATED ORAL at 10:39

## 2019-09-30 RX ADMIN — TRAMADOL HYDROCHLORIDE 50 MG: 50 TABLET, FILM COATED ORAL at 23:48

## 2019-09-30 NOTE — ASSESSMENT & PLAN NOTE
Wt Readings from Last 3 Encounters:   09/30/19 85 5 kg (188 lb 7 9 oz)   09/28/19 77 kg (169 lb 12 1 oz)   08/21/19 77 1 kg (170 lb)     Patient presented to emergency department with shortness of breath, increased swelling in ankles  BNP 2944  Chest xray showed mild vascular congestion, small left pleural effusion, early CHF  Rales present at bases, improved from previous exam   + 1 edema noted in bilateral lower extremities, improved from previous exam  Did have dyspnea, RR 23, O2 sat initially 89% on room air on presentation to ER  Improved to 96 % with O2 2 liters  Currently on 2 L nasal cannula, O2 sat 94%, respirations 20  Lasix 40 mg IV given in ED  Patient reported that she did not have her aldactone to take morning of admission as her boyfriend had picked up her prescription, and did not bring it to her  I&O's ordered  Daily weights ordered  Cardiac diet  Cardiology consulted, appreciate recommendations  Metoprolol increased to 125 mg b i d  With target heart rate 60  Losartan discontinued by Cardiology  Continue with Aldactone  Vital signs as per unit routine  Repeat BMP in [place]

## 2019-09-30 NOTE — PROGRESS NOTES
Progress Note Jayce Marshall 1960, 63 y o  female MRN: [id number]    Unit/Bed#: [address] [place] [address] Encounter: [id number]    Primary Care Provider: Daniel Samayoa DO   Date and time admitted to hospital: 9/29/2019  2:34 PM        * Acute on chronic diastolic CHF (congestive heart failure) (HCC)  Assessment & Plan  Wt Readings from Last 3 Encounters:   09/30/19 85 5 kg (188 lb 7 9 oz)   09/28/19 77 kg (169 lb 12 1 oz)   08/21/19 77 1 kg (170 lb)     Patient presented to emergency department with shortness of breath, increased swelling in ankles  BNP 2944  Chest xray showed mild vascular congestion, small left pleural effusion, early CHF  Rales present at bases, improved from previous exam   + 1 edema noted in bilateral lower extremities, improved from previous exam  Did have dyspnea, RR 23, O2 sat initially 89% on room air on presentation to ER  Improved to 96 % with O2 2 liters  Currently on 2 L nasal cannula, O2 sat 94%, respirations 20  Lasix 40 mg IV given in ED  Patient reported that she did not have her aldactone to take morning of admission as her boyfriend had picked up her prescription, and did not bring it to her  I&O's ordered  Daily weights ordered  Cardiac diet  Cardiology consulted, appreciate recommendations  Metoprolol increased to 125 mg b i d  With target heart rate 60  Losartan discontinued by Cardiology  Continue with Aldactone  Vital signs as per unit routine  Repeat BMP in [facility] Serum Hypotension  Assessment & Plan  Blood pressure initially 98/59 on presentation to ED  Had received 500 cc bolus in squad en route to hospital   Most current blood pressure 115/59  Vital signs as per unit routine  Monitor closely  Seizure disorder (HCC)  Assessment & Plan  Continue Keppra 500 mg po q 12 hours  Monitor  Hypercholesteremia  Assessment & Plan  Continue patient's home medications of Lipitor    Lopid discontinued by Cardiology as this medication has many interactions with other medications, including patient's anti seizure medication  Patient placed on Tricor by cardiology  Heart healthy diet  History of TIA (transient ischemic attack)  Assessment & Plan  Continue patient's home medication of ASA  No neuro deficits  Monitor  Anxiety  Assessment & Plan  Continue Cymbalta  On previous admission, patient was advised to follow up with outpatient psychiatry  Will encourage follow up  Fibromyalgia  Assessment & Plan  Patient on Neurontin 3 times a day  Reports taking 900 mg 3 times a day, discussed with attending and will trial 300 mg 3 times a day  Dependence on nicotine from cigarettes  Assessment & Plan  Patient smokes half a pack of cigarettes per day  Nicotine patch 14 mg ordered   Smoking cessation encouraged  Abnormal urinalysis  Assessment & Plan  Urinalysis showed small leukocytes and positive nitrates  Patient received 1 dose of ceftriaxone in emergency department, continued  Patient denies any symptoms of pain, burning or frequency with urination  WBC 7 48  Afebrile  Monitor  VTE Pharmacologic Prophylaxis:   Pharmacologic: Enoxaparin (Lovenox)  Mechanical VTE Prophylaxis in Place: Yes    Patient Centered Rounds: I have performed bedside rounds with nursing staff today  Discussions with Specialists or Other Care Team Provider:  Attending physician, cardiology and multi disciplinary team     Education and Discussions with Family / Patient:  I spoke with patient at the bedside, I have answered all questions to the best of my abilities  Time Spent for Care: 30 minutes  More than 50% of total time spent on counseling and coordination of care as described above  Current Length of Stay: 1 day(s)    Current Patient Status: Inpatient   Certification Statement: The patient will continue to require additional inpatient hospital stay due to Diuresis, Cardiology evaluation, repeat electrolytes in a [facility]     Discharge Plan:  Not stable for discharge today  Code Status: Level 1 - Full Code      Subjective:   Patient seen lying in bed, sleeping  Opens eyes when spoken to  She reports that her breathing feels better  She feels that the swelling in her ankles has gone down  She denies headache or dizziness  She reports that she does have some chest discomfort that is reproducible with movement midsternally  She denies nausea, vomiting diarrhea or abdominal pain  Objective:     Vitals:   Temp (24hrs), Av 8 °F (36 6 °C), Min:96 9 °F (36 1 °C), Max:98 8 °F (37 1 °C)    Temp:  [96 9 °F (36 1 °C)-98 8 °F (37 1 °C)] 97 5 °F (36 4 °C)  HR:  [62-81] 72  Resp:  [18-26] 20  BP: ()/(50-71) 115/59  SpO2:  [90 %-100 %] 94 %  Body mass index is 32 35 kg/m²  Input and Output Summary (last 24 hours): Intake/Output Summary (Last 24 hours) at 2019 1446  Last data filed at 2019 0401  Gross per 24 hour   Intake 850 ml   Output 2300 ml   Net -1450 ml       Physical Exam:     Physical Exam   Constitutional: She appears well-nourished  No distress  HENT:   Head: Normocephalic  Eyes: Conjunctivae and EOM are normal    Neck: Normal range of motion  Additional Data:     Labs:    Results from last 7 days   Lab Units 19  0816   WBC Thousand/uL 7 17   HEMOGLOBIN g/dL 12 2   HEMATOCRIT % 38 4   PLATELETS Thousands/uL 234   NEUTROS PCT % 62   LYMPHS PCT % 26   MONOS PCT % 8   EOS PCT % 3     Results from last 7 days   Lab Units 19  0659 19  1506   POTASSIUM mmol/L 4 1 4 9   CHLORIDE mmol/L 105 108   CO2 mmol/L 25 24   BUN mg/dL 10 8   CREATININE mg/dL 0 89 0 76   CALCIUM mg/dL 8 5 7 9*   ALK PHOS U/L  --  79   ALT U/L  --  21   AST U/L  --  15     Results from last 7 days   Lab Units 19  1506   INR  1 00       * I Have Reviewed All Lab Data Listed Above  * Additional Pertinent Lab Tests Reviewed:  All Labs Within Last 24 Hours Reviewed    Imaging:    Imaging Reports Reviewed Today Include: None   Imaging Personally Reviewed by Myself Includes:  None  Recent Cultures (last 7 days):     Results from last 7 days   Lab Units 09/29/19  1521   URINE CULTURE  >100,000 cfu/ml Escherichia coli*       Last 24 Hours Medication List:     Current Facility-Administered Medications:  acetaminophen 975 mg Oral Q8H PRN Dorothy Doll, DAJUANNP   aspirin 81 mg Oral Daily Dorothy Delacruzing, CRNP   atorvastatin 80 mg Oral Daily Dorothy Delacruzing, CRNP   cefTRIAXone 1,000 mg Intravenous Q24H Kia Zhu DO   DULoxetine 90 mg Oral HS Dorothy Doll, CRNP   enoxaparin 40 mg Subcutaneous Daily Verkarina Delacruzing, CRNP   fenofibrate 145 mg Oral Daily Manuel Schultz, NICKY   fluticasone-vilanterol 1 puff Inhalation Daily Dorothy Doll, DAJUANNP   gabapentin 300 mg Oral TID Dorothy Doll, NICKY   influenza vaccine 0 5 mL Intramuscular Prior to discharge Dorothy Doll, NICKY   levETIRAcetam 500 mg Oral Q12H [place], NICKY   magnesium hydroxide 30 mL Oral Daily PRN Dorothy Doll, CRNP   metoprolol tartrate 125 mg Oral Q12H [address] [place], NICKY   nicotine 1 patch Transdermal Daily Dorothy Doll, NICKY   ondansetron 4 mg Intravenous Q6H PRN Dorothy Doll, CRNP   pantoprazole 40 mg Oral Daily Verkarina Daring, CRNP   polyethylene glycol 17 g Oral Daily Verkarina Daring, CRNP   spironolactone 25 mg Oral Daily Verkarina Daring, CRNP   tiotropium 18 mcg Inhalation Daily Dorothy Doll, NICKY   traMADol 50 mg Oral Once PRN Filipe Paredes MD   traZODone 100 mg Oral HS Dorothy Doll, NICKY        Today, Patient Was Seen By: NICKY Case    ** Please Note: Dictation voice to text software may have been used in the creation of this document   **

## 2019-09-30 NOTE — ASSESSMENT & PLAN NOTE
Urinalysis showed small leukocytes and positive nitrates  Patient received 1 dose of ceftriaxone in emergency department, continued  Patient denies any symptoms of pain, burning or frequency with urination  WBC 7 17  Afebrile  Monitor

## 2019-09-30 NOTE — UTILIZATION REVIEW
Notification of Discharge  This is a Notification of Discharge from our facility [address]  Please be advised that this patient has been discharge from our facility  Below you will find the admission and discharge date and time including the patients disposition  PRESENTATION DATE: 9/26/2019  8:39 PM  OBS ADMISSION DATE: 09/26/2019  IP ADMISSION DATE: 9/27/19 1503   DISCHARGE DATE: 9/28/2019  2:51 PM  DISPOSITION: Home/Self Care Home/Self Care   Admission Orders listed below:  Admission Orders (From admission, onward)     Ordered        09/27/19 1503  Inpatient Admission  Once         09/26/19 2214  Place in Observation  Once                   Please contact the UR Department if additional information is required to close this patient's authorization/case  [address]  Utilization Review Department  Phone: [phone]; Fax [phone]  [email or web address]  ATTENTION: Please call with any questions or concerns to [phone]  and carefully listen to the prompts so that you are directed to the right person  Send all requests for admission clinical reviews, approved or denied determinations and any other requests to fax [phone]   All voicemails are confidential

## 2019-09-30 NOTE — UTILIZATION REVIEW
Continued Stay Review    Date: 9/30/19                        Current Patient Class: INPATIENT  Current Level of Care: TELEMETRY  Assessment/Plan:   ACUTE ON CHRONIC CHF WITH UNDERLYING SEVERE CONCENTRIC LVH, MODERATE PULMONARY HYPERTENSION  METOPROLOL DOSE INCREASED TO BID TO KEEP HEART RATE AROUND 60 PER CARDIO, LOSARTAN D/C'D, CONTINUES ON ALDACTONE  IVABT THERAPY IN PROGRESS FOR +U/A, FINAL CULTURES PENDING    Pertinent Labs/Diagnostic Results:   Results from last 7 days   Lab Units 09/30/19  0816 09/29/19  1506 09/27/19  0443 09/26/19  2114   WBC Thousand/uL 7 17 7 48 9 89 13 50*   HEMOGLOBIN g/dL 12 2 12 8 13 7 15 4   HEMATOCRIT % 38 4 39 2 41 4 46 4*   PLATELETS Thousands/uL 234 235 242 306   NEUTROS ABS Thousands/µL 4 44 3 94  --  9 01*     Results from last 7 days   Lab Units 09/30/19  0659 09/29/19  1506 09/28/19  0646 09/27/19  1559 09/27/19  0443   SODIUM mmol/L 139 140 141 139 138   POTASSIUM mmol/L 4 1 4 9 4 2 3 9 2 8*   CHLORIDE mmol/L 105 108 108 103 102   CO2 mmol/L 25 24 28 28 27   ANION GAP mmol/L 9 8 5 8 9   BUN mg/dL 10 8 15 19 21   CREATININE mg/dL 0 89 0 76 0 87 0 99 1 43*   EGFR ml/min/1 73sq m 72 87 74 63 41   CALCIUM mg/dL 8 5 7 9* 7 8* 8 0* 7 8*   MAGNESIUM mg/dL 1 9 1 8 1 9 2 3 1 4*   PHOSPHORUS mg/dL  --  2 9 2 8  --   --      Results from last 7 days   Lab Units 09/29/19  1506 09/26/19  2114   AST U/L 15 25   ALT U/L 21 27   ALK PHOS U/L 79 93   TOTAL PROTEIN g/dL 5 9* 7 3   ALBUMIN g/dL 2 8* 3 4*   TOTAL BILIRUBIN mg/dL 0 50 0 50     Results from last 7 days   Lab Units 09/30/19  0659 09/29/19  1506 09/28/19  0646 09/27/19  1559 09/27/19  0443 09/26/19  2114   GLUCOSE RANDOM mg/dL 109 85 96 120 121 109     Results from last 7 days   Lab Units 09/27/19  1516   PH ART  7 382   PCO2 ART mm Hg 47 5*   PO2 ART mm Hg 65 2*   HCO3 ART mmol/L 27 6   BASE EXC ART mmol/L 1 9   O2 CONTENT ART mL/dL 17 2   O2 HGB, ARTERIAL % 91 4*   ABG SOURCE  Radial, Right     Results from last 7 days   Lab Units 09/30/19  0659 09/29/19  2020 09/29/19  1506 09/27/19  0443 09/27/19  0053   TROPONIN I ng/mL <0 02 <0 02 <0 02 <0 02 <0 02     Results from last 7 days   Lab Units 09/26/19  2114   D DIMER QUANT ng/ml (FEU) 480     Results from last 7 days   Lab Units 09/29/19  1506 09/26/19  2114   PROTIME seconds 10 7 11 1   INR  1 00 1 03   PTT seconds 26 26     Results from last 7 days   Lab Units 09/29/19  1506 09/26/19  2114   NT-PRO BNP pg/mL 2,944* 1,001*     Results from last 7 days   Lab Units 09/27/19  0443   HEP C AB  Non-reactive     Results from last 7 days   Lab Units 09/29/19  1521 09/27/19  0601   CLARITY UA  Slightly Cloudy Clear   COLOR UA  Yellow Yellow   SPEC GRAV UA  1 010 1 010   PH UA  6 0 5 0   GLUCOSE UA mg/dl Negative Negative   KETONES UA mg/dl Negative Negative   BLOOD UA  Negative Negative   PROTEIN UA mg/dl Negative Negative   NITRITE UA  Positive* Negative   BILIRUBIN UA  Negative Negative   UROBILINOGEN UA E U /dl 0 2 0 2   LEUKOCYTES UA  Small* Negative   WBC UA /hpf 10-20* 0-1*   RBC UA /hpf 1-2* None Seen   BACTERIA UA /hpf Innumerable* Occasional   EPITHELIAL CELLS WET PREP /hpf Occasional Occasional     Results from last 7 days   Lab Units 09/29/19  1733   AMPH/METH  Negative   BARBITURATE UR  Positive*   BENZODIAZEPINE UR  Positive*   COCAINE UR  Negative   METHADONE URINE  Negative   OPIATE UR  Negative   PCP UR  Negative   THC UR  Negative     Results from last 7 days   Lab Units 09/29/19 2020   ETHANOL LVL mg/dL <3     Results from last 7 days   Lab Units 09/29/19  1521   URINE CULTURE  >100,000 cfu/ml Gram Negative Mauricio*     Vital Signs: /59 (BP Location: Right arm)   Pulse 72   Temp 97 5 °F (36 4 °C) (Tympanic)   Resp 20   Wt 85 5 kg (188 lb 7 9 oz)   LMP 09/04/2009 (Within Days) Comment: per patient   SpO2 94%   BMI 32 35 kg/m²    Intake/Output Summary (Last 24 hours) at 9/30/2019 1019  Last data filed at 9/30/2019 0401      Gross per 24 hour   Intake 850 ml   Output 2300 ml Net -1450 ml     Medications:   acetaminophen 975 mg Oral Q8H PRN   aspirin 81 mg Oral Daily   atorvastatin 80 mg Oral Daily   cefTRIAXone 1,000 mg Intravenous Q24H   DULoxetine 90 mg Oral HS   enoxaparin 40 mg Subcutaneous Daily   fenofibrate 145 mg Oral Daily   fluticasone-vilanterol 1 puff Inhalation Daily   furosemide 10 mg Intravenous Once   gabapentin 300 mg Oral TID   influenza vaccine 0 5 mL Intramuscular Prior to discharge   levETIRAcetam 500 mg Oral Q12H [address]   magnesium hydroxide 30 mL Oral Daily PRN   metoprolol tartrate 125 mg Oral Q12H [address]   nicotine 1 patch Transdermal Daily   ondansetron 4 mg Intravenous Q6H PRN   pantoprazole 40 mg Oral Daily   polyethylene glycol 17 g Oral Daily   spironolactone 25 mg Oral Daily   tiotropium 18 mcg Inhalation Daily   traMADol 50 mg Oral Once PRN   traZODone 100 mg Oral HS     Discharge Plan: TBD  Network Utilization Review Department  Phone: [phone]; Fax [phone]  [email or web address]  ATTENTION: Please call with any questions or concerns to [phone] and carefully listen to the prompts so that you are directed to the right person  Send all requests for admission clinical reviews, approved or denied determinations and any other requests to fax [phone]   All voicemails are confidential

## 2019-09-30 NOTE — SOCIAL WORK
Pt is current LOS 1  Is not a current bundle  Is a 30 day readmission for the same reason, chf   Pt feels she is compliant with meds including weighing herself  States she felt short of breath and came to the ED  Per cardiology, concerned if pt is following a cardiac diet  They will discuss with pt  Pt resides with her boyfriend [employer]  Uses cane for ambulation, but has a RW and w/c if needed  Home resides in a multi-level home with 5 steps to enter and 15 steps to the second floor  Has bathroom on the first floor and two upstairs  Was open to [facility] in the past, but is unsure which agency  Went to [facility] in the past   [facility] [place] for meds  [facility] provides transport for her or she uses [facility]  Explained role of cm, no d/c needs anticipated  CM reviewed d/c planning process including the following: identifying help at home, patient preference for d/c planning needs, and availability of the treatment team to discuss questions or concerns patient and/or family may have regarding understanding of medications and recognizing signs and symptoms once discharged  CM also encouraged patient to follow up with all recommended appointments after discharge  Patient advised of importance for patient and family to participate in managing patient's medical well being

## 2019-09-30 NOTE — ASSESSMENT & PLAN NOTE
Blood pressure initially 98/59 on presentation to ED  Had received 500 cc bolus in squad en route to hospital   Most current blood pressure 115/59  Vital signs as per unit routine  Monitor closely

## 2019-09-30 NOTE — PLAN OF CARE
Problem: Potential for Falls  Goal: Patient will remain free of falls  Description  INTERVENTIONS:  - Assess patient frequently for physical needs  -  Identify cognitive and physical deficits and behaviors that affect risk of falls    -  [place] fall precautions as indicated by assessment   - Educate patient/family on patient safety including physical limitations  - Instruct patient to call for assistance with activity based on assessment  - Modify environment to reduce risk of injury  - Consider OT/PT consult to assist with strengthening/mobility  Outcome: Progressing     Problem: CARDIOVASCULAR - ADULT  Goal: Maintains optimal cardiac output and hemodynamic stability  Description  INTERVENTIONS:  - Monitor I/O, vital signs and rhythm  - Monitor for S/S and trends of decreased cardiac output  - Administer and titrate ordered vasoactive medications to optimize hemodynamic stability  - Assess quality of pulses, skin color and temperature  - Assess for signs of decreased coronary artery perfusion  - Instruct patient to report change in severity of symptoms  Outcome: Progressing  Goal: Absence of cardiac dysrhythmias or at baseline rhythm  Description  INTERVENTIONS:  - Continuous cardiac monitoring, vital signs, obtain 12 lead EKG if ordered  - Administer antiarrhythmic and heart rate control medications as ordered  - Monitor electrolytes and administer replacement therapy as ordered  Outcome: Progressing     Problem: GENITOURINARY - ADULT  Goal: Maintains or returns to baseline urinary function  Description  INTERVENTIONS:  - Assess urinary function  - Encourage oral fluids to ensure adequate hydration if ordered  - Administer IV fluids as ordered to ensure adequate hydration  - Administer ordered medications as needed  - Offer frequent toileting  - Follow urinary retention protocol if ordered  Outcome: Progressing  Goal: Absence of urinary retention  Description  INTERVENTIONS:  - Assess patients ability to void and empty bladder  - Monitor I/O  - Bladder scan as needed  - Discuss with physician/AP medications to alleviate retention as needed  - Discuss catheterization for long term situations as appropriate  Outcome: Progressing  Goal: Urinary catheter remains patent  Description  INTERVENTIONS:  - Assess patency of urinary catheter  - If patient has a chronic lemon, consider changing catheter if non-functioning  - Follow guidelines for intermittent irrigation of non-functioning urinary catheter  Outcome: Progressing     Problem: PAIN - ADULT  Goal: Verbalizes/displays adequate comfort level or baseline comfort level  Description  Interventions:  - Encourage patient to monitor pain and request assistance  - Assess pain using appropriate pain scale  - Administer analgesics based on type and severity of pain and evaluate response  - Implement non-pharmacological measures as appropriate and evaluate response  - Consider cultural and social influences on pain and pain management  - Notify physician/advanced practitioner if interventions unsuccessful or patient reports new pain  Outcome: Progressing     Problem: INFECTION - ADULT  Goal: Absence or prevention of progression during hospitalization  Description  INTERVENTIONS:  - Assess and monitor for signs and symptoms of infection  - Monitor lab/diagnostic results  - Monitor all insertion sites, i e  indwelling lines, tubes, and drains  - Monitor endotracheal if appropriate and nasal secretions for changes in amount and color  - [place] appropriate cooling/warming therapies per order  - Administer medications as ordered  - Instruct and encourage patient and family to use good hand hygiene technique  - Identify and instruct in appropriate isolation precautions for identified infection/condition  Outcome: Progressing  Goal: Absence of fever/infection during neutropenic period  Description  INTERVENTIONS:  - Monitor WBC    Outcome: Progressing     Problem: SAFETY ADULT  Goal: Maintain or return to baseline ADL function  Description  INTERVENTIONS:  -  Assess patient's ability to carry out ADLs; assess patient's baseline for ADL function and identify physical deficits which impact ability to perform ADLs (bathing, care of mouth/teeth, toileting, grooming, dressing, etc )  - Assess/evaluate cause of self-care deficits   - Assess range of motion  - Assess patient's mobility; develop plan if impaired  - Assess patient's need for assistive devices and provide as appropriate  - Encourage maximum independence but intervene and supervise when necessary  - Involve family in performance of ADLs  - Assess for home care needs following discharge   - Consider OT consult to assist with ADL evaluation and planning for discharge  - Provide patient education as appropriate  Outcome: Progressing  Goal: Maintain or return mobility status to optimal level  Description  INTERVENTIONS:  - Assess patient's baseline mobility status (ambulation, transfers, stairs, etc )    - Identify cognitive and physical deficits and behaviors that affect mobility  - Identify mobility aids required to assist with transfers and/or ambulation (gait belt, sit-to-stand, lift, walker, cane, etc )  - [place] fall precautions as indicated by assessment  - Record patient progress and toleration of activity level on Mobility SBAR; progress patient to next Phase/Stage  - Instruct patient to call for assistance with activity based on assessment  - Consider rehabilitation consult to assist with strengthening/weightbearing, etc   Outcome: Progressing     Problem: DISCHARGE PLANNING  Goal: Discharge to home or other facility with appropriate resources  Description  INTERVENTIONS:  - Identify barriers to discharge w/patient and caregiver  - Arrange for needed discharge resources and transportation as appropriate  - Identify discharge learning needs (meds, wound care, etc )  - Arrange for interpretive services to assist at discharge as needed  - Refer to Case Management Department for coordinating discharge planning if the patient needs post-hospital services based on physician/advanced practitioner order or complex needs related to functional status, cognitive ability, or social support system  Outcome: Progressing     Problem: Knowledge Deficit  Goal: Patient/family/caregiver demonstrates understanding of disease process, treatment plan, medications, and discharge instructions  Description  Complete learning assessment and assess knowledge base    Interventions:  - Provide teaching at level of understanding  - Provide teaching via preferred learning methods  Outcome: Progressing

## 2019-09-30 NOTE — CONSULTS
Consultation - Cardiology   [facility]     [facility] 63 y o  female MRN: [id number]  : 1960  Unit/Bed#: [address] [place] [address] Encounter: [id number]      Assessment & Plan     1  Shortness of breath  Multifactorial   Possible etiology could be exacerbation of COPD/chronic bronchitis and diastolic heart failure  2  Acute on chronic diastolic heart failure with underlying severe concentric LVH  Echo Doppler done here as well as at [facility] reviewed  Echo here shows hyperdynamic LV with severe LVH and has intracardiac gradient of around 30-36 mmHg  Patient has grade 2 diastolic dysfunction with elevated left atrial pressure and markedly dilated left atrium  3  COPD/bronchitis with possible mild exacerbation, abnormal PFT results noted at [address] [place] [address] through care everywhere    4  Essential hypertension  Currently acceptable  Patient has earlier decreased blood pressure most likely due to over diuresis    5  Moderate pulmonary hypertension  Echo from [place] reviewed    6  Coronary artery disease nonobstructive with recent nonischemic nuclear    7  Dyslipidemia with  With patient on high-intensity statin and gemfibrozil    8  History of psych problem and seizure disorder on multiple medications    9  Continues tobacco abuse      Summary of Recommendations:        Monitor on tele  Strict input and daily weights  P r n  Lasix, avoid heavy doses  Agree with continuing Aldactone  Increase metoprolol to 125 mg twice a day to keep heart rate around 60 beats per minute  Discontinue losartan  Continue statins  Since patient has multiple seizure medications and gemfibrozil have so many interaction will discontinue and start on tricor  Follow-up electrolytes    Thank you for your consultation      Physician Requesting Consult: Christiano Holt DO    Reason for Consult / Principal Problem:  Shortness of breath and leg edema    Inpatient consult to Cardiology  Consult performed by: Kimberley Durand MD  Consult ordered by: Shelly Alatorre DO        HPI: Kym Castañeda is a 63y o  year old female who presents with shortness of breath and leg swelling  Patient who was recently discharged from [facility] with similar complaints has known past medical history significant for grade 2 diastolic dysfunction, severe concentric LVH, moderate pulmonary hypertension with last EF around 50 mmHg, possible seizure disorder and psych problem on multiple medications, dyslipidemia, nonobstructive CAD by catheterization and nonischemic nuclear came back with complaints about shortness of breath and leg swelling  Patient also has been a smoker and known to have COPD  She has been smoker for about 40 years  As she went home she did not take her medications as she with her stepmother in her prescriptions were at her home  Yesterday she felt that her legs are swelling more and she could not breathe and she had a cough with expectoration and she came to ED  She was just admitted 3 days ago with similar complaints and was found to be hypokalemic  In the ED she was given IV Lasix and then she became hypotensive  Her echo reviewed  She has around 30 mm intracardiac gradient gradient along with grade 2 diastolic dysfunction and dilated left atrium and large left atrial volume  She is feeling little bit better  She was found positive for benzodiazepine as well as barbiturates  She did take Xanax  She has been multiple other psych medications  Leg edema has improved  No nausea no vomiting no fever no chills  She has cough with expectoration  Review of Systems   Constitutional: Positive for activity change  Negative for chills, diaphoresis, fever and unexpected weight change  HENT: Negative for congestion  Eyes: Negative for discharge and redness  Respiratory: Positive for cough, shortness of breath and wheezing  Negative for chest tightness      Cardiovascular: Positive for leg swelling  Negative for chest pain and palpitations  Gastrointestinal: Negative for abdominal pain, diarrhea and nausea  Endocrine: Negative  Genitourinary: Negative for decreased urine volume and urgency  Musculoskeletal: Negative  Negative for arthralgias, back pain and gait problem  Skin: Negative for rash and wound  Allergic/Immunologic: Negative  Neurological: Negative for dizziness, seizures, syncope, weakness, light-headedness and headaches  Hematological: Negative  Psychiatric/Behavioral: Positive for self-injury  Negative for agitation and confusion  The patient is not nervous/anxious          Historical Information   Past Medical History:   Diagnosis Date    Alopecia     Back injury     Bell's palsy     CHF (congestive heart failure) ([facility])     Chronic pain     back    Closed left arm fracture     Fibromyalgia     Fibromyalgia, primary     GERD (gastroesophageal reflux disease)     Hyperlipidemia     Hypertension     Lyme disease     [facility]ct [facility]         [facility]     Cardiac catheterization 2 years ago showed 30% blockage in 1 of the blood vessels    Stroke [facility]     TIA (transient ischemic attack)     2017     Past Surgical History:   Procedure Laterality Date     SECTION      CHOLECYSTECTOMY      CORONARY ANGIOPLASTY          ORIF TIBIA FRACTURE Left 2018    Procedure: OPEN REDUCTION W/ INTERNAL FIXATION (ORIF) TIBIA FRACTURE;  Surgeon: Navin Arevalo MD;  Location: [facility];  Service: Orthopedics     Social History     Substance and Sexual Activity   Alcohol Use Not Currently    Comment: as per patient; former social drinker      Social History     Substance and Sexual Activity   Drug Use No     Social History     Tobacco Use   Smoking Status Current Every Day Smoker    Packs/day: 0 50    Years: 40 00    Pack years: 20 00   Smokeless Tobacco Never Used   Tobacco Comment    1/2 per day Family History:   Family History   Problem Relation Age of Onset    Heart attack Mother     Heart attack Father        Meds/Allergies    PTA meds:    Medications Prior to Admission   Medication    ARIPiprazole (ABILIFY) 5 mg tablet    aspirin (ECOTRIN LOW STRENGTH) 81 mg EC tablet    atorvastatin (LIPITOR) 80 mg tablet    DULoxetine HCl (CYMBALTA PO)    fluticasone-salmeterol (AIRDUO RESPICLICK) 629-93 mcg/act dry powder inhaler    gabapentin (NEURONTIN) 600 MG tablet    gemfibrozil (LOPID) 600 mg tablet    levETIRAcetam (KEPPRA) 500 mg tablet    losartan (COZAAR) 25 mg tablet    metoprolol tartrate (LOPRESSOR) 25 mg tablet    nicotine (NICODERM CQ) 14 mg/24hr TD 24 hr patch    pantoprazole (PROTONIX) 40 mg tablet    spironolactone (ALDACTONE) 25 mg tablet    Tiotropium Bromide Monohydrate (SPIRIVA HANDIHALER IN)    traZODone (DESYREL) 100 mg tablet    acetaminophen (TYLENOL) 325 mg tablet    magnesium hydroxide (MILK OF MAGNESIA) 400 mg/5 mL oral suspension    polyethylene glycol (MIRALAX) 17 g packet      Allergies   Allergen Reactions    Hydromorphone Shortness Of Breath    Morphine Shortness Of Breath       Current Facility-Administered Medications:     acetaminophen (TYLENOL) tablet 975 mg, 975 mg, Oral, Q8H PRN, Olivia Richards, DAJUANNP    aspirin (ECOTRIN LOW STRENGTH) EC tablet 81 mg, 81 mg, Oral, Daily, Olivia Garciaing, CRNP    atorvastatin (LIPITOR) tablet 80 mg, 80 mg, Oral, Daily, Olivia Richards, DAJUANNP    cefTRIAXone (ROCEPHIN) IVPB (premix) 1,000 mg, 1,000 mg, Intravenous, Q24H, Kia Zhu DO    DULoxetine (CYMBALTA) delayed release capsule 90 mg, 90 mg, Oral, HS, Olivia Richards, DAJUANNP, 90 mg at 09/29/19 2232    enoxaparin (LOVENOX) subcutaneous injection 40 mg, 40 mg, Subcutaneous, Daily, Olivia Garciaing, DAJUANNP    fenofibrate (TRICOR) tablet 145 mg, 145 mg, Oral, Daily, Lana Tobar, CRNP    fluticasone-vilanterol (BREO ELLIPTA) 100-25 mcg/inh inhaler 1 puff, 1 puff, Inhalation, Daily, Olivia Radhaing, CRNP    gabapentin (NEURONTIN) capsule 300 mg, 300 mg, Oral, TID, Olivia Radhaing, CRNP, 300 mg at 09/29/19 2138    influenza vaccine, recombinant, quadrivalent (FLUBLOK) IM injection 0 5 mL, 0 5 mL, Intramuscular, Prior to discharge, Olivia Radhaing, CRNP    levETIRAcetam (KEPPRA) tablet 500 mg, 500 mg, Oral, Q12H [address], Olivia Garciaing, CRNP, 500 mg at 09/29/19 2138    magnesium hydroxide (MILK OF MAGNESIA) 400 mg/5 mL oral suspension 30 mL, 30 mL, Oral, Daily PRN, Olivia Radhaing, DAJUANNP    metoprolol tartrate (LOPRESSOR) tablet 125 mg, 125 mg, Oral, Q12H [address], Lana Tobar, CRNP    nicotine (NICODERM CQ) 14 mg/24hr TD 24 hr patch 1 patch, 1 patch, Transdermal, Daily, Olivia Radhaing, DAJUANNP    ondansetron [facility]) injection 4 mg, 4 mg, Intravenous, Q6H PRN, Olivia Radhaing, CRNP    pantoprazole (PROTONIX) EC tablet 40 mg, 40 mg, Oral, Daily, Olivia [facility], CRNP    polyethylene glycol (MIRALAX) packet 17 g, 17 g, Oral, Daily, Olivia [facility], CRNP    spironolactone (ALDACTONE) tablet 25 mg, 25 mg, Oral, Daily, Olivia [facility], CRNP    tiotropium [facility]) capsule for inhaler 18 mcg, 18 mcg, Inhalation, Daily, Olivia [facility], CRNP    traMADol Julita Guarneri) tablet 50 mg, 50 mg, Oral, Once PRN, John Hood MD    traZODone (DESYREL) tablet 100 mg, 100 mg, Oral, HS, Olivia Radhaing, CRNP, 100 mg at 09/29/19 2232    VTE Pharmacologic Prophylaxis:   Lovenox    Objective:   Vitals: Blood pressure 115/59, pulse 72, temperature 97 5 °F (36 4 °C), temperature source Tympanic, resp  rate 20, weight 85 5 kg (188 lb 7 9 oz), last menstrual period 09/04/2009, SpO2 94 %, not currently breastfeeding  Body mass index is 32 35 kg/m²    BP Readings from Last 3 Encounters:   09/30/19 115/59   09/28/19 109/67   08/21/19 115/79     Orthostatic Blood Pressures      Most Recent Value   Blood Pressure 115/59 filed at 09/30/2019 2351   Patient Position - Orthostatic VS  Lying filed at 09/30/2019 7334          Intake/Output Summary (Last 24 hours) at 9/30/2019 1019  Last data filed at 9/30/2019 0401  Gross per 24 hour   Intake 850 ml   Output 2300 ml   Net -1450 ml       Invasive Devices     Peripheral Intravenous Line            Peripheral IV 09/29/19 Right Wrist 1 day                  Physical Exam:   Physical Exam    Neurologic:  Alert & oriented x 3, no new focal deficits, Not in any acute distress,  Constitutional:  Well developed, well nourished, non-toxic appearance   Eyes:  Pupil equal and reacting to light, conjunctiva normal   HENT:  Atraumatic, oropharynx moist, Neck- normal range of motion, no tenderness, supple   Respiratory:  Bilateral air entry, with coarse breath sounds and rhonchi  Cardiovascular:  S1-S2 regular with a 2/6 ejection systolic murmur S4 present  GI:  Soft, nondistended, normal bowel sounds, nontender, no hepatosplenomegaly appreciated  Musculoskeletal:  No edema, no tenderness, no deformities     Skin:  Well hydrated, no rash   Lymphatic:  No lymphadenopathy noted   Extremities:  Mild edema and distal pulses are present    Labs:   Troponins:   Results from last 7 days   Lab Units 09/30/19  0659 09/29/19  2020 09/29/19  1506   TROPONIN I ng/mL <0 02 <0 02 <0 02       CBC with diff:   Results from last 7 days   Lab Units 09/30/19  0816 09/29/19  1506 09/27/19  0443 09/26/19  2114   WBC Thousand/uL 7 17 7 48 9 89 13 50*   HEMOGLOBIN g/dL 12 2 12 8 13 7 15 4   HEMATOCRIT % 38 4 39 2 41 4 46 4*   MCV fL 96 96 94 92   PLATELETS Thousands/uL 234 235 242 306   MCH pg 30 6 31 3 31 1 30 6   MCHC g/dL 31 8 32 7 33 1 33 2   RDW % 14 2 14 9 13 9 13 8   MPV fL 10 4 10 7 10 0 9 8   NRBC AUTO /100 WBCs 0  --   --  0       CMP:   Results from last 7 days   Lab Units 09/30/19  0659 09/29/19  1506 09/28/19  0646 09/27/19  1559 09/27/19  0443 09/26/19  2114   SODIUM mmol/L 139 140 141 139 138 137 POTASSIUM mmol/L 4 1 4 9 4 2 3 9 2 8* 4 8   CHLORIDE mmol/L 105 108 108 103 102 102   CO2 mmol/L 25 24 28 28 27 27   ANION GAP mmol/L 9 8 5 8 9 8   BUN mg/dL 10 8 15 19 21 15   CREATININE mg/dL 0 89 0 76 0 87 0 99 1 43* 0 96   CALCIUM mg/dL 8 5 7 9* 7 8* 8 0* 7 8* 8 4   AST U/L  --  15  --   --   --  25   ALT U/L  --  21  --   --   --  27   ALK PHOS U/L  --  79  --   --   --  93   TOTAL PROTEIN g/dL  --  5 9*  --   --   --  7 3   ALBUMIN g/dL  --  2 8*  --   --   --  3 4*   TOTAL BILIRUBIN mg/dL  --  0 50  --   --   --  0 50   EGFR ml/min/1 73sq m 72 87 74 63 41 66   GLUCOSE RANDOM mg/dL 109 85 96 120 121 109       Magnesium:   Results from last 7 days   Lab Units 19  0659 19  1506 19  0646 19  1559 19  0443   MAGNESIUM mg/dL 1 9 1 8 1 9 2 3 1 4*     Coags:   Results from last 7 days   Lab Units 19  1506 19  2114   PTT seconds 26 26   INR  1 00 1 03     TSH:      Lipid Profile:   Results from last 7 days   Lab Units 19  0443   CHOLESTEROL mg/dL 192   TRIGLYCERIDES mg/dL 220*   HDL mg/dL 28*   LDL CALC mg/dL 120*     Lipid Profile:   Lab Results   Component Value Date    CHOLESTEROL 192 2019    HDL 28 (L) 2019    LDLCALC 120 (H) 2019    TRIG 220 (H) 2019     NT-proBNP:   Recent Labs     19  1506   NTBNP 2,944*        Imaging & Testing   Cardiac testing:   Results for orders placed during the hospital encounter of 19   Echo complete with contrast if indicated    [address]  [address] [place] [address]  [phone]    Transthoracic Echocardiogram  2D, M-mode, Doppler, and Color Doppler    Study date:  27-Sep-2019    Patient: Jacob Silva  MR number: [id number]  Account number: [de-identified]  : 1960  Age: 63 years  Gender: Female  Status: Outpatient  Location: Bedside  Height: 64 in  Weight: 159 7 lb  BP: 91/ 54 mmHg    Indications: SOB, Edema    Diagnoses: R06 00 - Dyspnea, unspecified    Sonographer:  MASON Osorio  Primary Physician:  Paradise Medrano DO  Group:  [facility]  Interpreting Physician:  Ulices Tejada DO    SUMMARY    LEFT VENTRICLE:  Systolic function was normal by visual assessment  Ejection fraction was estimated to be 55 %  There were no regional wall motion abnormalities  There was severe concentric hypertrophy  There was a dynamic obstruction of the LVOT with peak velocity of 3 m/s with Valsalva  Doppler parameters were consistent with elevated mean left atrial filling pressure  LEFT ATRIUM:  The atrium was markedly dilated  MITRAL VALVE:  There was mild regurgitation  AORTIC VALVE:  There was no evidence for stenosis  There was no regurgitation  PULMONIC VALVE:  There was trace regurgitation  HISTORY: PRIOR HISTORY: Alopecia, Bell's palsy, CHF,Fibromyalgia,Gastroesophageal Reflux Disease,Hyperlipidemia,HTN,Lyme Disease,MI, Stroke,TIA  PROCEDURE: The procedure was performed at the bedside  This was a routine study  The transthoracic approach was used  The study included complete 2D imaging, M-mode, complete spectral Doppler, and color Doppler  The heart rate was 74 bpm,  at the start of the study  LEFT VENTRICLE: Size was normal  Systolic function was normal by visual assessment  Ejection fraction was estimated to be 55 %  There were no regional wall motion abnormalities  There was severe concentric hypertrophy  There was a dynamic  obstruction of the LVOT with peak velocity of 3 m/s with Valsalva  DOPPLER: Doppler parameters were consistent with elevated mean left atrial filling pressure  RIGHT VENTRICLE: The size was normal  Systolic function was normal  DOPPLER: Systolic pressure was within the normal range  LEFT ATRIUM: The atrium was markedly dilated  RIGHT ATRIUM: Size was normal     MITRAL VALVE: Valve structure was normal  There was normal leaflet separation   No echocardiographic evidence for prolapse  DOPPLER: The transmitral velocity was within the normal range  There was no evidence for stenosis  There was mild  regurgitation  AORTIC VALVE: The valve was trileaflet  Leaflets exhibited normal thickness and normal cuspal separation  DOPPLER: Transaortic velocity was within the normal range  There was no evidence for stenosis  There was no regurgitation  TRICUSPID VALVE: The valve structure was normal  There was normal leaflet separation  DOPPLER: The transtricuspid velocity was within the normal range  There was no regurgitation  PULMONIC VALVE: Leaflets exhibited normal thickness, no calcification, and normal cuspal separation  DOPPLER: The transpulmonic velocity was within the normal range  There was trace regurgitation  PERICARDIUM: There was no thickening  There was no pericardial effusion  AORTA: The root exhibited normal size  PULMONARY ARTERY: The size was normal  The morphology appeared normal     SYSTEM MEASUREMENT TABLES    2D mode  AoR Diam 2D: 2 7 cm  LA Diam (2D): 4 4 cm  LA/Ao (2D): 1 63  FS (2D Teich): 30 %  IVSd (2D): 1 53 cm  LVDEV: 67 1 cmï¾³  LVESV: 28 3 cmï¾³  LVIDd(2D): 3 93 cm  LVISd (2D): 2 75 cm  LVOT Area 2D: 3 14 cmï¾²  LVPWd (2D): 1 49 cm  SV (Teich): 38 8 cmï¾³    Apical four chamber  LVEF A4C: 58 %    Unspecified Scan Mode  STEFFI Cont Eq (Peak Candido): 2 42 cmï¾²  LVOT Diam : 2 cm  LVOT Vmax: 1500 mm/s  LVOT Vmax; Mean: 1500 mm/s  Peak Grad ; Mean: 9 mm[Hg]  MV Peak A Candido: 874 mm/s  MV Peak E Candido   Mean: 1260 mm/s  MVA (PHT): 4 31 cmï¾²  PHT: 51 ms  RA Area: 14 9 cmï¾²  RA Volume: 28 8 cmï¾³  TAPSE: 2 cm    [employer] Accredited Echocardiography Laboratory    Prepared and electronically signed by    Bryanna Burk DO  Signed 27-Sep-2019 14:16:03         Results for orders placed during the hospital encounter of 07/25/18   NM myocardial perfusion spect (stress and/or rest)    Rashad [address]  [address] [facility] [address]  [phone]    Rest/Stress Gated SPECT Myocardial Perfusion Imaging After Regadenoson    Patient: Taye Singh  MR number: [id number]  Account number: [de-identified]  : 1960  Age: 65 years  Gender: Female  Status: Inpatient  Location: Stress lab  Height: 65 in  Weight: 179 lb  BP: 116/ 66 mmHg    Allergies: HYDROMORPHONE    Diagnosis: R06 00 - Dyspnea, unspecified, R07 9 - Chest pain, unspecified    Primary Physician:  Nelly Veloz DO  Technician:  Kailey Schroeder  RN:  MORRIS Noble  Group:  Manuel Rasheed  Report Prepared By[de-identified]  MORRIS Noble  Interpreting Physician:  Nadege Miller MD    INDICATIONS: Evaluation of known coronary artery disease  HISTORY: The patient is a 65year old  female  Chest pain status: chest pain  Other symptoms: dyspnea  Coronary artery disease risk factors: dyslipidemia, hypertension, and family history of premature coronary artery disease  Cardiovascular history: prior myocardial infarction, stroke, and transient ischemic attack  Prior cardiovascular procedures: cardiac angiogram  Co-morbidity: obesity, lyme disease  Medications: a beta blocker, a diuretic, clopidogrel, a  lipid lowering agent, and an antihypertensive agent  PHYSICAL EXAM: Baseline physical exam screening: no wheezes audible  REST ECG: Normal sinus rhythm  PROCEDURE: The study was performed in the stress lab  A regadenoson infusion pharmacologic stress test was performed  Gated SPECT myocardial perfusion imaging was performed after stress and at rest  Systolic blood pressure was 116 mmHg, at  the start of the study  Diastolic blood pressure was 66 mmHg, at the start of the study  The heart rate was 68 bpm, at the start of the study  IV double checked    Regadenoson protocol:  Time HR bpm SBP mmHg DBP mmHg Symptoms Rhythm/conduct  Baseline 09:09 68 116 66 none NSR  Immediate 09:11 78 128 74 mild dyspnea same as above  1 min 09:12 87 156 76 same as above --  2 min 09:13 84 145 68 subsiding --  3 min 09:14 82 138 70 none --  4 min 09:15 78 130 68 none --  No medications or fluids given  STRESS SUMMARY: Duration of pharmacologic stress was 3 min  Maximal heart rate during stress was 108 bpm  The heart rate response to stress was normal  There was normal resting blood pressure with an appropriate response to stress  The  rate-pressure product for the peak heart rate and blood pressure was 15537  There was no chest pain during stress  The stress test was terminated due to protocol completion  Pre oxygen saturation: 98 %  Peak oxygen saturation: 98 %  The  stress ECG was equivocal for ischemia  ISOTOPE ADMINISTRATION:  Resting isotope administration Stress isotope administration  Agent Tetrofosmin Tetrofosmin  Dose 10 6 mCi 30 1 mCi  Date 07/27/2018 07/27/2018  Injection time 07:45 09:12    The radiopharmaceutical was injected at the peak effect of pharmacologic stress  MYOCARDIAL PERFUSION IMAGING:  The image quality was excellent  Left ventricular size was normal  The TID ratio was 1 2  PERFUSION DEFECTS:  -  There were no perfusion defects  GATED SPECT:  The calculated left ventricular ejection fraction was 67 %  There was no left ventricular regional abnormality  SUMMARY:  -  Stress results: There was no chest pain during stress  -  ECG conclusions: The stress ECG was equivocal for ischemia  -  Perfusion imaging: There were no perfusion defects   -  Gated SPECT: The calculated left ventricular ejection fraction was 67 %  There was no left ventricular regional abnormality   -  Impressions and recommendations: Likely normal study after pharmacologic vasodilation  Minimal transient dilation noted- likely physiologic  Normal EF noted  IMPRESSIONS: Likely normal study after pharmacologic vasodilation  Minimal transient dilation noted- likely physiologic  Normal EF noted  Myocardial perfusion imaging was normal at rest and with stress      Prepared and signed by    Otis Boogie Jody Sandoval MD  Signed 07/27/2018 16:10:53         Imaging: I have personally reviewed pertinent reports  Xr Chest 1 View Portable    Result Date: 9/30/2019  Narrative: CHEST INDICATION:   sob  COMPARISON:  9/26/2019 EXAM PERFORMED/VIEWS:  XR CHEST PORTABLE  AP semierect Images: 1 FINDINGS: Heart shadow is enlarged but unchanged from prior exam  Mild vascular congestion with small left pleural effusion now noted  Osseous structures appear within normal limits for patient age  Impression: Mild vascular congestion and small left pleural effusion concerning for early CHF  Workstation performed: [id number]     Xr Chest 1 View Portable    Result Date: 9/27/2019  Narrative: CHEST INDICATION:   cp  COMPARISON:  09/11/2018 EXAM PERFORMED/VIEWS:  XR CHEST PORTABLE 1 image FINDINGS: Cardiomediastinal silhouette appears enlarged  The pulmonary vessels are normal  The lungs are clear  No pneumothorax or pleural effusion  Osseous structures appear within normal limits for patient age  Calcific tendinitis in the right shoulder  Impression: No acute cardiopulmonary disease  Cardiomegaly  Workstation performed: [id number]     EKG/ Monitor: Personally reviewed  Normal sinus rhythm LVH by voltage  Code Status: Level 1 - Full Code  Advance Directive and Living Will:      POLST:        Ventura Weber MD MD, [facility]      "This note has been constructed using a voice recognition system  Therefore there may be syntax, spelling, and/or grammatical errors   Please call if you have any questions  "

## 2019-09-30 NOTE — CONSULTS
Consultation - Cardiology   [facility] 63 y o  female MRN: [id number]  Unit/Bed#: [address] [place] [address] Encounter: [id number]    Assessment/Plan     Assessment:    Plan:    History of Present Illness   Physician Requesting Consult: Willa Preciado DO  Reason for Consult / Principal Problem:   HPI: [facility] is a 63y o  year old female who presents with     Inpatient consult to Cardiology  Consult performed by: NICKY Barnett  Consult ordered by: Willa Preciado DO          Review of Systems   Constitutional: Negative  Negative for activity change, fatigue and fever  HENT: Negative  Negative for congestion, ear pain, nosebleeds, sinus pain, tinnitus and trouble swallowing  Eyes: Negative  Negative for photophobia and visual disturbance  Respiratory: Positive for shortness of breath  Negative for chest tightness  Cardiovascular: Positive for leg swelling  Negative for chest pain and palpitations  Gastrointestinal: Negative  Negative for abdominal distention, constipation, diarrhea, nausea and vomiting  Endocrine: Negative  Negative for polydipsia, polyphagia and polyuria  Genitourinary: Negative  Musculoskeletal: Positive for arthralgias, back pain and gait problem  Skin: Negative  Allergic/Immunologic: Negative  Neurological: Negative for dizziness, syncope, numbness and headaches  Hematological: Negative  Psychiatric/Behavioral: Negative          Historical Information   Past Medical History:   Diagnosis Date    Alopecia     Back injury     Bell's palsy     CHF (congestive heart failure) (HCC)     Chronic pain     back    Closed left arm fracture     Fibromyalgia     Fibromyalgia, primary     GERD (gastroesophageal reflux disease)     Hyperlipidemia     Hypertension     Lyme disease     [facility]     2015    My[facility] infarction [facility])     Cardiac catheterization 2 years ago showed 30% blockage in 1 of the blood vessels    Stroke ([facility] )     TIA (transient ischemic attack)     2017     Past Surgical History:   Procedure Laterality Date     SECTION      CHOLECYSTECTOMY      CORONARY ANGIOPLASTY          ORIF TIBIA FRACTURE Left 2018    Procedure: OPEN REDUCTION W/ INTERNAL FIXATION (ORIF) TIBIA FRACTURE;  Surgeon: Tammi Thompson MD;  Location: [address] [place] [address];  Service: Orthopedics     Social History     Substance and Sexual Activity   Alcohol Use Not Currently    Comment: as per patient; former social drinker      Social History     Substance and Sexual Activity   Drug Use No     Social History     Tobacco Use   Smoking Status Current Every Day Smoker    Packs/day: 0 50    Years: 40 00    Pack years: 20 00   Smokeless Tobacco Never Used   Tobacco Comment    1/2 per day     Family History: non-contributory    Meds/Allergies   current meds:   Current Facility-Administered Medications   Medication Dose Route Frequency    acetaminophen (TYLENOL) tablet 975 mg  975 mg Oral Q8H PRN    aspirin (ECOTRIN LOW STRENGTH) EC tablet 81 mg  81 mg Oral Daily    atorvastatin (LIPITOR) tablet 80 mg  80 mg Oral Daily    cefTRIAXone (ROCEPHIN) IVPB (premix) 1,000 mg  1,000 mg Intravenous Q24H    DULoxetine (CYMBALTA) delayed release capsule 90 mg  90 mg Oral HS    enoxaparin (LOVENOX) subcutaneous injection 40 mg  40 mg Subcutaneous Daily    fenofibrate (TRICOR) tablet 145 mg  145 mg Oral Daily    fluticasone-vilanterol (BREO ELLIPTA) 100-25 mcg/inh inhaler 1 puff  1 puff Inhalation Daily    furosemide (LASIX) injection 20 mg  20 mg Intravenous Once    gabapentin (NEURONTIN) capsule 300 mg  300 mg Oral TID    influenza vaccine, recombinant, quadrivalent (FLUBLOK) IM injection 0 5 mL  0 5 mL Intramuscular Prior to discharge    levETIRAcetam (KEPPRA) tablet 500 mg  500 mg Oral Q12H [facility]    magnesium hydroxide (MILK OF MAGNESIA) 400 mg/5 mL oral suspension 30 mL  30 mL Oral Daily PRN    metoprolol tartrate (LOPRESSOR) tablet 125 mg  125 mg Oral Q12H [facility]    nicotine (NICODERM CQ) 14 mg/24hr TD 24 hr patch 1 patch  1 patch Transdermal Daily    ondansetron (ZOFRAN) injection 4 mg  4 mg Intravenous Q6H PRN    pantoprazole (PROTONIX) EC tablet 40 mg  40 mg Oral Daily    polyethylene glycol (MIRALAX) packet 17 g  17 g Oral Daily    spironolactone (ALDACTONE) tablet 25 mg  25 mg Oral Daily    tiotropium (SPIRIVA) capsule for inhaler 18 mcg  18 mcg Inhalation Daily    traMADol (ULTRAM) tablet 50 mg  50 mg Oral Once PRN    traZODone (DESYREL) tablet 100 mg  100 mg Oral HS     Allergies   Allergen Reactions    Hydromorphone Shortness Of Breath    Morphine Shortness Of Breath       Objective   Vitals: Blood pressure 115/59, pulse 72, temperature 97 5 °F (36 4 °C), temperature source Tympanic, resp  rate 20, weight 85 5 kg (188 lb 7 9 oz), last menstrual period 09/04/2009, SpO2 94 %, not currently breastfeeding  Orthostatic Blood Pressures      Most Recent Value   Blood Pressure  115/59 filed at 09/30/2019 8476   Patient Position - Orthostatic VS  Lying filed at 09/30/2019 7124            Intake/Output Summary (Last 24 hours) at 9/30/2019 0858  Last data filed at 9/30/2019 0401  Gross per 24 hour   Intake 850 ml   Output 2300 ml   Net -1450 ml       Invasive Devices     Peripheral Intravenous Line            Peripheral IV 09/29/19 Right Wrist 1 day                Physical Exam   Constitutional: She is oriented to person, place, and time  She appears well-developed and well-nourished  No distress  HENT:   Head: Normocephalic  Right Ear: External ear normal    Left Ear: External ear normal    Eyes: Pupils are equal, round, and reactive to light  Conjunctivae are normal  Right eye exhibits no discharge  Left eye exhibits no discharge  No scleral icterus  Neck: Normal range of motion  Neck supple  No JVD present  No thyromegaly present  Cardiovascular: Normal rate and regular rhythm  Murmur heard    Pulmonary/Chest: Effort normal and breath sounds normal  No respiratory distress  She has no wheezes  She has no rales  Abdominal: Soft  Bowel sounds are normal    Musculoskeletal: She exhibits no edema  Neurological: She is alert and oriented to person, place, and time  Skin: Skin is warm and dry  Capillary refill takes less than 2 seconds  She is not diaphoretic  No erythema  Psychiatric: She has a normal mood and affect  Her behavior is normal  Judgment and thought content normal    Nursing note and vitals reviewed  Lab Results: I have personally reviewed pertinent lab results  Imaging: I have personally reviewed pertinent reports  EKG:   VTE Prophylaxis: Sequential compression device Carlos Bland)     Code Status: Level 1 - Full Code  Advance Directive and Living Will:      Power of :    POLST:      Counseling / Coordination of Care  Total floor / unit time spent today minutes  Greater than 50% of total time was spent with the patient and / or family counseling and / or coordination of care  A description of the counseling / coordination of care: Sylvain Madrigal

## 2019-09-30 NOTE — ASSESSMENT & PLAN NOTE
Continue patient's home medications of Lipitor  Lopid discontinued by Cardiology as this medication has many interactions with other medications, including patient's anti seizure medication  Patient placed on Tricor by cardiology  Heart healthy diet

## 2019-09-30 NOTE — NURSING NOTE
Patient's B/P noted to be 90/54  MD Tiffany Wilder notified  Albumin administered as ordered  B/P noted to be 102/59 after administration  Will continue to monitor

## 2019-09-30 NOTE — UTILIZATION REVIEW
Initial Clinical Review    Admission: Date/Time/Statement: Inpatient Admission Orders (From admission, onward)     Ordered        09/29/19 1607  Inpatient Admission  Once                   Orders Placed This Encounter   Procedures    Inpatient Admission     Standing Status:   Standing     Number of Occurrences:   1     Order Specific Question:   Admitting Physician     Answer:   Mike Robert [[id number]]     Order Specific Question:   Level of Care     Answer:   Med Surg [[id number]]     Order Specific Question:   Estimated length of stay     Answer:   More than 2 Midnights     Order Specific Question:   Certification     Answer:   I certify that inpatient services are medically necessary for this patient for a duration of greater than two midnights  See H&P and MD Progress Notes for additional information about the patient's course of treatment  ED Arrival Information     Expected Arrival Acuity Means of Arrival Escorted By Service Admission Type    - 9/29/2019 14:33 Emergent Ambulance [address] Emergency    Arrival Complaint    Hypotension        Chief Complaint   Patient presents with    Shortness of Breath     Pt arrived via squad  Was discharged yesterday after CHF and has felt weak and SOB  Assessment/Plan:   62 YO FEMALE TO ER VIA EMS FOR INCREASING SOB & PEDAL EDEMA  HX TIA, SEIZURES, CHF, FIBROMYALGIA, HTN, HLD, ANXIETY  RECENTLY ADMITTED FOR CHF EXACERBATION, D/C'D HOME N ALDACTONE, HOWEVER DID NOT TAKE IT YET  PRESENTS SOB & HYPOXIC WITH O2 SAT @ 89% RA, BIBASILAR RALES, BLE PITTING EDEMA  ADMISSION WORK-UP SHOWING ELEVATED BNP WITH CXR SIGNS EARLY CHF, +U/A  ADMITTED TO INPATIENT STATUS FOR ACUTE ON CHRONIC CHF, CARDIO CONSULTED  STARTED ON IV DIURESIS WITH LASIX, IVABT WITH URINE CULTURE PENDING     ED Triage Vitals   Temperature Pulse Respirations Blood Pressure SpO2   09/29/19 1450 09/29/19 1445 09/29/19 1445 09/29/19 1445 09/29/19 1445   98 8 °F (37 1 °C) 70 (!) 28 98/59 (!) 89 % Temp Source Heart Rate Source Patient Position - Orthostatic VS BP Location FiO2 (%)   09/29/19 1711 09/29/19 2319 09/29/19 1613 09/29/19 1515 09/29/19 1613   Tympanic Monitor Sitting Right arm 3      Pain Score       09/29/19 1450       6        Wt Readings from Last 1 Encounters:   09/30/19 85 5 kg (188 lb 7 9 oz)     Additional Vital Signs:   09/29/19 2319  97 2 °F (36 2 °C)Abnormal   65  18  90/54   96 %  Nasal cannula  Lying   BP: MD Chelsie Smith notified and aware at 09/29/19 2319   09/29/19 2138    69    99/50         09/29/19 2000  98 5 °F (36 9 °C)  66  18  123/58  97 %  Nasal cannula  Lying   Pertinent Labs/Diagnostic Test Results:   Results from last 7 days   Lab Units 09/29/19  1506 09/27/19  0443 09/26/19  2114   WBC Thousand/uL 7 48 9 89 13 50*   HEMOGLOBIN g/dL 12 8 13 7 15 4   HEMATOCRIT % 39 2 41 4 46 4*   PLATELETS Thousands/uL 235 242 306   NEUTROS ABS Thousands/µL 3 94  --  9 01*     Results from last 7 days   Lab Units 09/29/19  1506 09/28/19  0646 09/27/19  1559 09/27/19  0443   SODIUM mmol/L 140 141 139 138   POTASSIUM mmol/L 4 9 4 2 3 9 2 8*   CHLORIDE mmol/L 108 108 103 102   CO2 mmol/L 24 28 28 27   ANION GAP mmol/L 8 5 8 9   BUN mg/dL 8 15 19 21   CREATININE mg/dL 0 76 0 87 0 99 1 43*   EGFR ml/min/1 73sq m 87 74 63 41   CALCIUM mg/dL 7 9* 7 8* 8 0* 7 8*   MAGNESIUM mg/dL 1 8 1 9 2 3 1 4*   PHOSPHORUS mg/dL 2 9 2 8  --   --      Results from last 7 days   Lab Units 09/29/19  1506 09/26/19  2114   AST U/L 15 25   ALT U/L 21 27   ALK PHOS U/L 79 93   TOTAL PROTEIN g/dL 5 9* 7 3   ALBUMIN g/dL 2 8* 3 4*   TOTAL BILIRUBIN mg/dL 0 50 0 50     Results from last 7 days   Lab Units 09/29/19  1506 09/28/19  0646 09/27/19  1559 09/27/19  0443 09/26/19  2114   GLUCOSE RANDOM mg/dL 85 96 120 121 109      Results from last 7 days   Lab Units 09/29/19 2020 09/29/19  1506 09/27/19  0443 09/27/19  0053 09/26/19  2114   TROPONIN I ng/mL <0 02 <0 02 <0 02 <0 02 <0 02     Lab Units 09/29/19  1506 PROTIME seconds 10 7   INR  1 00   PTT seconds 26     Lab Units 09/29/19  1506   NT-PRO BNP pg/mL 2,944*     Lab Units 09/29/19  1521   CLARITY UA  Slightly Cloudy   COLOR UA  Yellow   SPEC GRAV UA  1 010   PH UA  6 0   GLUCOSE UA mg/dl Negative   KETONES UA mg/dl Negative   BLOOD UA  Negative   PROTEIN UA mg/dl Negative   NITRITE UA  Positive*   BILIRUBIN UA  Negative   UROBILINOGEN UA E U /dl 0 2   LEUKOCYTES UA  Small*   WBC UA /hpf 10-20*   RBC UA /hpf 1-2*   BACTERIA UA /hpf Innumerable*   EPITHELIAL CELLS WET PREP /hpf Occasional     Results from last 7 days   Lab Units 09/29/19  1733   AMPH/METH  Negative   BARBITURATE UR  Positive*   BENZODIAZEPINE UR  Positive*   COCAINE UR  Negative   METHADONE URINE  Negative   OPIATE UR  Negative   PCP UR  Negative   THC UR  Negative     Results from last 7 days   Lab Units 09/29/19 2020   ETHANOL LVL mg/dL <3     Results from last 7 days   Lab Units 09/29/19  1521   URINE CULTURE  >100,000 cfu/ml Gram Negative Mauricio*     CXR=Mild vascular congestion and small left pleural effusion concerning for early CHF  EKG=Sinus rhythm rate 69, normal axis, QT prolonging, no acute ST elevations noted, biphasic T-wave noted in V3 with T-wave inversions noted in V4 through V6, lateral ischemia with QT prolongation that is new compared to previous EKG    ED Treatment:   Medication Administration from 09/29/2019 1432 to 09/29/2019 1655       Date/Time Order Dose Route     09/29/2019 1628 cefTRIAXone (ROCEPHIN) IVPB (premix) 1,000 mg 1,000 mg Intravenous     09/29/2019 1628 furosemide (LASIX) injection 40 mg 40 mg Intravenous        Past Medical History:   Diagnosis Date    Alopecia     Back injury     Bell's palsy     CHF (congestive heart failure) (HCC)     Chronic pain     back    Closed left arm fracture     Fibromyalgia     Fibromyalgia, primary     GERD (gastroesophageal reflux disease)     Hyperlipidemia     Hypertension     Lyme disease     My[facility] infarct ([facility] ) 2015    Myocardial infarction [facility])     Cardiac catheterization 2 years ago showed 30% blockage in 1 of the blood vessels    Stroke ([facility] )     TIA (transient ischemic attack)     12/2017     Present on Admission:   Acute on chronic diastolic [facility]congestive [facility]) [facility]   Hypotension   Hypercholesteremia   Seizure disorder ([facility])   Anxiety   Fibromyalgia   Dependence on nicotine from cigarettes   Abnormal urinalysis  Admitting Diagnosis: Shortness of breath [R06 02]  CHF (congestive heart failure) (HCC) [I50 9]  UTI (urinary tract infection) [N39 0]  Age/Sex: 63 y o  female  Admission Orders:  TELEMETRY  CONSULT CARDIO  VENODYNES  Current Facility-Administered Medications:  albumin human (FLEXBUMIN) 5 % injection 12 5 g   Dose: 12 5 g  Freq: Once Route: IV  acetaminophen 975 mg Oral Q8H PRN   aspirin 81 mg Oral Daily   atorvastatin 80 mg Oral Daily   cefTRIAXone 1,000 mg Intravenous Q24H   DULoxetine 90 mg Oral HS   enoxaparin 40 mg Subcutaneous Daily   fenofibrate 145 mg Oral Daily   fluticasone-vilanterol 1 puff Inhalation Daily   furosemide 40 mg Intravenous Daily   gabapentin 300 mg Oral TID   influenza vaccine 0 5 mL Intramuscular Prior to discharge   levETIRAcetam 500 mg Oral Q12H [facility]   magnesium hydroxide 30 mL Oral Daily PRN   metoprolol tartrate 125 mg Oral Q12H [facility]   nicotine 1 patch Transdermal Daily   ondansetron 4 mg Intravenous Q6H PRN   pantoprazole 40 mg Oral Daily   polyethylene glycol 17 g Oral Daily   spironolactone 25 mg Oral Daily   tiotropium 18 mcg Inhalation Daily   traMADol 50 mg Oral Once PRN   traZODone 100 mg Oral HS     Network Utilization Review Department  Phone: [phone]; Fax [phone]  [email or web address]  ATTENTION: Please call with any questions or concerns to [phone] and carefully listen to the prompts so that you are directed to the right person     Send all requests for admission clinical reviews, approved or denied determinations and any other requests to fax [phone]   All voicemails are confidential

## 2019-10-01 VITALS
HEART RATE: 67 BPM | SYSTOLIC BLOOD PRESSURE: 112 MMHG | RESPIRATION RATE: 16 BRPM | DIASTOLIC BLOOD PRESSURE: 59 MMHG | WEIGHT: 164.02 LBS | BODY MASS INDEX: 28.15 KG/M2 | TEMPERATURE: 97.9 F | OXYGEN SATURATION: 92 %

## 2019-10-01 LAB
ANION GAP SERPL CALCULATED.3IONS-SCNC: 7 MMOL/L (ref 4–13)
BASOPHILS # BLD AUTO: 0.07 THOUSANDS/ΜL (ref 0–0.1)
BASOPHILS NFR BLD AUTO: 1 % (ref 0–1)
BUN SERPL-MCNC: 12 MG/DL (ref 5–25)
CALCIUM SERPL-MCNC: 8.7 MG/DL (ref 8.3–10.1)
CHLORIDE SERPL-SCNC: 107 MMOL/L (ref 100–108)
CO2 SERPL-SCNC: 27 MMOL/L (ref 21–32)
CREAT SERPL-MCNC: 0.8 MG/DL (ref 0.6–1.3)
EOSINOPHIL # BLD AUTO: 0.2 THOUSAND/ΜL (ref 0–0.61)
EOSINOPHIL NFR BLD AUTO: 3 % (ref 0–6)
ERYTHROCYTE [DISTWIDTH] IN BLOOD BY AUTOMATED COUNT: 14 % (ref 11.6–15.1)
GFR SERPL CREATININE-BSD FRML MDRD: 82 ML/MIN/1.73SQ M
GLUCOSE SERPL-MCNC: 112 MG/DL (ref 65–140)
HCT VFR BLD AUTO: 42.3 % (ref 34.8–46.1)
HGB BLD-MCNC: 13.4 G/DL (ref 11.5–15.4)
IMM GRANULOCYTES # BLD AUTO: 0.02 THOUSAND/UL (ref 0–0.2)
IMM GRANULOCYTES NFR BLD AUTO: 0 % (ref 0–2)
LYMPHOCYTES # BLD AUTO: 2.85 THOUSANDS/ΜL (ref 0.6–4.47)
LYMPHOCYTES NFR BLD AUTO: 35 % (ref 14–44)
MAGNESIUM SERPL-MCNC: 2 MG/DL (ref 1.6–2.6)
MCH RBC QN AUTO: 30.7 PG (ref 26.8–34.3)
MCHC RBC AUTO-ENTMCNC: 31.7 G/DL (ref 31.4–37.4)
MCV RBC AUTO: 97 FL (ref 82–98)
MONOCYTES # BLD AUTO: 0.56 THOUSAND/ΜL (ref 0.17–1.22)
MONOCYTES NFR BLD AUTO: 7 % (ref 4–12)
MRSA NOSE QL CULT: NORMAL
NEUTROPHILS # BLD AUTO: 4.34 THOUSANDS/ΜL (ref 1.85–7.62)
NEUTS SEG NFR BLD AUTO: 54 % (ref 43–75)
NRBC BLD AUTO-RTO: 0 /100 WBCS
PLATELET # BLD AUTO: 269 THOUSANDS/UL (ref 149–390)
PMV BLD AUTO: 10.6 FL (ref 8.9–12.7)
POTASSIUM SERPL-SCNC: 4 MMOL/L (ref 3.5–5.3)
RBC # BLD AUTO: 4.36 MILLION/UL (ref 3.81–5.12)
SODIUM SERPL-SCNC: 141 MMOL/L (ref 136–145)
WBC # BLD AUTO: 8.04 THOUSAND/UL (ref 4.31–10.16)

## 2019-10-01 PROCEDURE — 83735 ASSAY OF MAGNESIUM: CPT | Performed by: STUDENT IN AN ORGANIZED HEALTH CARE EDUCATION/TRAINING PROGRAM

## 2019-10-01 PROCEDURE — 80048 BASIC METABOLIC PNL TOTAL CA: CPT | Performed by: NURSE PRACTITIONER

## 2019-10-01 PROCEDURE — 99239 HOSP IP/OBS DSCHRG MGMT >30: CPT | Performed by: NURSE PRACTITIONER

## 2019-10-01 PROCEDURE — 99232 SBSQ HOSP IP/OBS MODERATE 35: CPT | Performed by: INTERNAL MEDICINE

## 2019-10-01 PROCEDURE — 85025 COMPLETE CBC W/AUTO DIFF WBC: CPT | Performed by: NURSE PRACTITIONER

## 2019-10-01 RX ORDER — SPIRONOLACTONE 25 MG/1
25 TABLET ORAL DAILY
Status: DISCONTINUED | OUTPATIENT
Start: 2019-10-01 | End: 2019-10-01 | Stop reason: HOSPADM

## 2019-10-01 RX ORDER — FENOFIBRATE 145 MG/1
145 TABLET, COATED ORAL DAILY
Qty: 30 TABLET | Refills: 0 | Status: SHIPPED | OUTPATIENT
Start: 2019-10-02 | End: 2019-10-17

## 2019-10-01 RX ORDER — CIPROFLOXACIN 500 MG/1
500 TABLET, FILM COATED ORAL EVERY 12 HOURS SCHEDULED
Qty: 6 TABLET | Refills: 0 | Status: SHIPPED | OUTPATIENT
Start: 2019-10-01 | End: 2019-10-01 | Stop reason: HOSPADM

## 2019-10-01 RX ORDER — SULFAMETHOXAZOLE AND TRIMETHOPRIM 800; 160 MG/1; MG/1
1 TABLET ORAL EVERY 12 HOURS SCHEDULED
Qty: 10 TABLET | Refills: 0 | Status: SHIPPED | OUTPATIENT
Start: 2019-10-01 | End: 2019-10-06

## 2019-10-01 RX ADMIN — ALPRAZOLAM 0.5 MG: 0.5 TABLET ORAL at 08:21

## 2019-10-01 RX ADMIN — PANTOPRAZOLE SODIUM 40 MG: 40 TABLET, DELAYED RELEASE ORAL at 08:22

## 2019-10-01 RX ADMIN — NICOTINE 1 PATCH: 14 PATCH TRANSDERMAL at 08:25

## 2019-10-01 RX ADMIN — FENOFIBRATE 145 MG: 145 TABLET, COATED ORAL at 08:21

## 2019-10-01 RX ADMIN — ATORVASTATIN CALCIUM 80 MG: 80 TABLET ORAL at 08:23

## 2019-10-01 RX ADMIN — ENOXAPARIN SODIUM 40 MG: 40 INJECTION SUBCUTANEOUS at 08:24

## 2019-10-01 RX ADMIN — GABAPENTIN 300 MG: 300 CAPSULE ORAL at 08:22

## 2019-10-01 RX ADMIN — LEVETIRACETAM 500 MG: 500 TABLET, FILM COATED ORAL at 08:23

## 2019-10-01 RX ADMIN — FLUTICASONE FUROATE AND VILANTEROL TRIFENATATE 1 PUFF: 100; 25 POWDER RESPIRATORY (INHALATION) at 08:24

## 2019-10-01 RX ADMIN — ASPIRIN 81 MG: 81 TABLET, COATED ORAL at 08:22

## 2019-10-01 RX ADMIN — SPIRONOLACTONE 25 MG: 25 TABLET ORAL at 08:22

## 2019-10-01 RX ADMIN — METOPROLOL TARTRATE 125 MG: 100 TABLET, FILM COATED ORAL at 08:20

## 2019-10-01 RX ADMIN — TIOTROPIUM BROMIDE 18 MCG: 18 CAPSULE ORAL; RESPIRATORY (INHALATION) at 08:25

## 2019-10-01 NOTE — ASSESSMENT & PLAN NOTE
Patient smokes half a pack of cigarettes per day  Smoking cessation encouraged  Nicotine patch offered to patient, she declined

## 2019-10-01 NOTE — ASSESSMENT & PLAN NOTE
Wt Readings from Last 3 Encounters:   10/01/19 74 4 kg (164 lb 0 4 oz)   09/28/19 77 kg (169 lb 12 1 oz)   08/21/19 77 1 kg (170 lb)     Patient presented to emergency department with shortness of breath, increased swelling in ankles  BNP 2944  Chest xray showed mild vascular congestion, small left pleural effusion, early CHF  Lungs clear bilaterally  Trace edema noted in bilateral lower extremities, improved from previous exam  Did have dyspnea, RR 23, O2 sat initially 89% on room air on presentation to ER  Improved to 96 % with O2 2 liters  Currently on room air, O2 sats mid 90s  Patient reported that she did not have her aldactone to take morning of admission as her boyfriend had picked up her prescription, and did not bring it to her  Encourage patient to weigh herself daily at home, report more than 3 lb weight gain in 2 days to  Cardiac diet encouraged, heart failure booklet given to patient  Cardiology consulted, appreciate recommendations  Metoprolol increased to 125 mg b i d  With target heart rate 60  Losartan discontinued by Cardiology  Continue with Aldactone  Patient is directed to follow up with Cardiology in the next 1-2 weeks  She is also instructed to follow up with her primary care physician in 1-2 weeks

## 2019-10-01 NOTE — DISCHARGE SUMMARY
Discharge- Noble Portillo 1960, 63 y o  female MRN: [id number]    Unit/Bed#: [address] [place] [address] Encounter: [id number]    Primary Care Provider: Drew Tavarez DO   Date and time admitted to hospital: 9/29/2019  2:34 PM        * Acute on chronic diastolic CHF (congestive heart failure) (HCC)  Assessment & Plan  Wt Readings from Last 3 Encounters:   10/01/19 74 4 kg (164 lb 0 4 oz)   09/28/19 77 kg (169 lb 12 1 oz)   08/21/19 77 1 kg (170 lb)     Patient presented to emergency department with shortness of breath, increased swelling in ankles  BNP 2944  Chest xray showed mild vascular congestion, small left pleural effusion, early CHF  Lungs clear bilaterally  Trace edema noted in bilateral lower extremities, improved from previous exam  Did have dyspnea, RR 23, O2 sat initially 89% on room air on presentation to ER  Improved to 96 % with O2 2 liters  Currently on room air, O2 sats mid 90s  Patient reported that she did not have her aldactone to take morning of admission as her boyfriend had picked up her prescription, and did not bring it to her  Encourage patient to weigh herself daily at home, report more than 3 lb weight gain in 2 days to  Cardiac diet encouraged, heart failure booklet given to patient  Cardiology consulted, appreciate recommendations  Metoprolol increased to 125 mg b i d  With target heart rate 60  Losartan discontinued by Cardiology  Continue with Aldactone  Patient is directed to follow up with Cardiology in the next 1-2 weeks  She is also instructed to follow up with her primary care physician in 1-2 weeks  Hypotension  Assessment & Plan  Blood pressure initially 98/59 on presentation to ED  Had received 500 cc bolus in squad en route to hospital   Most current blood pressure 112/59      Seizure disorder (HCC)  Assessment & Plan  Continue Keppra 500 mg po q 12 hours  Follow-up primary care physician 1-2 weeks      Hypercholesteremia  Assessment & Plan  Continue patient's home medications of Lipitor  Lopid discontinued by Cardiology as this medication has many interactions with other medications, including patient's anti seizure medication  Patient placed on Tricor by cardiology  Stressed heart healthy diet patient, heart failure pamphlet provided to patient  History of TIA (transient ischemic attack)  Assessment & Plan  Continue patient's home medication of ASA  No neuro deficits  Anxiety  Assessment & Plan  Continue Cymbalta  On previous admission, patient was advised to follow up with outpatient psychiatry  Will encourage follow up  Fibromyalgia  Assessment & Plan  Patient on Neurontin 3 times a day  Follow up with primary care physician in 1- 2 weeks  Dependence on nicotine from cigarettes  Assessment & Plan  Patient smokes half a pack of cigarettes per day  Smoking cessation encouraged  Nicotine patch offered to patient, she declined  Abnormal urinalysis  Assessment & Plan  Urinalysis showed small leukocytes and positive nitrates  Patient received 1 dose of ceftriaxone in emergency department, continued  Patient denies any symptoms of pain, burning or frequency with urination  WBC 7 17  Urine culture came back positive with E coli, ESBL positive  Patient will continue with oral antibiotic, Bactrim for additional 5 days  Discussed with patient need for taking probiotics while on antibiotics  Follow up with family doctor in 1-2 weeks  Discharging Physician / Practitioner: NICKY Oliveira  PCP: Mahin Starr DO  Admission Date:   Admission Orders (From admission, onward)     Ordered        09/29/19 1607  Inpatient Admission  Once                   Discharge Date: 10/01/19    Resolved Problems  Date Reviewed: 10/1/2019    None          Consultations During Hospital Stay:  · Cardiology  Procedures Performed:     · None      Significant Findings / Test Results:     · Chest x-ray shows mild vascular congestion, small left pleural effusion, early CHF  Incidental Findings:   · Urinalysis positive, culture positive  Test Results Pending at Discharge (will require follow up): · None  Outpatient Tests Requested:  · None  Complications:  None  Reason for Admission:  Shortness of breath and leg swelling  Hospital Course:     Aung Andrew is a 63 y o  female patient past medical history of TIA, seizures, congestive heart failure, fibromyalgia, hypertension, hyperlipidemia and anxiety who originally presented to the hospital on 9/29/2019 due to shortness of breath  Of note patient was discharged the day previous to admission with diagnoses of CHF, metabolic encephalopathy, acute kidney injury and urinary retention  During that hospitalization cardiology was consulted and recommendations were made to change patient's diuretic from Lasix to Aldactone  Patient indicated that she did not  her prescription and did not have any diuretics on day of admission  Patient was given IV Lasix in the emergency department as her BNP revealed a level of 2944 and rales were noted bilaterally 1/4 the way up  Patient was also noted to have hypotension with blood pressure 98/59  Urinalysis was sent which showed small leukocytes and nitrates  She was started on ceftriaxone in the emergency department and continued during her hospitalization  Final culture did show E coli and positive ESBL  Patient was transition to oral Bactrim and will continue for an additional 5 days for total of 7 days treatment  Patient was instructed on her medications, including the need to fill her prescriptions and to take them consistently as prescribed  Patient was also given heart failure pamphlet and emphasis was placed on heart healthy diet  Patient will follow up with Cardiology in 1-2 weeks and will follow up with primary care physician in 1-2 weeks also    Patient was instructed to weigh herself on a daily basis, and to call provider with an increase of 3 lb in 2 days  She verbalized understanding, and will be discharged home today  Please see above list of diagnoses and related plan for additional information  Condition at Discharge: good     Discharge Day Visit / Exam:     Subjective:  Patient sitting on edge of bed, reports that her breathing feels much better and the leg swelling is completely gone  She denies headache, dizziness, shortness of breath, chest pain, abdominal pain, nausea, vomiting or diarrhea  Vitals: Blood Pressure: 112/59 (10/01/19 0800)  Pulse: 67 (10/01/19 0800)  Temperature: 97 9 °F (36 6 °C) (10/01/19 0800)  Temp Source: Oral (10/01/19 0800)  Respirations: 16 (10/01/19 0800)  Weight - Scale: 74 4 kg (164 lb 0 4 oz) (10/01/19 0600)  SpO2: 92 % (10/01/19 0800)     Exam:   Physical Exam   Constitutional: She is oriented to person, place, and time  She appears well-nourished  No distress  HENT:   Head: Normocephalic  Eyes: Pupils are equal, round, and reactive to light  Conjunctivae and EOM are normal    Neck: Normal range of motion  Cardiovascular: Normal rate and regular rhythm  Murmur heard  Pulmonary/Chest: Effort normal and breath sounds normal  No respiratory distress  Abdominal: Soft  Bowel sounds are normal  She exhibits no distension  There is no tenderness  Genitourinary:   Genitourinary Comments: Voiding spontaneously  Musculoskeletal: Normal range of motion  She exhibits no edema  Neurological: She is alert and oriented to person, place, and time  Skin: Skin is warm and dry  Capillary refill takes less than 2 seconds  Psychiatric: She has a normal mood and affect  Her behavior is normal  Judgment and thought content normal          Discussion with Family:  I spoke with the patient at the bedside, I have answered all questions to the best of my abilities      Discharge instructions/Information to patient and family:   See after visit summary for information provided to patient and family  Provisions for Follow-Up Care:  See after visit summary for information related to follow-up care and any pertinent home health orders  Disposition:     Home    For Discharges to [facility] SNF:   · Not Applicable to this Patient - Not Applicable to this Patient    Planned Readmission:  No      Discharge Statement:  I spent greater than 30 minutes discharging the patient  This time was spent on the day of discharge  I had direct contact with the patient on the day of discharge  Greater than 50% of the total time was spent examining patient, answering all patient questions, arranging and discussing plan of care with patient as well as directly providing post-discharge instructions  Additional time then spent on discharge activities  Discharge Medications:  See after visit summary for reconciled discharge medications provided to patient and family        ** Please Note: This note has been constructed using a voice recognition system **

## 2019-10-01 NOTE — DISCHARGE INSTRUCTIONS
Heart Failure   WHAT YOU NEED TO KNOW:   Heart failure (HF) is a condition that does not allow your heart to fill or pump properly  Not enough oxygen in your blood gets to your organs and tissues  HF can occur in the right side, the left side, or both lower chambers of your heart  HF is often caused by damage or injury to your heart  The damage may be caused by heart attack, other heart conditions, or high blood pressure  HF is a long-term condition that tends to get worse over time  It is important to manage your health to improve your quality of life  HF can be worsened by heavy alcohol use, smoking, diabetes that is not controlled, or obesity  DISCHARGE INSTRUCTIONS:   Call 911 if:   · You have any of the following signs of a heart attack:      ¨ Squeezing, pressure, or pain in your chest that lasts longer than 5 minutes or returns    ¨ Discomfort or pain in your back, neck, jaw, stomach, or arm     ¨ Trouble breathing    ¨ Nausea or vomiting    ¨ Lightheadedness or a sudden cold sweat, especially with chest pain or trouble breathing    Seek care immediately if:   · You gain 3 or more pounds (1 4 kg) in a day, or more than your healthcare provider says you should  · Your heartbeat is fast, slow, or uneven all the time  Contact your healthcare provider if:   · You have symptoms of worsening HF:      ¨ Shortness of breath at rest, at night, or that is getting worse in any way     ¨ Weight gain of 5 or more pounds (2 2 kg) in a week     ¨ More swelling in your legs or ankles     ¨ Abdominal pain or swelling     ¨ More coughing     ¨ Loss of appetite     ¨ Feeling tired all the time    · You feel hopeless or depressed, or you have lost interest in things you used to enjoy  · You often feel worried or afraid  · You have questions or concerns about your condition or care  Medicines: You may  need any of the following:  · Medicines  may be given to help regulate your heart rhythm   You may also need medicines to lower your blood pressure, and to get rid of extra fluids  · Take your medicine as directed  Contact your healthcare provider if you think your medicine is not helping or if you have side effects  Tell him or her if you are allergic to any medicine  Keep a list of the medicines, vitamins, and herbs you take  Include the amounts, and when and why you take them  Bring the list or the pill bottles to follow-up visits  Carry your medicine list with you in case of an emergency  Follow up with your healthcare provider or cardiologist as directed: You may need to return for other tests  You may need home health care  A healthcare provider will monitor your vital signs, weight, and make sure your medicines are working  Write down your questions so you remember to ask them during your visits  Go to cardiac rehab as directed:  Cardiac rehab is a program run by specialists who will help you safely strengthen your heart  The program includes exercise, relaxation, stress management, and heart-healthy nutrition  Healthcare providers will also make sure your medicines are helping to reduce your symptoms  Manage your HF:  · Do not smoke  Nicotine and other chemicals in cigarettes and cigars can cause lung damage and make HF difficult to manage  Ask your healthcare provider for information if you currently smoke and need help to quit  E-cigarettes or smokeless tobacco still contain nicotine  Talk to your healthcare provider before you use these products  · Do not drink alcohol or take illegal drugs  Alcohol and drugs can worsen your symptoms quickly  · Weigh yourself every morning  Use the same scale, in the same spot  Do this after you use the bathroom, but before you eat or drink anything  Wear the same type of clothing  Do not wear shoes  Record your weight each day so you will notice any sudden weight gain  Swelling and weight gain are signs of fluid retention   If you are overweight, ask how to lose weight safely  · Check your blood pressure and heart rate every day  Ask for more information about how to measure your blood pressure and heart rate correctly  Ask what these numbers should be for you  · Manage any chronic health conditions you have  These include high blood pressure, diabetes, obesity, high cholesterol, metabolic syndrome, and COPD  You will have fewer symptoms if you manage these health conditions  Follow your healthcare provider's recommendations and follow up with him or her regularly  · Eat heart-healthy foods and limit sodium (salt)  An easy way to do this is to eat more fresh fruits and vegetables and fewer canned and processed foods  Replace butter and margarine with heart-healthy oils such as olive oil and canola oil  Other heart-healthy foods include walnuts, whole-grain breads, low-fat dairy products, beans, and lean meats  Fatty fish such as salmon and tuna are also heart healthy  Ask how much salt you can eat each day  Do not use salt substitutes  · Drink liquids as directed  You may need to limit the amount of liquids you drink if you retain fluid  Ask how much liquid to drink each day and which liquids are best for you  · Stay active  If you are not active, your symptoms are likely to worsen quickly  Walking, bicycling, and other types of physical activity help maintain your strength and improve your mood  Physical activity also helps you manage your weight  Work with your healthcare provider to create an exercise plan that is right for you  · Get vaccines as directed  Get a flu shot every year  You may also need the pneumonia vaccine  The flu and pneumonia can be severe for a person who has HF  Vaccines protect you from these infections  Join a support group:  Living with HF can be difficult  It may be helpful to talk with others who have HF  You may learn how to better manage your condition or get emotional support   For more information:   · [address]  [place] , [place]   Phone: [address]284  Web Address: https://[employer] com/  org   © 2017 [address] [employer] Information is for End User's use only and may not be sold, redistributed or otherwise used for commercial purposes  All illustrations and images included in CareNotes® are the copyrighted property of [employer] , [employer]  or [employer] Robles  The above information is an  only  It is not intended as medical advice for individual conditions or treatments  Talk to your doctor, nurse or pharmacist before following any medical regimen to see if it is safe and effective for you

## 2019-10-01 NOTE — ASSESSMENT & PLAN NOTE
Urinalysis showed small leukocytes and positive nitrates  Patient received 1 dose of ceftriaxone in emergency department, continued  Patient denies any symptoms of pain, burning or frequency with urination  WBC 7 17  Urine culture came back positive with E coli, ESBL positive  Patient will continue with oral antibiotic, Bactrim for additional 5 days  Discussed with patient need for taking probiotics while on antibiotics  Follow up with family doctor in 1-2 weeks

## 2019-10-01 NOTE — PLAN OF CARE
Problem: Potential for Falls  Goal: Patient will remain free of falls  Description  INTERVENTIONS:  - Assess patient frequently for physical needs  -  Identify cognitive and physical deficits and behaviors that affect risk of falls    -  [place] fall precautions as indicated by assessment   - Educate patient/family on patient safety including physical limitations  - Instruct patient to call for assistance with activity based on assessment  - Modify environment to reduce risk of injury  - Consider OT/PT consult to assist with strengthening/mobility  Outcome: Adequate for Discharge  Pt remained free of falls - discharge today     Problem: CARDIOVASCULAR - ADULT  Goal: Maintains optimal cardiac output and hemodynamic stability  Description  INTERVENTIONS:  - Monitor I/O, vital signs and rhythm  - Monitor for S/S and trends of decreased cardiac output  - Administer and titrate ordered vasoactive medications to optimize hemodynamic stability  - Assess quality of pulses, skin color and temperature  - Assess for signs of decreased coronary artery perfusion  - Instruct patient to report change in severity of symptoms  Outcome: Adequate for Discharge  Hemodynamically stable for discharge  Goal: Absence of cardiac dysrhythmias or at baseline rhythm  Description  INTERVENTIONS:  - Continuous cardiac monitoring, vital signs, obtain 12 lead EKG if ordered  - Administer antiarrhythmic and heart rate control medications as ordered  - Monitor electrolytes and administer replacement therapy as ordered  Outcome: Adequate for Discharge  NSR     Problem: GENITOURINARY - ADULT  Goal: Maintains or returns to baseline urinary function  Description  INTERVENTIONS:  - Assess urinary function  - Encourage oral fluids to ensure adequate hydration if ordered  - Administer IV fluids as ordered to ensure adequate hydration  - Administer ordered medications as needed  - Offer frequent toileting  - Follow urinary retention protocol if ordered  Outcome: Adequate for Discharge  Baseline urinary function returned  No retention present  Goal: Absence of urinary retention  Description  INTERVENTIONS:  - Assess patients ability to void and empty bladder  - Monitor I/O  - Bladder scan as needed  - Discuss with physician/AP medications to alleviate retention as needed  - Discuss catheterization for long term situations as appropriate  Outcome: Adequate for Discharge  Absent     Problem: PAIN - ADULT  Goal: Verbalizes/displays adequate comfort level or baseline comfort level  Description  Interventions:  - Encourage patient to monitor pain and request assistance  - Assess pain using appropriate pain scale  - Administer analgesics based on type and severity of pain and evaluate response  - Implement non-pharmacological measures as appropriate and evaluate response  - Consider cultural and social influences on pain and pain management  - Notify physician/advanced practitioner if interventions unsuccessful or patient reports new pain  Outcome: Adequate for Discharge  Pain declined pain medication  Problem: INFECTION - ADULT  Goal: Absence or prevention of progression during hospitalization  Description  INTERVENTIONS:  - Assess and monitor for signs and symptoms of infection  - Monitor lab/diagnostic results  - Monitor all insertion sites, i e  indwelling lines, tubes, and drains  - Monitor endotracheal if appropriate and nasal secretions for changes in amount and color  - [place] appropriate cooling/warming therapies per order  - Administer medications as ordered  - Instruct and encourage patient and family to use good hand hygiene technique  - Identify and instruct in appropriate isolation precautions for identified infection/condition  Outcome: Adequate for Discharge  ESBL in urine  Noted by provider        Problem: SAFETY ADULT  Goal: Maintain or return to baseline ADL function  Description  INTERVENTIONS:  -  Assess patient's ability to carry out ADLs; assess patient's baseline for ADL function and identify physical deficits which impact ability to perform ADLs (bathing, care of mouth/teeth, toileting, grooming, dressing, etc )  - Assess/evaluate cause of self-care deficits   - Assess range of motion  - Assess patient's mobility; develop plan if impaired  - Assess patient's need for assistive devices and provide as appropriate  - Encourage maximum independence but intervene and supervise when necessary  - Involve family in performance of ADLs  - Assess for home care needs following discharge   - Consider OT consult to assist with ADL evaluation and planning for discharge  - Provide patient education as appropriate  Outcome: Adequate for Discharge  Return to baseline  Goal: Maintain or return mobility status to optimal level  Description  INTERVENTIONS:  - Assess patient's baseline mobility status (ambulation, transfers, stairs, etc )    - Identify cognitive and physical deficits and behaviors that affect mobility  - Identify mobility aids required to assist with transfers and/or ambulation (gait belt, sit-to-stand, lift, walker, cane, etc )  - [place] fall precautions as indicated by assessment  - Record patient progress and toleration of activity level on Mobility SBAR; progress patient to next Phase/Stage  - Instruct patient to call for assistance with activity based on assessment  - Consider rehabilitation consult to assist with strengthening/weightbearing, etc   Outcome: Adequate for Discharge  Return to baseline     Problem: DISCHARGE PLANNING  Goal: Discharge to home or other facility with appropriate resources  Description  INTERVENTIONS:  - Identify barriers to discharge w/patient and caregiver  - Arrange for needed discharge resources and transportation as appropriate  - Identify discharge learning needs (meds, wound care, etc )  - Arrange for interpretive services to assist at discharge as needed  - Refer to Case Management Department for coordinating discharge planning if the patient needs post-hospital services based on physician/advanced practitioner order or complex needs related to functional status, cognitive ability, or social support system  Outcome: Adequate for Discharge  Discharge today  Problem: Knowledge Deficit  Goal: Patient/family/caregiver demonstrates understanding of disease process, treatment plan, medications, and discharge instructions  Description  Complete learning assessment and assess knowledge base    Interventions:  - Provide teaching at level of understanding  - Provide teaching via preferred learning methods  Outcome: Adequate for Discharge  Appropriate for discharge

## 2019-10-01 NOTE — ASSESSMENT & PLAN NOTE
Blood pressure initially 98/59 on presentation to ED     Had received 500 cc bolus in squad en route to hospital   Most current blood pressure 112/59

## 2019-10-01 NOTE — ASSESSMENT & PLAN NOTE
Continue patient's home medications of Lipitor  Lopid discontinued by Cardiology as this medication has many interactions with other medications, including patient's anti seizure medication  Patient placed on Tricor by cardiology  Stressed heart healthy diet patient, heart failure pamphlet provided to patient

## 2019-10-01 NOTE — PROGRESS NOTES
Progress Note - Cardiology   [facility]     [facility] 63 y o  female MRN: [id number]  : 1960  Unit/Bed#: [address] [place], [place] [id number] Encounter: [id number]    Assessment and Plan:   1  Acute on chronic diastolic heart failure with underlying severe concentric LVH:  Patient's grade 2 diastolic dysfunction with hyperdynamic LV and severe LVH  Intracardiac gradient is 30-36 mm Hg  Goal of care is heart rate management and blood pressure support  Continue beta-blocker and Aldactone  Avoid aggressive IV diuresis  2  COPD/tobacco abuse:  Patient previously abnormal PFTs performed at DR [facility]ONICA [facility]  Encourage smoking cessation  3  Essential hypertension:  Stable  4  Dyslipidemia:  Continue statin therapy  Her Lopid was transition to tricor, recheck lipids in 1 month  Subjective / Objective:   Patient seen examined  She is resting comfortably in bed  Blood pressure and heart rate stable  Vitals: Blood pressure 112/59, pulse 67, temperature 97 9 °F (36 6 °C), temperature source Oral, resp  rate 16, weight 74 4 kg (164 lb 0 4 oz), last menstrual period 2009, SpO2 92 %, not currently breastfeeding  Vitals:    19 0600 10/01/19 0600   Weight: 85 5 kg (188 lb 7 9 oz) 74 4 kg (164 lb 0 4 oz)     Body mass index is 28 15 kg/m²  BP Readings from Last 3 Encounters:   10/01/19 112/59   19 109/67   19 115/79     Orthostatic Blood Pressures      Most Recent Value   Blood Pressure  112/59 filed at 10/01/2019 0800   Patient Position - Orthostatic VS  Sitting filed at 10/01/2019 0800        I/O        07 -  0700  0701 - 10/01 0700 10/01 07 - 10/02 0700    P  O  850      Total Intake(mL/kg) 850 (9 9)      Urine (mL/kg/hr) 2300 2175 (1 2)     Stool  0     Total Output 2300 2175     Net -1450 -2175            Unmeasured Stool Occurrence  1 x         Invasive Devices     Peripheral Intravenous Line            Peripheral IV 19 Right Wrist 2 days Intake/Output Summary (Last 24 hours) at 10/1/2019 0858  Last data filed at 10/1/2019 9110  Gross per 24 hour   Intake    Output 2175 ml   Net -2175 ml         Physical Exam:   Physical Exam   Constitutional: She is oriented to person, place, and time  She appears well-developed and well-nourished  No distress  HENT:   Head: Normocephalic  Right Ear: External ear normal    Left Ear: External ear normal    Eyes: Pupils are equal, round, and reactive to light  Conjunctivae are normal  Right eye exhibits no discharge  Left eye exhibits no discharge  No scleral icterus  Neck: Normal range of motion  Neck supple  No JVD present  No thyromegaly present  Cardiovascular: Normal rate, regular rhythm and normal heart sounds  No murmur heard  Pulmonary/Chest: Effort normal and breath sounds normal  No respiratory distress  She has no wheezes  She has no rales  Abdominal: Soft  Bowel sounds are normal  She exhibits no distension  There is no rebound  Musculoskeletal: She exhibits no edema  Neurological: She is alert and oriented to person, place, and time  Skin: Skin is warm and dry  Capillary refill takes less than 2 seconds  She is not diaphoretic  Psychiatric: She has a normal mood and affect  Her behavior is normal  Judgment and thought content normal    Nursing note and vitals reviewed                Medications/ Allergies:     Current Facility-Administered Medications:  acetaminophen 975 mg Oral Q8H PRN NICKY Jean-Baptiste    ALPRAZolam 0 5 mg Oral BID PRN NICKY Jean-Baptiste    aspirin 81 mg Oral Daily NICKY Jean-Baptiste    atorvastatin 80 mg Oral Daily NICKY Jean-Baptiste    cefTRIAXone 1,000 mg Intravenous Q24H Kia Zhu DO Last Rate: 1,000 mg (09/30/19 1728)   DULoxetine 90 mg Oral HS NICKY Jean-Baptiste    enoxaparin 40 mg Subcutaneous Daily NICKY Jean-Baptiste    fenofibrate 145 mg Oral Daily NICKY Roth fluticasone-vilanterol 1 puff Inhalation Daily [facility], CRNP    gabapentin 300 mg Oral TID [facility], CRNP    levETIRAcetam 500 mg Oral Q12H [place], CRNP    magnesium hydroxide 30 mL Oral Daily PRN [facility], CRNP    metoprolol tartrate 125 mg Oral Q12H [address] [facility], CRNP    nicotine 1 patch Transdermal Daily [facility], CRNP    ondansetron 4 mg Intravenous Q6H PRN [facility], CRNP    pantoprazole 40 mg Oral Daily [facility], CRNP    polyethylene glycol 17 g Oral Daily [facility], CRNP    spironolactone 25 mg Oral Daily [facility], CRNP    tiotropium 18 mcg Inhalation Daily [facility], CRNP    traZODone 100 mg Oral HS [facility], CRNP        acetaminophen 975 mg Q8H PRN   ALPRAZolam 0 5 mg BID PRN   magnesium hydroxide 30 mL Daily PRN   ondansetron 4 mg Q6H PRN     Allergies   Allergen Reactions    Hydromorphone Shortness Of Breath    Morphine Shortness Of Breath       VTE Pharmacologic Prophylaxis:   Sequential compression device (Venodyne)     Labs:   Troponins:  Results from last 7 days   Lab Units 09/30/19  0659 09/29/19  2020 09/29/19  1506   TROPONIN I ng/mL <0 02 <0 02 <0 02     CBC with diff:  Results from last 7 days   Lab Units 10/01/19  0454 09/30/19  0816 09/29/19  1506 09/27/19  0443 09/26/19  2114   WBC Thousand/uL 8 04 7 17 7 48 9 89 13 50*   HEMOGLOBIN g/dL 13 4 12 2 12 8 13 7 15 4   HEMATOCRIT % 42 3 38 4 39 2 41 4 46 4*   MCV fL 97 96 96 94 92   PLATELETS Thousands/uL 269 234 235 242 306   MCH pg 30 7 30 6 31 3 31 1 30 6   MCHC g/dL 31 7 31 8 32 7 33 1 33 2   RDW % 14 0 14 2 14 9 13 9 13 8   MPV fL 10 6 10 4 10 7 10 0 9 8   NRBC AUTO /100 WBCs 0 0  --   --  0     CMP:  Results from last 7 days   Lab Units 10/01/19  0454 09/30/19  0659 09/29/19  1506 09/28/19  0646 09/27/19  1559 09/27/19  0443 09/26/19  2114   SODIUM mmol/L 141 139 140 141 139 138 137   POTASSIUM mmol/L 4 0 4 1 4 9 4 2 3 9 2 8* 4 8   CHLORIDE mmol/L 107 105 108 108 103 102 102   CO2 mmol/L 27 25 24 28 28 27 27   ANION GAP mmol/L 7 9 8 5 8 9 8   BUN mg/dL 12 10 8 15 19 21 15   CREATININE mg/dL 0 80 0 89 0 76 0 87 0 99 1 43* 0 96   CALCIUM mg/dL 8 7 8 5 7 9* 7 8* 8 0* 7 8* 8 4   AST U/L  --   --  15  --   --   --  25   ALT U/L  --   --  21  --   --   --  27   ALK PHOS U/L  --   --  79  --   --   --  93   TOTAL PROTEIN g/dL  --   --  5 9*  --   --   --  7 3   ALBUMIN g/dL  --   --  2 8*  --   --   --  3 4*   TOTAL BILIRUBIN mg/dL  --   --  0 50  --   --   --  0 50   EGFR ml/min/1 73sq m 82 72 87 74 63 41 66     Magnesium:  Results from last 7 days   Lab Units 10/01/19  0454 09/30/19  0659 09/29/19  1506 09/28/19  0646 09/27/19  1559 09/27/19  0443   MAGNESIUM mg/dL 2 0 1 9 1 8 1 9 2 3 1 4*     Coags:  Results from last 7 days   Lab Units 09/29/19  1506 09/26/19  2114   PTT seconds 26 26   INR  1 00 1 03     Lipid Profile:  Results from last 7 days   Lab Units 09/27/19  0443   CHOLESTEROL mg/dL 192   TRIGLYCERIDES mg/dL 220*   HDL mg/dL 28*   LDL CALC mg/dL 120*     NT-proBNP:   Recent Labs     09/29/19  1506   NTBNP 2,944*        Imaging & Testing   I have personally reviewed pertinent reports  Xr Chest 1 View Portable    Result Date: 9/30/2019  Narrative: CHEST INDICATION:   sob  COMPARISON:  9/26/2019 EXAM PERFORMED/VIEWS:  XR CHEST PORTABLE  AP semierect Images: 1 FINDINGS: Heart shadow is enlarged but unchanged from prior exam  Mild vascular congestion with small left pleural effusion now noted  Osseous structures appear within normal limits for patient age  Impression: Mild vascular congestion and small left pleural effusion concerning for early CHF  Workstation performed: [id number]     Xr Chest 1 View Portable    Result Date: 9/27/2019  Narrative: CHEST INDICATION:   cp  COMPARISON:  09/11/2018 EXAM PERFORMED/VIEWS:  XR CHEST PORTABLE 1 image FINDINGS: Cardiomediastinal silhouette appears enlarged    The pulmonary vessels are normal  The lungs are clear  No pneumothorax or pleural effusion  Osseous structures appear within normal limits for patient age  Calcific tendinitis in the right shoulder  Impression: No acute cardiopulmonary disease  Cardiomegaly  Workstation performed: [id number]        EKG / Monitor: Personally reviewed  Sinus rhythm    Cardiac testing:   Results for orders placed during the hospital encounter of 19   Echo complete with contrast if indicated    Narrative [address]  [address] [facility]  [place] [address]  [phone]    Transthoracic Echocardiogram  2D, M-mode, Doppler, and Color Doppler    Study date:  27-Sep-2019    Patient: Gregory Ramirez  MR number: [id number]  Account number: [de-identified]  : 1960  Age: 63 years  Gender: Female  Status: Outpatient  Location: Bedside  Height: 64 in  Weight: 159 7 lb  BP: 91/ 54 mmHg    Indications: SOB, Edema    Diagnoses: R06 00 - Dyspnea, unspecified    Sonographer:  MASON Reid  Primary Physician:  Gwynn Duane, DO  Group:  [facility]  Interpreting Physician:  Bryanna Burk DO    SUMMARY    LEFT VENTRICLE:  Systolic function was normal by visual assessment  Ejection fraction was estimated to be 55 %  There were no regional wall motion abnormalities  There was severe concentric hypertrophy  There was a dynamic obstruction of the LVOT with peak velocity of 3 m/s with Valsalva  Doppler parameters were consistent with elevated mean left atrial filling pressure  LEFT ATRIUM:  The atrium was markedly dilated  MITRAL VALVE:  There was mild regurgitation  AORTIC VALVE:  There was no evidence for stenosis  There was no regurgitation  PULMONIC VALVE:  There was trace regurgitation  HISTORY: PRIOR HISTORY: Alopecia, Bell's palsy, CHF,Fibromyalgia,Gastroesophageal Reflux Disease,Hyperlipidemia,HTN,Lyme Disease,MI, Stroke,TIA      PROCEDURE: The procedure was performed at the bedside  This was a routine study  The transthoracic approach was used  The study included complete 2D imaging, M-mode, complete spectral Doppler, and color Doppler  The heart rate was 74 bpm,  at the start of the study  LEFT VENTRICLE: Size was normal  Systolic function was normal by visual assessment  Ejection fraction was estimated to be 55 %  There were no regional wall motion abnormalities  There was severe concentric hypertrophy  There was a dynamic  obstruction of the LVOT with peak velocity of 3 m/s with Valsalva  DOPPLER: Doppler parameters were consistent with elevated mean left atrial filling pressure  RIGHT VENTRICLE: The size was normal  Systolic function was normal  DOPPLER: Systolic pressure was within the normal range  LEFT ATRIUM: The atrium was markedly dilated  RIGHT ATRIUM: Size was normal     MITRAL VALVE: Valve structure was normal  There was normal leaflet separation  No echocardiographic evidence for prolapse  DOPPLER: The transmitral velocity was within the normal range  There was no evidence for stenosis  There was mild  regurgitation  AORTIC VALVE: The valve was trileaflet  Leaflets exhibited normal thickness and normal cuspal separation  DOPPLER: Transaortic velocity was within the normal range  There was no evidence for stenosis  There was no regurgitation  TRICUSPID VALVE: The valve structure was normal  There was normal leaflet separation  DOPPLER: The transtricuspid velocity was within the normal range  There was no regurgitation  PULMONIC VALVE: Leaflets exhibited normal thickness, no calcification, and normal cuspal separation  DOPPLER: The transpulmonic velocity was within the normal range  There was trace regurgitation  PERICARDIUM: There was no thickening  There was no pericardial effusion  AORTA: The root exhibited normal size      PULMONARY ARTERY: The size was normal  The morphology appeared normal     SYSTEM MEASUREMENT TABLES    2D mode  AoR Diam 2D: 2 7 cm  LA Diam (2D): 4 4 cm  LA/Ao (2D): 1 63  FS (2D Teich): 30 %  IVSd (2D): 1 53 cm  LVDEV: 67 1 cmï¾³  LVESV: 28 3 cmï¾³  LVIDd(2D): 3 93 cm  LVISd (2D): 2 75 cm  LVOT Area 2D: 3 14 cmï¾²  LVPWd (2D): 1 49 cm  SV (Teich): 38 8 cmï¾³    Apical four chamber  LVEF A4C: 58 %    Unspecified Scan Mode  STEFFI Cont Eq (Peak Candido): 2 42 cmï¾²  LVOT Diam : 2 cm  LVOT Vmax: 1500 mm/s  LVOT Vmax; Mean: 1500 mm/s  Peak Grad ; Mean: 9 mm[Hg]  MV Peak A Candido: 874 mm/s  MV Peak E Candido  Mean: 1260 mm/s  MVA (PHT): 4 31 cmï¾²  PHT: 51 ms  RA Area: 14 9 cmï¾²  RA Volume: 28 8 cmï¾³  TAPSE: 2 cm    [facility]    Prepared and electronically signed by    Kt Montenegro DO  Signed 27-Sep-2019 14:16:03       Results for orders placed during the hospital encounter of 18   NM myocardial perfusion spect (stress and/or rest)    [facility] [address]   [address] [facility]  [phone]    Rest/Stress Gated SPECT Myocardial Perfusion Imaging After Regadenoson    Patient: Enoc Moses  MR number: [id number]  Account number: [de-identified]  : 1960  Age: 65 years  Gender: Female  Status: Inpatient  Location: Stress lab  Height: 65 in  Weight: 179 lb  BP: 116/ 66 mmHg    Allergies: HYDROMORPHONE    Diagnosis: R06 00 - Dyspnea, unspecified, R07 9 - Chest pain, unspecified    Primary Physician:  Zeina Lopez DO  Technician:  Damari Rucker  RN:  MORRIS Zhang  Group:  [facility]  Report Prepared By[de-identified]  MORRIS Zhang  Interpreting Physician:  Mehreen Quispe MD    INDICATIONS: Evaluation of known coronary artery disease  HISTORY: The patient is a 65year old  female  Chest pain status: chest pain  Other symptoms: dyspnea  Coronary artery disease risk factors: dyslipidemia, hypertension, and family history of premature coronary artery disease    Cardiovascular history: prior myocardial infarction, stroke, and transient ischemic attack  Prior cardiovascular procedures: cardiac angiogram  Co-morbidity: obesity, lyme disease  Medications: a beta blocker, a diuretic, clopidogrel, a  lipid lowering agent, and an antihypertensive agent  PHYSICAL EXAM: Baseline physical exam screening: no wheezes audible  REST ECG: Normal sinus rhythm  PROCEDURE: The study was performed in the stress lab  A regadenoson infusion pharmacologic stress test was performed  Gated SPECT myocardial perfusion imaging was performed after stress and at rest  Systolic blood pressure was 116 mmHg, at  the start of the study  Diastolic blood pressure was 66 mmHg, at the start of the study  The heart rate was 68 bpm, at the start of the study  IV double checked  Regadenoson protocol:  Time HR bpm SBP mmHg DBP mmHg Symptoms Rhythm/conduct  Baseline 09:09 68 116 66 none NSR  Immediate 09:11 78 128 74 mild dyspnea same as above  1 min 09:12 87 156 76 same as above --  2 min 09:13 84 145 68 subsiding --  3 min 09:14 82 138 70 none --  4 min 09:15 78 130 68 none --  No medications or fluids given  STRESS SUMMARY: Duration of pharmacologic stress was 3 min  Maximal heart rate during stress was 108 bpm  The heart rate response to stress was normal  There was normal resting blood pressure with an appropriate response to stress  The  rate-pressure product for the peak heart rate and blood pressure was 40670  There was no chest pain during stress  The stress test was terminated due to protocol completion  Pre oxygen saturation: 98 %  Peak oxygen saturation: 98 %  The  stress ECG was equivocal for ischemia  ISOTOPE ADMINISTRATION:  Resting isotope administration Stress isotope administration  Agent Tetrofosmin Tetrofosmin  Dose 10 6 mCi 30 1 mCi  Date 07/27/2018 07/27/2018  Injection time 07:45 09:12    The radiopharmaceutical was injected at the peak effect of pharmacologic stress  MYOCARDIAL PERFUSION IMAGING:  The image quality was excellent  Left ventricular size was normal  The TID ratio was 1 2  PERFUSION DEFECTS:  -  There were no perfusion defects  GATED SPECT:  The calculated left ventricular ejection fraction was 67 %  There was no left ventricular regional abnormality  SUMMARY:  -  Stress results: There was no chest pain during stress  -  ECG conclusions: The stress ECG was equivocal for ischemia  -  Perfusion imaging: There were no perfusion defects   -  Gated SPECT: The calculated left ventricular ejection fraction was 67 %  There was no left ventricular regional abnormality   -  Impressions and recommendations: Likely normal study after pharmacologic vasodilation  Minimal transient dilation noted- likely physiologic  Normal EF noted  IMPRESSIONS: Likely normal study after pharmacologic vasodilation  Minimal transient dilation noted- likely physiologic  Normal EF noted  Myocardial perfusion imaging was normal at rest and with stress  Prepared and signed by    Nadege Miller MD  Signed 07/27/2018 16:10:53         Trice Lopez        "This note has been constructed using a voice recognition system  Therefore there may be syntax, spelling, and/or grammatical errors   Please call if you have any questions  "

## 2019-10-02 LAB — BACTERIA UR CULT: ABNORMAL

## 2019-10-02 NOTE — UTILIZATION REVIEW
Notification of Discharge  This is a Notification of Discharge from our facility [address]  Please be advised that this patient has been discharge from our facility  Below you will find the admission and discharge date and time including the patients disposition  PRESENTATION DATE: 9/29/2019  2:34 PM  OBS ADMISSION DATE:   IP ADMISSION DATE: 9/29/19 1607   DISCHARGE DATE: 10/1/2019 12:29 PM  DISPOSITION: Home/Self Care Home/Self Care   Admission Orders listed below:  Admission Orders (From admission, onward)     Ordered        09/29/19 1607  Inpatient Admission  Once                   Please contact the UR Department if additional information is required to close this patient's authorization/case  [address]  Utilization Review Department  Phone: [phone]; Fax [phone]  [email or web address]  ATTENTION: Please call with any questions or concerns to [phone]  and carefully listen to the prompts so that you are directed to the right person  Send all requests for admission clinical reviews, approved or denied determinations and any other requests to fax [phone]   All voicemails are confidential

## 2019-10-17 ENCOUNTER — OFFICE VISIT (OUTPATIENT)
Dept: CARDIOLOGY CLINIC | Facility: CLINIC | Age: 59
End: 2019-10-17
Payer: COMMERCIAL

## 2019-10-17 VITALS
DIASTOLIC BLOOD PRESSURE: 82 MMHG | OXYGEN SATURATION: 95 % | HEART RATE: 72 BPM | BODY MASS INDEX: 27.49 KG/M2 | WEIGHT: 161 LBS | SYSTOLIC BLOOD PRESSURE: 140 MMHG | HEIGHT: 64 IN

## 2019-10-17 DIAGNOSIS — I50.9 CHF (CONGESTIVE HEART FAILURE) (HCC): ICD-10-CM

## 2019-10-17 DIAGNOSIS — I50.9 CONGESTIVE HEART FAILURE, UNSPECIFIED HF CHRONICITY, UNSPECIFIED HEART FAILURE TYPE (HCC): Primary | ICD-10-CM

## 2019-10-17 DIAGNOSIS — E78.00 HYPERCHOLESTEREMIA: ICD-10-CM

## 2019-10-17 DIAGNOSIS — I50.33 ACUTE ON CHRONIC DIASTOLIC CHF (CONGESTIVE HEART FAILURE) (HCC): ICD-10-CM

## 2019-10-17 PROCEDURE — 99215 OFFICE O/P EST HI 40 MIN: CPT | Performed by: INTERNAL MEDICINE

## 2019-10-17 PROCEDURE — 93000 ELECTROCARDIOGRAM COMPLETE: CPT | Performed by: INTERNAL MEDICINE

## 2019-10-17 RX ORDER — SPIRONOLACTONE 25 MG/1
25 TABLET ORAL DAILY
Qty: 30 TABLET | Refills: 3 | Status: SHIPPED | OUTPATIENT
Start: 2019-10-17 | End: 2020-03-11

## 2019-10-17 RX ORDER — ROSUVASTATIN CALCIUM 40 MG/1
40 TABLET, COATED ORAL
Qty: 90 TABLET | Refills: 3 | Status: SHIPPED | OUTPATIENT
Start: 2019-10-17

## 2019-10-17 NOTE — PROGRESS NOTES
[facility]  [address] [place] [address] [address]   [place]  Cardiology Consultation    Isabela Pimentel  [id number]  1960      Consult for:  Appreciate consult by: Zhang Winter DO    1  Congestive heart failure, unspecified HF chronicity, unspecified heart failure type ([facility] [address] )  POCT ECG   2  Hypercholesteremia  rosuvastatin (CRESTOR) 40 MG tablet   3  Acute on chronic diastolic CHF (congestive heart failure) ([facility])  metoprolol tartrate (LOPRESSOR) 25 mg tablet    Basic metabolic panel    Magnesium    NT-BNP PRO    Basic metabolic panel    Magnesium    NT-BNP PRO    Compression Stocking    rosuvastatin (CRESTOR) 40 MG tablet   4  CHF (congestive heart failure) ([facility])  spironolactone (ALDACTONE) 25 mg tablet    Basic metabolic panel    Magnesium    NT-BNP PRO    Basic metabolic panel    Magnesium    NT-BNP PRO    Compression Stocking      Discussion/Summary:   Acute on chronic diastolic heart failure/severe left ventricular hypertrophy/dynamic LVOT gradient with peak velocity 3 m/sec with Valsalva- markedly improvement in her volume status  She has been compliant with beta-blocker therapy and her blood pressures have improved  Her left ventricular hypertrophy may be hypertensive heart disease  We cannot exclude hypertrophic cardiomyopathy  During her inpatient monitoring there was no evidence of significant ventricular arrhythmias  She is unable to obtain an MRI secondary to multiple metal screws      -- continue metoprolol 125 mg twice a day to target resting heart rate near 60  -- compression socks given history of hypotension  -- continue Aldactone 25 mg daily  -- plan to add back Cozaar based on her repeat blood work  -- she understands about a low-sodium diet  -- she will weigh herself daily    Nonobstructive coronary artery disease  -- rosuvastatin 40 mg daily  -- aspirin 81 mg daily    History of seizures  --currently on Keppra    Previous acute kidney injury  -- hold loop diuretic   -- recheck electrolytes    Fibromyalgia  -- she is currently on Neurontin plus Cymbalta      HPI:   60-year-old woman with a history of acute on chronic diastolic heart failure with severe concentric hypertrophy plus dynamic obstruction of the LVOT who presents for hospital follow-up  She has been compliant with her metoprolol 125 mg twice a day  She states feeling much less short of breath and without dizziness or lightheadedness  Her weight has been continue to trend down  She denies having any syncopal episodes  She denies having any palpitations  She is compliant with her medications  She was added on low-dose Aldactone  She has a prior history of seizures  She has been taking her seizure medications  She also has a prior history of severe anxiety  She has multiple metal screws in her legs  She has uncertain whether she can have an MRI  She reports having a family history of heart trouble  Her father  in his 46s and had an enlarged heart  She has 3 children  She is on sure if they have any significant heart problems  She has a brother who is older than her  She denies that he has had any heart trouble such as weakness of his heart muscle or myocardial infarctions  Other than her father there has been no one else was diet at a young age       Past Medical History:   Diagnosis Date    Alopecia     Back injury     Bell's palsy     [facility]     Chronic pain     back    Closed left arm fracture     Fibromyalgia     Fibromyalgia, primary     GERD (gastroesophageal reflux disease)     Hyperlipidemia     Hypertension     Lyme disease     Myocardial infarct ([facility] )         Myocardial infarction [facility])     Cardiac catheterization 2 years ago showed 30% blockage in 1 of the blood vessels    Stroke ([facility] )     TIA (transient ischemic attack)     2017     Social History     Socioeconomic History    Marital status: Single     Spouse name: Not on file    Number of children: Not on file    Years of education: Not on file    Highest education level: Not on file   Occupational History    Not on file   Social Needs    Financial resource strain: Not on file    Food insecurity:     Worry: Not on file     Inability: Not on file    Transportation needs:     Medical: Not on file     Non-medical: Not on file   Tobacco Use    Smoking status: Current Every Day Smoker     Packs/day: 0 50     Years: 40 00     Pack years: 20 00    Smokeless tobacco: Never Used    Tobacco comment: 1/2 per day   Substance and Sexual Activity    Alcohol use: Not Currently     Comment: as per patient; former social drinker     Drug use: No    Sexual activity: Not on file   Lifestyle    Physical activity:     Days per week: Not on file     Minutes per session: Not on file    Stress: Not on file   Relationships    Social connections:     Talks on phone: Not on file     Gets together: Not on file     Attends Gnosticist service: Not on file     Active member of club or organization: Not on file     Attends meetings of clubs or organizations: Not on file     Relationship status: Not on file    Intimate partner violence:     Fear of current or ex partner: Not on file     Emotionally abused: Not on file     Physically abused: Not on file     Forced sexual activity: Not on file   Other Topics Concern    Not on file   Social History Narrative    Not on file      Family History   Problem Relation Age of Onset    Heart attack Mother     Heart attack Father      Past Surgical History:   Procedure Laterality Date     SECTION      CHOLECYSTECTOMY      CORONARY ANGIOPLASTY          ORIF TIBIA FRACTURE Left 2018    Procedure: OPEN REDUCTION W/ INTERNAL FIXATION (ORIF) TIBIA FRACTURE;  Surgeon: Brad Ohara MD;  Location: [facility] OR;  Service: Orthopedics       Current Outpatient Medications:     ARIPiprazole (ABILIFY) 5 mg tablet, Take 5 mg by mouth daily, Disp: , Rfl:     aspirin (ECOTRIN LOW STRENGTH) 81 mg EC tablet, Take 81 mg by mouth daily, Disp: , Rfl:     DULoxetine HCl (CYMBALTA PO), Take 90 mg by mouth daily at bedtime  , Disp: , Rfl:     fluticasone-salmeterol (AIRDUO RESPICLICK) 401-31 mcg/act dry powder inhaler, Inhale 1 puff 2 (two) times a day Rinse mouth after use , Disp: , Rfl:     gabapentin (NEURONTIN) 600 MG tablet, Take 900 mg by mouth 3 (three) times a day , Disp: , Rfl:     levETIRAcetam (KEPPRA) 500 mg tablet, Take 1 tablet (500 mg total) by mouth every 12 (twelve) hours, Disp: 60 tablet, Rfl: 5    metoprolol tartrate (LOPRESSOR) 25 mg tablet, Take 5 tablets (125 mg total) by mouth every 12 (twelve) hours, Disp: 300 tablet, Rfl: 3    nicotine (NICODERM CQ) 14 mg/24hr TD 24 hr patch, Place 1 patch on the skin daily, Disp: 28 patch, Rfl: 0    pantoprazole (PROTONIX) 40 mg tablet, Take 40 mg by mouth daily, Disp: , Rfl:     spironolactone (ALDACTONE) 25 mg tablet, Take 1 tablet (25 mg total) by mouth daily, Disp: 30 tablet, Rfl: 3    Tiotropium Bromide Monohydrate (SPIRIVA HANDIHALER IN), Inhale, Disp: , Rfl:     traZODone (DESYREL) 100 mg tablet, Take 100 mg by mouth daily at bedtime, Disp: , Rfl:     acetaminophen (TYLENOL) 325 mg tablet, Take 3 tablets (975 mg total) by mouth every 8 (eight) hours as needed for mild pain or moderate pain (Patient not taking: Reported on 9/29/2019), Disp: 30 tablet, Rfl: 0    polyethylene glycol (MIRALAX) 17 g packet, Take 17 g by mouth daily (Patient not taking: Reported on 9/29/2019), Disp: 14 each, Rfl: 0    rosuvastatin (CRESTOR) 40 MG tablet, Take 1 tablet (40 mg total) by mouth daily at bedtime, Disp: 90 tablet, Rfl: 3  Allergies   Allergen Reactions    Hydromorphone Shortness Of Breath    Morphine Shortness Of Breath     Vitals:    10/17/19 1619   BP: 140/82   BP Location: Right arm   Patient Position: Sitting   Cuff Size: Large   Pulse: 72   SpO2: 95%   Weight: 73 kg (161 lb) Height: 5' 4" (1 626 m)       Review of Systems:   Review of Systems   Constitutional: Negative  Negative for activity change, appetite change, chills, diaphoresis, fatigue, fever and unexpected weight change  HENT: Negative  Negative for congestion, dental problem, drooling, ear discharge, ear pain, facial swelling, hearing loss, mouth sores, nosebleeds, postnasal drip, rhinorrhea, sinus pressure, sinus pain, sneezing, sore throat, tinnitus, trouble swallowing and voice change  Eyes: Negative  Negative for photophobia, pain, redness, itching and visual disturbance  Respiratory: Positive for shortness of breath  Negative for apnea, cough, choking, chest tightness, wheezing and stridor  Cardiovascular: Negative  Negative for chest pain, palpitations and leg swelling  Gastrointestinal: Negative  Negative for abdominal distention, abdominal pain, anal bleeding, blood in stool, constipation, diarrhea, nausea, rectal pain and vomiting  Endocrine: Negative  Negative for cold intolerance, heat intolerance, polydipsia, polyphagia and polyuria  Genitourinary: Negative  Negative for decreased urine volume, difficulty urinating, dyspareunia, dysuria, enuresis, flank pain, frequency, genital sores, hematuria, menstrual problem, pelvic pain, urgency, vaginal bleeding, vaginal discharge and vaginal pain  Musculoskeletal: Negative  Negative for arthralgias, back pain, gait problem, joint swelling, myalgias, neck pain and neck stiffness  Skin: Negative  Negative for color change, pallor, rash and wound  Allergic/Immunologic: Negative  Negative for environmental allergies, food allergies and immunocompromised state  Neurological: Positive for dizziness  Negative for tremors, seizures, syncope, facial asymmetry, speech difficulty, weakness, light-headedness, numbness and headaches  Hematological: Negative  Negative for adenopathy  Does not bruise/bleed easily  Psychiatric/Behavioral: Negative  Negative for agitation, behavioral problems, confusion, decreased concentration, dysphoric mood, hallucinations, self-injury, sleep disturbance and suicidal ideas  The patient is not nervous/anxious and is not hyperactive  All other systems reviewed and are negative  Vitals:    10/17/19 1619   BP: 140/82   BP Location: Right arm   Patient Position: Sitting   Cuff Size: Large   Pulse: 72   SpO2: 95%   Weight: 73 kg (161 lb)   Height: 5' 4" (1 626 m)     Physical Examination:   Physical Exam   Constitutional: She is oriented to person, place, and time  No distress  HENT:   Head: Normocephalic and atraumatic  Right Ear: External ear normal    Left Ear: External ear normal    Eyes: Pupils are equal, round, and reactive to light  Conjunctivae are normal  Right eye exhibits no discharge  Left eye exhibits no discharge  No scleral icterus  Neck: Normal range of motion  Neck supple  No JVD present  No tracheal deviation present  No thyromegaly present  Cardiovascular: Normal rate and regular rhythm  Exam reveals gallop  Exam reveals no friction rub  Murmur heard  Pulmonary/Chest: Effort normal and breath sounds normal  No stridor  No respiratory distress  She has no wheezes  She has no rales  She exhibits no tenderness  Abdominal: Soft  Bowel sounds are normal  She exhibits no distension and no mass  There is no tenderness  There is no rebound and no guarding  Musculoskeletal: Normal range of motion  She exhibits no edema, tenderness or deformity  Neurological: She is alert and oriented to person, place, and time  She has normal reflexes  She displays normal reflexes  No cranial nerve deficit  She exhibits normal muscle tone  Coordination normal    Skin: Skin is warm and dry  No rash noted  She is not diaphoretic  No erythema  No pallor  Psychiatric: She has a normal mood and affect   Her behavior is normal  Judgment and thought content normal        Labs:     Lab Results   Component Value Date WBC 8 04 10/01/2019    HGB 13 4 10/01/2019    HCT 42 3 10/01/2019    MCV 97 10/01/2019    RDW 14 0 10/01/2019     10/01/2019     BMP:  Lab Results   Component Value Date    SODIUM 141 10/01/2019    K 4 0 10/01/2019     10/01/2019    CO2 27 10/01/2019    BUN 12 10/01/2019    CREATININE 0 80 10/01/2019    GLUC 112 10/01/2019    GLUF 133 (H) 01/30/2018    CALCIUM 8 7 10/01/2019    EGFR 82 10/01/2019    MG 2 0 10/01/2019     LFT:  Lab Results   Component Value Date    AST 15 09/29/2019    ALT 21 09/29/2019    ALKPHOS 79 09/29/2019    TP 5 9 (L) 09/29/2019    ALB 2 8 (L) 09/29/2019      Lab Results   Component Value Date    ADS2JHSUVDZP 0 713 06/04/2018     Lab Results   Component Value Date    HGBA1C 5 6 06/05/2018     Lipid Profile:   Lab Results   Component Value Date    CHOLESTEROL 192 09/27/2019    HDL 28 (L) 09/27/2019    LDLCALC 120 (H) 09/27/2019    TRIG 220 (H) 09/27/2019     Lab Results   Component Value Date    CHOLESTEROL 192 09/27/2019    CHOLESTEROL 205 (H) 06/05/2018     Lab Results   Component Value Date    CKTOTAL 76 12/12/2017    TROPONINI <0 02 09/30/2019     Lab Results   Component Value Date    NTBNP 2,944 (H) 09/29/2019      Recent Results (from the past 672 hour(s))   ECG 12 lead    Collection Time: 09/26/19  8:39 PM   Result Value Ref Range    Ventricular Rate 109 BPM    Atrial Rate 109 BPM    MD Interval 144 ms    QRSD Interval 84 ms    QT Interval 362 ms    QTC Interval 487 ms    P Axis 24 degrees    QRS Axis -9 degrees    T Wave Axis 78 degrees   CBC and differential    Collection Time: 09/26/19  9:14 PM   Result Value Ref Range    WBC 13 50 (H) 4 31 - 10 16 Thousand/uL    RBC 5 04 3 81 - 5 12 Million/uL    Hemoglobin 15 4 11 5 - 15 4 g/dL    Hematocrit 46 4 (H) 34 8 - 46 1 %    MCV 92 82 - 98 fL    MCH 30 6 26 8 - 34 3 pg    MCHC 33 2 31 4 - 37 4 g/dL    RDW 13 8 11 6 - 15 1 %    MPV 9 8 8 9 - 12 7 fL    Platelets 058 506 - 760 Thousands/uL    nRBC 0 /100 WBCs    Neutrophils Relative 67 43 - 75 %    Immat GRANS % 0 0 - 2 %    Lymphocytes Relative 25 14 - 44 %    Monocytes Relative 6 4 - 12 %    Eosinophils Relative 1 0 - 6 %    Basophils Relative 1 0 - 1 %    Neutrophils Absolute 9 01 (H) 1 85 - 7 62 Thousands/µL    Immature Grans Absolute 0 05 0 00 - 0 20 Thousand/uL    Lymphocytes Absolute 3 34 0 60 - 4 47 Thousands/µL    Monocytes Absolute 0 85 0 17 - 1 22 Thousand/µL    Eosinophils Absolute 0 15 0 00 - 0 61 Thousand/µL    Basophils Absolute 0 10 0 00 - 0 10 Thousands/µL   Comprehensive metabolic panel    Collection Time: 09/26/19  9:14 PM   Result Value Ref Range    Sodium 137 136 - 145 mmol/L    Potassium 4 8 3 5 - 5 3 mmol/L    Chloride 102 100 - 108 mmol/L    CO2 27 21 - 32 mmol/L    ANION GAP 8 4 - 13 mmol/L    BUN 15 5 - 25 mg/dL    Creatinine 0 96 0 60 - 1 30 mg/dL    Glucose 109 65 - 140 mg/dL    Calcium 8 4 8 3 - 10 1 mg/dL    AST 25 5 - 45 U/L    ALT 27 12 - 78 U/L    Alkaline Phosphatase 93 46 - 116 U/L    Total Protein 7 3 6 4 - 8 2 g/dL    Albumin 3 4 (L) 3 5 - 5 0 g/dL    Total Bilirubin 0 50 0 20 - 1 00 mg/dL    eGFR 66 ml/min/1 73sq m   Troponin I    Collection Time: 09/26/19  9:14 PM   Result Value Ref Range    Troponin I <0 02 <=0 04 ng/mL   B-type natriuretic peptide    Collection Time: 09/26/19  9:14 PM   Result Value Ref Range    NT-proBNP 1,001 (H) <125 pg/mL   APTT    Collection Time: 09/26/19  9:14 PM   Result Value Ref Range    PTT 26 23 - 37 seconds   Protime-INR    Collection Time: 09/26/19  9:14 PM   Result Value Ref Range    Protime 11 1 9 8 - 12 0 seconds    INR 1 03 0 91 - 1 09   D-dimer, quantitative    Collection Time: 09/26/19  9:14 PM   Result Value Ref Range    D-Dimer, Quant 480 190 - 520 ng/ml (FEU)   MRSA culture    Collection Time: 09/26/19 11:12 PM   Result Value Ref Range    MRSA Culture Only       No Methicillin Resistant Staphlyococcus aureus (MRSA) isolated   Troponin I    Collection Time: 09/27/19 12:53 AM   Result Value Ref Range Troponin I <0 02 <=0 04 ng/mL   Basic metabolic panel    Collection Time: 09/27/19  4:43 AM   Result Value Ref Range    Sodium 138 136 - 145 mmol/L    Potassium 2 8 (L) 3 5 - 5 3 mmol/L    Chloride 102 100 - 108 mmol/L    CO2 27 21 - 32 mmol/L    ANION GAP 9 4 - 13 mmol/L    BUN 21 5 - 25 mg/dL    Creatinine 1 43 (H) 0 60 - 1 30 mg/dL    Glucose 121 65 - 140 mg/dL    Calcium 7 8 (L) 8 3 - 10 1 mg/dL    eGFR 41 ml/min/1 73sq m   Magnesium    Collection Time: 09/27/19  4:43 AM   Result Value Ref Range    Magnesium 1 4 (L) 1 6 - 2 6 mg/dL   Troponin I    Collection Time: 09/27/19  4:43 AM   Result Value Ref Range    Troponin I <0 02 <=0 04 ng/mL   Hepatitis C antibody    Collection Time: 09/27/19  4:43 AM   Result Value Ref Range    Hepatitis C Ab Non-reactive Non-reactive   Lipid Panel with Direct LDL reflex    Collection Time: 09/27/19  4:43 AM   Result Value Ref Range    Cholesterol 192 50 - 200 mg/dL    Triglycerides 220 (H) <=150 mg/dL    HDL, Direct 28 (L) 40 - 60 mg/dL    LDL Calculated 120 (H) 0 - 100 mg/dL   CBC (With Platelets)    Collection Time: 09/27/19  4:43 AM   Result Value Ref Range    WBC 9 89 4 31 - 10 16 Thousand/uL    RBC 4 40 3 81 - 5 12 Million/uL    Hemoglobin 13 7 11 5 - 15 4 g/dL    Hematocrit 41 4 34 8 - 46 1 %    MCV 94 82 - 98 fL    MCH 31 1 26 8 - 34 3 pg    MCHC 33 1 31 4 - 37 4 g/dL    RDW 13 9 11 6 - 15 1 %    Platelets 187 744 - 585 Thousands/uL    MPV 10 0 8 9 - 12 7 fL   Urinalysis with microscopic    Collection Time: 09/27/19  6:01 AM   Result Value Ref Range    Clarity, UA Clear     Color, UA Yellow     Specific Gravity, UA 1 010 1 000 - 1 030    pH, UA 5 0 5 0, 5 5, 6 0, 6 5, 7 0, 7 5, 8 0, 8 5, 9 0    Glucose, UA Negative Negative mg/dl    Ketones, UA Negative Negative mg/dl    Blood, UA Negative Negative    Protein, UA Negative Negative mg/dl    Nitrite, UA Negative Negative    Bilirubin, UA Negative Negative    Urobilinogen, UA 0 2 0 2, 1 0 E U /dl E U /dl    Leukocytes, UA Negative Negative    WBC, UA 0-1 (A) None Seen, 0-5, 5-55, 5-65 /hpf    RBC, UA None Seen None Seen, 0-5 /hpf    Hyaline Casts, UA 1-2 (A) (none) /lpf    Bacteria, UA Occasional None Seen, Occasional /hpf    Epithelial Cells Occasional None Seen, Occasional /hpf   Rapid drug screen, urine    Collection Time: 09/27/19  1:24 PM   Result Value Ref Range    Amph/Meth UR Negative Negative    Barbiturate Ur Negative Negative    Benzodiazepine Urine Positive (A) Negative    Cocaine Urine Negative Negative    Methadone Urine Negative Negative    Opiate Urine Negative Negative    PCP Ur Negative Negative    THC Urine Negative Negative   Blood gas, arterial    Collection Time: 09/27/19  3:16 PM   Result Value Ref Range    pH, Arterial 7 382 7 350 - 7 450    PH ART TC 7 385 7 350 - 7 450    pCO2, Arterial 47 5 (H) 36 0 - 44 0 mm Hg    PCO2 (TC) Arterial 47 1 (H) 36 0 - 44 0 mm Hg    pO2, Arterial 65 2 (L) 75 0 - 129 0 mm Hg    PO2 (TC) Arterial 64 3 (L) 75 0 - 129 0 mm Hg    HCO3, Arterial 27 6 22 0 - 28 0 mmol/L    Base Excess, Arterial 1 9 mmol/L    O2 Content, Arterial 17 2 16 0 - 23 0 mL/dL    O2 HGB,Arterial  91 4 (L) 94 0 - 97 0 %    SOURCE Radial, Right     MALIK TEST Yes     Temperature 98 2 Degrees Fehrenheit    ROOM AIR FIO2 21 %   Basic metabolic panel    Collection Time: 09/27/19  3:59 PM   Result Value Ref Range    Sodium 139 136 - 145 mmol/L    Potassium 3 9 3 5 - 5 3 mmol/L    Chloride 103 100 - 108 mmol/L    CO2 28 21 - 32 mmol/L    ANION GAP 8 4 - 13 mmol/L    BUN 19 5 - 25 mg/dL    Creatinine 0 99 0 60 - 1 30 mg/dL    Glucose 120 65 - 140 mg/dL    Calcium 8 0 (L) 8 3 - 10 1 mg/dL    eGFR 63 ml/min/1 73sq m   Magnesium    Collection Time: 09/27/19  3:59 PM   Result Value Ref Range    Magnesium 2 3 1 6 - 2 6 mg/dL   Basic metabolic panel    Collection Time: 09/28/19  6:46 AM   Result Value Ref Range    Sodium 141 136 - 145 mmol/L    Potassium 4 2 3 5 - 5 3 mmol/L    Chloride 108 100 - 108 mmol/L    CO2 28 21 - 32 mmol/L    ANION GAP 5 4 - 13 mmol/L    BUN 15 5 - 25 mg/dL    Creatinine 0 87 0 60 - 1 30 mg/dL    Glucose 96 65 - 140 mg/dL    Calcium 7 8 (L) 8 3 - 10 1 mg/dL    eGFR 74 ml/min/1 73sq m   Magnesium    Collection Time: 09/28/19  6:46 AM   Result Value Ref Range    Magnesium 1 9 1 6 - 2 6 mg/dL   Phosphorus    Collection Time: 09/28/19  6:46 AM   Result Value Ref Range    Phosphorus 2 8 2 7 - 4 5 mg/dL   ECG 12 lead    Collection Time: 09/29/19  2:39 PM   Result Value Ref Range    Ventricular Rate 69 BPM    Atrial Rate 69 BPM    AR Interval 152 ms    QRSD Interval 84 ms    QT Interval 502 ms    QTC Interval 537 ms    P Axis 16 degrees    QRS Axis -12 degrees    T Wave Axis 99 degrees   CBC and differential    Collection Time: 09/29/19  3:06 PM   Result Value Ref Range    WBC 7 48 4 31 - 10 16 Thousand/uL    RBC 4 09 3 81 - 5 12 Million/uL    Hemoglobin 12 8 11 5 - 15 4 g/dL    Hematocrit 39 2 34 8 - 46 1 %    MCV 96 82 - 98 fL    MCH 31 3 26 8 - 34 3 pg    MCHC 32 7 31 4 - 37 4 g/dL    RDW 14 9 11 6 - 15 1 %    MPV 10 7 8 9 - 12 7 fL    Platelets 404 817 - 302 Thousands/uL    Neutrophils Relative 52 43 - 75 %    Lymphocytes Relative 36 14 - 44 %    Monocytes Relative 8 4 - 12 %    Eosinophils Relative 3 0 - 6 %    Basophils Relative 1 0 - 1 %    Neutrophils Absolute 3 94 1 85 - 7 62 Thousands/µL    Lymphocytes Absolute 2 69 0 60 - 4 47 Thousands/µL    Monocytes Absolute 0 58 0 17 - 1 22 Thousand/µL    Eosinophils Absolute 0 23 0 00 - 0 61 Thousand/µL    Basophils Absolute 0 04 0 00 - 0 10 Thousands/µL   Protime-INR    Collection Time: 09/29/19  3:06 PM   Result Value Ref Range    Protime 10 7 9 8 - 12 0 seconds    INR 1 00 0 91 - 1 09   APTT    Collection Time: 09/29/19  3:06 PM   Result Value Ref Range    PTT 26 25 - 32 seconds   Comprehensive metabolic panel    Collection Time: 09/29/19  3:06 PM   Result Value Ref Range    Sodium 140 136 - 145 mmol/L    Potassium 4 9 3 5 - 5 3 mmol/L    Chloride 108 100 - 108 mmol/L    CO2 24 21 - 32 mmol/L    ANION GAP 8 4 - 13 mmol/L    BUN 8 5 - 25 mg/dL    Creatinine 0 76 0 60 - 1 30 mg/dL    Glucose 85 65 - 140 mg/dL    Calcium 7 9 (L) 8 3 - 10 1 mg/dL    AST 15 5 - 45 U/L    ALT 21 12 - 78 U/L    Alkaline Phosphatase 79 46 - 116 U/L    Total Protein 5 9 (L) 6 4 - 8 2 g/dL    Albumin 2 8 (L) 3 5 - 5 0 g/dL    Total Bilirubin 0 50 0 20 - 1 00 mg/dL    eGFR 87 ml/min/1 73sq m   Magnesium    Collection Time: 09/29/19  3:06 PM   Result Value Ref Range    Magnesium 1 8 1 6 - 2 6 mg/dL   Phosphorus    Collection Time: 09/29/19  3:06 PM   Result Value Ref Range    Phosphorus 2 9 2 7 - 4 5 mg/dL   B-type natriuretic peptide    Collection Time: 09/29/19  3:06 PM   Result Value Ref Range    NT-proBNP 2,944 (H) <125 pg/mL   Troponin I    Collection Time: 09/29/19  3:06 PM   Result Value Ref Range    Troponin I <0 02 <=0 04 ng/mL   UA w Reflex to Microscopic w Reflex to Culture    Collection Time: 09/29/19  3:21 PM   Result Value Ref Range    Color, UA Yellow     Clarity, UA Slightly Cloudy     Specific [place], UA 1 010 1 000 - 1 030    pH, UA 6 0 5 0, 5 5, 6 0, 6 5, 7 0, 7 5, 8 0, 8 5, 9 0    Leukocytes, UA Small (A) Negative    Nitrite, UA Positive (A) Negative    Protein, UA Negative Negative mg/dl    Glucose, UA Negative Negative mg/dl    Ketones, UA Negative Negative mg/dl    Urobilinogen, UA 0 2 0 2, 1 0 E U /dl E U /dl    Bilirubin, UA Negative Negative    Blood, UA Negative Negative   Urine Microscopic    Collection Time: 09/29/19  3:21 PM   Result Value Ref Range    RBC, UA 1-2 (A) None Seen, 0-5 /hpf    WBC, UA 10-20 (A) None Seen, 0-5, 5-55, 5-65 /hpf    Epithelial Cells Occasional None Seen, Occasional /hpf    Bacteria, UA Innumerable (A) None Seen, Occasional /hpf    Hyaline Casts, UA 1-2 (A) (none) /lpf   Urine culture    Collection Time: 09/29/19  3:21 PM   Result Value Ref Range    Urine Culture >100,000 cfu/ml Escherichia coli ESBL (A)        Susceptibility    Escherichia coli ESBL - [facility]     ZID Performed Yes       Amikacin ($$) <=16 Susceptible ug/ml     Amoxicillin + Clavulanate 16/8 Intermediate ug/ml     Ampicillin ($$) >16 00 Resistant ug/ml     Ampicillin + Sulbactam ($) 16/8 Intermediate ug/ml     Aztreonam ($$$)  >16 Resistant ug/ml     Cefazolin ($) >16 00 Resistant ug/ml     Cefepime ($) >16 00 Resistant ug/ml     Cefotaxime ($) >32 00 Resistant ug/ml     Ceftazidime ($$) >16 Resistant ug/ml     Ceftriaxone ($$) >32 00 Resistant ug/ml     Cefuroxime ($$) >16 Resistant ug/ml     Ciprofloxacin ($)  >2 00 Resistant ug/ml     Ertapenem ($$$) <=0 5 Susceptible ug/ml     Gentamicin ($$) >8 Resistant ug/ml     Levofloxacin ($) >4 00 Resistant ug/ml     Nitrofurantoin <=32 Susceptible ug/ml     Piperacillin + Tazobactam ($$$) 8 Susceptible ug/ml     Tetracycline >8 Resistant ug/ml     Tobramycin ($) >8 Resistant ug/ml     Trimethoprim + Sulfamethoxazole ($$$) <=2/38 Susceptible ug/ml     Fosfomycin   128 000 Intermediate ug/ml   Rapid drug screen, urine    Collection Time: 09/29/19  5:33 PM   Result Value Ref Range    Amph/Meth UR Negative Negative    Barbiturate Ur Positive (A) Negative    Benzodiazepine Urine Positive (A) Negative    Cocaine Urine Negative Negative    Methadone Urine Negative Negative    Opiate Urine Negative Negative    PCP Ur Negative Negative    THC Urine Negative Negative   MRSA culture    Collection Time: 09/29/19  5:47 PM   Result Value Ref Range    MRSA Culture Only       No Methicillin Resistant Staphlyococcus aureus (MRSA) isolated   Troponin I    Collection Time: 09/29/19  8:20 PM   Result Value Ref Range    Troponin I <0 02 <=0 04 ng/mL   Ethanol    Collection Time: 09/29/19  8:20 PM   Result Value Ref Range    Ethanol Lvl <3 0 - 3 mg/dL   Troponin I    Collection Time: 09/30/19  6:59 AM   Result Value Ref Range    Troponin I <0 02 <=0 04 ng/mL   Basic metabolic panel    Collection Time: 09/30/19  6:59 AM   Result Value Ref Range    Sodium 139 136 - 145 mmol/L    Potassium 4 1 3 5 - 5 3 mmol/L    Chloride 105 100 - 108 mmol/L    CO2 25 21 - 32 mmol/L    ANION GAP 9 4 - 13 mmol/L    BUN 10 5 - 25 mg/dL    Creatinine 0 89 0 60 - 1 30 mg/dL    Glucose 109 65 - 140 mg/dL    Calcium 8 5 8 3 - 10 1 mg/dL    eGFR 72 ml/min/1 73sq m   Magnesium    Collection Time: 09/30/19  6:59 AM   Result Value Ref Range    Magnesium 1 9 1 6 - 2 6 mg/dL   CBC and differential    Collection Time: 09/30/19  8:16 AM   Result Value Ref Range    WBC 7 17 4 31 - 10 16 Thousand/uL    RBC 3 99 3 81 - 5 12 Million/uL    Hemoglobin 12 2 11 5 - 15 4 g/dL    Hematocrit 38 4 34 8 - 46 1 %    MCV 96 82 - 98 fL    MCH 30 6 26 8 - 34 3 pg    MCHC 31 8 31 4 - 37 4 g/dL    RDW 14 2 11 6 - 15 1 %    MPV 10 4 8 9 - 12 7 fL    Platelets 517 156 - 024 Thousands/uL    nRBC 0 /100 WBCs    Neutrophils Relative 62 43 - 75 %    Immat GRANS % 0 0 - 2 %    Lymphocytes Relative 26 14 - 44 %    Monocytes Relative 8 4 - 12 %    Eosinophils Relative 3 0 - 6 %    Basophils Relative 1 0 - 1 %    Neutrophils Absolute 4 44 1 85 - 7 62 Thousands/µL    Immature Grans Absolute 0 02 0 00 - 0 20 Thousand/uL    Lymphocytes Absolute 1 88 0 60 - 4 47 Thousands/µL    Monocytes Absolute 0 54 0 17 - 1 22 Thousand/µL    Eosinophils Absolute 0 23 0 00 - 0 61 Thousand/µL    Basophils Absolute 0 06 0 00 - 0 10 Thousands/µL   Basic metabolic panel    Collection Time: 10/01/19  4:54 AM   Result Value Ref Range    Sodium 141 136 - 145 mmol/L    Potassium 4 0 3 5 - 5 3 mmol/L    Chloride 107 100 - 108 mmol/L    CO2 27 21 - 32 mmol/L    ANION GAP 7 4 - 13 mmol/L    BUN 12 5 - 25 mg/dL    Creatinine 0 80 0 60 - 1 30 mg/dL    Glucose 112 65 - 140 mg/dL    Calcium 8 7 8 3 - 10 1 mg/dL    eGFR 82 ml/min/1 73sq m   CBC and differential    Collection Time: 10/01/19  4:54 AM   Result Value Ref Range    WBC 8 04 4 31 - 10 16 Thousand/uL    RBC 4 36 3 81 - 5 12 Million/uL    Hemoglobin 13 4 11 5 - 15 4 g/dL    Hematocrit 42 3 34 8 - 46 1 %    MCV 97 82 - 98 fL    MCH 30 7 26 8 - 34 3 pg    MCHC 31 7 31 4 - 37 4 g/dL    RDW 14 0 11 6 - 15 1 %    MPV 10 6 8 9 - 12 7 fL    Platelets 696 314 - 572 Thousands/uL    nRBC 0 /100 WBCs    Neutrophils Relative 54 43 - 75 %    Immat GRANS % 0 0 - 2 %    Lymphocytes Relative 35 14 - 44 %    Monocytes Relative 7 4 - 12 %    Eosinophils Relative 3 0 - 6 %    Basophils Relative 1 0 - 1 %    Neutrophils Absolute 4 34 1 85 - 7 62 Thousands/µL    Immature Grans Absolute 0 02 0 00 - 0 20 Thousand/uL    Lymphocytes Absolute 2 85 0 60 - 4 47 Thousands/µL    Monocytes Absolute 0 56 0 17 - 1 22 Thousand/µL    Eosinophils Absolute 0 20 0 00 - 0 61 Thousand/µL    Basophils Absolute 0 07 0 00 - 0 10 Thousands/µL   Magnesium    Collection Time: 10/01/19  4:54 AM   Result Value Ref Range    Magnesium 2 0 1 6 - 2 6 mg/dL       Imaging & Testing   I have personally reviewed pertinent reports  Cardiac Testing     Results for orders placed during the hospital encounter of 19   Echo complete with contrast if indicated    [address] [address]  [address] [address]  [phone]    Transthoracic Echocardiogram  2D, M-mode, Doppler, and Color Doppler    Study date:  27-Sep-2019    Patient: Taye Singh  MR number: [id number]  Account number: [de-identified]  : 1960  Age: 63 years  Gender: Female  Status: Outpatient  Location: Bedside  Height: 64 in  Weight: 159 7 lb  BP: 91/ 54 mmHg    Indications: SOB, Edema    Diagnoses: R06 00 - Dyspnea, unspecified    Sonographer:  MASON Oquendo  Primary Physician:  Nelly Veloz DO  Group:  [facility]  Interpreting Physician:  Verito Lanier DO    SUMMARY    LEFT VENTRICLE:  Systolic function was normal by visual assessment  Ejection fraction was estimated to be 55 %  There were no regional wall motion abnormalities  There was severe concentric hypertrophy   There was a dynamic obstruction of the LVOT with peak velocity of 3 m/s with Valsalva  Doppler parameters were consistent with elevated mean left atrial filling pressure  LEFT ATRIUM:  The atrium was markedly dilated  MITRAL VALVE:  There was mild regurgitation  AORTIC VALVE:  There was no evidence for stenosis  There was no regurgitation  PULMONIC VALVE:  There was trace regurgitation  HISTORY: PRIOR HISTORY: Alopecia, Bell's palsy, CHF,Fibromyalgia,Gastroesophageal Reflux Disease,Hyperlipidemia,HTN,Lyme Disease,MI, Stroke,TIA  PROCEDURE: The procedure was performed at the bedside  This was a routine study  The transthoracic approach was used  The study included complete 2D imaging, M-mode, complete spectral Doppler, and color Doppler  The heart rate was 74 bpm,  at the start of the study  LEFT VENTRICLE: Size was normal  Systolic function was normal by visual assessment  Ejection fraction was estimated to be 55 %  There were no regional wall motion abnormalities  There was severe concentric hypertrophy  There was a dynamic  obstruction of the LVOT with peak velocity of 3 m/s with Valsalva  DOPPLER: Doppler parameters were consistent with elevated mean left atrial filling pressure  RIGHT VENTRICLE: The size was normal  Systolic function was normal  DOPPLER: Systolic pressure was within the normal range  LEFT ATRIUM: The atrium was markedly dilated  RIGHT ATRIUM: Size was normal     MITRAL VALVE: Valve structure was normal  There was normal leaflet separation  No echocardiographic evidence for prolapse  DOPPLER: The transmitral velocity was within the normal range  There was no evidence for stenosis  There was mild  regurgitation  AORTIC VALVE: The valve was trileaflet  Leaflets exhibited normal thickness and normal cuspal separation  DOPPLER: Transaortic velocity was within the normal range  There was no evidence for stenosis  There was no regurgitation  TRICUSPID VALVE: The valve structure was normal  There was normal leaflet separation  DOPPLER: The transtricuspid velocity was within the normal range  There was no regurgitation  PULMONIC VALVE: Leaflets exhibited normal thickness, no calcification, and normal cuspal separation  DOPPLER: The transpulmonic velocity was within the normal range  There was trace regurgitation  PERICARDIUM: There was no thickening  There was no pericardial effusion  AORTA: The root exhibited normal size  PULMONARY ARTERY: The size was normal  The morphology appeared normal     SYSTEM MEASUREMENT TABLES    2D mode  AoR Diam 2D: 2 7 cm  LA Diam (2D): 4 4 cm  LA/Ao (2D): 1 63  FS (2D Teich): 30 %  IVSd (2D): 1 53 cm  LVDEV: 67 1 cmï¾³  LVESV: 28 3 cmï¾³  LVIDd(2D): 3 93 cm  LVISd (2D): 2 75 cm  LVOT Area 2D: 3 14 cmï¾²  LVPWd (2D): 1 49 cm  SV (Teich): 38 8 cmï¾³    Apical four chamber  LVEF A4C: 58 %    Unspecified Scan Mode  STEFFI Cont Eq (Peak Candido): 2 42 cmï¾²  LVOT Diam : 2 cm  LVOT Vmax: 1500 mm/s  LVOT Vmax; Mean: 1500 mm/s  Peak Grad ; Mean: 9 mm[Hg]  MV Peak A Candido: 874 mm/s  MV Peak E Candido  Mean: 1260 mm/s  MVA (PHT): 4 31 cmï¾²  PHT: 51 ms  RA Area: 14 9 cmï¾²  RA Volume: 28 8 cmï¾³  TAPSE: 2 cm    [employer] Accredited Echocardiography Laboratory    Prepared and electronically signed by    DO Bryon Campoverde 27-Sep-2019 14:16:03       No results found for this or any previous visit  No results found for this or any previous visit  No results found for this or any previous visit    Results for orders placed during the hospital encounter of 18   NM myocardial perfusion spect (stress and/or rest)    Narrative [address] [place], [place] [address] [address] [phone]    Rest/Stress Gated SPECT Myocardial Perfusion Imaging After Regadenoson    Patient: Gregory Ramirez  MR number: [id number]  Account number: [de-identified]  : 1960  Age: 65 years  Gender: Female  Status: Inpatient  Location: Stress lab  Height: 65 in  Weight: 179 lb  BP: 116/ 66 mmHg    Allergies: HYDROMORPHONE    Diagnosis: R06 00 - Dyspnea, unspecified, R07 9 - Chest pain, unspecified    Primary Physician:  Zhang Winter DO  Technician:  Wicho Hough  RN:  MORRIS Pina  Group:  [facility]  Report Prepared By[de-identified]  MORRIS Pina  Interpreting Physician:  Kateri Skiff, MD    INDICATIONS: Evaluation of known coronary artery disease  HISTORY: The patient is a 65year old  female  Chest pain status: chest pain  Other symptoms: dyspnea  Coronary artery disease risk factors: dyslipidemia, hypertension, and family history of premature coronary artery disease  Cardiovascular history: prior myocardial infarction, stroke, and transient ischemic attack  Prior cardiovascular procedures: cardiac angiogram  Co-morbidity: obesity, lyme disease  Medications: a beta blocker, a diuretic, clopidogrel, a  lipid lowering agent, and an antihypertensive agent  PHYSICAL EXAM: Baseline physical exam screening: no wheezes audible  REST ECG: Normal sinus rhythm  PROCEDURE: The study was performed in the stress lab  A regadenoson infusion pharmacologic stress test was performed  Gated SPECT myocardial perfusion imaging was performed after stress and at rest  Systolic blood pressure was 116 mmHg, at  the start of the study  Diastolic blood pressure was 66 mmHg, at the start of the study  The heart rate was 68 bpm, at the start of the study  IV double checked  Regadenoson protocol:  Time HR bpm SBP mmHg DBP mmHg Symptoms Rhythm/conduct  Baseline 09:09 68 116 66 none NSR  Immediate 09:11 78 128 74 mild dyspnea same as above  1 min 09:12 87 156 76 same as above --  2 min 09:13 84 145 68 subsiding --  3 min 09:14 82 138 70 none --  4 min 09:15 78 130 68 none --  No medications or fluids given  STRESS SUMMARY: Duration of pharmacologic stress was 3 min   Maximal heart rate during stress was 108 bpm  The heart rate response to stress was normal  There was normal resting blood pressure with an appropriate response to stress  The  rate-pressure product for the peak heart rate and blood pressure was 82891  There was no chest pain during stress  The stress test was terminated due to protocol completion  Pre oxygen saturation: 98 %  Peak oxygen saturation: 98 %  The  stress ECG was equivocal for ischemia  ISOTOPE ADMINISTRATION:  Resting isotope administration Stress isotope administration  Agent Tetrofosmin Tetrofosmin  Dose 10 6 mCi 30 1 mCi  Date 07/27/2018 07/27/2018  Injection time 07:45 09:12    The radiopharmaceutical was injected at the peak effect of pharmacologic stress  MYOCARDIAL PERFUSION IMAGING:  The image quality was excellent  Left ventricular size was normal  The TID ratio was 1 2  PERFUSION DEFECTS:  -  There were no perfusion defects  GATED SPECT:  The calculated left ventricular ejection fraction was 67 %  There was no left ventricular regional abnormality  SUMMARY:  -  Stress results: There was no chest pain during stress  -  ECG conclusions: The stress ECG was equivocal for ischemia  -  Perfusion imaging: There were no perfusion defects   -  Gated SPECT: The calculated left ventricular ejection fraction was 67 %  There was no left ventricular regional abnormality   -  Impressions and recommendations: Likely normal study after pharmacologic vasodilation  Minimal transient dilation noted- likely physiologic  Normal EF noted  IMPRESSIONS: Likely normal study after pharmacologic vasodilation  Minimal transient dilation noted- likely physiologic  Normal EF noted  Myocardial perfusion imaging was normal at rest and with stress  Prepared and signed by    Zohra Whelan MD  Signed 07/27/2018 16:10:53         EKG: Personally reviewed      Normal sinus rhythm with LVH nonspecific ST T wave changes      Zohra Whelan MD [facility]  [phone]  Please call with any questions or suggestions    Counseling :  A description of the counseling:   Goals and Barriers:  Patient's ability to self care:  Medication side effect reviewed with patient in detail and all their questions answered  "Portions of the record may have been created with voice recognition software  Occasional wrong word or "sound a like" substitutions may have occurred due to the inherent limitations of voice recognition software  Read the chart carefully and recognize, using context, where substitutions have occurred   Please call if you have any questions  "

## 2019-11-20 ENCOUNTER — TELEPHONE (OUTPATIENT)
Dept: NEUROLOGY | Facility: CLINIC | Age: 59
End: 2019-11-20

## 2019-11-20 ENCOUNTER — OFFICE VISIT (OUTPATIENT)
Dept: NEUROLOGY | Facility: CLINIC | Age: 59
End: 2019-11-20
Payer: COMMERCIAL

## 2019-11-20 VITALS
HEIGHT: 64 IN | BODY MASS INDEX: 26.98 KG/M2 | SYSTOLIC BLOOD PRESSURE: 128 MMHG | WEIGHT: 158 LBS | DIASTOLIC BLOOD PRESSURE: 84 MMHG | HEART RATE: 137 BPM

## 2019-11-20 DIAGNOSIS — R40.20 LOSS OF CONSCIOUSNESS (HCC): ICD-10-CM

## 2019-11-20 DIAGNOSIS — I50.32 CHRONIC HEART FAILURE WITH PRESERVED EJECTION FRACTION (HCC): ICD-10-CM

## 2019-11-20 DIAGNOSIS — G40.909 SEIZURE DISORDER (HCC): Primary | ICD-10-CM

## 2019-11-20 PROBLEM — R47.81 SLURRED SPEECH: Status: RESOLVED | Noted: 2017-12-12 | Resolved: 2019-11-20

## 2019-11-20 PROBLEM — N17.9 AKI (ACUTE KIDNEY INJURY) (HCC): Status: RESOLVED | Noted: 2019-09-27 | Resolved: 2019-11-20

## 2019-11-20 PROBLEM — G92.8 TOXIC METABOLIC ENCEPHALOPATHY: Status: RESOLVED | Noted: 2019-09-27 | Resolved: 2019-11-20

## 2019-11-20 PROCEDURE — 99214 OFFICE O/P EST MOD 30 MIN: CPT | Performed by: PSYCHIATRY & NEUROLOGY

## 2019-11-20 RX ORDER — ALPRAZOLAM 0.25 MG/1
TABLET ORAL
Refills: 0 | COMMUNITY
Start: 2019-10-22

## 2019-11-20 RX ORDER — LEVETIRACETAM 500 MG/1
500 TABLET ORAL EVERY 12 HOURS SCHEDULED
Qty: 60 TABLET | Refills: 5 | Status: SHIPPED | OUTPATIENT
Start: 2019-11-20 | End: 2020-03-11 | Stop reason: SDUPTHER

## 2019-11-20 NOTE — PATIENT INSTRUCTIONS
Plan:   1 - Continue with Levetiracetam 500mg tab take one twice a day  2 - sleep deprived EEG  3 - keep a calendar regarding episodes of altered awareness  Discrete periods of unresponsiveness, staring, abnormal excessive movement, fumbling, fidgeting activity lasting 1-5 minutes  4 - try to have more family presence available to recognize unusual behaviors  5 - check a levetiracetam level  6 - call the office if there is a convulsion or another episode of loss of consciousness  7 - follow-up in 4 months  8 - speak to your cardiologist regarding aspirin; if you should continue to take this medication

## 2019-11-20 NOTE — TELEPHONE ENCOUNTER
DMV form mailed to patient's mailing address on file and scanned into patient's chart  Will call patient tomorrow to left her know and to schedule a four month follow up
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
## 2019-11-20 NOTE — PROGRESS NOTES
Review of Systems    {LimROS-complete:67044}      Review of Systems   Constitutional: Negative  Negative for appetite change and fever  HENT: Negative  Negative for hearing loss, tinnitus, trouble swallowing and voice change  Eyes: Negative  Negative for photophobia and pain  Respiratory: Negative  Negative for shortness of breath  Cardiovascular: Negative  Negative for palpitations  Gastrointestinal: Negative  Negative for nausea and vomiting  Endocrine: Negative  Negative for cold intolerance and heat intolerance  Genitourinary: Negative  Negative for dysuria, frequency and urgency  Musculoskeletal: Negative  Negative for myalgias and neck pain  Skin: Negative  Negative for rash  Neurological: Negative  Negative for dizziness, tremors, seizures, syncope, facial asymmetry, speech difficulty, weakness, light-headedness, numbness and headaches  Hematological: Negative  Does not bruise/bleed easily  Psychiatric/Behavioral: Negative  Negative for confusion, hallucinations and sleep disturbance

## 2019-11-26 NOTE — TELEPHONE ENCOUNTER
Attempted to reach patient to schedule follow up appointment  Left message requesting to return call and will mail out letter to mailing address as well

## 2019-12-19 ENCOUNTER — TELEPHONE (OUTPATIENT)
Dept: NEUROLOGY | Facility: CLINIC | Age: 59
End: 2019-12-19

## 2019-12-19 NOTE — TELEPHONE ENCOUNTER
----- Message from Mala Fenton MD sent at 12/19/2019  8:52 AM EST -----  Regarding: FW: EEG at [facility]  Please call patient to find out why she did not show up for her EEG study at [facility]  By not completing the EEG study her assessment for her episodes of loss of consciousness is incomplete      Rockie Klinefelter    ----- Message -----  From: Yohan Mosher  Sent: 12/19/2019   8:14 AM EST  To: Mala Fenton MD  Subject: EEG at [facility] was a now show on 12/18/19 for eeg at [facility]

## 2019-12-19 NOTE — TELEPHONE ENCOUNTER
Attempted to call patient but received a message saying that the call could not be completed at this time

## 2020-01-17 ENCOUNTER — TELEPHONE (OUTPATIENT)
Dept: CARDIOLOGY CLINIC | Facility: CLINIC | Age: 60
End: 2020-01-17

## 2020-03-11 ENCOUNTER — OFFICE VISIT (OUTPATIENT)
Dept: NEUROLOGY | Facility: CLINIC | Age: 60
End: 2020-03-11
Payer: COMMERCIAL

## 2020-03-11 VITALS
HEIGHT: 64 IN | DIASTOLIC BLOOD PRESSURE: 84 MMHG | HEART RATE: 109 BPM | BODY MASS INDEX: 30.9 KG/M2 | SYSTOLIC BLOOD PRESSURE: 135 MMHG | WEIGHT: 181 LBS

## 2020-03-11 DIAGNOSIS — G40.909 SEIZURE DISORDER (HCC): Primary | ICD-10-CM

## 2020-03-11 PROCEDURE — 99214 OFFICE O/P EST MOD 30 MIN: CPT | Performed by: PSYCHIATRY & NEUROLOGY

## 2020-03-11 RX ORDER — FUROSEMIDE 40 MG/1
40 TABLET ORAL DAILY
COMMUNITY
Start: 2020-01-22

## 2020-03-11 RX ORDER — LEVETIRACETAM 500 MG/1
500 TABLET ORAL EVERY 12 HOURS SCHEDULED
Qty: 180 TABLET | Refills: 3 | Status: SHIPPED | OUTPATIENT
Start: 2020-03-11

## 2020-03-11 RX ORDER — ISOSORBIDE MONONITRATE 30 MG/1
30 TABLET, EXTENDED RELEASE ORAL DAILY
COMMUNITY
Start: 2020-01-23

## 2020-03-11 RX ORDER — DILTIAZEM HYDROCHLORIDE 120 MG/1
120 CAPSULE, EXTENDED RELEASE ORAL DAILY
COMMUNITY
Start: 2020-01-23

## 2020-03-11 NOTE — PROGRESS NOTES
Review of Systems   Constitutional: Negative  HENT: Negative  Eyes: Negative  Respiratory: Positive for cough, chest tightness and shortness of breath  Cardiovascular: Positive for chest pain and palpitations  Gastrointestinal: Negative  Endocrine: Negative  Genitourinary: Negative  Musculoskeletal: Negative  Skin: Negative  Allergic/Immunologic: Negative  Neurological: Negative  Hematological: Negative  Psychiatric/Behavioral: The patient is nervous/anxious
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
## 2020-03-11 NOTE — PATIENT INSTRUCTIONS
Plan:   1 - Continue with Levetiracetam 500mg tab take one twice a day  2 - routine EEG awake and try to fall asleep study (if normal and no sleep is recorded, then will get a 24 hours ambulatory EEG)  3 - keep a diary of when you experience an episode of loss of consciousness or unresponsiveness, staring, abnormal excessive movement, fumbling, fidgeting activity lasting 1-5 minutes      4 - check a levetiracetam level  5 - call the office if there is a convulsion or another episode of loss of consciousness  6 - follow-up in 6 months

## 2020-09-10 ENCOUNTER — TELEPHONE (OUTPATIENT)
Dept: NEUROLOGY | Facility: CLINIC | Age: 60
End: 2020-09-10

## (undated) DEVICE — TIBURON SPLIT SHEET: Brand: CONVERTORS

## (undated) DEVICE — NEEDLE 25GA X 1 IN SAFETY GLIDE

## (undated) DEVICE — 3.5MM CORTEX SCREW SELF-TAPPING 26MM: Type: IMPLANTABLE DEVICE | Status: NON-FUNCTIONAL

## (undated) DEVICE — DRAPE C-ARM X-RAY

## (undated) DEVICE — SUT VICRYL 2-0 CT-1 27 IN J259H

## (undated) DEVICE — BASIC DOUBLE BASIN 2-LF: Brand: MEDLINE INDUSTRIES, INC.

## (undated) DEVICE — GLOVE SRG BIOGEL 7.5

## (undated) DEVICE — PACK GENERAL LF

## (undated) DEVICE — 3M™ COBAN™ NL STERILE NON-LATEX SELF-ADHERENT WRAP, 2084S, 4 IN X 5 YD (10 CM X 4,5 M), 18 ROLLS/CASE: Brand: 3M™ COBAN™

## (undated) DEVICE — TIBURON EXTREMITY SHEET: Brand: CONVERTORS

## (undated) DEVICE — CUFF TOURNIQUET 30 X 4 IN QUICK CONNECT DISP 1BLA

## (undated) DEVICE — GLOVE SRG BIOGEL 8

## (undated) DEVICE — DRAPE SHEET THREE QUARTER

## (undated) DEVICE — 2.5MM DRILL BIT/QC/GOLD/110MM

## (undated) DEVICE — GLOVE INDICATOR PI UNDERGLOVE SZ 8 BLUE

## (undated) DEVICE — CHLORAPREP HI-LITE 26ML ORANGE

## (undated) DEVICE — DRAPE C-ARMOUR

## (undated) DEVICE — REM POLYHESIVE ADULT PATIENT RETURN ELECTRODE: Brand: VALLEYLAB

## (undated) DEVICE — 3000CC GUARDIAN II: Brand: GUARDIAN

## (undated) DEVICE — SPONGE LAP 18 X 18 IN

## (undated) DEVICE — BANDAGE, ESMARK LF STR 6"X9' (20/CS): Brand: CYPRESS

## (undated) DEVICE — FABRIC REINFORCED, SURGICAL GOWN, XL: Brand: CONVERTORS

## (undated) DEVICE — ASTOUND FABRIC REINFORCED SURGICAL GOWN: Brand: CONVERTORS

## (undated) DEVICE — WEBRIL 6 IN UNSTERILE

## (undated) DEVICE — REPEL LITE CUT REST SURGICAL GLV LNRS X-LG: Brand: REPEL

## (undated) DEVICE — INTENDED FOR TISSUE SEPARATION, AND OTHER PROCEDURES THAT REQUIRE A SHARP SURGICAL BLADE TO PUNCTURE OR CUT.: Brand: BARD-PARKER SAFETY BLADES SIZE 10, STERILE

## (undated) DEVICE — PROXIMATE SKIN STAPLERS (35 WIDE) CONTAINS 35 STAINLESS STEEL STAPLES (FIXED HEAD): Brand: PROXIMATE

## (undated) DEVICE — GLOVE INDICATOR PI UNDERGLOVE SZ 7.5 BLUE

## (undated) DEVICE — 3.5MM CRTX SCREW/LOW PROF HD SELF-TAPPING/STAR DRIVE/40MM: Type: IMPLANTABLE DEVICE | Status: NON-FUNCTIONAL

## (undated) DEVICE — SCD SEQUENTIAL COMPRESSION COMFORT SLEEVE MEDIUM KNEE LENGTH: Brand: KENDALL SCD

## (undated) DEVICE — SPONGE GAUZE 4 X 8 12 PLY STRL LF

## (undated) DEVICE — SYRINGE 10ML LL CONTROL TOP

## (undated) DEVICE — 2.8MM PERCUTANEOUS DRILL BIT F/LCP® PL QC/200MM/100MM CALIB: Brand: LCP

## (undated) DEVICE — ANTIBACTERIAL VIOLET BRAIDED (POLYGLACTIN 910), SYNTHETIC ABSORBABLE SUTURE: Brand: COATED VICRYL

## (undated) DEVICE — OCCLUSIVE GAUZE STRIP,3% BISMUTH TRIBROMOPHENATE IN PETROLATUM BLEND: Brand: XEROFORM

## (undated) DEVICE — INTENDED FOR TISSUE SEPARATION, AND OTHER PROCEDURES THAT REQUIRE A SHARP SURGICAL BLADE TO PUNCTURE OR CUT.: Brand: BARD-PARKER SAFETY BLADES SIZE 15, STERILE